# Patient Record
Sex: MALE | Race: WHITE | Employment: OTHER | URBAN - METROPOLITAN AREA
[De-identification: names, ages, dates, MRNs, and addresses within clinical notes are randomized per-mention and may not be internally consistent; named-entity substitution may affect disease eponyms.]

---

## 2017-01-04 ENCOUNTER — HOSPITAL ENCOUNTER (OUTPATIENT)
Dept: OTHER | Age: 82
Discharge: HOME | End: 2017-01-04
Attending: INTERNAL MEDICINE | Admitting: INTERNAL MEDICINE

## 2017-01-04 ENCOUNTER — TELEPHONE (OUTPATIENT)
Dept: CARDIOLOGY CLINIC | Age: 82
End: 2017-01-04

## 2017-01-04 DIAGNOSIS — I48.0 PAROXYSMAL ATRIAL FIBRILLATION (HCC): ICD-10-CM

## 2017-01-04 DIAGNOSIS — I25.10 CORONARY ARTERY DISEASE INVOLVING NATIVE CORONARY ARTERY OF NATIVE HEART WITHOUT ANGINA PECTORIS: ICD-10-CM

## 2017-01-04 DIAGNOSIS — I10 ESSENTIAL HYPERTENSION: ICD-10-CM

## 2017-01-04 DIAGNOSIS — E78.2 MIXED HYPERLIPIDEMIA: ICD-10-CM

## 2017-01-04 LAB
ALT SERPL-CCNC: 22 U/L (ref 10–40)
ANION GAP SERPL CALCULATED.3IONS-SCNC: 17 MMOL/L (ref 3–16)
AST SERPL-CCNC: 22 U/L (ref 15–37)
BASOPHILS ABSOLUTE: 0 K/UL (ref 0–0.2)
BASOPHILS RELATIVE PERCENT: 0.5 %
BUN BLDV-MCNC: 16 MG/DL (ref 7–20)
CALCIUM SERPL-MCNC: 8.8 MG/DL (ref 8.3–10.6)
CHLORIDE BLD-SCNC: 99 MMOL/L (ref 99–110)
CHOLESTEROL, TOTAL: 124 MG/DL (ref 0–199)
CO2: 22 MMOL/L (ref 21–32)
CREAT SERPL-MCNC: 1.2 MG/DL (ref 0.8–1.3)
EOSINOPHILS ABSOLUTE: 0.2 K/UL (ref 0–0.6)
EOSINOPHILS RELATIVE PERCENT: 2.4 %
GFR AFRICAN AMERICAN: >60
GFR NON-AFRICAN AMERICAN: 58
GLUCOSE BLD-MCNC: 96 MG/DL (ref 70–99)
HCT VFR BLD CALC: 40.7 % (ref 40.5–52.5)
HDLC SERPL-MCNC: 45 MG/DL (ref 40–60)
HEMOGLOBIN: 13.7 G/DL (ref 13.5–17.5)
LDL CHOLESTEROL CALCULATED: 54 MG/DL
LYMPHOCYTES ABSOLUTE: 0.9 K/UL (ref 1–5.1)
LYMPHOCYTES RELATIVE PERCENT: 9.8 %
MCH RBC QN AUTO: 30.5 PG (ref 26–34)
MCHC RBC AUTO-ENTMCNC: 33.5 G/DL (ref 31–36)
MCV RBC AUTO: 91 FL (ref 80–100)
MONOCYTES ABSOLUTE: 0.7 K/UL (ref 0–1.3)
MONOCYTES RELATIVE PERCENT: 8.1 %
NEUTROPHILS ABSOLUTE: 7 K/UL (ref 1.7–7.7)
NEUTROPHILS RELATIVE PERCENT: 79.2 %
PDW BLD-RTO: 13.3 % (ref 12.4–15.4)
PLATELET # BLD: 151 K/UL (ref 135–450)
PMV BLD AUTO: 9 FL (ref 5–10.5)
POTASSIUM SERPL-SCNC: 4.1 MMOL/L (ref 3.5–5.1)
RBC # BLD: 4.47 M/UL (ref 4.2–5.9)
SODIUM BLD-SCNC: 138 MMOL/L (ref 136–145)
TRIGL SERPL-MCNC: 124 MG/DL (ref 0–150)
VLDLC SERPL CALC-MCNC: 25 MG/DL
WBC # BLD: 8.9 K/UL (ref 4–11)

## 2017-01-10 ENCOUNTER — TELEPHONE (OUTPATIENT)
Dept: CARDIOLOGY CLINIC | Age: 82
End: 2017-01-10

## 2017-01-17 ENCOUNTER — TELEPHONE (OUTPATIENT)
Dept: CARDIOLOGY CLINIC | Age: 82
End: 2017-01-17

## 2017-02-14 ENCOUNTER — TELEPHONE (OUTPATIENT)
Dept: CARDIOLOGY CLINIC | Age: 82
End: 2017-02-14

## 2017-03-06 ENCOUNTER — HOSPITAL ENCOUNTER (OUTPATIENT)
Dept: OTHER | Age: 82
Discharge: HOME | End: 2017-03-06
Attending: UROLOGY | Admitting: UROLOGY

## 2017-03-06 DIAGNOSIS — N20.0 KIDNEY STONE: ICD-10-CM

## 2017-04-12 PROBLEM — R19.7 DIARRHEA: Status: ACTIVE | Noted: 2017-04-12

## 2017-06-06 ENCOUNTER — TELEPHONE (OUTPATIENT)
Dept: CARDIOLOGY CLINIC | Age: 82
End: 2017-06-06

## 2017-09-25 ENCOUNTER — OFFICE VISIT (OUTPATIENT)
Dept: CARDIOLOGY CLINIC | Age: 82
End: 2017-09-25

## 2017-09-25 ENCOUNTER — HOSPITAL ENCOUNTER (OUTPATIENT)
Dept: OTHER | Age: 82
Discharge: HOME | End: 2017-09-25
Attending: INTERNAL MEDICINE | Admitting: INTERNAL MEDICINE

## 2017-09-25 ENCOUNTER — TELEPHONE (OUTPATIENT)
Dept: CARDIOLOGY CLINIC | Age: 82
End: 2017-09-25

## 2017-09-25 VITALS
HEIGHT: 71 IN | OXYGEN SATURATION: 97 % | WEIGHT: 246.9 LBS | DIASTOLIC BLOOD PRESSURE: 84 MMHG | SYSTOLIC BLOOD PRESSURE: 150 MMHG | HEART RATE: 60 BPM | BODY MASS INDEX: 34.56 KG/M2

## 2017-09-25 DIAGNOSIS — I25.10 CORONARY ARTERY DISEASE INVOLVING NATIVE CORONARY ARTERY OF NATIVE HEART WITHOUT ANGINA PECTORIS: ICD-10-CM

## 2017-09-25 DIAGNOSIS — I10 ESSENTIAL HYPERTENSION: ICD-10-CM

## 2017-09-25 DIAGNOSIS — I25.10 CORONARY ARTERY DISEASE INVOLVING NATIVE CORONARY ARTERY OF NATIVE HEART WITHOUT ANGINA PECTORIS: Primary | ICD-10-CM

## 2017-09-25 DIAGNOSIS — E78.2 MIXED HYPERLIPIDEMIA: ICD-10-CM

## 2017-09-25 DIAGNOSIS — E78.2 MIXED HYPERLIPIDEMIA: Primary | ICD-10-CM

## 2017-09-25 LAB
ALT SERPL-CCNC: 17 U/L (ref 10–40)
ANION GAP SERPL CALCULATED.3IONS-SCNC: 12 MMOL/L (ref 3–16)
AST SERPL-CCNC: 15 U/L (ref 15–37)
BUN BLDV-MCNC: 16 MG/DL (ref 7–20)
CALCIUM SERPL-MCNC: 9.3 MG/DL (ref 8.3–10.6)
CHLORIDE BLD-SCNC: 101 MMOL/L (ref 99–110)
CHOLESTEROL, TOTAL: 141 MG/DL (ref 0–199)
CO2: 26 MMOL/L (ref 21–32)
CREAT SERPL-MCNC: 0.8 MG/DL (ref 0.8–1.3)
GFR AFRICAN AMERICAN: >60
GFR NON-AFRICAN AMERICAN: >60
GLUCOSE BLD-MCNC: 108 MG/DL (ref 70–99)
HDLC SERPL-MCNC: 57 MG/DL (ref 40–60)
LDL CHOLESTEROL CALCULATED: 69 MG/DL
POTASSIUM SERPL-SCNC: 4.3 MMOL/L (ref 3.5–5.1)
SODIUM BLD-SCNC: 139 MMOL/L (ref 136–145)
TRIGL SERPL-MCNC: 76 MG/DL (ref 0–150)
VLDLC SERPL CALC-MCNC: 15 MG/DL

## 2017-09-25 PROCEDURE — 99214 OFFICE O/P EST MOD 30 MIN: CPT | Performed by: INTERNAL MEDICINE

## 2017-09-25 PROCEDURE — 1123F ACP DISCUSS/DSCN MKR DOCD: CPT | Performed by: INTERNAL MEDICINE

## 2017-09-25 PROCEDURE — 1036F TOBACCO NON-USER: CPT | Performed by: INTERNAL MEDICINE

## 2017-09-25 PROCEDURE — G8598 ASA/ANTIPLAT THER USED: HCPCS | Performed by: INTERNAL MEDICINE

## 2017-09-25 PROCEDURE — 4040F PNEUMOC VAC/ADMIN/RCVD: CPT | Performed by: INTERNAL MEDICINE

## 2017-09-25 PROCEDURE — G8417 CALC BMI ABV UP PARAM F/U: HCPCS | Performed by: INTERNAL MEDICINE

## 2017-09-25 PROCEDURE — G8427 DOCREV CUR MEDS BY ELIG CLIN: HCPCS | Performed by: INTERNAL MEDICINE

## 2017-09-25 RX ORDER — RIVAROXABAN 20 MG/1
20 TABLET, FILM COATED ORAL
Qty: 90 TABLET | Refills: 3 | Status: SHIPPED | OUTPATIENT
Start: 2017-09-25 | End: 2020-04-23 | Stop reason: SDUPTHER

## 2017-09-25 RX ORDER — LOSARTAN POTASSIUM 50 MG/1
50 TABLET ORAL DAILY
Qty: 90 TABLET | Refills: 3 | Status: SHIPPED | OUTPATIENT
Start: 2017-09-25 | End: 2018-07-03 | Stop reason: SDUPTHER

## 2017-09-25 RX ORDER — ATORVASTATIN CALCIUM 20 MG/1
20 TABLET, FILM COATED ORAL DAILY
Qty: 90 TABLET | Refills: 3 | Status: SHIPPED | OUTPATIENT
Start: 2017-09-25 | End: 2018-07-03 | Stop reason: SDUPTHER

## 2017-09-26 ENCOUNTER — TELEPHONE (OUTPATIENT)
Dept: CARDIOLOGY CLINIC | Age: 82
End: 2017-09-26

## 2017-10-11 ENCOUNTER — TELEPHONE (OUTPATIENT)
Dept: CARDIOLOGY CLINIC | Age: 82
End: 2017-10-11

## 2017-10-31 ENCOUNTER — TELEPHONE (OUTPATIENT)
Dept: CARDIOLOGY CLINIC | Age: 82
End: 2017-10-31

## 2017-10-31 NOTE — TELEPHONE ENCOUNTER
Spoke with pharmacy to verify. . Patient is in the donut hole. Currently out of samples. . Patient states he has one table left. Martita from [facility] will be bringing samples over today and I will place some at the  for patient. Patient notified.

## 2017-10-31 NOTE — TELEPHONE ENCOUNTER
Please look for samples. I assume this is donut hole issue. Is that correct?     1 [facility]

## 2017-11-03 ENCOUNTER — TELEPHONE (OUTPATIENT)
Dept: CARDIOLOGY CLINIC | Age: 82
End: 2017-11-03

## 2017-11-08 ENCOUNTER — TELEPHONE (OUTPATIENT)
Dept: CARDIOLOGY CLINIC | Age: 82
End: 2017-11-08

## 2017-11-08 NOTE — TELEPHONE ENCOUNTER
Pt's wife called to adv pt has been prescribed diclofenac gel for a hamstring injury. They are very concerned with all the the cardiac related side effects that are listed. Was told to take his bp twice a day when starting this due to this medications can raise Bp. Also states risks of causing heart attacks and a lot of other things that have the pt very concerned. Please call to adv.   Thank you [facility]

## 2017-11-08 NOTE — TELEPHONE ENCOUNTER
Called to speak with patient/patient wife; BP was 170/78 earlier today. patient has been using medication since yesterday. May be a continuous medication if works. . Patient says it seems to work so far. Feels okay. . Had patient take BP while on the phone and it was 164/79, pulse 61.  Please advise

## 2017-11-17 ENCOUNTER — TELEPHONE (OUTPATIENT)
Dept: CARDIOLOGY CLINIC | Age: 82
End: 2017-11-17

## 2017-11-17 DIAGNOSIS — I10 ESSENTIAL HYPERTENSION: Primary | ICD-10-CM

## 2017-11-17 RX ORDER — AMLODIPINE BESYLATE 5 MG/1
5 TABLET ORAL DAILY
Qty: 30 TABLET | Refills: 3 | Status: SHIPPED | OUTPATIENT
Start: 2017-11-17 | End: 2017-12-08 | Stop reason: SDUPTHER

## 2017-11-17 NOTE — TELEPHONE ENCOUNTER
Please add norvasc 5 mg po q day. Needs ov with NP 2 weeks to assess.     1 [facility] Angella

## 2017-11-17 NOTE — TELEPHONE ENCOUNTER
Pt wife calling to adv that pt bp has been going up yesterday at 6 pm when their daughter who is nurse took bp around 6 pm it was 200/100, around 2:30pm bp was 185/95 and at 4:30pm they went to the drugstore and it was 212/102. She adv that pt is saying he feels ok no symptoms.  Please call to adv thank you

## 2017-11-17 NOTE — TELEPHONE ENCOUNTER
I spoke  Pt wife and she states that pt is asymptomatic and that they are concerned about the high readings.

## 2017-12-04 ENCOUNTER — OFFICE VISIT (OUTPATIENT)
Dept: CARDIOLOGY CLINIC | Age: 82
End: 2017-12-04

## 2017-12-04 VITALS
SYSTOLIC BLOOD PRESSURE: 138 MMHG | WEIGHT: 256.9 LBS | DIASTOLIC BLOOD PRESSURE: 70 MMHG | HEIGHT: 71 IN | BODY MASS INDEX: 35.97 KG/M2 | HEART RATE: 72 BPM

## 2017-12-04 DIAGNOSIS — E78.2 MIXED HYPERLIPIDEMIA: ICD-10-CM

## 2017-12-04 DIAGNOSIS — L03.116 CELLULITIS OF LEFT LOWER EXTREMITY: ICD-10-CM

## 2017-12-04 DIAGNOSIS — I10 ESSENTIAL HYPERTENSION: ICD-10-CM

## 2017-12-04 DIAGNOSIS — I48.0 PAROXYSMAL ATRIAL FIBRILLATION (HCC): Primary | ICD-10-CM

## 2017-12-04 DIAGNOSIS — I25.10 CORONARY ARTERY DISEASE INVOLVING NATIVE CORONARY ARTERY OF NATIVE HEART WITHOUT ANGINA PECTORIS: ICD-10-CM

## 2017-12-04 PROCEDURE — G8484 FLU IMMUNIZE NO ADMIN: HCPCS | Performed by: NURSE PRACTITIONER

## 2017-12-04 PROCEDURE — G8598 ASA/ANTIPLAT THER USED: HCPCS | Performed by: NURSE PRACTITIONER

## 2017-12-04 PROCEDURE — G8417 CALC BMI ABV UP PARAM F/U: HCPCS | Performed by: NURSE PRACTITIONER

## 2017-12-04 PROCEDURE — 1036F TOBACCO NON-USER: CPT | Performed by: NURSE PRACTITIONER

## 2017-12-04 PROCEDURE — G8427 DOCREV CUR MEDS BY ELIG CLIN: HCPCS | Performed by: NURSE PRACTITIONER

## 2017-12-04 PROCEDURE — 4040F PNEUMOC VAC/ADMIN/RCVD: CPT | Performed by: NURSE PRACTITIONER

## 2017-12-04 PROCEDURE — 1123F ACP DISCUSS/DSCN MKR DOCD: CPT | Performed by: NURSE PRACTITIONER

## 2017-12-04 PROCEDURE — 99214 OFFICE O/P EST MOD 30 MIN: CPT | Performed by: NURSE PRACTITIONER

## 2017-12-04 RX ORDER — FUROSEMIDE 20 MG/1
20 TABLET ORAL DAILY
Qty: 60 TABLET | Refills: 3
Start: 2017-12-04 | End: 2018-07-03 | Stop reason: ALTCHOICE

## 2017-12-04 RX ORDER — POTASSIUM CHLORIDE 20 MEQ/1
20 TABLET, EXTENDED RELEASE ORAL DAILY
Qty: 60 TABLET | Refills: 3
Start: 2017-12-04 | End: 2018-07-03 | Stop reason: ALTCHOICE

## 2017-12-04 NOTE — PROGRESS NOTES
[address]     Outpatient Follow Up Note    CHIEF COMPLAINT / HPI:  Follow Up secondary to coronary artery disease/ HTN/ Hyperlipidemia/ AF/ edema        Brianne Denney is 80 y.o. male who presents today for a routine follow up  related to the above mentioned issues. Subjective:   Since the time of last office visit, the patient admits their symptoms have not changed. Mr. Maine Goyal is an 80year old gentleman here today in follow up for CAD, hypertension, hyperlipidemia, afib and PVD. In 2014, he was diagnosed with a DVT and completed 6 months of treatment with Xarelto. He was seen by a hematologist, initially worked up for lupus anticoagulant, but most recent testing did not show evidence of this. He was admitted  4/17 with sepsis and developed afib. He was started on Eliquis. Now on Xarelto. At today's visit patient is doing fine. He has had 2+ BLE edema for three weeks. Patient denies any problem with kidney issues and denies any hematuria  . He denies any chest pain, palpitations, SOB or dizziness. With regard to medication therapy the patient has been compliant with prescribed regimen. They have tolerated therapy to date.      Past Medical History:   Diagnosis Date    Atrial fibrillation ([address]     CAD (coronary artery disease)     Diarrhea 04/12/2017    Hyperlipidemia     Hypertension     Kidney stones 03/2017     Social History:    History   Smoking Status    Former Smoker    Packs/day: 2.00    Years: 10.00    Quit date: 1/1/1975   Smokeless Tobacco    Never Used     Current Medications:  Current Outpatient Prescriptions   Medication Sig Dispense Refill    furosemide (LASIX) 20 MG tablet Take 1 tablet by mouth daily 60 tablet 3    potassium chloride (KLOR-CON M) 20 MEQ extended release tablet Take 1 tablet by mouth daily 60 tablet 3    amLODIPine (NORVASC) 5 MG tablet Take 1 tablet by mouth daily 30 tablet 3    XARELTO 20 MG TABS tablet Take 1 tablet by mouth Daily ventricle size, wall thickness and systolic function with an EF of 55%. Mild aortic stenosis with mean gradient of 12mmHg. Trivial aortic regurgitation is present. Mild to moderate tricuspid regurgitation with RVSP estimated at 47 mmHg     Last Stress Test:  Lexiscan Myoview 2014> Normal perfusion/EF. Assessment:     .1 CAD (coronary artery disease)/CABG 1981 & 2001:   Denies any anginal symptoms. Currently on Lipitor and Losartan     2. HTN   Today's B/P- 138/70  Stable  Continue current medications   3. Hyperlipidemia- on Lipitor 20 mg daily  9/25/17: HDL: 57, LDL: 69   4. Edema: + 2 BLE edema  Add Lasix 20 mg daily and klor- Con 20 meq daily  Lab slip given for BMP   5. Atrial Fibrillation (paroxysmal): On Xarelto. PAF. HR controlled. Asymptomatic. Creat clear=91  6. DVT: Diagnosed Spring 2014>       Patient  is stable since last office visit. Plan:   Add Lasix 20 mg daily   Add Klor- Con 20 meq daily  Lab slip given for BMP  Instructed the patient on daily weights, 2 gram sodium diet, wearing compression stockings. Instructed the patient to call in one week on BLE edema status. Follow up in one month with PCP  Follow up in three months with [facility]    I have addressed the patient's cardiac risk factors and adjusted pharmacologic treatment as needed. In addition, I have reinforced the need for patient directed risk factor modification. Further evaluation will be based upon the patient's clinical course and testing results. All questions and concerns were addressed to the patient/family. Alternatives to  treatment were discussed. The patient  currently  is not smoking. The risks related to smoking were reviewed with the patient. Recommend maintaining a smoke-free lifestyle. Products available for smoking cessation were discussed.         Pt is not  on a BB  Pt is on an ace-i/ARB  Pt is on a statin    Saturated fat diet discussed  Exercise program discussed    Thank you for allowing to us to participate in the [facility]

## 2017-12-08 ENCOUNTER — TELEPHONE (OUTPATIENT)
Dept: CARDIOLOGY CLINIC | Age: 82
End: 2017-12-08

## 2017-12-08 DIAGNOSIS — I10 ESSENTIAL HYPERTENSION: ICD-10-CM

## 2017-12-08 RX ORDER — AMLODIPINE BESYLATE 5 MG/1
5 TABLET ORAL DAILY
Qty: 90 TABLET | Refills: 3 | Status: SHIPPED | OUTPATIENT
Start: 2017-12-08 | End: 2018-06-04

## 2017-12-19 ENCOUNTER — HOSPITAL ENCOUNTER (OUTPATIENT)
Dept: MRI IMAGING | Age: 82
Discharge: HOME | End: 2017-12-19
Attending: INTERNAL MEDICINE | Admitting: INTERNAL MEDICINE

## 2017-12-19 DIAGNOSIS — S76.319A HAMSTRING MUSCLE STRAIN, UNSPECIFIED LATERALITY, INITIAL ENCOUNTER: ICD-10-CM

## 2017-12-19 DIAGNOSIS — S76.311D STRAIN OF MUSCLE, FASCIA AND TENDON OF THE POSTERIOR MUSCLE GROUP AT THIGH LEVEL, RIGHT THIGH, SUBSEQUENT ENCOUNTER: ICD-10-CM

## 2017-12-20 ENCOUNTER — TELEPHONE (OUTPATIENT)
Dept: CARDIOLOGY CLINIC | Age: 82
End: 2017-12-20

## 2017-12-20 NOTE — TELEPHONE ENCOUNTER
Pt calling to let Mara Diazkishorekristina know that he had his labs done yesterday and wants to know if he should continue taking lasix and potassium. Pt would like to be called back Please

## 2018-01-08 ENCOUNTER — HOSPITAL ENCOUNTER (OUTPATIENT)
Dept: PHYSICAL THERAPY | Age: 83
Discharge: HOME | End: 2018-01-31
Attending: INTERNAL MEDICINE | Admitting: INTERNAL MEDICINE

## 2018-01-08 ASSESSMENT — PAIN DESCRIPTION - DESCRIPTORS: DESCRIPTORS: ACHING

## 2018-01-08 ASSESSMENT — PAIN DESCRIPTION - ORIENTATION: ORIENTATION: LEFT

## 2018-01-08 ASSESSMENT — PAIN DESCRIPTION - PAIN TYPE: TYPE: ACUTE PAIN

## 2018-01-08 ASSESSMENT — PAIN SCALES - GENERAL: PAINLEVEL_OUTOF10: 7

## 2018-01-08 ASSESSMENT — PAIN DESCRIPTION - FREQUENCY: FREQUENCY: CONTINUOUS

## 2018-01-08 ASSESSMENT — PAIN DESCRIPTION - LOCATION: LOCATION: LEG

## 2018-01-08 NOTE — PLAN OF CARE
Outpatient Physical Therapy     Phone: [phone] Fax: [phone]     To: Referring Practitioner: Dr. Catalino Jha      Patient: Parminder Prabhakar   : 1935   MRN: [id number]  Evaluation Date: 2018      Diagnosis Information:  · Diagnosis: L hamstring strain   · Treatment Diagnosis: increased tissue tension in medial proximal hamstring, decreased knee AROM, decreased hip strength, impaired gait, decreased balance, and decreased endurance of B [facility]     Physical Therapy Certification/Re-Certification Form  Dear Dr. Isauro Zavala,  The following patient has been evaluated for physical therapy services. Please review the attached evaluation and/or summary of the patient's plan of care, and verify that you agree therapy should continue by signing the attached document and sending it back to our office. Plan of Care/Treatment to date:  [x] Therapeutic Exercise      [x] Modalities:  [x] Therapeutic Activity        [] Ultrasound    [x] Gait Training        [] Cervical Traction   [x] Neuromuscular Re-education      [x] Cold/hotpack    [x] Instruction in HEP        [] Lumbar Traction  [x] Manual Therapy        [x] Electrical Stimulation            [] Aquatic Therapy        [] Iontophoresis        ? [] Lymphedema management  [] Women's Health     Other:  [] Vestibular Rehab        []    []  Needed     Frequency/Duration:  # Days per week: [] 1 day # Weeks: [] 1 week [] 5 weeks     [x] 2 days? [] 2 weeks [x] 6 weeks     [] 3 days   [] 3 weeks [] 7 weeks     [] 4 days   [] 4 weeks [] 8 weeks    Rehab Potential: [] Excellent [x] Good [] Fair  [] Poor     Electronically signed by:  Fly Reno PT, DPT    If you have any questions or concerns, please don't hesitate to call.   Thank you for your referral.      Physician Signature:________________________________Date:__________________  By signing above, therapists plan is approved by physician

## 2018-01-08 NOTE — PROGRESS NOTES
Physical Therapy  Initial Assessment  Date: 2018  Patient Name: Dori Morales  MRN: [id number]  : 1935     Treatment Diagnosis: increased tissue tension in medial proximal hamstring, decreased knee AROM, decreased hip strength, impaired gait, decreased balance, and decreased endurance of B LEs    Restrictions       Subjective   General  Chart Reviewed: Yes  Patient assessed for rehabilitation services?: Yes  Additional Pertinent Hx: R TKA, A fib, HTN, HLD, CAD, B shoulder sx, back sx x 4 (discectomies and possibly a fusion)  Response To Previous Treatment: Not applicable  Family / Caregiver Present: Yes  Referring Practitioner: Dr. Olga Keane  Referral Date : 17  Diagnosis: L hamstring strain  PT Visit Information  Onset Date: 17  PT Insurance Information: [address] (medicare guidelines)  Subjective  Subjective: In 2017 had very weak quad folowing TKA on R side. Went to PT to increase quad strength. Therapy was really intense and he thinks he overdid it during the last few sessions. He beleives he strained the L hamstring while strengthening the R leg. CLOF: When he goes to bed at night he has no pain. Standing, walking, and climbing stairs are all painful. Has been using ice on his hamstring. Is able to dress and bathe without issues. He uses a walking when he is out of the house. Uses a cane in the house and on the stairs. Is able to use the elevator if needed up to his condo.  PLOF: prior to R TKA, no issues with L LE Goals: return to ambu without AD, decrease pain with all ADLs and weight bearing  Pain Screening  Patient Currently in Pain: Yes  Pain Assessment  Pain Assessment: 0-10  Pain Level: 7  Pain Type: Acute pain  Pain Location: Leg  Pain Orientation: Left  Pain Descriptors: Aching  Pain Frequency: Continuous  Vital Signs  Patient Currently in Pain: Yes    Vision/Hearing  Vision  Vision: Within Functional Limits (had cataract surgery)  Hearing  Hearing: Exceptions to WFL  Hearing Exceptions: Hard of hearing/hearing concerns    Orientation  Orientation  Overall Orientation Status: Within Normal Limits    Social/Functional History  Social/Functional History  Lives With: Spouse  Type of Home: Apartment  Home Layout: One level  Home Access: Elevator;Stairs to enter with rails  Entrance Stairs - Number of Steps: 17 [address]  Entrance Stairs - Rails: Both  Bathroom Shower/Tub: Shower chair with back  Bathroom Equipment: Shower chair  Bathroom Accessibility: Accessible  Home Equipment: Rolling walker;4 wheeled walker  Receives Help From: Family  ADL Assistance: Independent  Homemaking Assistance: Independent  Homemaking Responsibilities: Yes  Ambulation Assistance: Independent  Transfer Assistance: Independent  Active : Yes  Mode of Transportation: Car  Occupation: Retired  Objective     Observation/Palpation  Posture: Fair  Palpation: no TTP distal HS    AROM LLE (degrees)  LLE General AROM: supine, goni  L Knee Extension 0: lacking 20 deg   Spine  Special Tests: HS 90/90 test on L lacking 50 deg, R lacking 30 deg    Strength RLE  R Hip Flexion: 5/5  R Hip Extension: 3+/5  R Hip Internal Rotation: 5/5  R Hip External Rotation: 5/5  R Knee Flexion: 5/5  R Knee Extension: 5/5  R Ankle Dorsiflexion: 5/5  Strength LLE  L Hip Flexion: 5/5  L Hip Extension: 3+/5  L Hip Internal Rotation: 5/5  L Hip External Rotation: 5/5  L Knee Flexion: 5/5  L Knee Extension: 5/5  L Ankle Dorsiflexion: 5/5           Bed mobility  Rolling to Left: Modified independent  Rolling to Right: Modified independent  Supine to Sit: Modified independent  Sit to Supine: Modified independent  Transfers  Sit to Stand: Modified independent  Stand to sit: Modified independent  Ambulation  Ambulation?: Yes  Ambulation 1  Surface: level tile  Device: Rolling Walker  Assistance: Modified Independent  Quality of Gait: forward flexed trunk (walker too far in front), short step length, slow candence, heavy reliance on R knee  Patient Education: PT role and plan  Barriers to Learning: none  REQUIRES PT FOLLOW UP: Yes         Plan   Plan  Times per week: 2x/weeks   Plan weeks: 6 weeks  Current Treatment Recommendations: ROM, Balance Training, Functional Mobility Training, ADL/Self-care Training, IADL Training, Endurance Training, Gait Training, Stair training, Neuromuscular Re-education, Manual Therapy - Soft Tissue Mobilization, Manual Therapy - Joint Manipulation, Home Exercise Program, Safety Education & Training, Modalities, Patient/Caregiver Education & Training, Positioning    G-Code  PT G-Codes  Functional Assessment Tool Used: PT assessment based on subjective and objective measures  Score: 40%  Functional Limitation: Mobility: Walking and moving around  Mobility: Walking and Moving Around Current Status (): At least 40 percent but less than 60 percent impaired, limited or restricted  Mobility: Walking and Moving Around Goal Status (): At least 1 percent but less than 20 percent impaired, limited or restricted      Goals  Long term goals  Time Frame for Long term goals : 6 weeks:  Long term goal 1: Pt will improve L HS length to lacking 30 deg to improve ability to ambulate and ascend stairs without limiations or pain. Long term goal 2: Pt will demo ability to ascend/descend 2 flights of stairs with reciporcal pattern and 1-2 HRs safely to increase independence in his home. Long term goal 3: Pt will safely ambulate 50 feet without AD with efficient gait mechanics to progress towards PLOF. Long term goal 4: Pt will report pain at 2/10 over 24 hour period to progress towards no pain with ADLs. Long term goal 5: Pt will be confident with HEP in [facility] to avoid future injury and maintain strength in LEs.         Therapy Time   Individual Concurrent Group Co-treatment   Time In 1340         Time Out 1430         Minutes 50         Timed Code Treatment Minutes: [address], DPT [id number]

## 2018-01-12 ENCOUNTER — TELEPHONE (OUTPATIENT)
Dept: CARDIOLOGY CLINIC | Age: 83
End: 2018-01-12

## 2018-01-12 RX ORDER — LOSARTAN POTASSIUM 50 MG/1
50 TABLET ORAL DAILY
Qty: 90 TABLET | Refills: 3 | OUTPATIENT
Start: 2018-01-12

## 2018-01-18 ENCOUNTER — TELEPHONE (OUTPATIENT)
Dept: CARDIOLOGY CLINIC | Age: 83
End: 2018-01-18

## 2018-02-01 ENCOUNTER — HOSPITAL ENCOUNTER (OUTPATIENT)
Dept: OTHER | Age: 83
Discharge: HOME | End: 2018-02-28
Attending: INTERNAL MEDICINE | Admitting: INTERNAL MEDICINE

## 2018-02-26 ENCOUNTER — HOSPITAL ENCOUNTER (OUTPATIENT)
Dept: VASCULAR LAB | Age: 83
Discharge: HOME | End: 2018-02-26
Attending: INTERNAL MEDICINE | Admitting: INTERNAL MEDICINE

## 2018-02-26 DIAGNOSIS — R60.0 LOCALIZED EDEMA: ICD-10-CM

## 2018-03-05 ENCOUNTER — TELEPHONE (OUTPATIENT)
Dept: CARDIOLOGY CLINIC | Age: 83
End: 2018-03-05

## 2018-03-05 NOTE — TELEPHONE ENCOUNTER
Last OV 12/4/17  4 bottles of samples. Placed at   Also let patient know he should have refills of amlodipine medication at pharmacy.

## 2018-03-15 ENCOUNTER — HOSPITAL ENCOUNTER (OUTPATIENT)
Dept: MRI IMAGING | Age: 83
Discharge: HOME | End: 2018-03-15
Attending: INTERNAL MEDICINE | Admitting: INTERNAL MEDICINE

## 2018-03-15 DIAGNOSIS — M54.16 RADICULOPATHY OF LUMBAR REGION: ICD-10-CM

## 2018-04-10 ENCOUNTER — TELEPHONE (OUTPATIENT)
Dept: CARDIOLOGY CLINIC | Age: 83
End: 2018-04-10

## 2018-04-17 ENCOUNTER — TELEPHONE (OUTPATIENT)
Dept: CARDIOLOGY CLINIC | Age: 83
End: 2018-04-17

## 2018-04-20 ENCOUNTER — TELEPHONE (OUTPATIENT)
Dept: CARDIOLOGY CLINIC | Age: 83
End: 2018-04-20

## 2018-05-07 ENCOUNTER — TELEPHONE (OUTPATIENT)
Dept: CARDIOLOGY CLINIC | Age: 83
End: 2018-05-07

## 2018-05-23 ENCOUNTER — TELEPHONE (OUTPATIENT)
Dept: CARDIOLOGY CLINIC | Age: 83
End: 2018-05-23

## 2018-06-01 ENCOUNTER — HOSPITAL ENCOUNTER (OUTPATIENT)
Dept: MRI IMAGING | Age: 83
Discharge: HOME | End: 2018-06-01
Attending: NURSE PRACTITIONER | Admitting: NURSE PRACTITIONER

## 2018-06-01 DIAGNOSIS — M48.062 LUMBAR STENOSIS WITH NEUROGENIC CLAUDICATION: ICD-10-CM

## 2018-06-01 DIAGNOSIS — M51.17 INTERVERTEBRAL DISC DISORDER WITH RADICULOPATHY OF LUMBOSACRAL REGION: ICD-10-CM

## 2018-06-01 RX ORDER — SODIUM CHLORIDE 0.9 % (FLUSH) 0.9 %
10 SYRINGE (ML) INJECTION PRN
Status: DISCONTINUED | OUTPATIENT
Start: 2018-06-01 | End: 2018-06-02 | Stop reason: HOSPADM

## 2018-06-01 RX ADMIN — Medication 10 ML: at 15:39

## 2018-06-03 DIAGNOSIS — I10 ESSENTIAL HYPERTENSION: ICD-10-CM

## 2018-06-04 ENCOUNTER — TELEPHONE (OUTPATIENT)
Dept: CARDIOLOGY CLINIC | Age: 83
End: 2018-06-04

## 2018-06-04 RX ORDER — AMLODIPINE BESYLATE 5 MG/1
5 TABLET ORAL DAILY
Qty: 90 TABLET | Refills: 1 | Status: SHIPPED | OUTPATIENT
Start: 2018-06-04 | End: 2018-11-26 | Stop reason: SDUPTHER

## 2018-06-28 ENCOUNTER — TELEPHONE (OUTPATIENT)
Dept: CARDIOLOGY CLINIC | Age: 83
End: 2018-06-28

## 2018-07-03 ENCOUNTER — OFFICE VISIT (OUTPATIENT)
Dept: CARDIOLOGY CLINIC | Age: 83
End: 2018-07-03

## 2018-07-03 VITALS
SYSTOLIC BLOOD PRESSURE: 138 MMHG | DIASTOLIC BLOOD PRESSURE: 70 MMHG | BODY MASS INDEX: 35.43 KG/M2 | OXYGEN SATURATION: 93 % | HEART RATE: 60 BPM | WEIGHT: 254 LBS

## 2018-07-03 DIAGNOSIS — E78.2 MIXED HYPERLIPIDEMIA: ICD-10-CM

## 2018-07-03 DIAGNOSIS — I10 ESSENTIAL HYPERTENSION: ICD-10-CM

## 2018-07-03 DIAGNOSIS — I25.10 CORONARY ARTERY DISEASE INVOLVING NATIVE CORONARY ARTERY OF NATIVE HEART WITHOUT ANGINA PECTORIS: Primary | ICD-10-CM

## 2018-07-03 PROCEDURE — 99214 OFFICE O/P EST MOD 30 MIN: CPT | Performed by: INTERNAL MEDICINE

## 2018-07-03 RX ORDER — ATORVASTATIN CALCIUM 20 MG/1
20 TABLET, FILM COATED ORAL DAILY
Qty: 90 TABLET | Refills: 3 | Status: SHIPPED | OUTPATIENT
Start: 2018-07-03 | End: 2019-05-30 | Stop reason: SDUPTHER

## 2018-07-03 RX ORDER — ALPRAZOLAM 0.5 MG/1
0.5 TABLET ORAL NIGHTLY PRN
Status: ON HOLD | COMMUNITY
End: 2020-04-16

## 2018-07-03 RX ORDER — LOSARTAN POTASSIUM 50 MG/1
50 TABLET ORAL DAILY
Qty: 90 TABLET | Refills: 3 | Status: SHIPPED | OUTPATIENT
Start: 2018-07-03 | End: 2019-05-30 | Stop reason: SDUPTHER

## 2018-07-03 RX ORDER — OXYCODONE HYDROCHLORIDE AND ACETAMINOPHEN 5; 325 MG/1; MG/1
1 TABLET ORAL EVERY 4 HOURS PRN
COMMUNITY
End: 2018-08-20

## 2018-07-03 NOTE — PROGRESS NOTES
(PERCOCET) 5-325 MG per tablet Take 1 tablet by mouth every 4 hours as needed for Pain. [place] ALPRAZolam (XANAX) 0.5 MG tablet Take 0.5 mg by mouth nightly as needed for Sleep. .      amLODIPine (NORVASC) 5 MG tablet TAKE 1 TABLET BY MOUTH DAILY 90 tablet 1    Compression Stockings MISC Knee high hose 20-30 mmHg for swelling 1 each 0    XARELTO 20 MG TABS tablet Take 1 tablet by mouth Daily with supper 90 tablet 3    losartan (COZAAR) 50 MG tablet Take 1 tablet by mouth daily 90 tablet 3    atorvastatin (LIPITOR) 20 MG tablet Take 1 tablet by mouth daily 90 tablet 3    amitriptyline (ELAVIL) 100 MG tablet TK 1 T PO QD HS  2    VIAGRA 100 MG tablet TK 1 T PO 30 MINUTES B INTERCOURSE  0    ranitidine (ZANTAC) 150 MG tablet Take 150 mg by mouth 2 times daily       nitroGLYCERIN (NITROSTAT) 0.4 MG SL tablet Place 1 tablet under the tongue every 5 minutes as needed for Chest pain 25 tablet 3    Ascorbic Acid (VITAMIN C) 500 MG tablet Take 1,000 mg by mouth daily       vitamin E 1000 UNITS capsule Take 800 Units by mouth daily       vitamin D (CHOLECALCIFEROL) 1000 UNIT TABS tablet Take 1,000 Units by mouth daily.  psyllium (KONSYL) 28.3 % PACK Take 1 packet by mouth daily       furosemide (LASIX) 20 MG tablet Take 1 tablet by mouth daily 60 tablet 3    potassium chloride (KLOR-CON M) 20 MEQ extended release tablet Take 1 tablet by mouth daily 60 tablet 3     No facility-administered encounter medications on file as of 7/3/2018. Allergies:  Codeine and Flomax [tamsulosin hcl]     [x] Medications and dosages reviewed.   ROS:  [x]Full ROS obtained and negative except as mentioned in HPI      Physical Examination:    Vitals:    07/03/18 1410   BP: 138/70   Pulse: 60   SpO2: 93%        · GENERAL: Elderly male in wheel chair in NAD  · NEUROLOGICAL: Alert and oriented  · PSYCH: Calm affect  · SKIN: Warm and dry, No obvious Rash  · HEENT: Normocephalic, Sclera non-icteric, mucus membranes moist  · NECK:

## 2018-07-12 ENCOUNTER — TELEPHONE (OUTPATIENT)
Dept: CARDIOLOGY CLINIC | Age: 83
End: 2018-07-12

## 2018-08-09 ENCOUNTER — TELEPHONE (OUTPATIENT)
Dept: CARDIOLOGY CLINIC | Age: 83
End: 2018-08-09

## 2018-08-13 ENCOUNTER — TELEPHONE (OUTPATIENT)
Dept: CARDIOLOGY CLINIC | Age: 83
End: 2018-08-13

## 2018-08-20 ENCOUNTER — HOSPITAL ENCOUNTER (OUTPATIENT)
Dept: OTHER | Age: 83
Discharge: HOME | End: 2018-08-20
Attending: INTERNAL MEDICINE | Admitting: INTERNAL MEDICINE

## 2018-08-20 ENCOUNTER — OFFICE VISIT (OUTPATIENT)
Dept: CARDIOLOGY CLINIC | Age: 83
End: 2018-08-20

## 2018-08-20 VITALS
SYSTOLIC BLOOD PRESSURE: 124 MMHG | HEART RATE: 61 BPM | WEIGHT: 259 LBS | RESPIRATION RATE: 14 BRPM | OXYGEN SATURATION: 96 % | DIASTOLIC BLOOD PRESSURE: 80 MMHG | BODY MASS INDEX: 36.26 KG/M2 | HEIGHT: 71 IN

## 2018-08-20 DIAGNOSIS — R06.02 SOB (SHORTNESS OF BREATH): ICD-10-CM

## 2018-08-20 DIAGNOSIS — R06.02 SOB (SHORTNESS OF BREATH): Primary | ICD-10-CM

## 2018-08-20 DIAGNOSIS — Z86.718 HISTORY OF DVT (DEEP VEIN THROMBOSIS): ICD-10-CM

## 2018-08-20 DIAGNOSIS — I48.0 PAROXYSMAL ATRIAL FIBRILLATION (HCC): ICD-10-CM

## 2018-08-20 DIAGNOSIS — I10 ESSENTIAL HYPERTENSION: ICD-10-CM

## 2018-08-20 DIAGNOSIS — I25.10 CORONARY ARTERY DISEASE INVOLVING NATIVE CORONARY ARTERY OF NATIVE HEART WITHOUT ANGINA PECTORIS: ICD-10-CM

## 2018-08-20 DIAGNOSIS — I65.23 BILATERAL CAROTID ARTERY STENOSIS: ICD-10-CM

## 2018-08-20 DIAGNOSIS — E78.2 MIXED HYPERLIPIDEMIA: ICD-10-CM

## 2018-08-20 LAB
ALT SERPL-CCNC: 17 U/L (ref 10–40)
ANION GAP SERPL CALCULATED.3IONS-SCNC: 12 MMOL/L (ref 3–16)
AST SERPL-CCNC: 13 U/L (ref 15–37)
BUN BLDV-MCNC: 25 MG/DL (ref 7–20)
CALCIUM SERPL-MCNC: 9.2 MG/DL (ref 8.3–10.6)
CHLORIDE BLD-SCNC: 103 MMOL/L (ref 99–110)
CHOLESTEROL, TOTAL: 143 MG/DL (ref 0–199)
CO2: 24 MMOL/L (ref 21–32)
CREAT SERPL-MCNC: 1.1 MG/DL (ref 0.8–1.3)
GFR AFRICAN AMERICAN: >60
GFR NON-AFRICAN AMERICAN: >60
GLUCOSE BLD-MCNC: 104 MG/DL (ref 70–99)
HCT VFR BLD CALC: 43.1 % (ref 40.5–52.5)
HDLC SERPL-MCNC: 53 MG/DL (ref 40–60)
HEMOGLOBIN: 14.6 G/DL (ref 13.5–17.5)
LDL CHOLESTEROL CALCULATED: 65 MG/DL
MCH RBC QN AUTO: 32.3 PG (ref 26–34)
MCHC RBC AUTO-ENTMCNC: 33.9 G/DL (ref 31–36)
MCV RBC AUTO: 95.2 FL (ref 80–100)
PDW BLD-RTO: 13.8 % (ref 12.4–15.4)
PLATELET # BLD: 129 K/UL (ref 135–450)
PMV BLD AUTO: 8.7 FL (ref 5–10.5)
POTASSIUM SERPL-SCNC: 4.6 MMOL/L (ref 3.5–5.1)
PRO-BNP: 267 PG/ML (ref 0–449)
RBC # BLD: 4.53 M/UL (ref 4.2–5.9)
SODIUM BLD-SCNC: 139 MMOL/L (ref 136–145)
TRIGL SERPL-MCNC: 123 MG/DL (ref 0–150)
VLDLC SERPL CALC-MCNC: 25 MG/DL
WBC # BLD: 7.1 K/UL (ref 4–11)

## 2018-08-20 PROCEDURE — 99214 OFFICE O/P EST MOD 30 MIN: CPT | Performed by: INTERNAL MEDICINE

## 2018-08-20 RX ORDER — AMLODIPINE BESYLATE 5 MG/1
5 TABLET ORAL DAILY
Qty: 90 TABLET | Refills: 3 | Status: CANCELLED | OUTPATIENT
Start: 2018-08-20

## 2018-08-20 RX ORDER — RIVAROXABAN 20 MG/1
20 TABLET, FILM COATED ORAL
Qty: 90 TABLET | Refills: 3 | Status: CANCELLED | OUTPATIENT
Start: 2018-08-20

## 2018-08-20 RX ORDER — RANITIDINE 150 MG/1
150 CAPSULE ORAL 2 TIMES DAILY
Status: ON HOLD | COMMUNITY
End: 2020-04-16

## 2018-08-20 NOTE — PATIENT INSTRUCTIONS
Continue current medications  Fasting labs and chest x ray today  Echocardiogram  Lexiscan myoview--no caffeine 24 hrs prior to test  Follow up in next Tuesday/Wednesday

## 2018-08-20 NOTE — LETTER
Social History:   reports that he quit smoking about 43 years ago. He has a 20.00 pack-year smoking history. He has never used smokeless tobacco. He reports that he drinks alcohol. He reports that he does not use drugs. Family History:  family history includes Cancer in his mother and sister; Diabetes in his mother; Heart Attack in his father and mother; Heart Disease in his father; High Blood Pressure in his father; High Cholesterol in his father. Home Medications:  Outpatient Encounter Prescriptions as of 8/20/2018   Medication Sig Dispense Refill    ALPRAZolam (XANAX) 0.5 MG tablet Take 0.5 mg by mouth nightly as needed for Sleep. Sylvia Glatter atorvastatin (LIPITOR) 20 MG tablet Take 1 tablet by mouth daily 90 tablet 3    losartan (COZAAR) 50 MG tablet Take 1 tablet by mouth daily 90 tablet 3    amLODIPine (NORVASC) 5 MG tablet TAKE 1 TABLET BY MOUTH DAILY 90 tablet 1    Compression Stockings MISC Knee high hose 20-30 mmHg for swelling 1 each 0    XARELTO 20 MG TABS tablet Take 1 tablet by mouth Daily with supper 90 tablet 3    amitriptyline (ELAVIL) 100 MG tablet TK 1 T PO QD HS  2    nitroGLYCERIN (NITROSTAT) 0.4 MG SL tablet Place 1 tablet under the tongue every 5 minutes as needed for Chest pain 25 tablet 3    Ascorbic Acid (VITAMIN C) 500 MG tablet Take 1,000 mg by mouth daily       vitamin E 1000 UNITS capsule Take 800 Units by mouth daily       vitamin D (CHOLECALCIFEROL) 1000 UNIT TABS tablet Take 1,000 Units by mouth daily.  [DISCONTINUED] oxyCODONE-acetaminophen (PERCOCET) 5-325 MG per tablet Take 1 tablet by mouth every 4 hours as needed for Pain. Sylvia Glatter [DISCONTINUED] VIAGRA 100 MG tablet TK 1 T PO 30 MINUTES B INTERCOURSE  0    [DISCONTINUED] ranitidine (ZANTAC) 150 MG tablet Take 150 mg by mouth 2 times daily       [DISCONTINUED] psyllium (KONSYL) 28.3 % PACK Take 1 packet by mouth daily        No facility-administered encounter medications on file as of 8/20/2018.

## 2018-08-20 NOTE — COMMUNICATION BODY
High Blood Pressure in his father; High Cholesterol in his father. Home Medications:  Outpatient Encounter Prescriptions as of 8/20/2018   Medication Sig Dispense Refill    ALPRAZolam (XANAX) 0.5 MG tablet Take 0.5 mg by mouth nightly as needed for Sleep. Venkatesh Anne atorvastatin (LIPITOR) 20 MG tablet Take 1 tablet by mouth daily 90 tablet 3    losartan (COZAAR) 50 MG tablet Take 1 tablet by mouth daily 90 tablet 3    amLODIPine (NORVASC) 5 MG tablet TAKE 1 TABLET BY MOUTH DAILY 90 tablet 1    Compression Stockings MISC Knee high hose 20-30 mmHg for swelling 1 each 0    XARELTO 20 MG TABS tablet Take 1 tablet by mouth Daily with supper 90 tablet 3    amitriptyline (ELAVIL) 100 MG tablet TK 1 T PO QD HS  2    nitroGLYCERIN (NITROSTAT) 0.4 MG SL tablet Place 1 tablet under the tongue every 5 minutes as needed for Chest pain 25 tablet 3    Ascorbic Acid (VITAMIN C) 500 MG tablet Take 1,000 mg by mouth daily       vitamin E 1000 UNITS capsule Take 800 Units by mouth daily       vitamin D (CHOLECALCIFEROL) 1000 UNIT TABS tablet Take 1,000 Units by mouth daily.  [DISCONTINUED] oxyCODONE-acetaminophen (PERCOCET) 5-325 MG per tablet Take 1 tablet by mouth every 4 hours as needed for Pain. Venkatesh Uniqueen [DISCONTINUED] VIAGRA 100 MG tablet TK 1 T PO 30 MINUTES B INTERCOURSE  0    [DISCONTINUED] ranitidine (ZANTAC) 150 MG tablet Take 150 mg by mouth 2 times daily       [DISCONTINUED] psyllium (KONSYL) 28.3 % PACK Take 1 packet by mouth daily        No facility-administered encounter medications on file as of 8/20/2018. Allergies:  Codeine and Flomax [tamsulosin hcl]     [x] Medications and dosages reviewed.   ROS:  [x]Full ROS obtained and negative except as mentioned in HPI      Physical Examination:    Vitals:    08/20/18 0850   BP: 124/80   Pulse: 61   Resp: 14   SpO2: 96%        · GENERAL: Elderly male in wheel chair in NAD  · NEUROLOGICAL: Alert and oriented  · PSYCH: Calm affect  · SKIN: Warm and dry, No obvious Rash  · HEENT: Normocephalic, Sclera non-icteric, mucus membranes moist  · NECK: supple, JVP normal  · CAROTID: Normal upstroke, no bruits  · CARDIAC: Normal PMI, regular rate and slightly irregular rhythm, normal S1S2, no murmur, rub, or gallop  · RESPIRATORY: Normal respiratory effort, Clear bilaterally  · EXTREMITIES: 1+ BLE edema, lower extremity venous stasis changes  · MUSCULOSKELETAL: no chest wall tenderness, no c/o joint pain. · GASTROINTESTINAL: normal bowel sounds, soft, non-tender, no bruit    Echo 7/14/16:  Normal left ventricle size, wall thickness and systolic function with an EF of 55%. Mild aortic stenosis with mean gradient of 12mmHg. Trivial aortic regurgitation is present. Mild to moderate tricuspid regurgitation with RVSP estimated at 47 mmHg    Assessment:     1. CAD (coronary artery disease)/CABG 1981 & 2001:   Lexiscan Myoview 2014> Normal perfusion/EF. Repeat myoview due to progressive POWELL   2. HTN (hypertension): /80 (Site: Left Arm, Position: Sitting, Cuff Size: Large Adult)   Pulse 61   Resp 14   Ht 5' 11\" (1.803 m)   Wt 259 lb (117.5 kg)   SpO2 96%   BMI 36.12 kg/m²    Controlled. Same meds   3. Hyperlipidemia: LDL=67 9/2017-On lipitor. Repeat labs   4. Carotid Stenosis:  2008 dopplers> less than 40 % bilaterally. Stable   5. Atrial Fibrillation (paroxysmal): On Xarelto. PAF. HR controlled. Asymptomatic. Creat clear=91. Unchanged. Labs today  6. DVT: Diagnosed Spring 2014>  Xarelto for 6 months. ? Hypercoagulable. Repeat testing negative. 7.  Dysnpea: I suspect this is due to deconditioning and weight gain. Need to check ECHO, Myoview, labs CXR.  F/u 1 week to review      Plan:  ECHO  Myoview  Labs  CXR  F/u 1 week    Thank you for allowing me to participate in the care of this individual.      Itzel Squires M.D., [facility]

## 2018-08-20 NOTE — PROGRESS NOTES
[facility]  Cardiac Follow up       Referring Provider:  Mily Clemens MD     Chief Complaint   Patient presents with    Follow-up    Shortness of Breath     when walking upstairs     Coronary Artery Disease    Atrial Fibrillation    Hypertension      History of Present Illness:  Mr. Yvan He is an 80year old gentleman here today in follow up for dyspnea, CAD, hypertension, hyperlipidemia, afib and PVD. In 2014, he was diagnosed with a DVT and completed 6 months of treatment with Xarelto. He was seen by a hematologist, initially worked up for lupus anticoagulant, but most recent testing did not show evidence of this. Prior to last visit, he was admitted with sepsis and developed afib. He was started on Eliquis. Now on Xarelto. He has been struggling with back pain limiting his activity. He has gained about 25#. He recently moved to a condo, but now has to walk up 18 steps. He is having difficulty with this. Severe dyspnea with exertion. No associated chest pain. Wife and daughter feel he is also now having dyspnea with minimal walking on flat surface. Past Medical History:   has a past medical history of Atrial fibrillation ([address] CAD (coronary artery disease); Diarrhea; Hyperlipidemia; Hypertension; and Kidney stones. Surgical History:   has a past surgical history that includes back surgery; knee surgery (2005); shoulder surgery; Appendectomy; Coronary artery bypass graft (1981); Coronary artery bypass graft (2001); Prostate surgery; Skin cancer excision; and joint replacement (Right). Social History:   reports that he quit smoking about 43 years ago. He has a 20.00 pack-year smoking history. He has never used smokeless tobacco. He reports that he drinks alcohol. He reports that he does not use drugs.      Family History:  family history includes Cancer in his mother and sister; Diabetes in his mother; Heart Attack in his father and mother; Heart Disease in his father; High Blood Pressure in his father; High Cholesterol in his father. Home Medications:  Outpatient Encounter Prescriptions as of 8/20/2018   Medication Sig Dispense Refill    ALPRAZolam (XANAX) 0.5 MG tablet Take 0.5 mg by mouth nightly as needed for Sleep. Lorren Lesches atorvastatin (LIPITOR) 20 MG tablet Take 1 tablet by mouth daily 90 tablet 3    losartan (COZAAR) 50 MG tablet Take 1 tablet by mouth daily 90 tablet 3    amLODIPine (NORVASC) 5 MG tablet TAKE 1 TABLET BY MOUTH DAILY 90 tablet 1    Compression Stockings MISC Knee high hose 20-30 mmHg for swelling 1 each 0    XARELTO 20 MG TABS tablet Take 1 tablet by mouth Daily with supper 90 tablet 3    amitriptyline (ELAVIL) 100 MG tablet TK 1 T PO QD HS  2    nitroGLYCERIN (NITROSTAT) 0.4 MG SL tablet Place 1 tablet under the tongue every 5 minutes as needed for Chest pain 25 tablet 3    Ascorbic Acid (VITAMIN C) 500 MG tablet Take 1,000 mg by mouth daily       vitamin E 1000 UNITS capsule Take 800 Units by mouth daily       vitamin D (CHOLECALCIFEROL) 1000 UNIT TABS tablet Take 1,000 Units by mouth daily.  [DISCONTINUED] oxyCODONE-acetaminophen (PERCOCET) 5-325 MG per tablet Take 1 tablet by mouth every 4 hours as needed for Pain. Lorren Lesches [DISCONTINUED] VIAGRA 100 MG tablet TK 1 T PO 30 MINUTES B INTERCOURSE  0    [DISCONTINUED] ranitidine (ZANTAC) 150 MG tablet Take 150 mg by mouth 2 times daily       [DISCONTINUED] psyllium (KONSYL) 28.3 % PACK Take 1 packet by mouth daily        No facility-administered encounter medications on file as of 8/20/2018. Allergies:  Codeine and Flomax [tamsulosin hcl]     [x] Medications and dosages reviewed.   ROS:  [x]Full ROS obtained and negative except as mentioned in HPI      Physical Examination:    Vitals:    08/20/18 0850   BP: 124/80   Pulse: 61   Resp: 14   SpO2: 96%        · GENERAL: Elderly male in wheel chair in NAD  · NEUROLOGICAL: Alert and oriented  · PSYCH: Calm affect  · SKIN: Warm and dry, No

## 2018-08-21 ENCOUNTER — TELEPHONE (OUTPATIENT)
Dept: CARDIOLOGY CLINIC | Age: 83
End: 2018-08-21

## 2018-08-21 NOTE — TELEPHONE ENCOUNTER
Pt calling to see if [facility] got the results to his labs and chest X-ray that was done yesterday.  Please call to adv thank you

## 2018-08-22 ENCOUNTER — HOSPITAL ENCOUNTER (OUTPATIENT)
Dept: OTHER | Age: 83
Discharge: HOME | End: 2018-08-22
Attending: NEUROLOGICAL SURGERY | Admitting: NEUROLOGICAL SURGERY

## 2018-08-22 DIAGNOSIS — M25.551 HIP PAIN, RIGHT: ICD-10-CM

## 2018-08-28 ENCOUNTER — OFFICE VISIT (OUTPATIENT)
Dept: CARDIOLOGY CLINIC | Age: 83
End: 2018-08-28

## 2018-08-28 ENCOUNTER — HOSPITAL ENCOUNTER (OUTPATIENT)
Dept: NON INVASIVE DIAGNOSTICS | Age: 83
Discharge: HOME | End: 2018-08-28
Admitting: INTERNAL MEDICINE

## 2018-08-28 VITALS
OXYGEN SATURATION: 98 % | DIASTOLIC BLOOD PRESSURE: 76 MMHG | HEIGHT: 71 IN | WEIGHT: 259 LBS | BODY MASS INDEX: 36.26 KG/M2 | RESPIRATION RATE: 16 BRPM | SYSTOLIC BLOOD PRESSURE: 120 MMHG | HEART RATE: 50 BPM

## 2018-08-28 DIAGNOSIS — I25.10 CORONARY ARTERY DISEASE INVOLVING NATIVE CORONARY ARTERY OF NATIVE HEART WITHOUT ANGINA PECTORIS: Primary | ICD-10-CM

## 2018-08-28 DIAGNOSIS — E78.2 MIXED HYPERLIPIDEMIA: ICD-10-CM

## 2018-08-28 DIAGNOSIS — R06.02 SOB (SHORTNESS OF BREATH): ICD-10-CM

## 2018-08-28 DIAGNOSIS — I25.10 CORONARY ARTERY DISEASE INVOLVING NATIVE CORONARY ARTERY OF NATIVE HEART WITHOUT ANGINA PECTORIS: ICD-10-CM

## 2018-08-28 DIAGNOSIS — I25.10 ATHEROSCLEROTIC HEART DISEASE OF NATIVE CORONARY ARTERY WITHOUT ANGINA PECTORIS: ICD-10-CM

## 2018-08-28 DIAGNOSIS — I65.23 BILATERAL CAROTID ARTERY STENOSIS: ICD-10-CM

## 2018-08-28 DIAGNOSIS — I10 ESSENTIAL HYPERTENSION: ICD-10-CM

## 2018-08-28 DIAGNOSIS — Z86.718 HISTORY OF DVT (DEEP VEIN THROMBOSIS): ICD-10-CM

## 2018-08-28 DIAGNOSIS — I48.0 PAROXYSMAL ATRIAL FIBRILLATION (HCC): ICD-10-CM

## 2018-08-28 LAB
LEFT VENTRICULAR EJECTION FRACTION HIGH VALUE: 55 %
LEFT VENTRICULAR EJECTION FRACTION MODE: NORMAL
LV EF: 50 %
LV EF: 51 %
LVEF MODALITY: NORMAL

## 2018-09-13 ENCOUNTER — TELEPHONE (OUTPATIENT)
Dept: CARDIOLOGY CLINIC | Age: 83
End: 2018-09-13

## 2018-10-02 ENCOUNTER — TELEPHONE (OUTPATIENT)
Dept: CARDIOLOGY CLINIC | Age: 83
End: 2018-10-02

## 2018-10-02 NOTE — TELEPHONE ENCOUNTER
Pt has been experiencing broken blood vessels in his left eye that covers the white part of the eye. Pt has had 3 this past month. Could this be caused by Yu Perkins? Please pt to advise.  Thank you

## 2018-10-17 ENCOUNTER — TELEPHONE (OUTPATIENT)
Dept: CARDIOLOGY CLINIC | Age: 83
End: 2018-10-17

## 2018-10-17 NOTE — TELEPHONE ENCOUNTER
Discussed with [facility]- He may proceed with tooth extraction and hold Xarelto the day prior to procedure. Please call patient and send letter to Dr Manuel Holland office.

## 2018-10-17 NOTE — TELEPHONE ENCOUNTER
3 bottles of Xarelto 20 mg placed at [facility] cardiology .  Lot # [id number] 3/2021  Notified patient

## 2018-11-19 ENCOUNTER — TELEPHONE (OUTPATIENT)
Dept: CARDIOLOGY CLINIC | Age: 83
End: 2018-11-19

## 2018-11-26 ENCOUNTER — OFFICE VISIT (OUTPATIENT)
Dept: CARDIOLOGY CLINIC | Age: 83
End: 2018-11-26
Payer: COMMERCIAL

## 2018-11-26 VITALS
WEIGHT: 255 LBS | OXYGEN SATURATION: 97 % | DIASTOLIC BLOOD PRESSURE: 60 MMHG | HEART RATE: 59 BPM | HEIGHT: 72 IN | BODY MASS INDEX: 34.54 KG/M2 | SYSTOLIC BLOOD PRESSURE: 144 MMHG

## 2018-11-26 DIAGNOSIS — Z86.718 HISTORY OF DVT (DEEP VEIN THROMBOSIS): ICD-10-CM

## 2018-11-26 DIAGNOSIS — I10 ESSENTIAL HYPERTENSION: ICD-10-CM

## 2018-11-26 DIAGNOSIS — E78.2 MIXED HYPERLIPIDEMIA: ICD-10-CM

## 2018-11-26 DIAGNOSIS — I48.0 PAROXYSMAL ATRIAL FIBRILLATION (HCC): ICD-10-CM

## 2018-11-26 DIAGNOSIS — I25.10 CORONARY ARTERY DISEASE INVOLVING NATIVE CORONARY ARTERY OF NATIVE HEART WITHOUT ANGINA PECTORIS: Primary | ICD-10-CM

## 2018-11-26 DIAGNOSIS — I65.23 BILATERAL CAROTID ARTERY STENOSIS: ICD-10-CM

## 2018-11-26 PROCEDURE — 99214 OFFICE O/P EST MOD 30 MIN: CPT | Performed by: INTERNAL MEDICINE

## 2018-11-26 RX ORDER — AMLODIPINE BESYLATE 5 MG/1
5 TABLET ORAL DAILY
Qty: 90 TABLET | Refills: 3 | Status: SHIPPED | OUTPATIENT
Start: 2018-11-26 | End: 2019-05-30 | Stop reason: SDUPTHER

## 2018-11-26 NOTE — PROGRESS NOTES
[address]  Cardiac Follow up       Referring Provider:  Lidia Park MD     Chief Complaint   Patient presents with    Atrial Fibrillation     3 month follow up    Coronary Artery Disease    Hypertension    Hyperlipidemia      History of Present Illness:  Mr. Lakesha Martinez is an 80year old gentleman here today in follow up for dyspnea, CAD, hypertension, hyperlipidemia, afib and PVD. In 2014, he was diagnosed with a DVT and completed 6 months of treatment with Xarelto. He was seen by a hematologist, initially worked up for lupus anticoagulant, but most recent testing did not show evidence of this. Prior to last visit, he was admitted with sepsis and developed afib. He was started on Eliquis. Now on Xarelto. He continues to have issues with back pain. Now in wheel chair and walks at times with walker. He is to start therapy ordered by Dr. Mariam Boswell. No chest pain. Breathing unchanged. Denies snoring. He would like to try cardiac rehab after therapy. Past Medical History:   has a past medical history of Atrial fibrillation ([address] CAD (coronary artery disease); Diarrhea; Hyperlipidemia; Hypertension; and Kidney stones. Surgical History:   has a past surgical history that includes back surgery; knee surgery (2005); shoulder surgery; Appendectomy; Coronary artery bypass graft (1981); Coronary artery bypass graft (2001); Prostate surgery; Skin cancer excision; and joint replacement (Right). Social History:   reports that he quit smoking about 43 years ago. He has a 20.00 pack-year smoking history. He has never used smokeless tobacco. He reports that he drinks alcohol. He reports that he does not use drugs. Family History:  family history includes Cancer in his mother and sister; Diabetes in his mother; Heart Attack in his father and mother; Heart Disease in his father; High Blood Pressure in his father; High Cholesterol in his father.      Home Medications:  Outpatient Encounter Prescriptions as of 11/26/2018   Medication Sig Dispense Refill    psyllium (KONSYL) 28.3 % PACK Take 1 packet by mouth daily      ranitidine (ZANTAC) 150 MG capsule Take 150 mg by mouth 2 times daily      ALPRAZolam (XANAX) 0.5 MG tablet Take 0.5 mg by mouth nightly as needed for Sleep. Huan Bonilla atorvastatin (LIPITOR) 20 MG tablet Take 1 tablet by mouth daily 90 tablet 3    losartan (COZAAR) 50 MG tablet Take 1 tablet by mouth daily 90 tablet 3    amLODIPine (NORVASC) 5 MG tablet TAKE 1 TABLET BY MOUTH DAILY 90 tablet 1    Compression Stockings MISC Knee high hose 20-30 mmHg for swelling 1 each 0    XARELTO 20 MG TABS tablet Take 1 tablet by mouth Daily with supper 90 tablet 3    amitriptyline (ELAVIL) 100 MG tablet TK 1 T PO QD HS  2    nitroGLYCERIN (NITROSTAT) 0.4 MG SL tablet Place 1 tablet under the tongue every 5 minutes as needed for Chest pain 25 tablet 3    Ascorbic Acid (VITAMIN C) 500 MG tablet Take 1,000 mg by mouth daily       vitamin E 1000 UNITS capsule Take 800 Units by mouth daily       vitamin D (CHOLECALCIFEROL) 1000 UNIT TABS tablet Take 1,000 Units by mouth daily. No facility-administered encounter medications on file as of 11/26/2018. Allergies:  Codeine and Flomax [tamsulosin hcl]     [x] Medications and dosages reviewed.   ROS:  [x]Full ROS obtained and negative except as mentioned in HPI      Physical Examination:    Vitals:    11/26/18 1344   BP: (!) 150/76   Pulse: 59   SpO2: 97%        · GENERAL: Elderly obese male in wheel chair in NAD  · NEUROLOGICAL: Alert and oriented  · PSYCH: Calm affect  · SKIN: Warm and dry, No obvious Rash  · HEENT: Normocephalic, Sclera non-icteric, mucus membranes moist  · NECK: supple, JVP normal  · CAROTID: Normal upstroke, no bruits  · CARDIAC: Normal PMI, regular rate and slightly irregular rhythm, normal S1S2, no murmur, rub, or gallop  · RESPIRATORY: Normal respiratory effort, Clear bilaterally  · EXTREMITIES: trace BLE edema, lower

## 2018-11-26 NOTE — LETTER
· NEUROLOGICAL: Alert and oriented  · PSYCH: Calm affect  · SKIN: Warm and dry, No obvious Rash  · HEENT: Normocephalic, Sclera non-icteric, mucus membranes moist  · NECK: supple, JVP normal  · CAROTID: Normal upstroke, no bruits  · CARDIAC: Normal PMI, regular rate and slightly irregular rhythm, normal S1S2, no murmur, rub, or gallop  · RESPIRATORY: Normal respiratory effort, Clear bilaterally  · EXTREMITIES: trace BLE edema, lower extremity venous stasis changes  · MUSCULOSKELETAL: no chest wall tenderness, no c/o joint pain. · GASTROINTESTINAL: normal bowel sounds, soft, non-tender, no bruit    Echo 7/14/16:  Normal left ventricle size, wall thickness and systolic function with an EF of 55%. Mild aortic stenosis with mean gradient of 12mmHg. Trivial aortic regurgitation is present. Mild to moderate tricuspid regurgitation with RVSP estimated at 47 mmHg    Assessment:     1. CAD (coronary artery disease)/CABG 1981 & 2001:   Lexiscan Myoview 2014> Normal perfusion/EF. Repeat myoview 8/2018-fixed inferior, scar vs diaphragm   2. HTN (hypertension): BP (!) 150/76 (Site: Right Upper Arm, Position: Sitting, Cuff Size: Medium Adult)   Pulse 59   Ht 6' (1.829 m)   Wt 255 lb (115.7 kg)   SpO2 97%   BMI 34.58 kg/m²    Slightly high today. Follow. Has been good   3. Hyperlipidemia: LDL=65 Aug 2018 Continue lipitor. 4. Carotid Stenosis:  2008 dopplers> less than 40 % bilaterally. Stable   5. Atrial Fibrillation (paroxysmal): On Xarelto. PAF. HR controlled. Asymptomatic. Creat clear=91. Unchanged. 6.  DVT: Diagnosed Spring 2014>  Xarelto for 6 months. ? Hypercoagulable. Repeat testing negative. On Xarelto  7. Dysnpea: I suspect this is due to deconditioning and weight gain. ECHO normal LV, some right heart strain, On xarelto so PE unlikely, doesn't want sleep study. , Myoview no ischemic.        Plan:  Same meds  Agree with PT  Cardiac rehab after he finishes PT

## 2018-12-10 ENCOUNTER — HOSPITAL ENCOUNTER (OUTPATIENT)
Dept: WOUND CARE | Age: 83
Discharge: HOME | End: 2018-12-10
Payer: COMMERCIAL

## 2018-12-10 VITALS
RESPIRATION RATE: 16 BRPM | WEIGHT: 271 LBS | DIASTOLIC BLOOD PRESSURE: 81 MMHG | TEMPERATURE: 98 F | BODY MASS INDEX: 36.75 KG/M2 | SYSTOLIC BLOOD PRESSURE: 139 MMHG

## 2018-12-10 DIAGNOSIS — M79.89 PAIN AND SWELLING OF LOWER EXTREMITY, RIGHT: ICD-10-CM

## 2018-12-10 DIAGNOSIS — L97.929 CHRONIC VENOUS HYPERTENSION (IDIOPATHIC) WITH ULCER AND INFLAMMATION OF LEFT LOWER EXTREMITY (HCC): Primary | ICD-10-CM

## 2018-12-10 DIAGNOSIS — I87.332 CHRONIC VENOUS HYPERTENSION (IDIOPATHIC) WITH ULCER AND INFLAMMATION OF LEFT LOWER EXTREMITY (HCC): Primary | ICD-10-CM

## 2018-12-10 DIAGNOSIS — M79.604 PAIN AND SWELLING OF LOWER EXTREMITY, RIGHT: ICD-10-CM

## 2018-12-10 DIAGNOSIS — M79.89 PAIN AND SWELLING OF LEFT LOWER LEG: ICD-10-CM

## 2018-12-10 DIAGNOSIS — M79.662 PAIN AND SWELLING OF LEFT LOWER LEG: ICD-10-CM

## 2018-12-10 PROCEDURE — 99213 OFFICE O/P EST LOW 20 MIN: CPT

## 2018-12-10 PROCEDURE — 11042 DBRDMT SUBQ TIS 1ST 20SQCM/<: CPT | Performed by: SURGERY

## 2018-12-10 PROCEDURE — 11042 DBRDMT SUBQ TIS 1ST 20SQCM/<: CPT

## 2018-12-10 PROCEDURE — 29580 STRAPPING UNNA BOOT: CPT

## 2018-12-10 PROCEDURE — 99203 OFFICE O/P NEW LOW 30 MIN: CPT | Performed by: SURGERY

## 2018-12-10 RX ORDER — LIDOCAINE HYDROCHLORIDE 40 MG/ML
SOLUTION TOPICAL ONCE
Status: DISCONTINUED | OUTPATIENT
Start: 2018-12-10 | End: 2018-12-11 | Stop reason: HOSPADM

## 2018-12-10 NOTE — PROGRESS NOTES
Compression Application   Below Knee    NAME:  Arlet Trevino  YOB: 1935  MEDICAL RECORD NUMBER:  [id number]  DATE:  12/10/2018       Applied moisturizing agent to dry skin as needed. Appied primary and secondary dressing as ordered     Applied  Unna Boot wrap from toes to knee overlapping each time. Applied cast padding and coban from toes to below the knee. Instructed patient/caregiver to keep dressing dry and intact. DO NOT REMOVE DRESSING. Instructed pt/family/caregiver to report excessive draining, loose bandage, wet dressing, severe pain or tingling in toes. Applied Compression dressing below the knee to Bilateral lower leg(s)      Compression wrap(s) were applied per  Guidelines.      Electronically signed by Braxton Carlos LPN on 46/26/2614 at 10:49 AM
normal rate, regular rhythm,  distal pulses intact  Abdomen: soft, non-tender, non-distended  Extremities: no cyanosis, 2+ bilateral lower extremity swelling  Musculoskeletal: normal range of motion, no joint swelling, deformity or tenderness  Neurologic: reflexes normal and symmetric, no cranial nerve deficit, gait, coordination and speech normal      Assessment:     Patient Active Problem List   Diagnosis    Mixed hyperlipidemia    HTN (hypertension)    Coronary artery disease involving native heart without angina pectoris    Bilateral carotid artery stenosis    Knee pain, bilateral    Right knee DJD    Left knee DJD    Cellulitis of leg without foot, left    Cholelithiasis    Septic shock (HCC)    Hematuria    Acute respiratory failure with hypoxemia ([address]    Urinary tract infection with hematuria    Cellulitis of left lower extremity    Hypotension    Sepsis ([address]    ODILIA (acute kidney injury) ([address]    Paroxysmal atrial fibrillation (HCC)    Lactic acidosis    Dizziness    Diarrhea    History of DVT (deep vein thrombosis)    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 Devitalized Tissue Debrided:  fibrin, biofilm and slough    Pre Debridement Measurements:  Are located in the Wound Documentation Flow Sheet    Wound #: 1 and 2     Post  Debridement Measurements:  Wound 12/10/18 Leg Posterior; Left #1 (Active)   Wound Image   12/10/2018  9:21 AM   Wound Venous 12/10/2018  9:21 AM   Wound Cleansed Rinsed/Irrigated with saline 12/10/2018  9:21 AM   Wound Length (cm) 1.5 cm 12/10/2018  9:21

## 2018-12-12 ENCOUNTER — TELEPHONE (OUTPATIENT)
Dept: CARDIOLOGY CLINIC | Age: 83
End: 2018-12-12

## 2018-12-12 NOTE — TELEPHONE ENCOUNTER
Spoke to the pt-advised to  three sample bottles of Xarelto 20 mg, Lot # [id number], Exp. 3/'21, at the [place] office.

## 2018-12-17 ENCOUNTER — HOSPITAL ENCOUNTER (OUTPATIENT)
Dept: WOUND CARE | Age: 83
Discharge: HOME | End: 2018-12-17

## 2018-12-18 ENCOUNTER — HOSPITAL ENCOUNTER (OUTPATIENT)
Dept: WOUND CARE | Age: 83
Discharge: HOME | End: 2018-12-18
Attending: SURGERY
Payer: COMMERCIAL

## 2018-12-18 VITALS
SYSTOLIC BLOOD PRESSURE: 149 MMHG | RESPIRATION RATE: 18 BRPM | TEMPERATURE: 97.8 F | DIASTOLIC BLOOD PRESSURE: 72 MMHG | HEART RATE: 66 BPM

## 2018-12-18 PROCEDURE — 29580 STRAPPING UNNA BOOT: CPT

## 2018-12-18 PROCEDURE — 99213 OFFICE O/P EST LOW 20 MIN: CPT | Performed by: SURGERY

## 2018-12-18 RX ORDER — LIDOCAINE HYDROCHLORIDE 40 MG/ML
SOLUTION TOPICAL ONCE
Status: DISCONTINUED | OUTPATIENT
Start: 2018-12-18 | End: 2018-12-19 | Stop reason: HOSPADM

## 2018-12-18 NOTE — PROGRESS NOTES
psyllium (KONSYL) 28.3 % PACK Take 1 packet by mouth daily      ranitidine (ZANTAC) 150 MG capsule Take 150 mg by mouth 2 times daily      ALPRAZolam (XANAX) 0.5 MG tablet Take 0.5 mg by mouth nightly as needed for Sleep. Cheryle Gnosticist atorvastatin (LIPITOR) 20 MG tablet Take 1 tablet by mouth daily 90 tablet 3    losartan (COZAAR) 50 MG tablet Take 1 tablet by mouth daily 90 tablet 3    XARELTO 20 MG TABS tablet Take 1 tablet by mouth Daily with supper 90 tablet 3    amitriptyline (ELAVIL) 100 MG tablet TK 1 T PO QD HS  2    Ascorbic Acid (VITAMIN C) 500 MG tablet Take 1,000 mg by mouth daily       vitamin E 1000 UNITS capsule Take 800 Units by mouth daily       vitamin D (CHOLECALCIFEROL) 1000 UNIT TABS tablet Take 1,000 Units by mouth daily.  Compression Stockings MISC Knee high hose 20-30 mmHg for swelling 1 each 0    nitroGLYCERIN (NITROSTAT) 0.4 MG SL tablet Place 1 tablet under the tongue every 5 minutes as needed for Chest pain 25 tablet 3     No current facility-administered medications on file prior to encounter. REVIEW OF SYSTEMS    Pertinent items are noted in HPI.       Objective:      BP (!) 149/72   Pulse 66   Temp 97.8 °F (36.6 °C) (Oral)   Resp 18     PHYSICAL EXAM    General Appearance: alert and oriented to person, place and time, well-developed and well-nourished, in no acute distress  Skin: warm and dry, no rash or erythema  Head: normocephalic and atraumatic  Neck: neck supple and non tender without mass, no thyromegaly or thyroid nodules, no cervical lymphadenopathy   Abdomen: soft, non-tender, non-distended, normal bowel sounds, no masses or organomegaly  Clean superficial ulcerations L leg  No open ulcerations of the R leg     Assessment:     Patient Active Problem List   Diagnosis    Mixed hyperlipidemia    HTN (hypertension)    Coronary artery disease involving native heart without angina pectoris    Bilateral carotid artery stenosis    Knee pain, bilateral    Right

## 2018-12-26 ENCOUNTER — HOSPITAL ENCOUNTER (OUTPATIENT)
Dept: WOUND CARE | Age: 83
Discharge: HOME | End: 2018-12-26
Attending: SURGERY
Payer: COMMERCIAL

## 2018-12-26 ENCOUNTER — HOSPITAL ENCOUNTER (OUTPATIENT)
Dept: VASCULAR LAB | Age: 83
Discharge: HOME | End: 2018-12-26
Payer: COMMERCIAL

## 2018-12-26 VITALS
DIASTOLIC BLOOD PRESSURE: 65 MMHG | RESPIRATION RATE: 18 BRPM | HEART RATE: 59 BPM | TEMPERATURE: 97.8 F | SYSTOLIC BLOOD PRESSURE: 135 MMHG

## 2018-12-26 DIAGNOSIS — M79.662 PAIN AND SWELLING OF LEFT LOWER LEG: ICD-10-CM

## 2018-12-26 DIAGNOSIS — M79.604 PAIN AND SWELLING OF LOWER EXTREMITY, RIGHT: ICD-10-CM

## 2018-12-26 DIAGNOSIS — M79.89 PAIN AND SWELLING OF LOWER EXTREMITY, RIGHT: ICD-10-CM

## 2018-12-26 DIAGNOSIS — L97.929 CHRONIC VENOUS HYPERTENSION (IDIOPATHIC) WITH ULCER AND INFLAMMATION OF LEFT LOWER EXTREMITY (HCC): ICD-10-CM

## 2018-12-26 DIAGNOSIS — M79.89 PAIN AND SWELLING OF LEFT LOWER LEG: ICD-10-CM

## 2018-12-26 DIAGNOSIS — I87.332 CHRONIC VENOUS HYPERTENSION (IDIOPATHIC) WITH ULCER AND INFLAMMATION OF LEFT LOWER EXTREMITY (HCC): ICD-10-CM

## 2018-12-26 PROCEDURE — 11042 DBRDMT SUBQ TIS 1ST 20SQCM/<: CPT

## 2018-12-26 PROCEDURE — 93970 EXTREMITY STUDY: CPT

## 2018-12-26 PROCEDURE — 11042 DBRDMT SUBQ TIS 1ST 20SQCM/<: CPT | Performed by: SURGERY

## 2018-12-26 RX ORDER — LIDOCAINE HYDROCHLORIDE 40 MG/ML
SOLUTION TOPICAL ONCE
Status: DISCONTINUED | OUTPATIENT
Start: 2018-12-26 | End: 2018-12-27 | Stop reason: HOSPADM

## 2018-12-26 NOTE — PROGRESS NOTES
Compression Application   Below Knee    NAME:  Devin Meza  YOB: 1935  MEDICAL RECORD NUMBER:  [id number]  DATE:  12/26/2018       Applied moisturizing agent to dry skin as needed. Appied primary and secondary dressing as ordered     Applied  Unna Boot wrap from toes to knee overlapping each time. Applied cast padding and coban from toes to below the knee. Instructed patient/caregiver to keep dressing dry and intact. DO NOT REMOVE DRESSING. Instructed pt/family/caregiver to report excessive draining, loose bandage, wet dressing, severe pain or tingling in toes. Applied Compression dressing below the knee to Left lower leg(s)      Compression wrap(s) were applied per  Guidelines.      Electronically signed by Tanesha Storey RN on 12/26/2018 at 3:05 PM

## 2018-12-31 ENCOUNTER — HOSPITAL ENCOUNTER (OUTPATIENT)
Dept: WOUND CARE | Age: 83
Discharge: HOME | End: 2018-12-31
Payer: COMMERCIAL

## 2018-12-31 VITALS — SYSTOLIC BLOOD PRESSURE: 127 MMHG | HEART RATE: 65 BPM | TEMPERATURE: 97.6 F | DIASTOLIC BLOOD PRESSURE: 64 MMHG

## 2018-12-31 DIAGNOSIS — I87.332 CHRONIC VENOUS HYPERTENSION (IDIOPATHIC) WITH ULCER AND INFLAMMATION OF LEFT LOWER EXTREMITY (HCC): Primary | ICD-10-CM

## 2018-12-31 DIAGNOSIS — L97.929 CHRONIC VENOUS HYPERTENSION (IDIOPATHIC) WITH ULCER AND INFLAMMATION OF LEFT LOWER EXTREMITY (HCC): Primary | ICD-10-CM

## 2018-12-31 PROCEDURE — 11042 DBRDMT SUBQ TIS 1ST 20SQCM/<: CPT | Performed by: SURGERY

## 2018-12-31 PROCEDURE — 11042 DBRDMT SUBQ TIS 1ST 20SQCM/<: CPT

## 2018-12-31 RX ORDER — LIDOCAINE HYDROCHLORIDE 40 MG/ML
SOLUTION TOPICAL ONCE
Status: DISCONTINUED | OUTPATIENT
Start: 2018-12-31 | End: 2019-01-01 | Stop reason: HOSPADM

## 2019-01-07 ENCOUNTER — HOSPITAL ENCOUNTER (OUTPATIENT)
Dept: WOUND CARE | Age: 84
Discharge: HOME | End: 2019-01-07
Payer: MEDICARE

## 2019-01-07 ENCOUNTER — TELEPHONE (OUTPATIENT)
Dept: CARDIOLOGY CLINIC | Age: 84
End: 2019-01-07

## 2019-01-07 VITALS
SYSTOLIC BLOOD PRESSURE: 149 MMHG | RESPIRATION RATE: 18 BRPM | HEART RATE: 66 BPM | DIASTOLIC BLOOD PRESSURE: 81 MMHG | TEMPERATURE: 98 F

## 2019-01-07 DIAGNOSIS — L97.929 CHRONIC VENOUS HYPERTENSION (IDIOPATHIC) WITH ULCER AND INFLAMMATION OF LEFT LOWER EXTREMITY (HCC): Primary | ICD-10-CM

## 2019-01-07 DIAGNOSIS — I87.332 CHRONIC VENOUS HYPERTENSION (IDIOPATHIC) WITH ULCER AND INFLAMMATION OF LEFT LOWER EXTREMITY (HCC): Primary | ICD-10-CM

## 2019-01-07 PROCEDURE — 11045 DBRDMT SUBQ TISS EACH ADDL: CPT | Performed by: SURGERY

## 2019-01-07 PROCEDURE — 11042 DBRDMT SUBQ TIS 1ST 20SQCM/<: CPT

## 2019-01-07 PROCEDURE — 11042 DBRDMT SUBQ TIS 1ST 20SQCM/<: CPT | Performed by: SURGERY

## 2019-01-07 RX ORDER — LIDOCAINE HYDROCHLORIDE 40 MG/ML
SOLUTION TOPICAL ONCE
Status: DISCONTINUED | OUTPATIENT
Start: 2019-01-07 | End: 2019-01-08 | Stop reason: HOSPADM

## 2019-01-07 RX ORDER — AMOXICILLIN AND CLAVULANATE POTASSIUM 500; 125 MG/1; MG/1
1 TABLET, FILM COATED ORAL 2 TIMES DAILY WITH MEALS
COMMUNITY
End: 2019-05-30

## 2019-01-14 ENCOUNTER — HOSPITAL ENCOUNTER (OUTPATIENT)
Dept: WOUND CARE | Age: 84
Discharge: HOME | End: 2019-01-14
Payer: MEDICARE

## 2019-01-14 ENCOUNTER — TELEPHONE (OUTPATIENT)
Dept: SURGERY | Age: 84
End: 2019-01-14

## 2019-01-14 VITALS
TEMPERATURE: 97.2 F | SYSTOLIC BLOOD PRESSURE: 140 MMHG | RESPIRATION RATE: 18 BRPM | DIASTOLIC BLOOD PRESSURE: 72 MMHG | HEART RATE: 59 BPM

## 2019-01-14 DIAGNOSIS — I87.332 CHRONIC VENOUS HYPERTENSION (IDIOPATHIC) WITH ULCER AND INFLAMMATION OF LEFT LOWER EXTREMITY (HCC): Primary | ICD-10-CM

## 2019-01-14 DIAGNOSIS — L97.929 CHRONIC VENOUS HYPERTENSION (IDIOPATHIC) WITH ULCER AND INFLAMMATION OF LEFT LOWER EXTREMITY (HCC): Primary | ICD-10-CM

## 2019-01-14 PROBLEM — I87.323 CHRONIC VENOUS HTN W INFLAMMATION OF BILATERAL LOW EXTRM: Status: ACTIVE | Noted: 2018-12-10

## 2019-01-14 PROBLEM — I87.2 VENOUS INSUFFICIENCY: Status: ACTIVE | Noted: 2019-01-14

## 2019-01-14 PROCEDURE — 99213 OFFICE O/P EST LOW 20 MIN: CPT

## 2019-01-14 PROCEDURE — 99212 OFFICE O/P EST SF 10 MIN: CPT | Performed by: SURGERY

## 2019-01-14 ASSESSMENT — PAIN SCALES - GENERAL: PAINLEVEL_OUTOF10: 0

## 2019-01-21 ENCOUNTER — TELEPHONE (OUTPATIENT)
Dept: SURGERY | Age: 84
End: 2019-01-21

## 2019-01-21 ENCOUNTER — TELEPHONE (OUTPATIENT)
Dept: CARDIOLOGY CLINIC | Age: 84
End: 2019-01-21

## 2019-02-06 ENCOUNTER — TELEPHONE (OUTPATIENT)
Dept: CARDIOLOGY CLINIC | Age: 84
End: 2019-02-06

## 2019-02-26 ENCOUNTER — TELEPHONE (OUTPATIENT)
Dept: CARDIOLOGY CLINIC | Age: 84
End: 2019-02-26

## 2019-03-08 ENCOUNTER — OFFICE VISIT (OUTPATIENT)
Dept: VASCULAR SURGERY | Age: 84
End: 2019-03-08
Payer: MEDICARE

## 2019-03-08 VITALS
BODY MASS INDEX: 34.13 KG/M2 | DIASTOLIC BLOOD PRESSURE: 74 MMHG | SYSTOLIC BLOOD PRESSURE: 134 MMHG | WEIGHT: 252 LBS | HEIGHT: 72 IN

## 2019-03-08 DIAGNOSIS — I87.323 CHRONIC VENOUS HTN W INFLAMMATION OF BILATERAL LOW EXTRM: Primary | ICD-10-CM

## 2019-03-08 PROCEDURE — 99203 OFFICE O/P NEW LOW 30 MIN: CPT | Performed by: SURGERY

## 2019-03-21 ENCOUNTER — TELEPHONE (OUTPATIENT)
Dept: CARDIOLOGY CLINIC | Age: 84
End: 2019-03-21

## 2019-03-25 ENCOUNTER — TELEPHONE (OUTPATIENT)
Dept: CARDIOLOGY CLINIC | Age: 84
End: 2019-03-25

## 2019-04-12 ENCOUNTER — TELEPHONE (OUTPATIENT)
Dept: CARDIOLOGY CLINIC | Age: 84
End: 2019-04-12

## 2019-04-12 NOTE — TELEPHONE ENCOUNTER
Sample requested: xarelto    Strength: 20 mg    Dosage: daily    Patient's call back number: [phone]

## 2019-04-17 ENCOUNTER — HOSPITAL ENCOUNTER (OUTPATIENT)
Age: 84
Discharge: HOME | End: 2019-04-17
Payer: MEDICARE

## 2019-04-17 LAB
ALBUMIN SERPL-MCNC: 4.1 G/DL (ref 3.4–5)
ALP BLD-CCNC: 72 U/L (ref 40–129)
ALT SERPL-CCNC: 11 U/L (ref 10–40)
ANION GAP SERPL CALCULATED.3IONS-SCNC: 15 MMOL/L (ref 3–16)
AST SERPL-CCNC: 13 U/L (ref 15–37)
BILIRUB SERPL-MCNC: 0.6 MG/DL (ref 0–1)
BILIRUBIN DIRECT: <0.2 MG/DL (ref 0–0.3)
BILIRUBIN, INDIRECT: ABNORMAL MG/DL (ref 0–1)
BUN BLDV-MCNC: 14 MG/DL (ref 7–20)
CALCIUM SERPL-MCNC: 9 MG/DL (ref 8.3–10.6)
CHLORIDE BLD-SCNC: 101 MMOL/L (ref 99–110)
CHOLESTEROL, TOTAL: 151 MG/DL (ref 0–199)
CO2: 24 MMOL/L (ref 21–32)
CREAT SERPL-MCNC: 0.8 MG/DL (ref 0.8–1.3)
GFR AFRICAN AMERICAN: >60
GFR NON-AFRICAN AMERICAN: >60
GLUCOSE BLD-MCNC: 118 MG/DL (ref 70–99)
HCT VFR BLD CALC: 41.3 % (ref 40.5–52.5)
HDLC SERPL-MCNC: 48 MG/DL (ref 40–60)
HEMOGLOBIN: 13.9 G/DL (ref 13.5–17.5)
LDL CHOLESTEROL CALCULATED: 80 MG/DL
MCH RBC QN AUTO: 31.4 PG (ref 26–34)
MCHC RBC AUTO-ENTMCNC: 33.7 G/DL (ref 31–36)
MCV RBC AUTO: 93.2 FL (ref 80–100)
PDW BLD-RTO: 13.4 % (ref 12.4–15.4)
PLATELET # BLD: 142 K/UL (ref 135–450)
PMV BLD AUTO: 8.9 FL (ref 5–10.5)
POTASSIUM SERPL-SCNC: 4.6 MMOL/L (ref 3.5–5.1)
RBC # BLD: 4.43 M/UL (ref 4.2–5.9)
SODIUM BLD-SCNC: 140 MMOL/L (ref 136–145)
TOTAL PROTEIN: 7.3 G/DL (ref 6.4–8.2)
TRIGL SERPL-MCNC: 117 MG/DL (ref 0–150)
VLDLC SERPL CALC-MCNC: 23 MG/DL
WBC # BLD: 5.4 K/UL (ref 4–11)

## 2019-04-17 PROCEDURE — 85027 COMPLETE CBC AUTOMATED: CPT

## 2019-04-17 PROCEDURE — 83036 HEMOGLOBIN GLYCOSYLATED A1C: CPT

## 2019-04-17 PROCEDURE — 80076 HEPATIC FUNCTION PANEL: CPT

## 2019-04-17 PROCEDURE — 80048 BASIC METABOLIC PNL TOTAL CA: CPT

## 2019-04-17 PROCEDURE — 36415 COLL VENOUS BLD VENIPUNCTURE: CPT

## 2019-04-17 PROCEDURE — 80061 LIPID PANEL: CPT

## 2019-04-18 LAB
ESTIMATED AVERAGE GLUCOSE: 111.2 MG/DL
HBA1C MFR BLD: 5.5 %

## 2019-04-30 ENCOUNTER — TELEPHONE (OUTPATIENT)
Dept: CARDIOLOGY CLINIC | Age: 84
End: 2019-04-30

## 2019-04-30 NOTE — TELEPHONE ENCOUNTER
SPOKE TO PT AND PT UNDERSTOOD- WE WERE ONLY ABLE TO GIVE 2 BOTTLES OF SAMPLES AND THEY ARE AT THE  WAITING ON     2 BOTTLES EXP 04/21

## 2019-04-30 NOTE — TELEPHONE ENCOUNTER
Sample requested:  xarelto     Strength:  20 mg    Dosage: QD    Patient's call back number:  [phone]    Requesting 3-4 boxes

## 2019-05-15 ENCOUNTER — TELEPHONE (OUTPATIENT)
Dept: CARDIOLOGY CLINIC | Age: 84
End: 2019-05-15

## 2019-05-15 NOTE — TELEPHONE ENCOUNTER
Xarelto 20 mg samples (4 bottles) placed at [facility] cardio office. Lot # [id number] exp 4/2021. Patient notified.

## 2019-05-30 ENCOUNTER — OFFICE VISIT (OUTPATIENT)
Dept: CARDIOLOGY CLINIC | Age: 84
End: 2019-05-30
Payer: MEDICARE

## 2019-05-30 VITALS
HEART RATE: 52 BPM | HEIGHT: 71 IN | SYSTOLIC BLOOD PRESSURE: 110 MMHG | DIASTOLIC BLOOD PRESSURE: 64 MMHG | WEIGHT: 252 LBS | BODY MASS INDEX: 35.28 KG/M2

## 2019-05-30 DIAGNOSIS — I10 ESSENTIAL HYPERTENSION: ICD-10-CM

## 2019-05-30 DIAGNOSIS — R06.00 DYSPNEA, UNSPECIFIED TYPE: ICD-10-CM

## 2019-05-30 DIAGNOSIS — I25.10 CORONARY ARTERY DISEASE INVOLVING NATIVE CORONARY ARTERY OF NATIVE HEART WITHOUT ANGINA PECTORIS: Primary | ICD-10-CM

## 2019-05-30 DIAGNOSIS — I48.0 PAROXYSMAL ATRIAL FIBRILLATION (HCC): ICD-10-CM

## 2019-05-30 DIAGNOSIS — E78.2 MIXED HYPERLIPIDEMIA: ICD-10-CM

## 2019-05-30 DIAGNOSIS — Z86.718 HISTORY OF DVT (DEEP VEIN THROMBOSIS): ICD-10-CM

## 2019-05-30 PROCEDURE — 99214 OFFICE O/P EST MOD 30 MIN: CPT | Performed by: INTERNAL MEDICINE

## 2019-05-30 RX ORDER — ATORVASTATIN CALCIUM 20 MG/1
20 TABLET, FILM COATED ORAL DAILY
Qty: 90 TABLET | Refills: 3 | Status: SHIPPED | OUTPATIENT
Start: 2019-05-30 | End: 2020-05-27 | Stop reason: SDUPTHER

## 2019-05-30 RX ORDER — ATORVASTATIN CALCIUM 20 MG/1
20 TABLET, FILM COATED ORAL DAILY
Qty: 90 TABLET | Refills: 3 | Status: SHIPPED | OUTPATIENT
Start: 2019-05-30 | End: 2019-05-30 | Stop reason: SDUPTHER

## 2019-05-30 RX ORDER — AMLODIPINE BESYLATE 5 MG/1
5 TABLET ORAL DAILY
Qty: 90 TABLET | Refills: 3 | Status: SHIPPED | OUTPATIENT
Start: 2019-05-30 | End: 2019-05-30 | Stop reason: SDUPTHER

## 2019-05-30 RX ORDER — AMLODIPINE BESYLATE 5 MG/1
5 TABLET ORAL DAILY
Qty: 90 TABLET | Refills: 3 | Status: ON HOLD | OUTPATIENT
Start: 2019-05-30 | End: 2020-04-20 | Stop reason: HOSPADM

## 2019-05-30 RX ORDER — LOSARTAN POTASSIUM 50 MG/1
50 TABLET ORAL DAILY
Qty: 90 TABLET | Refills: 3 | Status: SHIPPED | OUTPATIENT
Start: 2019-05-30 | End: 2019-05-30 | Stop reason: SDUPTHER

## 2019-05-30 RX ORDER — LOSARTAN POTASSIUM 50 MG/1
50 TABLET ORAL DAILY
Qty: 90 TABLET | Refills: 3 | Status: SHIPPED | OUTPATIENT
Start: 2019-05-30 | End: 2020-05-27 | Stop reason: SDUPTHER

## 2019-05-30 NOTE — PROGRESS NOTES
[address]  Cardiac Follow up       Referring Provider:  Joyce Hendrix MD     Chief Complaint   Patient presents with    Check-Up     No cardiac complaints      History of Present Illness:  Mr. Harshal Manrique is an 80year old gentleman here today in follow up for dyspnea, CAD, hypertension, hyperlipidemia, afib and PVD. In 2014, he was diagnosed with a DVT and completed 6 months of treatment with Xarelto. He was seen by a hematologist, initially worked up for lupus anticoagulant, but most recent testing did not show evidence of this. Prior to last visit, he was admitted with sepsis and developed afib. He was started on Eliquis. Now on Xarelto. He continues to have issues with back pain. Activity very limited. Did not go to rehab. He does go to wound care. Wears LE compression stocking due to venous insufficiency. No chest pain. Denies significant dyspnea as well. No palpitations or heart racing. Past Medical History:   has a past medical history of Atrial fibrillation ([address] CAD (coronary artery disease), Diarrhea, Hyperlipidemia, Hypertension, and Kidney stones. Surgical History:   has a past surgical history that includes back surgery; knee surgery (2005); shoulder surgery; Appendectomy; Coronary artery bypass graft (1981); Coronary artery bypass graft (2001); Prostate surgery; Skin cancer excision; and joint replacement (Right). Social History:   reports that he quit smoking about 44 years ago. He has a 20.00 pack-year smoking history. He has never used smokeless tobacco. He reports that he drinks alcohol. He reports that he does not use drugs. Family History:  family history includes Cancer in his mother and sister; Diabetes in his mother; Heart Attack in his father and mother; Heart Disease in his father; High Blood Pressure in his father; High Cholesterol in his father.      Home Medications:  Outpatient Encounter Medications as of 5/30/2019   Medication Sig Dispense Refill    Compression Stockings MISC by Does not apply route 20-30 mm hg compression stockings, bilateral, knee high 1 each 0    amLODIPine (NORVASC) 5 MG tablet Take 1 tablet by mouth daily 90 tablet 3    psyllium (KONSYL) 28.3 % PACK Take 1 packet by mouth daily      ranitidine (ZANTAC) 150 MG capsule Take 150 mg by mouth 2 times daily      ALPRAZolam (XANAX) 0.5 MG tablet Take 0.5 mg by mouth nightly as needed for Sleep. Raejewilliam Blank atorvastatin (LIPITOR) 20 MG tablet Take 1 tablet by mouth daily 90 tablet 3    losartan (COZAAR) 50 MG tablet Take 1 tablet by mouth daily 90 tablet 3    XARELTO 20 MG TABS tablet Take 1 tablet by mouth Daily with supper 90 tablet 3    amitriptyline (ELAVIL) 100 MG tablet TK 1 T PO QD HS  2    nitroGLYCERIN (NITROSTAT) 0.4 MG SL tablet Place 1 tablet under the tongue every 5 minutes as needed for Chest pain 25 tablet 3    Ascorbic Acid (VITAMIN C) 500 MG tablet Take 1,000 mg by mouth daily       vitamin E 1000 UNITS capsule Take 800 Units by mouth daily       vitamin D (CHOLECALCIFEROL) 1000 UNIT TABS tablet Take 2,000 Units by mouth daily       [DISCONTINUED] amoxicillin-clavulanate (AUGMENTIN) 500-125 MG per tablet Take 1 tablet by mouth 2 times daily (with meals)      [DISCONTINUED] Compression Stockings MISC Knee high hose 20-30 mmHg for swelling 1 each 0     No facility-administered encounter medications on file as of 5/30/2019. Allergies:  Codeine and Flomax [tamsulosin hcl]     [x] Medications and dosages reviewed.   ROS:  [x]Full ROS obtained and negative except as mentioned in HPI      Physical Examination:    Vitals:    05/30/19 1402   BP: 110/64   Pulse: 52      /64   Pulse 52   Ht 5' 11\" (1.803 m)   Wt 252 lb (114.3 kg)   BMI 35.15 kg/m²     · GENERAL: Elderly obese male in wheel chair in NAD  · NEUROLOGICAL: Alert and oriented  · PSYCH: Calm affect  · SKIN: Warm and dry, No obvious Rash  · HEENT: Normocephalic, Sclera non-icteric, mucus membranes moist  · NECK: supple, JVP normal  · CAROTID: Normal upstroke, no bruits  · CARDIAC: Normal PMI, regular rate and slightly irregular rhythm, normal S1S2, no murmur, rub, or gallop  · RESPIRATORY: Normal respiratory effort, Clear bilaterally  · EXTREMITIES:No LE edema. Compression stockings on  · MUSCULOSKELETAL: no chest wall tenderness, no c/o joint pain. · GASTROINTESTINAL: normal bowel sounds, soft, non-tender, no bruit    Echo 7/14/16:  Normal left ventricle size, wall thickness and systolic function with an EF of 55%. Mild aortic stenosis with mean gradient of 12mmHg. Trivial aortic regurgitation is present. Mild to moderate tricuspid regurgitation with RVSP estimated at 47 mmHg    Assessment:     1. CAD (coronary artery disease)/CABG 1981 & 2001:   Remains stable without angina. Continue medical Rx  Lexiscan Myoview 2014> Normal perfusion/EF. Repeat myoview 8/2018-fixed inferior, scar vs diaphragm   2. HTN (hypertension): /64   Pulse 52   Ht 5' 11\" (1.803 m)   Wt 252 lb (114.3 kg)   BMI 35.15 kg/m²   Well controlled. Same meds   3. Hyperlipidemia: LDL=80 April 2019. Controlled Continue lipitor. 4. Carotid Stenosis:  2008 dopplers> less than 40 % bilaterally. Stable   5. Atrial Fibrillation (paroxysmal): On Xarelto. PAF. HR controlled. Asymptomatic. Creat clear=91. Unchanged. Same RX  6. DVT: Diagnosed Spring 2014>  Xarelto for 6 months. ? Hypercoagulable. Repeat testing negative. On Xarelto  7. Dysnpea:  ECHO normal LV, some right heart strain, On xarelto so PE unlikely,still doesn't want sleep study. , Myoview non ischemic.  Suspect mainly deconditioning      Plan:  Same meds  F/u 6 months    Thank you for allowing me to participate in the care of this individual.      Ry Munoz M.D., [facility]

## 2019-06-17 ENCOUNTER — TELEPHONE (OUTPATIENT)
Dept: CARDIOLOGY CLINIC | Age: 84
End: 2019-06-17

## 2019-06-17 NOTE — TELEPHONE ENCOUNTER
Sample requested: Xarelto    Strength: 20 mg    Dosage: 1 tab daily    Patient's call back number: [phone]

## 2019-06-25 NOTE — TELEPHONE ENCOUNTER
Pt calling. Pt was told by his pharmacy that Losartan was recalled. What is [facility] recommending? Also, pt would like free samples of Xarelto      Medication Samples    (if  it's been more than 1 year, please go ahead and schedule an appointment with the physician while you have the patient on the phone)    Medication:  xarelto    Doseage of the medication:  20 mg  How are you taking this medication (QD, BID, TID, QID, PRN):  1 daily    in the office or Mail to your home? Pt is going out of town and would like 4 bottles if available this week. Please call pt to advise.

## 2019-07-25 ENCOUNTER — TELEPHONE (OUTPATIENT)
Dept: CARDIOLOGY CLINIC | Age: 84
End: 2019-07-25

## 2019-08-12 ENCOUNTER — TELEPHONE (OUTPATIENT)
Dept: CARDIOLOGY CLINIC | Age: 84
End: 2019-08-12

## 2019-08-13 ENCOUNTER — TELEPHONE (OUTPATIENT)
Dept: CARDIOLOGY CLINIC | Age: 84
End: 2019-08-13

## 2019-08-14 ENCOUNTER — TELEPHONE (OUTPATIENT)
Dept: CARDIOLOGY CLINIC | Age: 84
End: 2019-08-14

## 2019-09-03 ENCOUNTER — TELEPHONE (OUTPATIENT)
Dept: CARDIOLOGY CLINIC | Age: 84
End: 2019-09-03

## 2019-09-03 NOTE — TELEPHONE ENCOUNTER
Sample requested: Xarelto    Strength:     Dosage: 20 mg    Patient's call back number: [phone]     Please call to let him know if/when ready to . He will have daughter pick them up.

## 2019-09-27 ENCOUNTER — TELEPHONE (OUTPATIENT)
Dept: CARDIOLOGY CLINIC | Age: 84
End: 2019-09-27

## 2019-10-18 ENCOUNTER — OFFICE VISIT (OUTPATIENT)
Dept: CARDIOLOGY CLINIC | Age: 84
End: 2019-10-18
Payer: MEDICARE

## 2019-10-18 VITALS
HEART RATE: 76 BPM | WEIGHT: 262 LBS | SYSTOLIC BLOOD PRESSURE: 136 MMHG | HEIGHT: 71 IN | BODY MASS INDEX: 36.68 KG/M2 | RESPIRATION RATE: 18 BRPM | DIASTOLIC BLOOD PRESSURE: 72 MMHG

## 2019-10-18 DIAGNOSIS — I25.10 CORONARY ARTERY DISEASE INVOLVING NATIVE CORONARY ARTERY OF NATIVE HEART WITHOUT ANGINA PECTORIS: Primary | ICD-10-CM

## 2019-10-18 DIAGNOSIS — I10 ESSENTIAL HYPERTENSION: ICD-10-CM

## 2019-10-18 DIAGNOSIS — I48.0 PAROXYSMAL ATRIAL FIBRILLATION (HCC): ICD-10-CM

## 2019-10-18 DIAGNOSIS — E78.2 MIXED HYPERLIPIDEMIA: ICD-10-CM

## 2019-10-18 DIAGNOSIS — Z86.718 HISTORY OF DVT (DEEP VEIN THROMBOSIS): ICD-10-CM

## 2019-10-18 DIAGNOSIS — I65.23 BILATERAL CAROTID ARTERY STENOSIS: ICD-10-CM

## 2019-10-18 PROCEDURE — 99214 OFFICE O/P EST MOD 30 MIN: CPT | Performed by: INTERNAL MEDICINE

## 2019-11-08 ENCOUNTER — TELEPHONE (OUTPATIENT)
Dept: CARDIOLOGY CLINIC | Age: 84
End: 2019-11-08

## 2019-12-11 ENCOUNTER — TELEPHONE (OUTPATIENT)
Dept: CARDIOLOGY CLINIC | Age: 84
End: 2019-12-11

## 2020-01-08 ENCOUNTER — TELEPHONE (OUTPATIENT)
Dept: CARDIOLOGY CLINIC | Age: 85
End: 2020-01-08

## 2020-01-08 NOTE — TELEPHONE ENCOUNTER
Sample requested: Xarelto    Strength: 20 mg    Dosage: 1 tab day    Patient's call back number: [phone]

## 2020-01-22 ENCOUNTER — TELEPHONE (OUTPATIENT)
Dept: CARDIOLOGY CLINIC | Age: 85
End: 2020-01-22

## 2020-01-22 NOTE — TELEPHONE ENCOUNTER
Call placed to patient he was informed that his samples are ready for . He states that he may his daughter Alec Pratt pick them up. Patient informed of office hrs.

## 2020-02-24 ENCOUNTER — TELEPHONE (OUTPATIENT)
Dept: CARDIOLOGY CLINIC | Age: 85
End: 2020-02-24

## 2020-02-24 NOTE — TELEPHONE ENCOUNTER
Spoke with patient informed him that we currently don't have an samples of xarelto 20 mg that we have notified a drug rep.

## 2020-02-26 ENCOUNTER — TELEPHONE (OUTPATIENT)
Dept: CARDIOLOGY CLINIC | Age: 85
End: 2020-02-26

## 2020-03-13 ENCOUNTER — TELEPHONE (OUTPATIENT)
Dept: CARDIOLOGY CLINIC | Age: 85
End: 2020-03-13

## 2020-04-16 ENCOUNTER — APPOINTMENT (OUTPATIENT)
Dept: GENERAL RADIOLOGY | Age: 85
DRG: 871 | End: 2020-04-16
Payer: MEDICARE

## 2020-04-16 ENCOUNTER — TELEPHONE (OUTPATIENT)
Dept: CARDIOLOGY CLINIC | Age: 85
End: 2020-04-16

## 2020-04-16 ENCOUNTER — HOSPITAL ENCOUNTER (INPATIENT)
Age: 85
LOS: 4 days | Discharge: HOME | DRG: 871 | End: 2020-04-20
Attending: EMERGENCY MEDICINE | Admitting: INTERNAL MEDICINE
Payer: MEDICARE

## 2020-04-16 PROBLEM — Z20.822 SUSPECTED COVID-19 VIRUS INFECTION: Status: ACTIVE | Noted: 2020-04-16

## 2020-04-16 PROBLEM — R79.89 ELEVATED TROPONIN: Status: ACTIVE | Noted: 2020-04-16

## 2020-04-16 PROBLEM — E44.0 MODERATE PROTEIN-CALORIE MALNUTRITION (HCC): Status: ACTIVE | Noted: 2020-04-16

## 2020-04-16 PROBLEM — D72.819 LEUKOPENIA: Status: ACTIVE | Noted: 2020-04-16

## 2020-04-16 PROBLEM — R00.0 TACHYCARDIA: Status: ACTIVE | Noted: 2020-04-16

## 2020-04-16 PROBLEM — R06.82 TACHYPNEA: Status: ACTIVE | Noted: 2020-04-16

## 2020-04-16 PROBLEM — R77.8 ELEVATED TROPONIN: Status: ACTIVE | Noted: 2020-04-16

## 2020-04-16 PROBLEM — J81.0 ACUTE PULMONARY EDEMA (HCC): Status: ACTIVE | Noted: 2020-04-16

## 2020-04-16 PROBLEM — R79.89 ELEVATED LACTIC ACID LEVEL: Status: ACTIVE | Noted: 2020-04-16

## 2020-04-16 PROBLEM — R50.9 FEVER: Status: ACTIVE | Noted: 2020-04-16

## 2020-04-16 PROBLEM — E66.01 MORBID OBESITY DUE TO EXCESS CALORIES (HCC): Status: ACTIVE | Noted: 2020-04-16

## 2020-04-16 PROBLEM — E83.42 HYPOMAGNESEMIA: Status: ACTIVE | Noted: 2020-04-16

## 2020-04-16 LAB
A/G RATIO: 0.8 (ref 1.1–2.2)
ALBUMIN SERPL-MCNC: 2.9 G/DL (ref 3.4–5)
ALP BLD-CCNC: 79 U/L (ref 40–129)
ALT SERPL-CCNC: 17 U/L (ref 10–40)
ANION GAP SERPL CALCULATED.3IONS-SCNC: 12 MMOL/L (ref 3–16)
ANION GAP SERPL CALCULATED.3IONS-SCNC: 16 MMOL/L (ref 3–16)
APTT: 37.8 SEC (ref 24.2–36.2)
AST SERPL-CCNC: 27 U/L (ref 15–37)
BACTERIA: ABNORMAL /HPF
BASOPHILS ABSOLUTE: 0 K/UL (ref 0–0.2)
BASOPHILS ABSOLUTE: 0 K/UL (ref 0–0.2)
BASOPHILS RELATIVE PERCENT: 0.1 %
BASOPHILS RELATIVE PERCENT: 0.2 %
BILIRUB SERPL-MCNC: 0.6 MG/DL (ref 0–1)
BILIRUBIN URINE: NEGATIVE
BLOOD, URINE: ABNORMAL
BUN BLDV-MCNC: 21 MG/DL (ref 7–20)
BUN BLDV-MCNC: 24 MG/DL (ref 7–20)
C-REACTIVE PROTEIN: 81.3 MG/L (ref 0–5.1)
CALCIUM SERPL-MCNC: 8.5 MG/DL (ref 8.3–10.6)
CALCIUM SERPL-MCNC: 8.7 MG/DL (ref 8.3–10.6)
CHLORIDE BLD-SCNC: 102 MMOL/L (ref 99–110)
CHLORIDE BLD-SCNC: 99 MMOL/L (ref 99–110)
CLARITY: ABNORMAL
CO2: 21 MMOL/L (ref 21–32)
CO2: 23 MMOL/L (ref 21–32)
COLOR: YELLOW
CREAT SERPL-MCNC: 1.3 MG/DL (ref 0.8–1.3)
CREAT SERPL-MCNC: 1.4 MG/DL (ref 0.8–1.3)
D DIMER: 707 NG/ML DDU (ref 0–229)
EKG ATRIAL RATE: 94 BPM
EKG DIAGNOSIS: NORMAL
EKG Q-T INTERVAL: 374 MS
EKG QRS DURATION: 110 MS
EKG QTC CALCULATION (BAZETT): 467 MS
EKG R AXIS: 32 DEGREES
EKG T AXIS: -9 DEGREES
EKG VENTRICULAR RATE: 94 BPM
EOSINOPHILS ABSOLUTE: 0 K/UL (ref 0–0.6)
EOSINOPHILS ABSOLUTE: 0 K/UL (ref 0–0.6)
EOSINOPHILS RELATIVE PERCENT: 0.3 %
EOSINOPHILS RELATIVE PERCENT: 0.3 %
EPITHELIAL CELLS, UA: 1 /HPF (ref 0–5)
FERRITIN: 373.8 NG/ML (ref 30–400)
GFR AFRICAN AMERICAN: 58
GFR AFRICAN AMERICAN: >60
GFR NON-AFRICAN AMERICAN: 48
GFR NON-AFRICAN AMERICAN: 53
GLOBULIN: 3.7 G/DL
GLUCOSE BLD-MCNC: 132 MG/DL (ref 70–99)
GLUCOSE BLD-MCNC: 136 MG/DL (ref 70–99)
GLUCOSE URINE: NEGATIVE MG/DL
HCT VFR BLD CALC: 31.3 % (ref 40.5–52.5)
HCT VFR BLD CALC: 34.1 % (ref 40.5–52.5)
HEMOGLOBIN: 10.5 G/DL (ref 13.5–17.5)
HEMOGLOBIN: 11.3 G/DL (ref 13.5–17.5)
HYALINE CASTS: 1 /LPF (ref 0–8)
INR BLD: 2.43 (ref 0.86–1.14)
KETONES, URINE: NEGATIVE MG/DL
LACTATE DEHYDROGENASE: 264 U/L (ref 100–190)
LACTIC ACID: 1.4 MMOL/L (ref 0.4–2)
LACTIC ACID: 1.7 MMOL/L (ref 0.4–2)
LACTIC ACID: 3.4 MMOL/L (ref 0.4–2)
LEUKOCYTE ESTERASE, URINE: NEGATIVE
LYMPHOCYTES ABSOLUTE: 0.2 K/UL (ref 1–5.1)
LYMPHOCYTES ABSOLUTE: 0.3 K/UL (ref 1–5.1)
LYMPHOCYTES RELATIVE PERCENT: 6.6 %
LYMPHOCYTES RELATIVE PERCENT: 7.9 %
MAGNESIUM: 1.6 MG/DL (ref 1.8–2.4)
MCH RBC QN AUTO: 30 PG (ref 26–34)
MCH RBC QN AUTO: 30.3 PG (ref 26–34)
MCHC RBC AUTO-ENTMCNC: 33.2 G/DL (ref 31–36)
MCHC RBC AUTO-ENTMCNC: 33.6 G/DL (ref 31–36)
MCV RBC AUTO: 90 FL (ref 80–100)
MCV RBC AUTO: 90.3 FL (ref 80–100)
MICROSCOPIC EXAMINATION: YES
MONOCYTES ABSOLUTE: 0.2 K/UL (ref 0–1.3)
MONOCYTES ABSOLUTE: 0.2 K/UL (ref 0–1.3)
MONOCYTES RELATIVE PERCENT: 5.4 %
MONOCYTES RELATIVE PERCENT: 5.8 %
NEUTROPHILS ABSOLUTE: 2.7 K/UL (ref 1.7–7.7)
NEUTROPHILS ABSOLUTE: 3.2 K/UL (ref 1.7–7.7)
NEUTROPHILS RELATIVE PERCENT: 86.2 %
NEUTROPHILS RELATIVE PERCENT: 87.2 %
NITRITE, URINE: NEGATIVE
PDW BLD-RTO: 14.3 % (ref 12.4–15.4)
PDW BLD-RTO: 14.5 % (ref 12.4–15.4)
PH UA: 5.5 (ref 5–8)
PLATELET # BLD: 81 K/UL (ref 135–450)
PLATELET # BLD: 87 K/UL (ref 135–450)
PLATELET SLIDE REVIEW: ABNORMAL
PMV BLD AUTO: 8 FL (ref 5–10.5)
PMV BLD AUTO: 8.2 FL (ref 5–10.5)
POTASSIUM REFLEX MAGNESIUM: 3.5 MMOL/L (ref 3.5–5.1)
POTASSIUM REFLEX MAGNESIUM: 3.7 MMOL/L (ref 3.5–5.1)
PRO-BNP: 2190 PG/ML (ref 0–449)
PROCALCITONIN: 0.43 NG/ML (ref 0–0.15)
PROTEIN UA: 30 MG/DL
PROTHROMBIN TIME: 28.4 SEC (ref 10–13.2)
RAPID INFLUENZA  B AGN: NEGATIVE
RAPID INFLUENZA A AGN: NEGATIVE
RBC # BLD: 3.48 M/UL (ref 4.2–5.9)
RBC # BLD: 3.78 M/UL (ref 4.2–5.9)
RBC UA: ABNORMAL /HPF (ref 0–4)
REASON FOR REJECTION: NORMAL
REJECTED TEST: NORMAL
SARS-COV-2, PCR: NOT DETECTED
SLIDE REVIEW: ABNORMAL
SODIUM BLD-SCNC: 136 MMOL/L (ref 136–145)
SODIUM BLD-SCNC: 137 MMOL/L (ref 136–145)
SPECIFIC GRAVITY UA: 1.02 (ref 1–1.03)
TOTAL PROTEIN: 6.6 G/DL (ref 6.4–8.2)
TROPONIN: 0.02 NG/ML
URINE REFLEX TO CULTURE: ABNORMAL
URINE TYPE: ABNORMAL
UROBILINOGEN, URINE: 1 E.U./DL
WBC # BLD: 3.1 K/UL (ref 4–11)
WBC # BLD: 3.8 K/UL (ref 4–11)
WBC UA: 1 /HPF (ref 0–5)

## 2020-04-16 PROCEDURE — 87804 INFLUENZA ASSAY W/OPTIC: CPT

## 2020-04-16 PROCEDURE — 84145 PROCALCITONIN (PCT): CPT

## 2020-04-16 PROCEDURE — 85610 PROTHROMBIN TIME: CPT

## 2020-04-16 PROCEDURE — U0002 COVID-19 LAB TEST NON-CDC: HCPCS

## 2020-04-16 PROCEDURE — 6360000002 HC RX W HCPCS: Performed by: NURSE PRACTITIONER

## 2020-04-16 PROCEDURE — 6360000002 HC RX W HCPCS: Performed by: INTERNAL MEDICINE

## 2020-04-16 PROCEDURE — 80053 COMPREHEN METABOLIC PANEL: CPT

## 2020-04-16 PROCEDURE — 2580000003 HC RX 258: Performed by: INTERNAL MEDICINE

## 2020-04-16 PROCEDURE — 99285 EMERGENCY DEPT VISIT HI MDM: CPT

## 2020-04-16 PROCEDURE — 83605 ASSAY OF LACTIC ACID: CPT

## 2020-04-16 PROCEDURE — 82728 ASSAY OF FERRITIN: CPT

## 2020-04-16 PROCEDURE — 81001 URINALYSIS AUTO W/SCOPE: CPT

## 2020-04-16 PROCEDURE — 85025 COMPLETE CBC W/AUTO DIFF WBC: CPT

## 2020-04-16 PROCEDURE — 83735 ASSAY OF MAGNESIUM: CPT

## 2020-04-16 PROCEDURE — 83615 LACTATE (LD) (LDH) ENZYME: CPT

## 2020-04-16 PROCEDURE — 93005 ELECTROCARDIOGRAM TRACING: CPT | Performed by: EMERGENCY MEDICINE

## 2020-04-16 PROCEDURE — 94640 AIRWAY INHALATION TREATMENT: CPT

## 2020-04-16 PROCEDURE — 71045 X-RAY EXAM CHEST 1 VIEW: CPT

## 2020-04-16 PROCEDURE — 86140 C-REACTIVE PROTEIN: CPT

## 2020-04-16 PROCEDURE — 87449 NOS EACH ORGANISM AG IA: CPT

## 2020-04-16 PROCEDURE — 36415 COLL VENOUS BLD VENIPUNCTURE: CPT

## 2020-04-16 PROCEDURE — 87205 SMEAR GRAM STAIN: CPT

## 2020-04-16 PROCEDURE — 93010 ELECTROCARDIOGRAM REPORT: CPT | Performed by: INTERNAL MEDICINE

## 2020-04-16 PROCEDURE — 85379 FIBRIN DEGRADATION QUANT: CPT

## 2020-04-16 PROCEDURE — 2580000003 HC RX 258: Performed by: EMERGENCY MEDICINE

## 2020-04-16 PROCEDURE — 1200000000 HC SEMI PRIVATE

## 2020-04-16 PROCEDURE — 84484 ASSAY OF TROPONIN QUANT: CPT

## 2020-04-16 PROCEDURE — 85730 THROMBOPLASTIN TIME PARTIAL: CPT

## 2020-04-16 PROCEDURE — 87040 BLOOD CULTURE FOR BACTERIA: CPT

## 2020-04-16 PROCEDURE — 83880 ASSAY OF NATRIURETIC PEPTIDE: CPT

## 2020-04-16 PROCEDURE — 6370000000 HC RX 637 (ALT 250 FOR IP): Performed by: NURSE PRACTITIONER

## 2020-04-16 PROCEDURE — 6370000000 HC RX 637 (ALT 250 FOR IP): Performed by: INTERNAL MEDICINE

## 2020-04-16 RX ORDER — SODIUM CHLORIDE 0.9 % (FLUSH) 0.9 %
10 SYRINGE (ML) INJECTION PRN
Status: DISCONTINUED | OUTPATIENT
Start: 2020-04-16 | End: 2020-04-20 | Stop reason: HOSPADM

## 2020-04-16 RX ORDER — IPRATROPIUM BROMIDE AND ALBUTEROL SULFATE 2.5; .5 MG/3ML; MG/3ML
1 SOLUTION RESPIRATORY (INHALATION) EVERY 4 HOURS PRN
Status: DISCONTINUED | OUTPATIENT
Start: 2020-04-16 | End: 2020-04-20 | Stop reason: HOSPADM

## 2020-04-16 RX ORDER — 0.9 % SODIUM CHLORIDE 0.9 %
500 INTRAVENOUS SOLUTION INTRAVENOUS ONCE
Status: COMPLETED | OUTPATIENT
Start: 2020-04-16 | End: 2020-04-16

## 2020-04-16 RX ORDER — AZITHROMYCIN 500 MG/1
500 TABLET, FILM COATED ORAL DAILY
Status: COMPLETED | OUTPATIENT
Start: 2020-04-16 | End: 2020-04-16

## 2020-04-16 RX ORDER — ALPRAZOLAM 2 MG/1
2 TABLET ORAL NIGHTLY
Status: ON HOLD | COMMUNITY
Start: 2020-02-23 | End: 2020-04-20 | Stop reason: HOSPADM

## 2020-04-16 RX ORDER — HYDROXYCHLOROQUINE SULFATE 200 MG/1
200 TABLET, FILM COATED ORAL EVERY 12 HOURS
Status: DISCONTINUED | OUTPATIENT
Start: 2020-04-17 | End: 2020-04-16

## 2020-04-16 RX ORDER — AZITHROMYCIN 250 MG/1
250 TABLET, FILM COATED ORAL DAILY
Status: COMPLETED | OUTPATIENT
Start: 2020-04-17 | End: 2020-04-20

## 2020-04-16 RX ORDER — ONDANSETRON 2 MG/ML
4 INJECTION INTRAMUSCULAR; INTRAVENOUS EVERY 4 HOURS PRN
Status: DISCONTINUED | OUTPATIENT
Start: 2020-04-16 | End: 2020-04-20 | Stop reason: HOSPADM

## 2020-04-16 RX ORDER — METHYLPREDNISOLONE SODIUM SUCCINATE 125 MG/2ML
125 INJECTION, POWDER, LYOPHILIZED, FOR SOLUTION INTRAMUSCULAR; INTRAVENOUS ONCE
Status: COMPLETED | OUTPATIENT
Start: 2020-04-16 | End: 2020-04-16

## 2020-04-16 RX ORDER — HYDROXYCHLOROQUINE SULFATE 200 MG/1
400 TABLET, FILM COATED ORAL EVERY 12 HOURS
Status: DISCONTINUED | OUTPATIENT
Start: 2020-04-16 | End: 2020-04-16

## 2020-04-16 RX ORDER — POLYETHYLENE GLYCOL 3350 17 G/17G
17 POWDER, FOR SOLUTION ORAL DAILY PRN
Status: DISCONTINUED | OUTPATIENT
Start: 2020-04-16 | End: 2020-04-20 | Stop reason: HOSPADM

## 2020-04-16 RX ORDER — SODIUM CHLORIDE 0.9 % (FLUSH) 0.9 %
10 SYRINGE (ML) INJECTION EVERY 12 HOURS SCHEDULED
Status: DISCONTINUED | OUTPATIENT
Start: 2020-04-16 | End: 2020-04-20 | Stop reason: HOSPADM

## 2020-04-16 RX ORDER — ALPRAZOLAM 0.5 MG/1
0.5 TABLET ORAL NIGHTLY PRN
Status: DISCONTINUED | OUTPATIENT
Start: 2020-04-16 | End: 2020-04-20 | Stop reason: HOSPADM

## 2020-04-16 RX ORDER — 0.9 % SODIUM CHLORIDE 0.9 %
1000 INTRAVENOUS SOLUTION INTRAVENOUS ONCE
Status: COMPLETED | OUTPATIENT
Start: 2020-04-16 | End: 2020-04-16

## 2020-04-16 RX ORDER — MAGNESIUM SULFATE IN WATER 40 MG/ML
2 INJECTION, SOLUTION INTRAVENOUS ONCE
Status: COMPLETED | OUTPATIENT
Start: 2020-04-16 | End: 2020-04-16

## 2020-04-16 RX ORDER — ATORVASTATIN CALCIUM 20 MG/1
20 TABLET, FILM COATED ORAL DAILY
Status: DISCONTINUED | OUTPATIENT
Start: 2020-04-16 | End: 2020-04-20 | Stop reason: HOSPADM

## 2020-04-16 RX ORDER — METHYLPREDNISOLONE SODIUM SUCCINATE 40 MG/ML
40 INJECTION, POWDER, LYOPHILIZED, FOR SOLUTION INTRAMUSCULAR; INTRAVENOUS EVERY 12 HOURS
Status: DISCONTINUED | OUTPATIENT
Start: 2020-04-17 | End: 2020-04-19

## 2020-04-16 RX ORDER — FAMOTIDINE 20 MG/1
20 TABLET, FILM COATED ORAL 2 TIMES DAILY
COMMUNITY
Start: 2020-03-08

## 2020-04-16 RX ADMIN — AZITHROMYCIN MONOHYDRATE 500 MG: 500 TABLET ORAL at 08:40

## 2020-04-16 RX ADMIN — IPRATROPIUM BROMIDE AND ALBUTEROL SULFATE 1 AMPULE: .5; 3 SOLUTION RESPIRATORY (INHALATION) at 21:40

## 2020-04-16 RX ADMIN — SODIUM CHLORIDE 1000 ML: 9 INJECTION, SOLUTION INTRAVENOUS at 02:22

## 2020-04-16 RX ADMIN — SODIUM CHLORIDE, PRESERVATIVE FREE 10 ML: 5 INJECTION INTRAVENOUS at 08:41

## 2020-04-16 RX ADMIN — METHYLPREDNISOLONE SODIUM SUCCINATE 125 MG: 125 INJECTION, POWDER, FOR SOLUTION INTRAMUSCULAR; INTRAVENOUS at 21:53

## 2020-04-16 RX ADMIN — ATORVASTATIN CALCIUM 20 MG: 20 TABLET, FILM COATED ORAL at 08:40

## 2020-04-16 RX ADMIN — MAGNESIUM SULFATE HEPTAHYDRATE 2 G: 40 INJECTION, SOLUTION INTRAVENOUS at 02:38

## 2020-04-16 RX ADMIN — WATER 1 G: 1 INJECTION INTRAMUSCULAR; INTRAVENOUS; SUBCUTANEOUS at 09:47

## 2020-04-16 RX ADMIN — SODIUM CHLORIDE 500 ML: 9 INJECTION, SOLUTION INTRAVENOUS at 13:58

## 2020-04-16 RX ADMIN — HYDROXYCHLOROQUINE SULFATE 400 MG: 200 TABLET ORAL at 04:28

## 2020-04-16 RX ADMIN — SODIUM CHLORIDE, PRESERVATIVE FREE 10 ML: 5 INJECTION INTRAVENOUS at 21:13

## 2020-04-16 RX ADMIN — RIVAROXABAN 20 MG: 20 TABLET, FILM COATED ORAL at 17:06

## 2020-04-16 ASSESSMENT — PAIN SCALES - GENERAL
PAINLEVEL_OUTOF10: 0
PAINLEVEL_OUTOF10: 0

## 2020-04-16 NOTE — ED PROVIDER NOTES
[facility]  eMERGENCY dEPARTMENT eNCOUnter      Pt Name: Trevon Nicole  MRN: [id number]  Cés[place]met 1935  Date of evaluation: 4/16/2020  Provider: Renuka Ling MD  PCP: Sofía Mtz MD      [address] [place] [address]       Chief Complaint   Patient presents with    Shortness of Breath    Fever       HISTORY OFPRESENT ILLNESS   (Location/Symptom, Timing/Onset, Context/Setting, Quality, Duration, Modifying Factors,Severity)  Note limiting factors. Trevon Nicole is a 80 y.o. male presents with complaints of shortness of breath and a fever he says it all started tonight denies any nausea vomiting no chest pain he did get a DuoNeb in route he does not have any history of COPD he does have hypertension he does have a history of atrial fibrillation. he denies any he denies any contact with anyone positive for coronavirus he denies any cough or sore throat    Nursing Notes were all reviewed and agreed with or any disagreements were addressed  in the HPI. REVIEW OF SYSTEMS    (2-9 systems for level 4, 10 or more for level 5)     Review of Systems    Positives and Pertinent negatives as per HPI. Except as noted above in the ROS, all other systems were reviewed andnegative.        PASTMEDICAL HISTORY     Past Medical History:   Diagnosis Date    Atrial fibrillation ([address]     CAD (coronary artery disease)     Diarrhea 04/12/2017    Hyperlipidemia     Hypertension     Kidney stones 03/2017         SURGICAL HISTORY       Past Surgical History:   Procedure Laterality Date    APPENDECTOMY      BACK SURGERY      CORONARY ARTERY BYPASS GRAFT  1981    CORONARY ARTERY BYPASS GRAFT  2001    JOINT REPLACEMENT Right     knee replacement    KNEE SURGERY  2005    right     PROSTATE SURGERY      SHOULDER SURGERY      both    SKIN CANCER EXCISION           CURRENT MEDICATIONS       Previous Medications    ALPRAZOLAM (XANAX) 0.5 MG TABLET    Take 0.5 mg by mouth [id number]   D-DIMER, QUANTITATIVE - Abnormal; Notable for the following components:    D-Dimer, Quant 707 (*)     All other components within normal limits    Narrative:     Performed at:  [facility]  [address] [place] [employer] [address]   Phone [phone]   LACTIC ACID, PLASMA - Abnormal; Notable for the following components:    Lactic Acid 3.4 (*)     All other components within normal limits    Narrative:     Performed at:  [facility]  [address]  [place] [employer] [address]   Phone [phone]   MAGNESIUM - Abnormal; Notable for the following components:    Magnesium 1.60 (*)     All other components within normal limits    Narrative:     Performed at:  [facility]  [address] [place] [employer] [address]   Phone [phone]   RAPID INFLUENZA A/B ANTIGENS   CULTURE, BLOOD 1   CULTURE, BLOOD 2   URINE RT REFLEX TO CULTURE   EMERGENT DISEASE PANEL   COVID-19   COVID-19       All other labs were within normal range or not returned as of this dictation. EKG: All EKG's are interpreted by the Emergency Department Physician who eithersigns or Co-signs this chart in the absence of a cardiologist.    The Ekg interpreted by me in the absence of a cardiologist shows. Atrial  fibrillation  Rate of   94  Axis is   Normal  QTc is  within an acceptable range  Intervals and Durations are unremarkable. Nonspecific ST-T wave changes appreciated. No evidence of acute ischemia. RADIOLOGY:   Non-plain film images such as CT, Ultrasound and MRI are read by the radiologist. Plain radiographic images are visualized by myself. *    Interpretation per the Radiologist below, if available at the time of this note:    XR CHEST PORTABLE   Final Result   Cardiomegaly. Increased interstitial opacities may represent pulmonary edema   or interstitial/atypical pneumonia, including viral pneumonia.

## 2020-04-16 NOTE — H&P
tablet by mouth daily 5/30/19   Argelia Thomas MD   Compression Stockings MISC by Does not apply route 20-30 mm hg compression stockings, bilateral, knee high 1/14/19   Sari Barcenas MD   psyllium (KONSYL) 28.3 % PACK Take 1 packet by mouth daily    Historical Provider, MD   ranitidine (ZANTAC) 150 MG capsule Take 150 mg by mouth 2 times daily    Historical Provider, MD   ALPRAZolam Hezzie Arnt) 0.5 MG tablet Take 0.5 mg by mouth nightly as needed for Sleep. Donald Ace Historical Provider, MD   XARELTO 20 MG TABS tablet Take 1 tablet by mouth Daily with supper 9/25/17   Argelia Thomas MD   amitriptyline (ELAVIL) 100 MG tablet TK 1 T PO QD HS 3/27/17   Historical Provider, MD   nitroGLYCERIN (NITROSTAT) 0.4 MG SL tablet Place 1 tablet under the tongue every 5 minutes as needed for Chest pain 9/24/15   Argelia Thomas MD   Ascorbic Acid (VITAMIN C) 500 MG tablet Take 1,000 mg by mouth daily     Historical Provider, MD   vitamin E 1000 UNITS capsule Take 800 Units by mouth daily     Historical Provider, MD   vitamin D (CHOLECALCIFEROL) 1000 UNIT TABS tablet Take 2,000 Units by mouth daily     Historical Provider, MD       Family History:       Problem Relation Age of Onset    Heart Attack Mother     Cancer Mother     Diabetes Mother     Heart Disease Father     Heart Attack Father     High Blood Pressure Father     High Cholesterol Father     Cancer Sister         brain, lung, spine     Social History:   TOBACCO:   reports that he quit smoking about 45 years ago. He has a 20.00 pack-year smoking history. He has never used smokeless tobacco.  ETOH:   reports current alcohol use.   OCCUPATION:      REVIEW OF SYSTEMS:  A full review of systems was performed and is negative except for that which appears in the HPI    Physical Exam:    Vitals: BP (!) 110/58   Pulse 84   Temp 98.8 °F (37.1 °C) (Oral)   Resp 20   Ht 5' 11\" (1.803 m)   Wt 275 lb 12.7 oz (125.1 kg)   SpO2 94%   BMI 38.47 kg/m²   General appearance: WD/WN 80y.o. year-old  male who is alert, appears stated age and is cooperative  HEENT: Head: Normocephalic, no lesions, without obvious abnormality. Eye: Normal external eye, conjunctiva, lids cornea, PASCUAL. Ears: Normal external ears. Non-tender. Nose: Normal external nose, mucus membranes and septum. Pharynx: Dental Hygiene adequate. Normal buccal mucosa. Normal pharynx. Neck: no adenopathy, no carotid bruit, no JVD, supple, symmetrical, trachea midline and thyroid not enlarged, symmetric, no tenderness/mass/nodules  Lungs: scattered crackles on auscultation bilaterally and no use of accessory muscles. Heart: Irregularly irregular, S1, S2 normal, no murmur, click, rub or gallop and normal apical impulse  Abdomen: soft, non-tender; bowel sounds normal; no masses,  no organomegaly  Extremities: extremities atraumatic, no cyanosis or edema and Homans sign is negative, no sign of DVT. Capillary Refill: Acceptable < 3 seconds  Peripheral Pulses: +3 easily felt, not easily obliterated with pressures  Skin: Skin color, texture, turgor normal. No rashes or lesions on exposed skin  Neurologic: Neurovascularly intact without any focal sensory/motor deficits. Cranial nerves: II-XII intact, grossly non-focal. Gait was not tested.   Psychiatric: Alert and oriented, thought content appropriate, normal insight    CBC:   Recent Labs     04/16/20  0125   WBC 3.8*   HGB 11.3*   PLT 87*     BMP:    Recent Labs     04/16/20  0125      K 3.5   CL 99   CO2 21   BUN 21*   CREATININE 1.3   GLUCOSE 136*     Troponin:   Recent Labs     04/16/20  0125   TROPONINI 0.02*     PT/INR:  No results found for: PTINR  U/A:    Lab Results   Component Value Date    LEUKOCYTESUR LARGE 04/13/2017    RBCUA 3 04/13/2017    SPECGRAV 1.005 04/13/2017    UROBILINOGEN 0.2 04/13/2017    BILIRUBINUR Negative 04/13/2017    BILIRUBINUR SMALL 05/16/2012    BLOODU SMALL 04/13/2017    GLUCOSEU Negative 04/13/2017    GLUCOSEU NEGATIVE 05/16/2012 Ferritin and Procalcitonin)    Sepsis ([facility] due to COVID19? With Fever of 101°F at home, Tachycardia, Tachypnea, Leukopenia. Initial Lactic Acid was elevated. - Pt will not be resuscitated with 30 cc/Kg IV Fluids per sepsis protocol as his BP has improved with a smaller volume of IV fluids, and he has acute pulmonary edema on his CXR resulting in a high risk of fluid overload and catastrophic decompensation if he were to receive such a bolus. His blood pressure will be monitored closely  - We will cycle serial Lactic Acid levels until Lactic Acid has normalized  - Blood cultures x 2 have been ordered    Hypotension - BP improved with IV fluids. Monitor closely    Coronary artery disease involving native heart without angina pectoris - Pt denies chest pain. Continue Statin. Monitor on Telemetry. Elevated troponin - Pt denies chest pain. Possibly secondary to viral pneumonia. Will monitor on Telemetry and follow serial cardiac enzymes    Paroxysmal atrial fibrillation (HCC) rate controlled without a rate controlling medication; continue Xarelto    Moderate protein-calorie malnutrition ([facility] - Patient encouraged to eat a balanced diet, including protein-rich foods. Dietary has been consulted, and patient's diet has been liberalized. Dietary supplements will be provided. Hypomagnesemia - replace Magnesium      Essential (primary) hypertension - hold antihypertensive medications while hypotensive and monitor blood pressure. Hyperlipidemia - No current evidence of Rhabdomyolysis or other adverse effects. Continue statin therapy while in the hospital    Morbid obesity due to excess calories (Body mass index is 38.47 kg/m². ) - Complicating assessment and treatment. Placing patient at risk for multiple co-morbidities as well as early death and contributing to the patient's presentation.  on weight loss when appropriate.         Total critical care time caring for this patient with life threatening illness,

## 2020-04-16 NOTE — TELEPHONE ENCOUNTER
Spoke with patient's daughter Eduarda Crawford. Notified her that [facility] is OOT until Monday. She did speak with ER nurse who gave her info and also spoke with her father today. She is trying to get a hold of the floor nurse to find out if a cardiology consult can be placed. She knows they are trying to rule out COVID but does not want them to overlook his heart.

## 2020-04-16 NOTE — TELEPHONE ENCOUNTER
Pt daughter calling pt was having SOB and Wheezing, he \"slipped\" out of bed, they called Ambulance and they took his to [address] was admitted, tested for COVID-19 (Daughter doesn't think it's that, pt hasn't been out anywhere). Drs there think it's CHF ruling out pneumonia. Amanda Going not sure if heart Drs will be called in. Amanda Going would like to know if [facility] can check on him to see what's going on?  Pls call to advise Thank you

## 2020-04-16 NOTE — PLAN OF CARE
Problem: Falls - Risk of:  Goal: Will remain free from falls  Description: Will remain free from falls  Outcome: Ongoing  Goal: Absence of physical injury  Description: Absence of physical injury  Outcome: Ongoing  Note: No falls this shift. PT in bed with non-skid footwear on and alarm applied. Belongings and call light are placed within reach. PT calls appropriately for needs and is rounded on frequently. Bed placed in lowest position with siderails up and wheels locked. Will continue to monitor.       Problem: Discharge Planning:  Goal: Discharged to appropriate level of care  Description: Discharged to appropriate level of care  Outcome: Ongoing  Goal: Participates in care planning  Description: Participates in care planning  Outcome: Ongoing     Problem: Airway Clearance - Ineffective:  Goal: Clear lung sounds  Description: Clear lung sounds  Outcome: Ongoing  Goal: Ability to maintain a clear airway will improve  Description: Ability to maintain a clear airway will improve  Outcome: Ongoing     Problem: Fluid Volume - Deficit:  Goal: Achieves intake and output within specified parameters  Description: Achieves intake and output within specified parameters  Outcome: Ongoing     Problem: Gas Exchange - Impaired:  Goal: Levels of oxygenation will improve  Description: Levels of oxygenation will improve  Outcome: Ongoing     Problem: Hyperthermia:  Goal: Ability to maintain a body temperature in the normal range will improve  Description: Ability to maintain a body temperature in the normal range will improve  Outcome: Ongoing     Problem: Tobacco Use:  Goal: Will participate in inpatient tobacco-use cessation counseling  Description: Will participate in inpatient tobacco-use cessation counseling  Outcome: Ongoing     Problem: OXYGENATION/RESPIRATORY FUNCTION  Goal: Patient will maintain patent airway  Outcome: Ongoing  Goal: Patient will achieve/maintain normal respiratory rate/effort  Description: Respiratory

## 2020-04-16 NOTE — PROGRESS NOTES
Pt to unit via stretcher to COVID-19 rule out unit. PT in droplet isolation. Heart monitor applied and confirmed with [facility]. Oxygen placed on 2L nasal cannula for dyspnea with exertion. Lung sounds are diminished with fine crackles. All VSS at this time, PT afebrile upon arrival.  PT in bed with non-skid footwear on and alarm applied to bed. Belongings and call light are placed within reach. PT educated and oriented to call light and unit. Bed placed in lowest position with siderails up and wheels locked. Will continue to monitor.

## 2020-04-16 NOTE — PROGRESS NOTES
pneumonia. Assessment/Plan:    Active Hospital Problems    Diagnosis    Essential hypertension [I10]     Priority: High    Coronary artery disease involving native heart without angina pectoris [I25.10]     Priority: Medium    Mixed hyperlipidemia [E78.2]     Priority: Low    Suspected COVID-19 virus infection [R68.89]    Tachycardia [R00.0]    Tachypnea [R06.82]    Leukopenia [D72.819]    Elevated lactic acid level [R79.89]    Moderate protein-calorie malnutrition (HCC) [E44.0]    Fever [R50.9]    Hypomagnesemia [E83.42]    Elevated troponin [R79.89]    Acute pulmonary edema (HCC) [J81.0]    Morbid obesity due to excess calories ([facility] [E66.01]    Paroxysmal atrial fibrillation (HCC) [I48.0]    Sepsis ([facility] [A41.9]    Hypotension [I95.9]     Acute Hypoxic Respiratory Failure  Requiring 2L NC. Last TTE with normal systolic and diastolic function though does take lasix at home  - wean O2 as able  - IS  - IV lasix   - recheck TTE      Sepsis ([facility] due to pneumonia   Presented with fever, SOB. T max 101, tachycardic, tachypnic, elevated lactate. CXR concerning for fluid vs viral PNA. Elevated procal, negative COVID test, prominent wheezing. Does have elevated acute phase reactants, non specific. Elevated D dimer but on Xarelto, less likely PE. Started on plaquenil, discontinued. - Pt not resuscitated with 30 cc/Kg IV Fluids per sepsis protocol as his BP improved with a smaller volume of IV fluids, and he has acute pulmonary edema on his CXR resulting in a high risk of fluid overload and catastrophic decompensation if he were to receive such a bolus. His blood pressure will be monitored closely  - lactate normalized  - Blood cultures x 2 have been ordered  - continue ceftriaxone, azithromycin  - started solumedrol and nebs for prominent wheezing    Diastolic heart failure  Acute on chronic. Patient says he is chronic lower extremity edema.   Chest x-ray consistent with heart failure with cardiomegaly and pleural effusions. Not on Lasix at home.  -Lasix 20 mg twice daily  -Patient requested cardiology consult  -TTE ordered and pending     Hypotension   BP improved with IV fluids. Monitor closely    Acute Kidney Injury, improved  Cr up to 1.4 from baseline 0.8. Likely pre-renal in setting of sepsis. - daily renal  - avoid nephrotoxic agents    Thrombocytopenia, pancytopenia  Platelets stable ~55. Timing not consistent with HIT. White count 2.4. Hemoglobin 11, stable. Likely secondary to acute illness  -Daily CBC     Coronary artery disease involving native heart without angina pectoris   Pt denies chest pain. - Continue Statin  - Monitor on Telemetry.      Elevated troponin -  0.02 x2. Pt denies chest pain. Possibly secondary to viral pneumonia. - tele     Paroxysmal atrial fibrillation   Rate controlled without a rate controlling medication  - continue Xarelto     Moderate protein-calorie malnutrition   Patient encouraged to eat a balanced diet, including protein-rich foods. Dietary has been consulted, and patient's diet has been liberalized. Dietary supplements will be provided.     Hypomagnesemia   Replace Magnesium      Essential (primary) hypertension   Hold antihypertensive medications while hypotensive and monitor blood pressure.     Hyperlipidemia   No current evidence of Rhabdomyolysis or other adverse effects. Continue statin therapy while in the hospital     Morbid obesity due to excess calories (Body mass index is 48.68 kg/m².)   Complicating assessment and treatment. Placing patient at risk for multiple co-morbidities as well as early death and contributing to the patient's presentation.  on weight loss when appropriate.     DVT Prophylaxis: Xarelto  Diet: DIET GENERAL;  Dietary Nutrition Supplements: Standard Oral Supplement  Code Status: Full Code    PT/OT Eval Status: deferred for COVID rule out    Dispo - pending    Nereyda Paula MD    This note was transcribed using Dragon Dictation software. Please disregard any translational errors.

## 2020-04-17 ENCOUNTER — TELEPHONE (OUTPATIENT)
Dept: CARDIOLOGY CLINIC | Age: 85
End: 2020-04-17

## 2020-04-17 ENCOUNTER — APPOINTMENT (OUTPATIENT)
Dept: GENERAL RADIOLOGY | Age: 85
DRG: 871 | End: 2020-04-17
Payer: MEDICARE

## 2020-04-17 LAB
ANION GAP SERPL CALCULATED.3IONS-SCNC: 10 MMOL/L (ref 3–16)
BASOPHILS ABSOLUTE: 0 K/UL (ref 0–0.2)
BASOPHILS RELATIVE PERCENT: 0.3 %
BUN BLDV-MCNC: 18 MG/DL (ref 7–20)
CALCIUM SERPL-MCNC: 8.6 MG/DL (ref 8.3–10.6)
CHLORIDE BLD-SCNC: 102 MMOL/L (ref 99–110)
CO2: 25 MMOL/L (ref 21–32)
CREAT SERPL-MCNC: 0.9 MG/DL (ref 0.8–1.3)
EKG ATRIAL RATE: 83 BPM
EKG DIAGNOSIS: NORMAL
EKG P AXIS: 27 DEGREES
EKG P-R INTERVAL: 220 MS
EKG Q-T INTERVAL: 392 MS
EKG QRS DURATION: 100 MS
EKG QTC CALCULATION (BAZETT): 460 MS
EKG R AXIS: 37 DEGREES
EKG T AXIS: -3 DEGREES
EKG VENTRICULAR RATE: 83 BPM
EOSINOPHILS ABSOLUTE: 0 K/UL (ref 0–0.6)
EOSINOPHILS RELATIVE PERCENT: 0.1 %
GFR AFRICAN AMERICAN: >60
GFR NON-AFRICAN AMERICAN: >60
GLUCOSE BLD-MCNC: 194 MG/DL (ref 70–99)
HCT VFR BLD CALC: 33.7 % (ref 40.5–52.5)
HEMOGLOBIN: 11.5 G/DL (ref 13.5–17.5)
L. PNEUMOPHILA SEROGP 1 UR AG: NORMAL
LV EF: 43 %
LVEF MODALITY: NORMAL
LYMPHOCYTES ABSOLUTE: 0.2 K/UL (ref 1–5.1)
LYMPHOCYTES RELATIVE PERCENT: 9.6 %
MCH RBC QN AUTO: 30.3 PG (ref 26–34)
MCHC RBC AUTO-ENTMCNC: 34 G/DL (ref 31–36)
MCV RBC AUTO: 89 FL (ref 80–100)
MONOCYTES ABSOLUTE: 0.1 K/UL (ref 0–1.3)
MONOCYTES RELATIVE PERCENT: 3.8 %
NEUTROPHILS ABSOLUTE: 2.1 K/UL (ref 1.7–7.7)
NEUTROPHILS RELATIVE PERCENT: 86.2 %
PDW BLD-RTO: 14.3 % (ref 12.4–15.4)
PLATELET # BLD: 81 K/UL (ref 135–450)
PMV BLD AUTO: 8.1 FL (ref 5–10.5)
POTASSIUM REFLEX MAGNESIUM: 4.1 MMOL/L (ref 3.5–5.1)
RBC # BLD: 3.78 M/UL (ref 4.2–5.9)
SODIUM BLD-SCNC: 137 MMOL/L (ref 136–145)
STREP PNEUMONIAE ANTIGEN, URINE: NORMAL
WBC # BLD: 2.4 K/UL (ref 4–11)

## 2020-04-17 PROCEDURE — 71045 X-RAY EXAM CHEST 1 VIEW: CPT

## 2020-04-17 PROCEDURE — 6370000000 HC RX 637 (ALT 250 FOR IP): Performed by: INTERNAL MEDICINE

## 2020-04-17 PROCEDURE — 36415 COLL VENOUS BLD VENIPUNCTURE: CPT

## 2020-04-17 PROCEDURE — 80048 BASIC METABOLIC PNL TOTAL CA: CPT

## 2020-04-17 PROCEDURE — 2580000003 HC RX 258: Performed by: INTERNAL MEDICINE

## 2020-04-17 PROCEDURE — 85025 COMPLETE CBC W/AUTO DIFF WBC: CPT

## 2020-04-17 PROCEDURE — 93010 ELECTROCARDIOGRAM REPORT: CPT | Performed by: INTERNAL MEDICINE

## 2020-04-17 PROCEDURE — 6360000002 HC RX W HCPCS: Performed by: INTERNAL MEDICINE

## 2020-04-17 PROCEDURE — 1200000000 HC SEMI PRIVATE

## 2020-04-17 PROCEDURE — 93005 ELECTROCARDIOGRAM TRACING: CPT | Performed by: INTERNAL MEDICINE

## 2020-04-17 PROCEDURE — 6360000002 HC RX W HCPCS: Performed by: NURSE PRACTITIONER

## 2020-04-17 PROCEDURE — 99222 1ST HOSP IP/OBS MODERATE 55: CPT | Performed by: INTERNAL MEDICINE

## 2020-04-17 PROCEDURE — C8929 TTE W OR WO FOL WCON,DOPPLER: HCPCS

## 2020-04-17 PROCEDURE — 6360000004 HC RX CONTRAST MEDICATION: Performed by: NURSE PRACTITIONER

## 2020-04-17 RX ORDER — AMLODIPINE BESYLATE 5 MG/1
5 TABLET ORAL DAILY
Status: DISCONTINUED | OUTPATIENT
Start: 2020-04-17 | End: 2020-04-17

## 2020-04-17 RX ORDER — FUROSEMIDE 10 MG/ML
40 INJECTION INTRAMUSCULAR; INTRAVENOUS 2 TIMES DAILY
Status: DISCONTINUED | OUTPATIENT
Start: 2020-04-18 | End: 2020-04-20

## 2020-04-17 RX ORDER — CARVEDILOL 12.5 MG/1
12.5 TABLET ORAL 2 TIMES DAILY WITH MEALS
Status: DISCONTINUED | OUTPATIENT
Start: 2020-04-18 | End: 2020-04-18

## 2020-04-17 RX ORDER — AMITRIPTYLINE HYDROCHLORIDE 50 MG/1
100 TABLET, FILM COATED ORAL NIGHTLY
Status: DISCONTINUED | OUTPATIENT
Start: 2020-04-17 | End: 2020-04-20 | Stop reason: HOSPADM

## 2020-04-17 RX ORDER — FUROSEMIDE 10 MG/ML
20 INJECTION INTRAMUSCULAR; INTRAVENOUS 2 TIMES DAILY
Status: DISCONTINUED | OUTPATIENT
Start: 2020-04-17 | End: 2020-04-17

## 2020-04-17 RX ORDER — ACETAMINOPHEN 500 MG
1000 TABLET ORAL EVERY 8 HOURS PRN
Status: DISCONTINUED | OUTPATIENT
Start: 2020-04-17 | End: 2020-04-20 | Stop reason: HOSPADM

## 2020-04-17 RX ORDER — FUROSEMIDE 10 MG/ML
20 INJECTION INTRAMUSCULAR; INTRAVENOUS ONCE
Status: COMPLETED | OUTPATIENT
Start: 2020-04-17 | End: 2020-04-17

## 2020-04-17 RX ADMIN — PERFLUTREN 1.5 ML: 6.52 INJECTION, SUSPENSION INTRAVENOUS at 10:49

## 2020-04-17 RX ADMIN — METHYLPREDNISOLONE SODIUM SUCCINATE 40 MG: 40 INJECTION, POWDER, FOR SOLUTION INTRAMUSCULAR; INTRAVENOUS at 19:57

## 2020-04-17 RX ADMIN — AMLODIPINE BESYLATE 5 MG: 5 TABLET ORAL at 15:27

## 2020-04-17 RX ADMIN — ALPRAZOLAM 0.5 MG: 0.5 TABLET ORAL at 21:32

## 2020-04-17 RX ADMIN — RIVAROXABAN 20 MG: 20 TABLET, FILM COATED ORAL at 17:15

## 2020-04-17 RX ADMIN — FUROSEMIDE 20 MG: 10 INJECTION, SOLUTION INTRAMUSCULAR; INTRAVENOUS at 05:56

## 2020-04-17 RX ADMIN — SODIUM CHLORIDE, PRESERVATIVE FREE 10 ML: 5 INJECTION INTRAVENOUS at 19:59

## 2020-04-17 RX ADMIN — AZITHROMYCIN MONOHYDRATE 250 MG: 250 TABLET ORAL at 08:44

## 2020-04-17 RX ADMIN — WATER 1 G: 1 INJECTION INTRAMUSCULAR; INTRAVENOUS; SUBCUTANEOUS at 08:44

## 2020-04-17 RX ADMIN — ATORVASTATIN CALCIUM 20 MG: 20 TABLET, FILM COATED ORAL at 08:44

## 2020-04-17 RX ADMIN — FUROSEMIDE 20 MG: 10 INJECTION, SOLUTION INTRAMUSCULAR; INTRAVENOUS at 13:15

## 2020-04-17 RX ADMIN — FUROSEMIDE 20 MG: 10 INJECTION, SOLUTION INTRAMUSCULAR; INTRAVENOUS at 17:15

## 2020-04-17 RX ADMIN — METHYLPREDNISOLONE SODIUM SUCCINATE 40 MG: 40 INJECTION, POWDER, FOR SOLUTION INTRAMUSCULAR; INTRAVENOUS at 08:45

## 2020-04-17 RX ADMIN — AMITRIPTYLINE HYDROCHLORIDE 100 MG: 50 TABLET, FILM COATED ORAL at 21:31

## 2020-04-17 RX ADMIN — SODIUM CHLORIDE, PRESERVATIVE FREE 10 ML: 5 INJECTION INTRAVENOUS at 08:45

## 2020-04-17 ASSESSMENT — PAIN SCALES - GENERAL: PAINLEVEL_OUTOF10: 0

## 2020-04-17 NOTE — PLAN OF CARE
Problem: Falls - Risk of:  Goal: Will remain free from falls  Description: Will remain free from falls  4/17/2020 1108 by Sarah Cavanaugh RN  Outcome: Ongoing  Note: Fall risk assessment completed. Fall precautions in place. Call light within reach. Pt educated on calling for assistance before getting up. Walkway free of clutter. Will continue to monitor. Problem: OXYGENATION/RESPIRATORY FUNCTION  Goal: Patient will maintain patent airway  4/17/2020 1108 by Sarah Cavanaugh RN  Outcome: Ongoing  Note: Pt able to maintain patent airway. Lungs assessed. Medications administered per orders. Vitals stable and monitored per floor orders. Problem: HEMODYNAMIC STATUS  Goal: Patient has stable vital signs and fluid balance  4/17/2020 1108 by Sarah Cavanaugh RN  Outcome: Ongoing  Note: Vitals stable and monitored per floor orders. Strict I&O monitored. Daily weights obtained. Pt educated on fluids, pt denies hx of CHF. Labs monitored. Medications administered as directed. Problem: FLUID AND ELECTROLYTE IMBALANCE  Goal: Fluid and electrolyte balance are achieved/maintained  4/17/2020 1108 by Sarah Cavanaugh RN  Outcome: Ongoing  Note: Fluids and electrolytes monitored. Pt denies fluid restriction. Swelling noted. I&O monitored. Daily weights obtained. Medications administered as directed. Problem: ACTIVITY INTOLERANCE/IMPAIRED MOBILITY  Goal: Mobility/activity is maintained at optimum level for patient  4/17/2020 1108 by Sarah Cavanaugh RN  Outcome: Ongoing  Note: Pt with SOB with exertion. Oxygen as needed. Vitals stable and monitored per floor orders. Fall precautions in place. Lungs assessed.

## 2020-04-17 NOTE — PROGRESS NOTES
Pt has an appointment with his Cardiologist at [facility] on Monday that he is worried he won't be able to make. He is requesting cardiology to see him here. He may have active CHF (per chest x-ray), SOB, and he is swollen 2+ pitting edema. Messaged Dr Kishore Gonzalez to see if we could get cardiology consulted. Await response.   Electronically signed by Kenroy Ott RN on 4/17/2020 at 12:12 PM

## 2020-04-17 NOTE — TELEPHONE ENCOUNTER
Wife states pt is admitted to [facility] for wheezing because squad would not bring him to [facility], they tested for covid which was negitive. Pt want to keep his appt on 4/23/20.

## 2020-04-17 NOTE — PROGRESS NOTES
Pt A&O x4. VSS. Pt on 2L O2 d/t dyspnea with exertion. Lungs assessed, exp wheezing noted. 2+ pitting edema and discoloration to BLE. Lasix given earlier. AM meds completed. Will monitor.   Electronically signed by Kalin Solis RN on 4/17/2020 at 8:59 AM

## 2020-04-18 PROBLEM — I50.21 ACUTE SYSTOLIC HEART FAILURE (HCC): Status: ACTIVE | Noted: 2020-04-18

## 2020-04-18 LAB
ANION GAP SERPL CALCULATED.3IONS-SCNC: 12 MMOL/L (ref 3–16)
ATYPICAL LYMPHOCYTE RELATIVE PERCENT: 4 % (ref 0–6)
BANDED NEUTROPHILS RELATIVE PERCENT: 1 % (ref 0–7)
BASOPHILS ABSOLUTE: 0 K/UL (ref 0–0.2)
BASOPHILS RELATIVE PERCENT: 0 %
BUN BLDV-MCNC: 22 MG/DL (ref 7–20)
CALCIUM SERPL-MCNC: 8.7 MG/DL (ref 8.3–10.6)
CHLORIDE BLD-SCNC: 99 MMOL/L (ref 99–110)
CO2: 28 MMOL/L (ref 21–32)
CREAT SERPL-MCNC: 0.9 MG/DL (ref 0.8–1.3)
EOSINOPHILS ABSOLUTE: 0 K/UL (ref 0–0.6)
EOSINOPHILS RELATIVE PERCENT: 0 %
GFR AFRICAN AMERICAN: >60
GFR NON-AFRICAN AMERICAN: >60
GLUCOSE BLD-MCNC: 165 MG/DL (ref 70–99)
HCT VFR BLD CALC: 36.3 % (ref 40.5–52.5)
HEMOGLOBIN: 12.1 G/DL (ref 13.5–17.5)
LYMPHOCYTES ABSOLUTE: 0.6 K/UL (ref 1–5.1)
LYMPHOCYTES RELATIVE PERCENT: 7 %
MCH RBC QN AUTO: 29.6 PG (ref 26–34)
MCHC RBC AUTO-ENTMCNC: 33.2 G/DL (ref 31–36)
MCV RBC AUTO: 89.3 FL (ref 80–100)
MONOCYTES ABSOLUTE: 0.2 K/UL (ref 0–1.3)
MONOCYTES RELATIVE PERCENT: 4 %
NEUTROPHILS ABSOLUTE: 4.9 K/UL (ref 1.7–7.7)
NEUTROPHILS RELATIVE PERCENT: 84 %
PDW BLD-RTO: 14.4 % (ref 12.4–15.4)
PLATELET # BLD: 107 K/UL (ref 135–450)
PLATELET SLIDE REVIEW: ABNORMAL
PMV BLD AUTO: 8.4 FL (ref 5–10.5)
POTASSIUM REFLEX MAGNESIUM: 3.8 MMOL/L (ref 3.5–5.1)
PRO-BNP: 2893 PG/ML (ref 0–449)
RBC # BLD: 4.07 M/UL (ref 4.2–5.9)
RBC # BLD: NORMAL 10*6/UL
SLIDE REVIEW: ABNORMAL
SODIUM BLD-SCNC: 139 MMOL/L (ref 136–145)
WBC # BLD: 5.8 K/UL (ref 4–11)

## 2020-04-18 PROCEDURE — 80048 BASIC METABOLIC PNL TOTAL CA: CPT

## 2020-04-18 PROCEDURE — 6370000000 HC RX 637 (ALT 250 FOR IP): Performed by: NURSE PRACTITIONER

## 2020-04-18 PROCEDURE — 85025 COMPLETE CBC W/AUTO DIFF WBC: CPT

## 2020-04-18 PROCEDURE — 6360000002 HC RX W HCPCS: Performed by: NURSE PRACTITIONER

## 2020-04-18 PROCEDURE — 2580000003 HC RX 258: Performed by: INTERNAL MEDICINE

## 2020-04-18 PROCEDURE — 6370000000 HC RX 637 (ALT 250 FOR IP): Performed by: INTERNAL MEDICINE

## 2020-04-18 PROCEDURE — 83880 ASSAY OF NATRIURETIC PEPTIDE: CPT

## 2020-04-18 PROCEDURE — 2700000000 HC OXYGEN THERAPY PER DAY

## 2020-04-18 PROCEDURE — 99232 SBSQ HOSP IP/OBS MODERATE 35: CPT | Performed by: NURSE PRACTITIONER

## 2020-04-18 PROCEDURE — 36415 COLL VENOUS BLD VENIPUNCTURE: CPT

## 2020-04-18 PROCEDURE — 6360000002 HC RX W HCPCS: Performed by: INTERNAL MEDICINE

## 2020-04-18 PROCEDURE — 1200000000 HC SEMI PRIVATE

## 2020-04-18 PROCEDURE — 94760 N-INVAS EAR/PLS OXIMETRY 1: CPT

## 2020-04-18 RX ORDER — FUROSEMIDE 10 MG/ML
40 INJECTION INTRAMUSCULAR; INTRAVENOUS ONCE
Status: COMPLETED | OUTPATIENT
Start: 2020-04-18 | End: 2020-04-18

## 2020-04-18 RX ORDER — POTASSIUM CHLORIDE 20 MEQ/1
20 TABLET, EXTENDED RELEASE ORAL ONCE
Status: COMPLETED | OUTPATIENT
Start: 2020-04-18 | End: 2020-04-18

## 2020-04-18 RX ORDER — CARVEDILOL 6.25 MG/1
6.25 TABLET ORAL 2 TIMES DAILY WITH MEALS
Status: DISCONTINUED | OUTPATIENT
Start: 2020-04-18 | End: 2020-04-20 | Stop reason: HOSPADM

## 2020-04-18 RX ADMIN — METHYLPREDNISOLONE SODIUM SUCCINATE 40 MG: 40 INJECTION, POWDER, FOR SOLUTION INTRAMUSCULAR; INTRAVENOUS at 21:03

## 2020-04-18 RX ADMIN — SODIUM CHLORIDE, PRESERVATIVE FREE 10 ML: 5 INJECTION INTRAVENOUS at 21:04

## 2020-04-18 RX ADMIN — FUROSEMIDE 40 MG: 10 INJECTION, SOLUTION INTRAMUSCULAR; INTRAVENOUS at 11:26

## 2020-04-18 RX ADMIN — METHYLPREDNISOLONE SODIUM SUCCINATE 40 MG: 40 INJECTION, POWDER, FOR SOLUTION INTRAMUSCULAR; INTRAVENOUS at 08:30

## 2020-04-18 RX ADMIN — CARVEDILOL 6.25 MG: 6.25 TABLET, FILM COATED ORAL at 17:29

## 2020-04-18 RX ADMIN — AMITRIPTYLINE HYDROCHLORIDE 100 MG: 50 TABLET, FILM COATED ORAL at 21:03

## 2020-04-18 RX ADMIN — ATORVASTATIN CALCIUM 20 MG: 20 TABLET, FILM COATED ORAL at 08:29

## 2020-04-18 RX ADMIN — CARVEDILOL 12.5 MG: 12.5 TABLET, FILM COATED ORAL at 08:29

## 2020-04-18 RX ADMIN — WATER 1 G: 1 INJECTION INTRAMUSCULAR; INTRAVENOUS; SUBCUTANEOUS at 10:42

## 2020-04-18 RX ADMIN — RIVAROXABAN 20 MG: 20 TABLET, FILM COATED ORAL at 17:29

## 2020-04-18 RX ADMIN — FUROSEMIDE 40 MG: 10 INJECTION, SOLUTION INTRAMUSCULAR; INTRAVENOUS at 08:30

## 2020-04-18 RX ADMIN — POTASSIUM CHLORIDE 20 MEQ: 1500 TABLET, EXTENDED RELEASE ORAL at 14:29

## 2020-04-18 RX ADMIN — ACETAMINOPHEN 1000 MG: 500 TABLET, FILM COATED ORAL at 21:18

## 2020-04-18 RX ADMIN — FUROSEMIDE 40 MG: 10 INJECTION, SOLUTION INTRAMUSCULAR; INTRAVENOUS at 17:29

## 2020-04-18 RX ADMIN — SODIUM CHLORIDE, PRESERVATIVE FREE 10 ML: 5 INJECTION INTRAVENOUS at 08:31

## 2020-04-18 RX ADMIN — AZITHROMYCIN MONOHYDRATE 250 MG: 250 TABLET ORAL at 08:29

## 2020-04-18 RX ADMIN — ALPRAZOLAM 0.5 MG: 0.5 TABLET ORAL at 21:15

## 2020-04-18 ASSESSMENT — PAIN SCALES - GENERAL
PAINLEVEL_OUTOF10: 3
PAINLEVEL_OUTOF10: 0

## 2020-04-18 ASSESSMENT — PAIN SCALES - WONG BAKER: WONGBAKER_NUMERICALRESPONSE: 0

## 2020-04-18 NOTE — CONSULTS
Cardiology Consultation   Referring Physician: Dr. Carrol Alba   Reason for Consultation: Low EF   Chief Complaint:   Chief Complaint   Patient presents with    Shortness of Breath    Fever         Subjective:   History of Present Illness:     Patti Melgoza is a 80 y.o. male who presents with the above chief complaint. PMhx of CABG X 2, pafib, HTN, HL who admits to about one week of worsening dyspnea with minimal exertion. He admits to difficulty sleeping and having to sit up at night as well as increased daytime fatigue. He denies chest pain, palpitations or syncope. Has regular follow up with Dr. Dianna Randall at [address] [facility]. Prior cardiac testing:    EKG:     Echo:   2018   Summary   -Left ventricular cavity size is normal.   -There is increased left ventricular wall thickness.   -Overall left ventricular systolic function estimated at 50-55%. -Abnormal septal motion.   -Normal diastolic function.   -Aortic valve appears sclerotic but opens adequately. -Mild aortic regurgitation.   -Mild mitral regurgitation.   -Mild tricuspid regurgitation with a estimated RVSP of 48 mmHg. -The right atrium is dilated. -The right ventricle is enlarged with reduced right ventricular function. Stress Test: 2018  Summary    -There is a small, moderate intensity, inferior fixed defect suggestive of    scar vs diaphragm attenuation.    -There is no ischemia.    -There is mild global hypokinesis with EF=51%.  -Low risk study. Cath:     Past Medical History:   has a past medical history of Atrial fibrillation ([address] CAD (coronary artery disease), Diarrhea, Hyperlipidemia, Hypertension, and Kidney stones. Surgical History:   has a past surgical history that includes back surgery; knee surgery (2005); shoulder surgery; Appendectomy; Coronary artery bypass graft (1981); Coronary artery bypass graft (2001); Prostate surgery; Skin cancer excision; and joint replacement (Right).      Social History:   reports that he infection    Tachycardia    Tachypnea    Leukopenia    Elevated lactic acid level    Moderate protein-calorie malnutrition (HCC)    Fever    Hypomagnesemia    Elevated troponin    Acute pulmonary edema (HCC)    Morbid obesity due to excess calories (HCC)         Assessment and Plan   1) Acute LV systolic heart failure  - EF newly reduced   - NYHA class III, Stage C   - IV lasix 40mg IV bid for 48 hrs and then transition to PO BID   - will start coreg, d/c CCB  - discussed with his daughter at length ( [address])     2) pafib   - c/w NOAC     3) CAD   - troponin negative   - recommend asa/statin/beta blockade    Thank you for allowing us to participate in the care of Samuel Correia.     Sunny Agee MD [address] [facility]   [facility], [place]   (C): [phone]  Ned Butts): [phone]     MDM  Number of Diagnoses or Management Options  Acute on chronic systolic congestive heart failure ([address]): new, no workup  Pneumonia due to organism:      Amount and/or Complexity of Data Reviewed  Clinical lab tests: reviewed  Tests in the radiology section of CPT®: reviewed  Tests in the medicine section of CPT®: reviewed  Review and summarize past medical records: yes  Independent visualization of images, tracings, or specimens: yes    Risk of Complications, Morbidity, and/or Mortality  Presenting problems: high  Diagnostic procedures: high  Management options: high

## 2020-04-18 NOTE — PROGRESS NOTES
is increased left ventricular wall thickness.   -Overall left ventricular systolic function estimated at 50-55%.  -Abnormal septal motion.   -Normal diastolic function.   -Aortic valve appears sclerotic but opens adequately.   -Mild aortic regurgitation.   -Mild mitral regurgitation.   -Mild tricuspid regurgitation with a estimated RVSP of 48 mmHg.   -The right atrium is dilated.   -The right ventricle is enlarged with reduced right ventricular function. Stress Test: 2018  Summary    -There is a small, moderate intensity, inferior fixed defect suggestive of    scar vs diaphragm attenuation.    -There is no ischemia.    -There is mild global hypokinesis with EF=51%.  -Low risk study. 4/17/2020  FINDINGS:   Monitor wires project over the thorax.  Rotated chest.  Status post median   sternotomy.  Heart size is enlarged, stable.  Mild pulmonary edema.  Small   bilateral pleural effusions.  No focal airspace consolidation or pneumothorax.           Impression   Cardiomegaly with mild pulmonary edema and small pleural effusions compatible   with CHF.  No significant interval changes. Cardiac Angiography:     Assessment/Plan:  1. ) Acute systolic heart failure: EF newly reduced 50-55% to 40-45%: Minimal improvement, continues w/ SOB, edema, abd distention. Only neg 840 but probably not accurate with incontinence. Dry weight prob 250lbs so at least 10lbs more than that today   NYHA Class III-IV  Diuretic: lasix 40mg IV BID, will give extra dose today  Beta Blocker: Decrease coreg D/T bradycardia  ACEi/ARB/ARNI: plan to restart losartan tomorrow   Aldosterone Antagonist: plan to start tomorrow  Cardiac Rehab: Not indicated for EF>35%  2gm Na diet, daily weight, 64 oz fluid restriction  Avoid NSAIDS  ICD: Not indicated for EF>35%  Recommend outpatient sleep medicine referral for possible SACHA based on symptoms.     2.) Paroxysmal AF: controlledm NSR  CHADSVASC-       Thromboembolic risk reduction:

## 2020-04-18 NOTE — PROGRESS NOTES
Patient is alert and oriented x4, call light within reach, bed/chair alarm on. AM meds complete, patient tolerated well. VSS and WDL. No s/s of distress and no c/o pain, no further needs noted at this time.   Electronically signed by Dax Mcknight RN on 4/18/2020 at 11:10 AM

## 2020-04-18 NOTE — PROGRESS NOTES
interstitial opacities may represent pulmonary edema   or interstitial/atypical pneumonia, including viral pneumonia. Assessment/Plan:    Active Hospital Problems    Diagnosis    Essential hypertension [I10]     Priority: High    Coronary artery disease involving native heart without angina pectoris [I25.10]     Priority: Medium    Mixed hyperlipidemia [E78.2]     Priority: Low    Suspected COVID-19 virus infection [R68.89]    Tachycardia [R00.0]    Tachypnea [R06.82]    Leukopenia [D72.819]    Elevated lactic acid level [R79.89]    Moderate protein-calorie malnutrition (HCC) [E44.0]    Fever [R50.9]    Hypomagnesemia [E83.42]    Elevated troponin [R79.89]    Acute pulmonary edema (HCC) [J81.0]    Morbid obesity due to excess calories ([facility] [E66.01]    Paroxysmal atrial fibrillation (HCC) [I48.0]    Sepsis ([facility] [A41.9]    Hypotension [I95.9]     Acute Hypoxic Respiratory Failure  Requiring 2L NC. Last TTE with normal systolic and diastolic function though does take lasix at home  - wean O2 as able  - IS  - diuresing      Sepsis ([facility] due to pneumonia   Presented with fever, SOB. T max 101, tachycardic, tachypnic, elevated lactate. CXR concerning for fluid vs viral PNA. Elevated procal, negative COVID test, prominent wheezing. Does have elevated acute phase reactants, non specific. Elevated D dimer but on Xarelto, less likely PE. Started on plaquenil, discontinued. - Pt not resuscitated with 30 cc/Kg IV Fluids per sepsis protocol as his BP improved with a smaller volume of IV fluids, and he has acute pulmonary edema on his CXR resulting in a high risk of fluid overload and catastrophic decompensation if he were to receive such a bolus.  His blood pressure will be monitored closely  - lactate normalized  - Blood cultures x 2 have been ordered  - continue ceftriaxone, azithromycin  - started solumedrol and nebs for prominent wheezing    Acute systolic and diastolic HF  Patient says he out    Dispo - pending    Steven Mead MD    This note was transcribed using [id number]. Please disregard any translational errors.

## 2020-04-18 NOTE — PLAN OF CARE
Problem: Falls - Risk of:  Goal: Will remain free from falls  Description: Will remain free from falls  4/18/2020 1108 by Zachery Slade RN  Outcome: Ongoing     Problem: Discharge Planning:  Goal: Discharged to appropriate level of care  Description: Discharged to appropriate level of care  4/18/2020 1108 by Zachery Slade RN  Outcome: Ongoing     Problem: Airway Clearance - Ineffective:  Goal: Clear lung sounds  Description: Clear lung sounds  4/18/2020 1108 by Zachery Slade RN  Outcome: Ongoing     Problem: Fluid Volume - Deficit:  Goal: Achieves intake and output within specified parameters  Description: Achieves intake and output within specified parameters  4/18/2020 1108 by Zachery Slade RN  Outcome: Ongoing     Problem: OXYGENATION/RESPIRATORY FUNCTION  Goal: Patient will maintain patent airway  4/18/2020 1108 by Zachery Slade RN  Outcome: Ongoing     Problem: FLUID AND ELECTROLYTE IMBALANCE  Goal: Fluid and electrolyte balance are achieved/maintained  4/18/2020 1108 by Zachery Slade RN  Outcome: Ongoing     Problem: ACTIVITY INTOLERANCE/IMPAIRED MOBILITY  Goal: Mobility/activity is maintained at optimum level for patient  4/18/2020 1108 by Zachery Slade RN  Outcome: Ongoing

## 2020-04-19 LAB
ANION GAP SERPL CALCULATED.3IONS-SCNC: 15 MMOL/L (ref 3–16)
ATYPICAL LYMPHOCYTE RELATIVE PERCENT: 10 % (ref 0–6)
BASOPHILS ABSOLUTE: 0 K/UL (ref 0–0.2)
BASOPHILS RELATIVE PERCENT: 0 %
BUN BLDV-MCNC: 32 MG/DL (ref 7–20)
CALCIUM SERPL-MCNC: 8.7 MG/DL (ref 8.3–10.6)
CHLORIDE BLD-SCNC: 93 MMOL/L (ref 99–110)
CO2: 29 MMOL/L (ref 21–32)
CREAT SERPL-MCNC: 1 MG/DL (ref 0.8–1.3)
EOSINOPHILS ABSOLUTE: 0 K/UL (ref 0–0.6)
EOSINOPHILS RELATIVE PERCENT: 0 %
GFR AFRICAN AMERICAN: >60
GFR NON-AFRICAN AMERICAN: >60
GLUCOSE BLD-MCNC: 174 MG/DL (ref 70–99)
HCT VFR BLD CALC: 36.4 % (ref 40.5–52.5)
HEMATOLOGY PATH CONSULT: YES
HEMOGLOBIN: 12.2 G/DL (ref 13.5–17.5)
LYMPHOCYTES ABSOLUTE: 2.5 K/UL (ref 1–5.1)
LYMPHOCYTES RELATIVE PERCENT: 14 %
MCH RBC QN AUTO: 29.8 PG (ref 26–34)
MCHC RBC AUTO-ENTMCNC: 33.5 G/DL (ref 31–36)
MCV RBC AUTO: 89 FL (ref 80–100)
MONOCYTES ABSOLUTE: 0 K/UL (ref 0–1.3)
MONOCYTES RELATIVE PERCENT: 0 %
NEUTROPHILS ABSOLUTE: 7.9 K/UL (ref 1.7–7.7)
NEUTROPHILS RELATIVE PERCENT: 76 %
PDW BLD-RTO: 14.5 % (ref 12.4–15.4)
PLATELET # BLD: 115 K/UL (ref 135–450)
PLATELET SLIDE REVIEW: ABNORMAL
PMV BLD AUTO: 8.5 FL (ref 5–10.5)
POTASSIUM REFLEX MAGNESIUM: 3.9 MMOL/L (ref 3.5–5.1)
RBC # BLD: 4.09 M/UL (ref 4.2–5.9)
SLIDE REVIEW: ABNORMAL
SODIUM BLD-SCNC: 137 MMOL/L (ref 136–145)
WBC # BLD: 10.4 K/UL (ref 4–11)

## 2020-04-19 PROCEDURE — 6360000002 HC RX W HCPCS: Performed by: INTERNAL MEDICINE

## 2020-04-19 PROCEDURE — 6370000000 HC RX 637 (ALT 250 FOR IP): Performed by: INTERNAL MEDICINE

## 2020-04-19 PROCEDURE — 2580000003 HC RX 258: Performed by: INTERNAL MEDICINE

## 2020-04-19 PROCEDURE — 94760 N-INVAS EAR/PLS OXIMETRY 1: CPT

## 2020-04-19 PROCEDURE — 99232 SBSQ HOSP IP/OBS MODERATE 35: CPT | Performed by: NURSE PRACTITIONER

## 2020-04-19 PROCEDURE — 85025 COMPLETE CBC W/AUTO DIFF WBC: CPT

## 2020-04-19 PROCEDURE — 2700000000 HC OXYGEN THERAPY PER DAY

## 2020-04-19 PROCEDURE — 80048 BASIC METABOLIC PNL TOTAL CA: CPT

## 2020-04-19 PROCEDURE — 36415 COLL VENOUS BLD VENIPUNCTURE: CPT

## 2020-04-19 PROCEDURE — 1200000000 HC SEMI PRIVATE

## 2020-04-19 PROCEDURE — 6370000000 HC RX 637 (ALT 250 FOR IP): Performed by: NURSE PRACTITIONER

## 2020-04-19 RX ORDER — PREDNISONE 20 MG/1
40 TABLET ORAL DAILY
Status: DISCONTINUED | OUTPATIENT
Start: 2020-04-19 | End: 2020-04-20 | Stop reason: HOSPADM

## 2020-04-19 RX ORDER — LOSARTAN POTASSIUM 50 MG/1
50 TABLET ORAL DAILY
Status: DISCONTINUED | OUTPATIENT
Start: 2020-04-19 | End: 2020-04-20 | Stop reason: HOSPADM

## 2020-04-19 RX ORDER — SPIRONOLACTONE 25 MG/1
25 TABLET ORAL DAILY
Status: DISCONTINUED | OUTPATIENT
Start: 2020-04-19 | End: 2020-04-20 | Stop reason: HOSPADM

## 2020-04-19 RX ORDER — ALBUTEROL SULFATE 90 UG/1
2 AEROSOL, METERED RESPIRATORY (INHALATION) 4 TIMES DAILY PRN
Qty: 3 INHALER | Refills: 1 | Status: SHIPPED | OUTPATIENT
Start: 2020-04-19

## 2020-04-19 RX ADMIN — RIVAROXABAN 20 MG: 20 TABLET, FILM COATED ORAL at 17:07

## 2020-04-19 RX ADMIN — ALPRAZOLAM 0.5 MG: 0.5 TABLET ORAL at 21:19

## 2020-04-19 RX ADMIN — AMITRIPTYLINE HYDROCHLORIDE 100 MG: 50 TABLET, FILM COATED ORAL at 21:17

## 2020-04-19 RX ADMIN — CARVEDILOL 6.25 MG: 6.25 TABLET, FILM COATED ORAL at 17:06

## 2020-04-19 RX ADMIN — SODIUM CHLORIDE, PRESERVATIVE FREE 10 ML: 5 INJECTION INTRAVENOUS at 10:43

## 2020-04-19 RX ADMIN — AZITHROMYCIN MONOHYDRATE 250 MG: 250 TABLET ORAL at 10:40

## 2020-04-19 RX ADMIN — CARVEDILOL 6.25 MG: 6.25 TABLET, FILM COATED ORAL at 10:39

## 2020-04-19 RX ADMIN — PREDNISONE 40 MG: 20 TABLET ORAL at 10:38

## 2020-04-19 RX ADMIN — SODIUM CHLORIDE, PRESERVATIVE FREE 10 ML: 5 INJECTION INTRAVENOUS at 22:05

## 2020-04-19 RX ADMIN — ATORVASTATIN CALCIUM 20 MG: 20 TABLET, FILM COATED ORAL at 10:39

## 2020-04-19 RX ADMIN — SPIRONOLACTONE 25 MG: 25 TABLET ORAL at 10:39

## 2020-04-19 RX ADMIN — LOSARTAN POTASSIUM 50 MG: 50 TABLET, FILM COATED ORAL at 10:39

## 2020-04-19 RX ADMIN — FUROSEMIDE 40 MG: 10 INJECTION, SOLUTION INTRAMUSCULAR; INTRAVENOUS at 17:06

## 2020-04-19 RX ADMIN — WATER 1 G: 1 INJECTION INTRAMUSCULAR; INTRAVENOUS; SUBCUTANEOUS at 10:39

## 2020-04-19 RX ADMIN — FUROSEMIDE 40 MG: 10 INJECTION, SOLUTION INTRAMUSCULAR; INTRAVENOUS at 10:39

## 2020-04-19 ASSESSMENT — PAIN SCALES - GENERAL: PAINLEVEL_OUTOF10: 0

## 2020-04-19 NOTE — PROGRESS NOTES
[address]   Daily Progress Note      Admit Date:  4/16/2020    CC: SOB    HPI:   Katja Wyatt is a 80 y.o. male with PMH CAD s/p CABGX2, PAF, DHF, HTN, HLP. Admitted to [facility] with  with SOB X1 week and orthopnea. Fatigue and decreased activity for about 1 month. ECHO indicated decreased EF 40-45%. Slightly better today. Continues to sleep in chair D/T orthopnea but overall SOB improving. Still on 1L NC. Ambulating in room. Edema and abd distention improved. BLE discoloring improving. He continues with wheezing. Discussed with Dr. Rica Chao, hospitalist. Possible underlying COPD, she has ordered outpatient PFT upon DC. Admits to snoring, daytime sleepiness and napping> agrees to outpatient sleep study.     Review of Systems:   General: Denies fever, chills, +fatigue  Skin: Denies rash, itching, lesions. + BLE petecheai  HEENT: Denies headache, dizziness, vision changes, nosebleeds, sore throat, nasal drainage  RESP: Denies cough, sputum  CARD: Denies palpitations,  Murmur, chest pain  GI:Denies nausea, vomiting, heartburn, loss of appetite, change in bowels  : Denies frequency, pain, incontinence, polyuria  VASC: Denies claudication, leg cramps, clots  MUSC/SKEL: Denies pain, stiffness, arthritis  PSYCH: Denies anxiety, depression, stress  NEURO: Denies numbness, tingling, weakness,change in mood or memory  HEME: Denies abn bruising, bleeding, anemia  ENDO: Denies intolerance to heat, cold, excessive thirst or hunger, hx thyroid disease    Objective:   BP (!) 179/61   Pulse 76   Temp 98.5 °F (36.9 °C) (Oral)   Resp 16   Ht 5' 11\" (1.803 m)   Wt 260 lb 9.3 oz (118.2 kg)   SpO2 95%   BMI 36.34 kg/m²           Intake/Output Summary (Last 24 hours) at 4/19/2020 0941  Last data filed at 4/19/2020 0235  Gross per 24 hour   Intake 480 ml   Output 1950 ml   Net -1470 ml     I/O since adm: -2250+ incontinence    WEIGHT:Admit Weight: 277 lb 9 oz (125.9 kg)         Today  Weight: 260 lb 9.3 oz (118.2 kg)   DRY WEIGHT: around 250#  Wt Readings from Last 3 Encounters:   04/19/20 260 lb 9.3 oz (118.2 kg)   10/18/19 262 lb (118.8 kg)   05/30/19 252 lb (114.3 kg)       Physical Exam:  GEN: Appears obese, no acute distress  SKIN: Pink, warm, dry. Nails without clubbing. HEENT: PERRLA. Normocephalic, atraumatic. Neck supple. No adenopathy. LUNG: AP diameter normal. Decreased bases, exp wheeze. Respiratory effort increaesed. HEART: S1S2 A/R. No JVD. No carotid bruit. No murmur, rub or gallop. ABD: Soft, nontender. +BS X 4 quads. No hepatomegaly. EXT: Radial and pedal pulses 2+ and symmetric. Without varicosities. 2+ BLE pedal to sacral edema. Discoloring mottling vs petechiae. MUSCSKEL: Good ROM X4 extremities. No deformity. Uses walker for ambulation. NEURO: A/O X3. Calm and cooperative. Telemetry: SR 1st degree with PVCs HR 50s    Medications:    predniSONE  40 mg Oral Daily    carvedilol  6.25 mg Oral BID WC    amitriptyline  100 mg Oral Nightly    furosemide  40 mg Intravenous BID    rivaroxaban  20 mg Oral Dinner    atorvastatin  20 mg Oral Daily    azithromycin  250 mg Oral Daily    sodium chloride flush  10 mL Intravenous 2 times per day    cefTRIAXone (ROCEPHIN) IV  1 g Intravenous Q24H       acetaminophen, ALPRAZolam, sodium chloride flush, polyethylene glycol, ondansetron, ipratropium-albuterol    Lab Data: I have reviewed all labs below today.    CBC:   Recent Labs     04/17/20  0426 04/18/20  0430 04/19/20  0353   WBC 2.4* 5.8 10.4   HGB 11.5* 12.1* 12.2*   HCT 33.7* 36.3* 36.4*   MCV 89.0 89.3 89.0   PLT 81* 107* 115*     BMP:   Recent Labs     04/17/20  0426 04/18/20  0430 04/19/20  0353    139 137   K 4.1 3.8 3.9    99 93*   CO2 25 28 29   BUN 18 22* 32*   CREATININE 0.9 0.9 1.0     GLUCOSE:   Recent Labs     04/17/20  0426 04/18/20  0430 04/19/20  0353   GLUCOSE 194* 165* 174*     LIVER PROFILE:   Lab Results   Component Value Date    AST 27 04/16/2020    ALT 17 motion.   -Normal diastolic function.   -Aortic valve appears sclerotic but opens adequately.   -Mild aortic regurgitation.   -Mild mitral regurgitation.   -Mild tricuspid regurgitation with a estimated RVSP of 48 mmHg.   -The right atrium is dilated.   -The right ventricle is enlarged with reduced right ventricular function. Stress Test: 2018  Summary    -There is a small, moderate intensity, inferior fixed defect suggestive of    scar vs diaphragm attenuation.    -There is no ischemia.    -There is mild global hypokinesis with EF=51%.  -Low risk study. 4/17/2020  FINDINGS:   Monitor wires project over the thorax.  Rotated chest.  Status post median   sternotomy.  Heart size is enlarged, stable.  Mild pulmonary edema.  Small   bilateral pleural effusions.  No focal airspace consolidation or pneumothorax.           Impression   Cardiomegaly with mild pulmonary edema and small pleural effusions compatible   with CHF.  No significant interval changes. Cardiac Angiography:     Assessment/Plan:  1. ) Acute systolic heart failure: EF newly reduced 50-55% to 40-45%: Slight improvement, continues w/ SOB, edema, abd distention. Neg > 2.2L but probably not accurate with incontinence. Dry weight prob 250lbs so at least 10lbs more than that today. Continue diurese, possible DC tomorrow. Joe Omalley NYHA Class III-IV  Diuretic: lasix 40mg IV BID  Beta Blocker: Decrease coreg D/T bradycardia  ACEi/ARB/ARNI: restart losartan today  Aldosterone Antagonist: plan to start tomorrow  Cardiac Rehab: Not indicated for EF>35%  2gm Na diet, daily weight, 64 oz fluid restriction  Avoid NSAIDS  ICD: Not indicated for EF>35%  Recommend outpatient sleep medicine referral for possible SACHA based on symptoms.     2.) Paroxysmal AF: controlled NSR  CHADSVASC-       Thromboembolic risk reduction: xarelto- had mild throombocytopenia with petechiae BLE, plt count now improving, no evidence of bleeding  Rate Control: Decrease coreg D/T HR 50s,

## 2020-04-20 VITALS
OXYGEN SATURATION: 95 % | SYSTOLIC BLOOD PRESSURE: 129 MMHG | WEIGHT: 254.85 LBS | DIASTOLIC BLOOD PRESSURE: 75 MMHG | BODY MASS INDEX: 35.68 KG/M2 | HEIGHT: 71 IN | RESPIRATION RATE: 16 BRPM | TEMPERATURE: 98.3 F | HEART RATE: 62 BPM

## 2020-04-20 PROBLEM — G47.00 INSOMNIA: Status: ACTIVE | Noted: 2020-04-20

## 2020-04-20 LAB
ANION GAP SERPL CALCULATED.3IONS-SCNC: 11 MMOL/L (ref 3–16)
BASOPHILS ABSOLUTE: 0 K/UL (ref 0–0.2)
BASOPHILS RELATIVE PERCENT: 0 %
BLOOD CULTURE, ROUTINE: NORMAL
BUN BLDV-MCNC: 36 MG/DL (ref 7–20)
CALCIUM SERPL-MCNC: 8.7 MG/DL (ref 8.3–10.6)
CHLORIDE BLD-SCNC: 93 MMOL/L (ref 99–110)
CO2: 34 MMOL/L (ref 21–32)
CREAT SERPL-MCNC: 1.1 MG/DL (ref 0.8–1.3)
CULTURE, BLOOD 2: NORMAL
EOSINOPHILS ABSOLUTE: 0 K/UL (ref 0–0.6)
EOSINOPHILS RELATIVE PERCENT: 0 %
GFR AFRICAN AMERICAN: >60
GFR NON-AFRICAN AMERICAN: >60
GLUCOSE BLD-MCNC: 128 MG/DL (ref 70–99)
HCT VFR BLD CALC: 37.4 % (ref 40.5–52.5)
HEMATOLOGY PATH CONSULT: NORMAL
HEMATOLOGY PATH CONSULT: YES
HEMOGLOBIN: 12.5 G/DL (ref 13.5–17.5)
LYMPHOCYTES ABSOLUTE: 5.4 K/UL (ref 1–5.1)
LYMPHOCYTES RELATIVE PERCENT: 44 %
MCH RBC QN AUTO: 29.5 PG (ref 26–34)
MCHC RBC AUTO-ENTMCNC: 33.4 G/DL (ref 31–36)
MCV RBC AUTO: 88.2 FL (ref 80–100)
MONOCYTES ABSOLUTE: 0.7 K/UL (ref 0–1.3)
MONOCYTES RELATIVE PERCENT: 6 %
NEUTROPHILS ABSOLUTE: 6.1 K/UL (ref 1.7–7.7)
NEUTROPHILS RELATIVE PERCENT: 50 %
PDW BLD-RTO: 14.1 % (ref 12.4–15.4)
PLATELET # BLD: 139 K/UL (ref 135–450)
PMV BLD AUTO: 8.8 FL (ref 5–10.5)
POTASSIUM REFLEX MAGNESIUM: 3.7 MMOL/L (ref 3.5–5.1)
RBC # BLD: 4.24 M/UL (ref 4.2–5.9)
RBC # BLD: NORMAL 10*6/UL
SMUDGE CELLS: PRESENT
SODIUM BLD-SCNC: 138 MMOL/L (ref 136–145)
WBC # BLD: 12.2 K/UL (ref 4–11)

## 2020-04-20 PROCEDURE — 99232 SBSQ HOSP IP/OBS MODERATE 35: CPT | Performed by: NURSE PRACTITIONER

## 2020-04-20 PROCEDURE — 36415 COLL VENOUS BLD VENIPUNCTURE: CPT

## 2020-04-20 PROCEDURE — 6360000002 HC RX W HCPCS: Performed by: INTERNAL MEDICINE

## 2020-04-20 PROCEDURE — 94680 O2 UPTK RST&XERS DIR SIMPLE: CPT

## 2020-04-20 PROCEDURE — 6370000000 HC RX 637 (ALT 250 FOR IP): Performed by: INTERNAL MEDICINE

## 2020-04-20 PROCEDURE — 2580000003 HC RX 258: Performed by: INTERNAL MEDICINE

## 2020-04-20 PROCEDURE — 85025 COMPLETE CBC W/AUTO DIFF WBC: CPT

## 2020-04-20 PROCEDURE — 94761 N-INVAS EAR/PLS OXIMETRY MLT: CPT

## 2020-04-20 PROCEDURE — 6370000000 HC RX 637 (ALT 250 FOR IP): Performed by: NURSE PRACTITIONER

## 2020-04-20 PROCEDURE — 80048 BASIC METABOLIC PNL TOTAL CA: CPT

## 2020-04-20 RX ORDER — CARVEDILOL 6.25 MG/1
6.25 TABLET ORAL 2 TIMES DAILY WITH MEALS
Qty: 60 TABLET | Refills: 3 | Status: SHIPPED | OUTPATIENT
Start: 2020-04-20 | End: 2021-09-16

## 2020-04-20 RX ORDER — FUROSEMIDE 40 MG/1
40 TABLET ORAL 2 TIMES DAILY
Status: DISCONTINUED | OUTPATIENT
Start: 2020-04-20 | End: 2020-04-20 | Stop reason: HOSPADM

## 2020-04-20 RX ORDER — SPIRONOLACTONE 25 MG/1
25 TABLET ORAL DAILY
Qty: 30 TABLET | Refills: 3 | Status: SHIPPED | OUTPATIENT
Start: 2020-04-21 | End: 2020-05-08 | Stop reason: SINTOL

## 2020-04-20 RX ORDER — POLYETHYLENE GLYCOL 3350 17 G/17G
17 POWDER, FOR SOLUTION ORAL DAILY PRN
Qty: 527 G | Refills: 1 | Status: SHIPPED | OUTPATIENT
Start: 2020-04-20 | End: 2020-05-20

## 2020-04-20 RX ORDER — METHYLPREDNISOLONE 4 MG/1
TABLET ORAL
Qty: 1 KIT | Refills: 0 | Status: SHIPPED | OUTPATIENT
Start: 2020-04-20 | End: 2020-04-26

## 2020-04-20 RX ORDER — ALPRAZOLAM 0.5 MG/1
0.5 TABLET ORAL NIGHTLY PRN
Qty: 3 TABLET | Refills: 0 | Status: SHIPPED | OUTPATIENT
Start: 2020-04-20 | End: 2020-04-23

## 2020-04-20 RX ORDER — FUROSEMIDE 40 MG/1
40 TABLET ORAL 2 TIMES DAILY
Qty: 60 TABLET | Refills: 3 | Status: SHIPPED | OUTPATIENT
Start: 2020-04-20 | End: 2020-10-27

## 2020-04-20 RX ADMIN — AZITHROMYCIN MONOHYDRATE 250 MG: 250 TABLET ORAL at 09:17

## 2020-04-20 RX ADMIN — SPIRONOLACTONE 25 MG: 25 TABLET ORAL at 09:18

## 2020-04-20 RX ADMIN — ATORVASTATIN CALCIUM 20 MG: 20 TABLET, FILM COATED ORAL at 09:17

## 2020-04-20 RX ADMIN — FUROSEMIDE 40 MG: 10 INJECTION, SOLUTION INTRAMUSCULAR; INTRAVENOUS at 09:20

## 2020-04-20 RX ADMIN — CARVEDILOL 6.25 MG: 6.25 TABLET, FILM COATED ORAL at 09:18

## 2020-04-20 RX ADMIN — PREDNISONE 40 MG: 20 TABLET ORAL at 09:18

## 2020-04-20 RX ADMIN — LOSARTAN POTASSIUM 50 MG: 50 TABLET, FILM COATED ORAL at 09:18

## 2020-04-20 RX ADMIN — WATER 1 G: 1 INJECTION INTRAMUSCULAR; INTRAVENOUS; SUBCUTANEOUS at 09:17

## 2020-04-20 RX ADMIN — SODIUM CHLORIDE, PRESERVATIVE FREE 10 ML: 5 INJECTION INTRAVENOUS at 09:20

## 2020-04-20 ASSESSMENT — PAIN SCALES - GENERAL: PAINLEVEL_OUTOF10: 0

## 2020-04-20 NOTE — PROGRESS NOTES
Data- discharge order received, pt verbalized agreement to discharge, disposition to previous residence, with home O2. Action- discharge instructions prepared and given to patient, pt verbalized understanding. Medication information packet given r/t NEW and/or CHANGED prescriptions emphasizing name/purpose/side effects, pt verbalized understanding. Discharge instruction summary: Diet- low sodium with 1500 ml fluid restriction, Activity- as tolerated, Primary Care Physician as follows: Roland Mcadams -[phone] f/u appointment scheduled by patient, immunizations reviewed and up to date, prescription medications filled at preferred pharmacy. Inpatient surgical procedure precautions reviewed: 61 CHF Education reviewed. Pt/ Family has had a total of 60 minutes CHF education this admission encounter. Response- Pt belongings gathered, IV removed. Disposition is home (with oyxgen), transported with belongings, taken to lobby via w/c w/ RN, no complications.

## 2020-04-20 NOTE — DISCHARGE SUMMARY
Portable    Result Date: 4/16/2020  EXAMINATION: ONE XRAY VIEW OF THE CHEST 4/16/2020 1:31 am COMPARISON: 08/20/2018 HISTORY: ORDERING SYSTEM PROVIDED HISTORY: cxr TECHNOLOGIST PROVIDED HISTORY: Reason for exam:->cxr Reason for Exam: sob with fever Acuity: Acute Relevant Medical/Surgical History: hx hypertension FINDINGS: Heart size is enlarged. Status post median sternotomy. Increased interstitial opacities. Small pleural effusion suspected. No focal airspace consolidation. No pneumothorax. Cardiomegaly. Increased interstitial opacities may represent pulmonary edema or interstitial/atypical pneumonia, including viral pneumonia. Results for orders placed or performed during the hospital encounter of 04/16/20   Culture, Blood 1   Result Value Ref Range    Blood Culture, Routine No Growth after 4 days of incubation. Culture, Blood 2   Result Value Ref Range    Culture, Blood 2 No Growth after 4 days of incubation. Strep Pneumoniae Antigen   Result Value Ref Range    STREP PNEUMONIAE ANTIGEN, URINE       Presumptive Negative  Presumptive negative suggests no current or recent  pneumococcal infection. Infection due to Strep pneumoniae  cannot be ruled out since the antigen present in the sample  may be below the detection limit of the test.  Normal Range:Presumptive Negative     Rapid influenza A/B antigens   Result Value Ref Range    Rapid Influenza A Ag Negative Negative    Rapid Influenza B Ag Negative Negative   Legionella antigen, urine   Result Value Ref Range    L. pneumophila Serogp 1 Ur Ag       Presumptive Negative  No Legionella pneumophila serogroup 1 antigens detected. A negative result does not exclude infection with  Legionella pneumophila serogroup 1 nor does it rule out  other microbial-caused respiratory infections or  disease caused by other serogroups of  Legionella pneumophila.   Normal Range: Presumptive Negative     CBC Auto Differential   Result Value Ref Range    WBC 3.8 Reflex to MG   Result Value Ref Range    Sodium 139 136 - 145 mmol/L    Potassium reflex Magnesium 3.8 3.5 - 5.1 mmol/L    Chloride 99 99 - 110 mmol/L    CO2 28 21 - 32 mmol/L    Anion Gap 12 3 - 16    Glucose 165 (H) 70 - 99 mg/dL    BUN 22 (H) 7 - 20 mg/dL    CREATININE 0.9 0.8 - 1.3 mg/dL    GFR Non-African American >60 >60    GFR African American >60 >60    Calcium 8.7 8.3 - 10.6 mg/dL   CBC auto differential   Result Value Ref Range    WBC 5.8 4.0 - 11.0 K/uL    RBC 4.07 (L) 4.20 - 5.90 M/uL    Hemoglobin 12.1 (L) 13.5 - 17.5 g/dL    Hematocrit 36.3 (L) 40.5 - 52.5 %    MCV 89.3 80.0 - 100.0 fL    MCH 29.6 26.0 - 34.0 pg    MCHC 33.2 31.0 - 36.0 g/dL    RDW 14.4 12.4 - 15.4 %    Platelets 163 (L) 569 - 450 K/uL    MPV 8.4 5.0 - 10.5 fL    PLATELET SLIDE REVIEW Decreased     SLIDE REVIEW see below     Neutrophils % 84.0 %    Lymphocytes % 7.0 %    Monocytes % 4.0 %    Eosinophils % 0.0 %    Basophils % 0.0 %    Neutrophils Absolute 4.9 1.7 - 7.7 K/uL    Lymphocytes Absolute 0.6 (L) 1.0 - 5.1 K/uL    Monocytes Absolute 0.2 0.0 - 1.3 K/uL    Eosinophils Absolute 0.0 0.0 - 0.6 K/uL    Basophils Absolute 0.0 0.0 - 0.2 K/uL    Bands Relative 1 0 - 7 %    Atypical Lymphocytes Relative 4 0 - 6 %    RBC Morphology Normal    Brain Natriuretic Peptide   Result Value Ref Range    Pro-BNP 2,893 (H) 0 - 449 pg/mL   Basic Metabolic Panel w/ Reflex to MG   Result Value Ref Range    Sodium 137 136 - 145 mmol/L    Potassium reflex Magnesium 3.9 3.5 - 5.1 mmol/L    Chloride 93 (L) 99 - 110 mmol/L    CO2 29 21 - 32 mmol/L    Anion Gap 15 3 - 16    Glucose 174 (H) 70 - 99 mg/dL    BUN 32 (H) 7 - 20 mg/dL    CREATININE 1.0 0.8 - 1.3 mg/dL    GFR Non-African American >60 >60    GFR African American >60 >60    Calcium 8.7 8.3 - 10.6 mg/dL   CBC auto differential   Result Value Ref Range    WBC 10.4 4.0 - 11.0 K/uL    RBC 4.09 (L) 4.20 - 5.90 M/uL    Hemoglobin 12.2 (L) 13.5 - 17.5 g/dL    Hematocrit 36.4 (L) 40.5 - 52.5 %    MCV 89.0 Calculation (Bazett) 467 ms    R Axis 32 degrees    T Axis -9 degrees    Diagnosis       sinus rhythm with pacs and pvc most likely but Poor data quality, interpretation may be adversely affectedNon-diagnostic Q wave inferiorlyAbnormal ECGWhen compared with ECG of 12-APR-2017 10:34,No significant change likelyConfirmed by [facility] Ed GREWAL [id number] ([id number]) on 4/16/2020 3:08:06 PM   EKG 12 Lead   Result Value Ref Range    Ventricular Rate 83 BPM    Atrial Rate 83 BPM    P-R Interval 220 ms    QRS Duration 100 ms    Q-T Interval 392 ms    QTc Calculation (Bazett) 460 ms    P Axis 27 degrees    R Axis 37 degrees    T Axis -3 degrees    Diagnosis       Sinus rhythm with 1st degree A-V block with frequent Premature ventricular complexes  Poor data quality, interpretation may be adversely affectedCannot rule out Inferior infarct (cited on or before 17-APR-2020)Cannot rule out Anterior infarct , age undeterminedAbnormal ECGWhen compared with ECG of 16-APR-2020 01:10,No significant changeConfirmed by [facility] DAMI GREWAL ([id number]) on 4/17/2020 4:16:40 PM       Diet:  Dietary Nutrition Supplements: Standard Oral Supplement  DIET GENERAL; Low Sodium (2 GM);  Daily Fluid Restriction: 1500 ml    Activity:  Activity as tolerated (Patient may move about with assist as indicated or with supervision.)    Follow-up:  in the next few days with Daisy Gilbert MD,      Disposition: home    Condition: Stable      Time Spent: 35 minutes    Electronically signed by Tessa Garcia MD on 4/20/2020 at 11:41 AM    Discharging Hospitalist

## 2020-04-20 NOTE — DISCHARGE INSTR - COC
angina pectoris I25.10    Bilateral carotid artery stenosis I65.23    Knee pain, bilateral M25.561, M25.562    Right knee DJD M17.11    Left knee DJD M17.12    Cellulitis of leg without foot, left L03. 116    Cholelithiasis K80.20    Septic shock (HCC) A41.9, R65.21    Hematuria R31.9    Acute respiratory failure with hypoxemia ([facility]) J96.01    Urinary tract infection with hematuria N39.0, R31.9    Cellulitis of left lower extremity L03. 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 ([facility]) I87.332, L97.929    Suspected COVID-19 virus infection R68.89    Tachycardia R00.0    Tachypnea R06.82    Leukopenia D72.819    Elevated lactic acid level R79.89    Moderate protein-calorie malnutrition ([facility]) E44.0    Fever R50.9    Hypomagnesemia E83.42    Elevated troponin R79.89    Acute pulmonary edema ([facility]) J81.0    Morbid obesity due to excess calories ([facility]) I70.25    Acute systolic heart failure ([facility]) I50.21    Insomnia G47.00       Isolation/Infection:   Isolation          No Isolation        Patient Infection Status     Infection Onset Added Last Indicated Last Indicated By Review Planned Expiration Resolved Resolved By    None active    Resolved    COVID-19 Rule Out 04/16/20 04/16/20 04/16/20 Emergent Disease Panel (Ordered)   04/16/20 Rule-Out Test Resulted          Nurse Assessment:  Last Vital Signs: /75   Pulse 62   Temp 98.3 °F (36.8 °C)   Resp 16   Ht 5' 11\" (1.803 m)   Wt 254 lb 13.6 oz (115.6 kg)   SpO2 95%   BMI 35.54 kg/m²     Last documented pain score (0-10 scale): Pain Level: 0  Last Weight:   Wt Readings from Last 1 Encounters:   04/20/20 254 lb 13.6 oz (115.6 kg)     Mental Status:  {IP PT MENTAL STATUS:20030}    IV Access:  { CARLEEN IV ACCESS:277422381}    Nursing Mobility/ADLs:  Walking   {CHP DME JFVL:415704458}  Transfer  {CHP DME HXPB:911429117}  Bathing  {CHP DME BMJA:289042015}  Dressing  {CHP DME EJQD:739447623}  Toileting  {CHP DME YKCR:630884025}  Feeding  {CHP DME MKKW:586785353}  Med Admin  {P DME UWGR:488152320}  Med Delivery   { CARLEEN MED Delivery:285553305}    Wound Care Documentation and Therapy:  Wound 12/26/18 Leg Left;Medial #3 (Active)   Number of days: 481        Elimination:  Continence:   · Bowel: {YES / NC:05417}  · Bladder: {YES / GW:04663}  Urinary Catheter: {Urinary Catheter:980881544}   Colostomy/Ileostomy/Ileal Conduit: {YES / NP:91282}       Date of Last BM: ***    Intake/Output Summary (Last 24 hours) at 4/20/2020 1230  Last data filed at 4/20/2020 1146  Gross per 24 hour   Intake 360 ml   Output 1200 ml   Net -840 ml     I/O last 3 completed shifts: In: 120 [P.O.:120]  Out: 600 [Urine:600]    Safety Concerns:     [employer] Safety Concerns:702379585}    Impairments/Disabilities:      [employer] Impairments/Disabilities:517277011}    Nutrition Therapy:  Current Nutrition Therapy:   [employer] Diet List:488694984}    Routes of Feeding: {P DME Other Feedings:103389945}  Liquids: {Slp liquid thickness:51192}  Daily Fluid Restriction: {CHP DME Yes amt example:654955673}  Last Modified Barium Swallow with Video (Video Swallowing Test): {Done Not Done DZGK:759656717}    Treatments at the Time of Hospital Discharge:   Respiratory Treatments: ***  Oxygen Therapy:  {Therapy; copd oxygen:59746}  Ventilator:    { CC Vent UWCJ:776073152}     Heart Failure Instructions for Daily Management  Patient was treated for acute combined systolic and diastolic heart failure. he  will require the following:     Please weigh daily on the same scale and approximately the same time of day. Report weight gain of 3 pounds/day or 5 pounds/week to : Tamia [phone].  Please use hospital discharge weight as baseline reference.  Please monitor for signs and symptoms of and report to MD:  o Worsening Heart Failure: sudden weight gain, shortness of breath, lower extremity or general edema/swelling, abdominal bloating/swelling, inability to lie flat, intolerance to usual activity, or cough (especially at night). Report these finding even if no increase in weight.  o Dehydration:  having difficulty or a decrease in urination, dizziness, worsening fatigue, or new onset/worsening of generalized weakness.  Please continue a LOW SODIUM diet and LIMIT fluid intake to 48 - 64 ounces ( 1.5 - 2 liters) per day. Cao Call Tamia [phone] and/or Mary Cantu @ [phone] with any questions or concerns.  Please continue heart failure education to patient and family/support system.  See After Visit Summary for hospital follow up appointment details.  Consider spiritual care referral for support and/or completion of advance directives [phone].  Consider: [place] [address] telehealth program if patient agreeable and able to participate, palliative care consult for ongoing goals of care, end of life, and/or chronic disease management discussions and referral to [facility] [phone]) once SNF/HHC complete.     Rehab Therapies: {THERAPEUTIC INTERVENTION:0325933049}  Weight Bearing Status/Restrictions: [facility] Weight Bearin}  Other Medical Equipment (for information only, NOT a DME order):  {EQUIPMENT:556820711}  Other Treatments: ***    Patient's personal belongings (please select all that are sent with patient):  {CHP DME Belongings:153137671}    RN SIGNATURE:  {Esignature:898039833}    CASE MANAGEMENT/SOCIAL WORK SECTION    Inpatient Status Date: ***    Readmission Risk Assessment Score:  Readmission Risk

## 2020-04-20 NOTE — PROGRESS NOTES
filed at 4/20/2020 0531  Gross per 24 hour   Intake 120 ml   Output 600 ml   Net -480 ml     I/O since adm: -2850+ incontinence    WEIGHT:Admit Weight: 277 lb 9 oz (125.9 kg)         Today  Weight: 254 lb 13.6 oz (115.6 kg)   DRY WEIGHT: around 250#  Wt Readings from Last 3 Encounters:   04/20/20 254 lb 13.6 oz (115.6 kg)   10/18/19 262 lb (118.8 kg)   05/30/19 252 lb (114.3 kg)       Physical Exam:  GEN: Appears obese, no acute distress  SKIN: Pink, warm, dry. Nails without clubbing. HEENT: PERRLA. Normocephalic, atraumatic. Neck supple. No adenopathy. LUNG: AP diameter normal. Decreased bases, exp wheeze. Respiratory effort increaesed. HEART: S1S2 A/R. No JVD. No carotid bruit. No murmur, rub or gallop. ABD: Soft, nontender. +BS X 4 quads. No hepatomegaly. EXT: Radial and pedal pulses 2+ and symmetric. Without varicosities. 2+ BLE pedal to sacral edema. Discoloring mottling vs petechiae. MUSCSKEL: Good ROM X4 extremities. No deformity. Uses walker for ambulation. NEURO: A/O X3. Calm and cooperative. Telemetry: SR 1st degree with PVCs HR 50s    Medications:    predniSONE  40 mg Oral Daily    losartan  50 mg Oral Daily    spironolactone  25 mg Oral Daily    carvedilol  6.25 mg Oral BID WC    amitriptyline  100 mg Oral Nightly    furosemide  40 mg Intravenous BID    rivaroxaban  20 mg Oral Dinner    atorvastatin  20 mg Oral Daily    sodium chloride flush  10 mL Intravenous 2 times per day    cefTRIAXone (ROCEPHIN) IV  1 g Intravenous Q24H       acetaminophen, ALPRAZolam, sodium chloride flush, polyethylene glycol, ondansetron, ipratropium-albuterol    Lab Data: I have reviewed all labs below today.    CBC:   Recent Labs     04/18/20  0430 04/19/20  0353 04/20/20  0434   WBC 5.8 10.4 12.2*   HGB 12.1* 12.2* 12.5*   HCT 36.3* 36.4* 37.4*   MCV 89.3 89.0 88.2   * 115* 139     BMP:   Recent Labs     04/18/20  0430 04/19/20  0353 04/20/20  0434    137 138   K 3.8 3.9 3.7   CL 99 93*

## 2020-04-20 NOTE — PROGRESS NOTES
Elevated lactic acid level [R79.89] 04/16/2020    Moderate protein-calorie malnutrition ([facility] [address] [E44.0] 04/16/2020    Fever [R50.9] 04/16/2020    Hypomagnesemia [E83.42] 04/16/2020    Elevated troponin [R79.89] 04/16/2020    Acute pulmonary edema ([facility] [address] [J81.0] 04/16/2020    Morbid obesity due to excess calories (HCC) [E66.01] 04/16/2020    Paroxysmal atrial fibrillation ([facility] [address] [I48.0] 07/17/2016    Sepsis ([facility] [address] [A41.9]     Hypotension [I95.9]      1) CHF  - Newly reduced EF 97-30%, grade I diastolic dysfunction    2) pAfib    3) CAD  - trop neg    4) Hypoxemic resp failure  - desat to 84% with ambulation    DVT Prophylaxis: ***  Diet: Dietary Nutrition Supplements: Standard Oral Supplement  DIET GENERAL; Low Sodium (2 GM);  Daily Fluid Restriction: 1500 ml  Code Status: Full Code    PT/OT Eval Status: ***    Dispo - ***    Chucky Dominguez MD

## 2020-04-20 NOTE — CONSULTS
lb (122.9 kg)   11/26/18 255 lb (115.7 kg)   08/28/18 259 lb (117.5 kg)   08/20/18 259 lb (117.5 kg)   07/03/18 254 lb (115.2 kg)   12/04/17 256 lb 14.4 oz (116.5 kg)   09/25/17 246 lb 14.4 oz (112 kg)   04/14/17 236 lb 4.8 oz (107.2 kg)   01/11/17 240 lb (108.9 kg)   12/28/16 239 lb (108.4 kg)   08/22/16 235 lb (106.6 kg)   08/12/16 233 lb (105.7 kg)   08/03/16 244 lb 6.4 oz (110.9 kg)   07/25/16 241 lb (109.3 kg)   07/20/16 251 lb (113.9 kg)   07/18/16 252 lb 3.3 oz (114.4 kg)   06/06/16 231 lb 8 oz (105 kg)   04/04/16 252 lb (114.3 kg)   12/01/15 238 lb 12.8 oz (108.3 kg)   09/24/15 250 lb 6.4 oz (113.6 kg)   09/18/14 245 lb (111.1 kg)          Intake/Output Summary (Last 24 hours) at 4/20/2020 0843  Last data filed at 4/20/2020 0531  Gross per 24 hour   Intake 120 ml   Output 600 ml   Net -480 ml       LABS  BMP:   Lab Results   Component Value Date     04/20/2020    K 3.7 04/20/2020    CL 93 04/20/2020    CO2 34 04/20/2020    BUN 36 04/20/2020    LABALBU 2.9 04/16/2020    CREATININE 1.1 04/20/2020    CALCIUM 8.7 04/20/2020    GFRAA >60 04/20/2020    GFRAA >60 10/23/2012    LABGLOM >60 04/20/2020    GLUCOSE 128 04/20/2020     CBC:   Recent Labs     04/18/20  0430 04/19/20  0353 04/20/20  0434   WBC 5.8 10.4 12.2*   HGB 12.1* 12.2* 12.5*   HCT 36.3* 36.4* 37.4*   MCV 89.3 89.0 88.2   * 115* 139     BNP: No results found for: BNP    ECHOCARDIOGRAM: 04/17/2020      Summary   Suboptimal image quality. Definity contrast administered. Left ventricular cavity size is normal.   There is asymmetric hypertrophy of the basal septum. Ejection fraction is visually estimated to be 40-45%. There is mild to moderate diffuse hypokinesis. Diastolic filling parameters suggest grade I diastolic dysfunction. Mitral valve leaflets appear mildly thickened. Trivial mitral regurgitation is present. The left atrium is mildly dilated. Aortic valve leaflets appear thickened.    Trivial aortic regurgitation is present. The right ventricle is enlarged. Right ventricular systolic function is moderately reduced. TAPSE 1.1 cm   Tricuspid valve is structurally normal.   Mild tricuspid regurgitation. The right atrium is severely dilated. Estimated pulmonary artery systolic pressure is moderately elevated at 29-41   mmHg assuming a right atrial pressure of 15 mmHg. Assessment:     CONSULTS:   IP CONSULT TO DIETITIAN  IP CONSULT TO PALLIATIVE CARE  IP CONSULT TO CARDIOLOGY  IP CONSULT TO HEART FAILURE NURSE/COORDINATOR    Patient has a CARDIOLOGY CONSULT: Yes      Patient taking an ACEI/ARB:  Yes       Patient taking a BETA BLOCKER:  Yes    SCALE AVAILABLE:  Yes - \"spring\" type but will get a new digital    EDUCATION STATUS: Patient and pt's wife, ANNA [facility], by phone  [x]  Provided both written and verbal education on Heart Failure signs/symptoms. [x]  Provided instructions on daily medications. [x]  Provided instructions to monitor and record weight daily. [x]  Provided instructions to call if weight increases 3 lbs in one day or 5 lbs in one week. [x]  Received verbal acknowledgment/understanding of Heart Failure related causes. [x]  Provided instructions on how to maintain a low sodium diet. []  Provided recommendations for smoking cessation programs  [x]  Provided recommendations on activity and exercise    [x]  Other: HF RN consult received from Dr Cale Ruiz. Chart reviewed. Pt presented to ED via EMS with c/o fever 101F at home and SOB. He was admitted as COVID r/o vs HF vs PNA. Rapid flu was negative x 2 as were strep and legionella. Covid testing reported as negative although I can not see this in lab results myself. Pt has a history of CABG 1981 and 2001. He follows with Dr Edwin Hua at [facility]. He was last seen in office on 1018/2019 by Dr Edwin Hua and felt to be stable. Weight at that time was 262#. Pt does not have prior history of HF per office notes and was on no home diuretic.      ProBNP on on his scale as a baseline reference but to also anticipate further weight loss and cardiology feels patient still has ~10# of \"water weight\" to lose. They both voice understanding. I stressed the importance of EARLY report of concerns to most easily address issues. I provided [facility] contact number as well as [place] [facility] [employer]. We discussed the education opportunities available at the [place] location and how HF RN education will also be available through the cardiology office. Pt / wife voices understanding. Pt reports he routinely uses salt shaker at home and he / wife eat out 1-2 times per week (under normal circumstances). Wife reports Jesika President is a big water drinker -- always refilling his glass\". They use filtered tap water through frig dispenser. We discussed the rationale of sodium /fluid restriction for treatment / management of HF. We reviewed the recommendations of each. I shared tips on how to start weaning away from added salt and replacing with alternate seasoning. Wife requested I make notations on handouts which she will review later. I provided the [employer] and we reviewed both how to read a food label and how to wean off added salt in 3 week time frame [facility] recommendations). Wife questions appropriateness of NoSalt item (potassium chloride for seasoning). I informed her I personally advised against it as pt will be taking a diuretic and aldactone - both which impact K+ level. I advised just weaning away to salt free seasonings such as Mrs Sylvia Samano and any other spices alexsandra garlic, onion, lemon, pepper, etc.  I also cautioned against \"rubs\" which often have a salt base. She voices understanding. We reviewed changes made in his medications - specifically switch to Coreg from Norvasc and plan for scheduled diuretic as well as addition of Aldactone. We discussed timing of diuretic and importance of reporting s/sx of DEhydration as well.   Pt / wife voice

## 2020-04-20 NOTE — PROGRESS NOTES
minimal alcohol consumption    Drug use: No       Patient Room Air saturation at rest 89  %  Patient Room Air saturation upon ambulation 84 %    Oxygen saturations of 88% or less on RA qualifies patient for Home Oxygen    Patient resting on 0  lmp  with an oxygen saturation of  89 %     Patient ambulated on 2 lpm with an oxygen saturation of 91%      Qualifying patient for home oxygen with ambulation and continuous flow  @ 2 lpm.      In your clinical assessment does the Patient Require Portable Oxygen Tanks?     Yes              [facility] [email or web address] contacted to follow care of patients home oxygen needs on 4/20/2020 at 10:01 AM    Patient/caregiver was educated on Home Oxygen process:  Yes      Level of patient/caregiver understanding able to:   [] Verbalize understanding   [] Demonstrate understanding       [] Teach back        [] Needs reinforcement        []  No available caregiver               []  Other:     Response to education:  Excellent     Time Spent with Home O2 Set Up:  20  minutes     Ariella Honeycutt RCP on 4/20/2020 at 10:01 AM

## 2020-04-21 ENCOUNTER — CARE COORDINATION (OUTPATIENT)
Dept: CASE MANAGEMENT | Age: 85
End: 2020-04-21

## 2020-04-21 ENCOUNTER — TELEPHONE (OUTPATIENT)
Dept: CARDIOLOGY CLINIC | Age: 85
End: 2020-04-21

## 2020-04-21 NOTE — CARE COORDINATION
feels a \"little better\". He states he often weighs himself but has not done so yet today. His daughter is going to pick him up a digital scale. Michelle Jou denies any SOB,chest pain, or edema. Plan for follow-up call in 5-7 days based on severity of symptoms and risk factors.

## 2020-04-21 NOTE — TELEPHONE ENCOUNTER
Medication Refill    Medication needing refilled:    Doseage of the medication:    How are you taking this medication (QD, BID, TID, QID, PRN):    30 or 90 day supply called in:    Which Pharmacy are we sending the medication to?:      Medication Question/Concern    What is the name of the medication you need to speak with someone about? Doseage of the medication:    How are you taking this medication:    What issues/concerns are you having with this medication:      Medication Samples    Medication:  xarelto  Doseage of the medication:  20 mg  How are you taking this medication (QD, BID, TID, QID, PRN):  1 tab daily   in the office or Mail to your home? Will  in office on thursday at his appt if available.

## 2020-04-22 ENCOUNTER — TELEPHONE (OUTPATIENT)
Dept: CARDIOLOGY CLINIC | Age: 85
End: 2020-04-22

## 2020-04-22 NOTE — TELEPHONE ENCOUNTER
Pt wife calling to see if an order can be sent for the pt to have a portable oxygen tank? If so, please fax to [facility] [phone]. Can this be done today so they can have it for tomorrow's appt?

## 2020-04-23 ENCOUNTER — OFFICE VISIT (OUTPATIENT)
Dept: CARDIOLOGY CLINIC | Age: 85
End: 2020-04-23
Payer: MEDICARE

## 2020-04-23 ENCOUNTER — HOSPITAL ENCOUNTER (OUTPATIENT)
Age: 85
Discharge: HOME | End: 2020-04-23
Payer: MEDICARE

## 2020-04-23 VITALS
DIASTOLIC BLOOD PRESSURE: 70 MMHG | HEIGHT: 71 IN | HEART RATE: 56 BPM | SYSTOLIC BLOOD PRESSURE: 120 MMHG | BODY MASS INDEX: 35 KG/M2 | WEIGHT: 250 LBS | OXYGEN SATURATION: 99 %

## 2020-04-23 PROBLEM — I50.21 ACUTE SYSTOLIC HEART FAILURE (HCC): Status: RESOLVED | Noted: 2020-04-18 | Resolved: 2020-04-23

## 2020-04-23 LAB
ANION GAP SERPL CALCULATED.3IONS-SCNC: 11 MMOL/L (ref 3–16)
BUN BLDV-MCNC: 33 MG/DL (ref 7–20)
CALCIUM SERPL-MCNC: 8.6 MG/DL (ref 8.3–10.6)
CHLORIDE BLD-SCNC: 92 MMOL/L (ref 99–110)
CO2: 32 MMOL/L (ref 21–32)
CREAT SERPL-MCNC: 1.1 MG/DL (ref 0.8–1.3)
GFR AFRICAN AMERICAN: >60
GFR NON-AFRICAN AMERICAN: >60
GLUCOSE BLD-MCNC: 127 MG/DL (ref 70–99)
POTASSIUM SERPL-SCNC: 3.8 MMOL/L (ref 3.5–5.1)
SODIUM BLD-SCNC: 135 MMOL/L (ref 136–145)

## 2020-04-23 PROCEDURE — 80048 BASIC METABOLIC PNL TOTAL CA: CPT

## 2020-04-23 PROCEDURE — 99214 OFFICE O/P EST MOD 30 MIN: CPT | Performed by: INTERNAL MEDICINE

## 2020-04-23 PROCEDURE — 36415 COLL VENOUS BLD VENIPUNCTURE: CPT

## 2020-04-23 RX ORDER — RIVAROXABAN 20 MG/1
20 TABLET, FILM COATED ORAL
Qty: 90 TABLET | Refills: 3 | Status: SHIPPED | OUTPATIENT
Start: 2020-04-23 | End: 2021-03-02 | Stop reason: SDUPTHER

## 2020-04-23 NOTE — PROGRESS NOTES
Disease in his father; High Blood Pressure in his father; High Cholesterol in his father. Home Medications:  Outpatient Encounter Medications as of 4/23/2020   Medication Sig Dispense Refill    carvedilol (COREG) 6.25 MG tablet Take 1 tablet by mouth 2 times daily (with meals) 60 tablet 3    methylPREDNISolone (MEDROL DOSEPACK) 4 MG tablet Take by mouth. 1 kit 0    furosemide (LASIX) 40 MG tablet Take 1 tablet by mouth 2 times daily 60 tablet 3    spironolactone (ALDACTONE) 25 MG tablet Take 1 tablet by mouth daily 30 tablet 3    polyethylene glycol (GLYCOLAX) packet Take 17 g by mouth daily as needed for Constipation 527 g 1    albuterol sulfate HFA (VENTOLIN HFA) 108 (90 Base) MCG/ACT inhaler Inhale 2 puffs into the lungs 4 times daily as needed for Wheezing 3 Inhaler 1    famotidine (PEPCID) 20 MG tablet Take 20 mg by mouth 2 times daily      atorvastatin (LIPITOR) 20 MG tablet Take 1 tablet by mouth daily 90 tablet 3    losartan (COZAAR) 50 MG tablet Take 1 tablet by mouth daily 90 tablet 3    psyllium (KONSYL) 28.3 % PACK Take 1 packet by mouth daily      XARELTO 20 MG TABS tablet Take 1 tablet by mouth Daily with supper 90 tablet 3    amitriptyline (ELAVIL) 100 MG tablet Take 100 mg by mouth nightly   2    Ascorbic Acid (VITAMIN C) 500 MG tablet Take 1,000 mg by mouth daily       vitamin E 400 UNIT capsule Take 800 Units by mouth daily       vitamin D (CHOLECALCIFEROL) 1000 UNIT TABS tablet Take 1,000 Units by mouth 2 times daily       ALPRAZolam (XANAX) 0.5 MG tablet Take 1 tablet by mouth nightly as needed for Sleep for up to 3 days.  Indications: nightly for sleep 3 tablet 0    Compression Stockings MISC by Does not apply route 20-30 mm hg compression stockings, bilateral, knee high 1 each 0    nitroGLYCERIN (NITROSTAT) 0.4 MG SL tablet Place 1 tablet under the tongue every 5 minutes as needed for Chest pain 25 tablet 3     No facility-administered encounter medications on file as of

## 2020-04-24 ENCOUNTER — TELEPHONE (OUTPATIENT)
Dept: CARDIOLOGY CLINIC | Age: 85
End: 2020-04-24

## 2020-04-27 ENCOUNTER — TELEPHONE (OUTPATIENT)
Dept: CARDIOLOGY CLINIC | Age: 85
End: 2020-04-27

## 2020-04-27 NOTE — TELEPHONE ENCOUNTER
Spoke to patient. He asked to reduce his furosemide dose as taking the second dose at 4-5 pm was more of a nuisance. His weight is 238 lb. Pulse of 96-97%. He denies lower extremity edema and states his breathing is good/stable. Dr. Deirdre Andersen said he could try to reduce the dose to Furosemide 40 mg daily, but he needs to closely monitor weight, symptoms (more shortness of breath, or edema etc). Also suggested that patient could take second dose of furosemide at 2-3 pm and see if urinary frequency slows down before bedtime. Patient states he will continue taking furosemide 40 mg BID and will move up his second dose as suggested. He will call the office in three days (4/30/20) with an update on weight/symtoms.

## 2020-05-05 ENCOUNTER — CARE COORDINATION (OUTPATIENT)
Dept: CASE MANAGEMENT | Age: 85
End: 2020-05-05

## 2020-05-05 NOTE — CARE COORDINATION
Patient resolved from the Care Transitions episode on 05/05/2020  Patient/family has been provided the following resources and education related to COVID-19:                         Signs, symptoms and red flags related to COVID-19            CDC exposure and quarantine guidelines            [facility] exposure contact - [phone]            Contact for their local Department of Health                 Patient currently reports that the following symptoms have improved:  no new or worsening symptoms. No further outreach scheduled with this CTN/ACM. Episode of Care resolved. Patient has this CTN/ACM contact information if future needs arise.

## 2020-05-06 ENCOUNTER — TELEPHONE (OUTPATIENT)
Dept: CARDIOLOGY CLINIC | Age: 85
End: 2020-05-06

## 2020-05-06 NOTE — TELEPHONE ENCOUNTER
Pt wife calling pt was coming to have labs done and wants to make sure there are orders in his chart. There are no orders, can they be placed in chart and call wife when done so pt can get them done.  Thank you

## 2020-05-07 ENCOUNTER — HOSPITAL ENCOUNTER (OUTPATIENT)
Age: 85
Discharge: HOME | End: 2020-05-07
Payer: MEDICARE

## 2020-05-07 LAB
ANION GAP SERPL CALCULATED.3IONS-SCNC: 13 MMOL/L (ref 3–16)
BUN BLDV-MCNC: 22 MG/DL (ref 7–20)
CALCIUM SERPL-MCNC: 9 MG/DL (ref 8.3–10.6)
CHLORIDE BLD-SCNC: 99 MMOL/L (ref 99–110)
CO2: 30 MMOL/L (ref 21–32)
CREAT SERPL-MCNC: 1.3 MG/DL (ref 0.8–1.3)
GFR AFRICAN AMERICAN: >60
GFR NON-AFRICAN AMERICAN: 53
GLUCOSE BLD-MCNC: 98 MG/DL (ref 70–99)
POTASSIUM SERPL-SCNC: 3.4 MMOL/L (ref 3.5–5.1)
SODIUM BLD-SCNC: 142 MMOL/L (ref 136–145)

## 2020-05-07 PROCEDURE — 80048 BASIC METABOLIC PNL TOTAL CA: CPT

## 2020-05-07 PROCEDURE — 36415 COLL VENOUS BLD VENIPUNCTURE: CPT

## 2020-05-08 ENCOUNTER — TELEPHONE (OUTPATIENT)
Dept: CARDIOLOGY CLINIC | Age: 85
End: 2020-05-08

## 2020-05-08 RX ORDER — POTASSIUM CHLORIDE 20 MEQ/1
20 TABLET, EXTENDED RELEASE ORAL 2 TIMES DAILY
Qty: 90 TABLET | Refills: 1
Start: 2020-05-08 | End: 2020-05-27 | Stop reason: ALTCHOICE

## 2020-05-08 NOTE — TELEPHONE ENCOUNTER
----- Message from Boogie Bautista MD sent at 5/8/2020 10:19 AM EDT -----  Labs OK. K a little low. Increase K in diet. Recheck 2 weeks.     1 [facility]

## 2020-05-16 PROBLEM — R79.89 ELEVATED TROPONIN: Status: RESOLVED | Noted: 2020-04-16 | Resolved: 2020-05-16

## 2020-05-16 PROBLEM — R77.8 ELEVATED TROPONIN: Status: RESOLVED | Noted: 2020-04-16 | Resolved: 2020-05-16

## 2020-05-21 ENCOUNTER — HOSPITAL ENCOUNTER (OUTPATIENT)
Age: 85
Discharge: HOME | End: 2020-05-21
Payer: MEDICARE

## 2020-05-21 LAB
ANION GAP SERPL CALCULATED.3IONS-SCNC: 12 MMOL/L (ref 3–16)
BUN BLDV-MCNC: 15 MG/DL (ref 7–20)
CALCIUM SERPL-MCNC: 9.3 MG/DL (ref 8.3–10.6)
CHLORIDE BLD-SCNC: 99 MMOL/L (ref 99–110)
CO2: 30 MMOL/L (ref 21–32)
CREAT SERPL-MCNC: 1 MG/DL (ref 0.8–1.3)
GFR AFRICAN AMERICAN: >60
GFR NON-AFRICAN AMERICAN: >60
GLUCOSE BLD-MCNC: 98 MG/DL (ref 70–99)
POTASSIUM SERPL-SCNC: 3.6 MMOL/L (ref 3.5–5.1)
SODIUM BLD-SCNC: 141 MMOL/L (ref 136–145)

## 2020-05-21 PROCEDURE — 36415 COLL VENOUS BLD VENIPUNCTURE: CPT

## 2020-05-21 PROCEDURE — 80048 BASIC METABOLIC PNL TOTAL CA: CPT

## 2020-05-27 ENCOUNTER — OFFICE VISIT (OUTPATIENT)
Dept: CARDIOLOGY CLINIC | Age: 85
End: 2020-05-27
Payer: MEDICARE

## 2020-05-27 VITALS
HEART RATE: 58 BPM | SYSTOLIC BLOOD PRESSURE: 106 MMHG | WEIGHT: 244.9 LBS | HEIGHT: 71 IN | BODY MASS INDEX: 34.28 KG/M2 | DIASTOLIC BLOOD PRESSURE: 58 MMHG | OXYGEN SATURATION: 97 %

## 2020-05-27 PROBLEM — R06.00 DYSPNEA: Status: ACTIVE | Noted: 2020-05-27

## 2020-05-27 PROCEDURE — 99214 OFFICE O/P EST MOD 30 MIN: CPT | Performed by: INTERNAL MEDICINE

## 2020-05-27 RX ORDER — ATORVASTATIN CALCIUM 20 MG/1
20 TABLET, FILM COATED ORAL DAILY
Qty: 90 TABLET | Refills: 3 | Status: SHIPPED | OUTPATIENT
Start: 2020-05-27 | End: 2020-09-24 | Stop reason: SDUPTHER

## 2020-05-27 RX ORDER — LOSARTAN POTASSIUM 50 MG/1
50 TABLET ORAL DAILY
Qty: 90 TABLET | Refills: 3 | Status: SHIPPED | OUTPATIENT
Start: 2020-05-27 | End: 2021-03-02 | Stop reason: SDUPTHER

## 2020-05-27 NOTE — LETTER
[address] [place] [address] Cardiology [address] [id number]  Phone: [phone]  Fax: [phone]    Valeria Benson MD        June 8, 2020     [address] [place] [address]    Patient: Ghanshyam Mukherjee  MR Number: [id number]  YOB: 1935  Date of Visit: 5/27/2020    Dear Dr. Pamela Alcala:    [address]  Cardiac Follow up       Referring Provider:  Pamela Alcala MD     Chief Complaint   Patient presents with    Coronary Artery Disease     no cardiac complaints at this time    Atrial Fibrillation    1 Month Follow-Up      History of Present Illness:  Mr. Dusty Zurita is an 80year old gentleman here today in follow up for CHF, CAD, hypertension, hyperlipidemia, afib and PVD. In 2014, he was diagnosed with a DVT and completed 6 months of treatment with Xarelto. He was seen by a hematologist, initially worked up for lupus anticoagulant, but most recent testing did not show evidence of this. Prior to last visit, he was admitted with sepsis and developed afib. He was started on Eliquis. Now on Xarelto. He is doing better. Off O2. Weight down 10 lbs. Decrease in appetite. Edema resolved. Breathing improved. Stopped aldactone due to diarrhea that improved. He is taking lasix 20 mg BID and extra 20 prn edema. Past Medical History:   has a past medical history of Atrial fibrillation ([address] CAD (coronary artery disease), Diarrhea, Hyperlipidemia, Hypertension, Insomnia, Insomnia, and Kidney stones. Surgical History:   has a past surgical history that includes back surgery; knee surgery (2005); shoulder surgery; Appendectomy; Coronary artery bypass graft (1981); Coronary artery bypass graft (2001); Prostate surgery; Skin cancer excision; and joint replacement (Right). Social History:   reports that he quit smoking about 45 years ago.  He has a 20.00 pack-year  [DISCONTINUED] psyllium (KONSYL) 28.3 % PACK Take 1 packet by mouth daily       No facility-administered encounter medications on file as of 5/27/2020. Allergies:  Codeine and Flomax [tamsulosin hcl]     [x] Medications and dosages reviewed. ROS:  [x]Full ROS obtained and negative except as mentioned in HPI      Physical Examination:    Vitals:    05/27/20 0906   BP: (!) 106/58   Pulse: 58   SpO2: 97%      BP (!) 106/58 (Site: Right Upper Arm, Position: Sitting, Cuff Size: Large Adult)   Pulse 58   Ht 5' 11\" (1.803 m)   Wt 244 lb 14.4 oz (111.1 kg)   SpO2 97%   BMI 34.16 kg/m²      · GENERAL: Elderly obese male in wheel chair in NAD  · NEUROLOGICAL: Alert and oriented  · PSYCH: Calm affect  · SKIN: Warm and dry, No obvious Rash  · HEENT: Normocephalic, Sclera non-icteric, mucus membranes moist  · NECK: supple, JVP normal  · CAROTID: Normal upstroke, no bruits  · CARDIAC: Normal PMI, regular rate and slightly irregular rhythm, normal S1S2, No murmur, rub, or gallop  · RESPIRATORY: Normal respiratory effort, Clear bilaterally  · EXTREMITIES: Trace  RLE edema. · MUSCULOSKELETAL: no chest wall tenderness, no c/o joint pain. · GASTROINTESTINAL: normal bowel sounds, soft, non-tender, no bruit    Echo 7/14/16:  Normal left ventricle size, wall thickness and systolic function with an EF of 55%. Mild aortic stenosis with mean gradient of 12mmHg. Trivial aortic regurgitation is present. Mild to moderate tricuspid regurgitation with RVSP estimated at 47 mmHg        Assessment:     1. CAD (coronary artery disease)/CABG 1981 & 2001:   Remains stable without angina. Continue current treatment  Lexiscan Myoview 2014> Normal perfusion/EF. Repeat myoview 8/2018-fixed inferior, scar vs diaphragm   2.  HTN (hypertension): BP (!) 106/58 (Site: Right Upper Arm, Position: Sitting, Cuff Size: Large Adult)   Pulse 58   Ht 5' 11\" (1.803 m)   Wt 244 lb 14.4 oz (111.1 kg)   SpO2 97%   BMI 34.16 kg/m²

## 2020-05-27 NOTE — PROGRESS NOTES
[address]  Cardiac Follow up       Referring Provider:  Erick Boswell MD     Chief Complaint   Patient presents with    Coronary Artery Disease     no cardiac complaints at this time    Atrial Fibrillation    1 Month Follow-Up      History of Present Illness:  Mr. Tommy Sol is an 80year old gentleman here today in follow up for CHF, CAD, hypertension, hyperlipidemia, afib and PVD. In 2014, he was diagnosed with a DVT and completed 6 months of treatment with Xarelto. He was seen by a hematologist, initially worked up for lupus anticoagulant, but most recent testing did not show evidence of this. Prior to last visit, he was admitted with sepsis and developed afib. He was started on Eliquis. Now on Xarelto. He is doing better. Off O2. Weight down 10 lbs. Decrease in appetite. Edema resolved. Breathing improved. Stopped aldactone due to diarrhea that improved. He is taking lasix 20 mg BID and extra 20 prn edema. Past Medical History:   has a past medical history of Atrial fibrillation ([address] CAD (coronary artery disease), Diarrhea, Hyperlipidemia, Hypertension, Insomnia, Insomnia, and Kidney stones. Surgical History:   has a past surgical history that includes back surgery; knee surgery (2005); shoulder surgery; Appendectomy; Coronary artery bypass graft (1981); Coronary artery bypass graft (2001); Prostate surgery; Skin cancer excision; and joint replacement (Right). Social History:   reports that he quit smoking about 45 years ago. He has a 20.00 pack-year smoking history. He has never used smokeless tobacco. He reports current alcohol use. He reports that he does not use drugs. Family History:  family history includes Cancer in his mother and sister; Diabetes in his mother; Heart Attack in his father and mother; Heart Disease in his father; High Blood Pressure in his father; High Cholesterol in his father.      Home Medications:  Outpatient Encounter Medications as of 5/27/2020   Medication Sig Dispense Refill    XARELTO 20 MG TABS tablet Take 1 tablet by mouth Daily with supper 90 tablet 3    carvedilol (COREG) 6.25 MG tablet Take 1 tablet by mouth 2 times daily (with meals) 60 tablet 3    furosemide (LASIX) 40 MG tablet Take 1 tablet by mouth 2 times daily (Patient taking differently: Take 20 mg by mouth 2 times daily Half tab BID) 60 tablet 3    albuterol sulfate HFA (VENTOLIN HFA) 108 (90 Base) MCG/ACT inhaler Inhale 2 puffs into the lungs 4 times daily as needed for Wheezing 3 Inhaler 1    famotidine (PEPCID) 20 MG tablet Take 20 mg by mouth 2 times daily      atorvastatin (LIPITOR) 20 MG tablet Take 1 tablet by mouth daily 90 tablet 3    losartan (COZAAR) 50 MG tablet Take 1 tablet by mouth daily 90 tablet 3    Compression Stockings MISC by Does not apply route 20-30 mm hg compression stockings, bilateral, knee high 1 each 0    amitriptyline (ELAVIL) 100 MG tablet Take 100 mg by mouth nightly   2    nitroGLYCERIN (NITROSTAT) 0.4 MG SL tablet Place 1 tablet under the tongue every 5 minutes as needed for Chest pain 25 tablet 3    Ascorbic Acid (VITAMIN C) 500 MG tablet Take 1,000 mg by mouth daily       vitamin E 400 UNIT capsule Take 800 Units by mouth daily       vitamin D (CHOLECALCIFEROL) 1000 UNIT TABS tablet Take 1,000 Units by mouth 2 times daily       [DISCONTINUED] potassium chloride (KLOR-CON M) 20 MEQ extended release tablet Take 1 tablet by mouth 2 times daily (Patient not taking: Reported on 5/27/2020) 90 tablet 1    [DISCONTINUED] psyllium (KONSYL) 28.3 % PACK Take 1 packet by mouth daily       No facility-administered encounter medications on file as of 5/27/2020. Allergies:  Codeine and Flomax [tamsulosin hcl]     [x] Medications and dosages reviewed.   ROS:  [x]Full ROS obtained and negative except as mentioned in HPI      Physical Examination:    Vitals:    05/27/20 0906   BP: (!) 106/58   Pulse: 58   SpO2: 97%      BP (!) 106/58 (Site:

## 2020-05-29 ENCOUNTER — TELEPHONE (OUTPATIENT)
Dept: CARDIOLOGY CLINIC | Age: 85
End: 2020-05-29

## 2020-06-29 ENCOUNTER — TELEPHONE (OUTPATIENT)
Dept: CARDIOLOGY CLINIC | Age: 85
End: 2020-06-29

## 2020-06-29 NOTE — TELEPHONE ENCOUNTER
Medication was sent in to [facility] on 5/27/20 for 90 days with 3 refills. Called pt, asked that he call [facility] to refill med and if any issues call us back.

## 2020-06-29 NOTE — TELEPHONE ENCOUNTER
Medication Refill    Medication needing refilled:losartan (COZAAR) 50 MG tablet    Doseage of the medication: 50 mg    How are you taking this medication (QD, BID, TID, QID, PRN): 1 tablet daily    30 or 90 day supply called in: 80    Which Pharmacy are we sending the medication to?:  [id number] - [place], [address] [phone] - F [phone]

## 2020-09-14 ENCOUNTER — OFFICE VISIT (OUTPATIENT)
Dept: CARDIOLOGY CLINIC | Age: 85
End: 2020-09-14
Payer: MEDICARE

## 2020-09-14 VITALS
BODY MASS INDEX: 34.16 KG/M2 | SYSTOLIC BLOOD PRESSURE: 122 MMHG | HEART RATE: 51 BPM | HEIGHT: 71 IN | OXYGEN SATURATION: 96 % | WEIGHT: 244 LBS | DIASTOLIC BLOOD PRESSURE: 60 MMHG

## 2020-09-14 PROCEDURE — 99214 OFFICE O/P EST MOD 30 MIN: CPT | Performed by: INTERNAL MEDICINE

## 2020-09-14 NOTE — PROGRESS NOTES
mouth daily 90 tablet 3    atorvastatin (LIPITOR) 20 MG tablet Take 1 tablet by mouth daily 90 tablet 3    XARELTO 20 MG TABS tablet Take 1 tablet by mouth Daily with supper 90 tablet 3    carvedilol (COREG) 6.25 MG tablet Take 1 tablet by mouth 2 times daily (with meals) 60 tablet 3    furosemide (LASIX) 40 MG tablet Take 1 tablet by mouth 2 times daily (Patient taking differently: Take 20 mg by mouth 2 times daily Half tab BID) 60 tablet 3    famotidine (PEPCID) 20 MG tablet Take 20 mg by mouth 2 times daily      Compression Stockings MISC by Does not apply route 20-30 mm hg compression stockings, bilateral, knee high 1 each 0    amitriptyline (ELAVIL) 100 MG tablet Take 100 mg by mouth nightly   2    Ascorbic Acid (VITAMIN C) 500 MG tablet Take 1,000 mg by mouth daily       vitamin E 400 UNIT capsule Take 800 Units by mouth daily       vitamin D (CHOLECALCIFEROL) 1000 UNIT TABS tablet Take 1,000 Units by mouth 2 times daily       albuterol sulfate HFA (VENTOLIN HFA) 108 (90 Base) MCG/ACT inhaler Inhale 2 puffs into the lungs 4 times daily as needed for Wheezing (Patient not taking: Reported on 9/14/2020) 3 Inhaler 1    nitroGLYCERIN (NITROSTAT) 0.4 MG SL tablet Place 1 tablet under the tongue every 5 minutes as needed for Chest pain (Patient not taking: Reported on 9/14/2020) 25 tablet 3     No facility-administered encounter medications on file as of 9/14/2020. Allergies:  Codeine and Flomax [tamsulosin hcl]     [x] Medications and dosages reviewed.   ROS:  [x]Full ROS obtained and negative except as mentioned in HPI      Physical Examination:    Vitals:    09/14/20 1119   BP: 122/60   Pulse: 51   SpO2: 96%      /60 (Site: Right Upper Arm, Position: Sitting, Cuff Size: Medium Adult)   Pulse 51   Ht 5' 11\" (1.803 m)   Wt 244 lb (110.7 kg)   SpO2 96%   BMI 34.03 kg/m²     · GENERAL: Elderly obese male in wheel chair in NAD  · NEUROLOGICAL: Alert and oriented  · PSYCH: Calm affect  · SKIN: Warm and dry, No obvious Rash  · HEENT: Normocephalic, Sclera non-icteric, mucus membranes moist  · NECK: supple, JVP normal  · CAROTID: Normal upstroke, no bruits  · CARDIAC: Normal PMI, regular rate and slightly irregular rhythm, normal S1S2, no murmur, rub, or gallop  · RESPIRATORY: Normal respiratory effort, Clear bilaterally  · EXTREMITIES: No LE edema. · MUSCULOSKELETAL: no chest wall tenderness, no c/o joint pain. · GASTROINTESTINAL: normal bowel sounds, soft, non-tender, no bruit    Echo 7/14/16:  Normal left ventricle size, wall thickness and systolic function with an EF of 55%. Mild aortic stenosis with mean gradient of 12mmHg. Trivial aortic regurgitation is present. Mild to moderate tricuspid regurgitation with RVSP estimated at 47 mmHg        Assessment:     1. CAD (coronary artery disease)/CABG 1981 & 2001:   Stable without angina. Continue medical therapy. Lexiscan Myoview 2014> Normal perfusion/EF. Repeat myoview 8/2018-fixed inferior, scar vs diaphragm   2. HTN (hypertension): Well controlled. Continue medical therapy. /60 (Site: Right Upper Arm, Position: Sitting, Cuff Size: Medium Adult)   Pulse 51   Ht 5' 11\" (1.803 m)   Wt 244 lb (110.7 kg)   SpO2 96%   BMI 34.03 kg/m²      3. Hyperlipidemia: LDL=80 April 2019. Controlled. Continue lipitor. Repeat labs 2 months   4. Carotid Stenosis:  2008 dopplers> less than 40 % bilaterally. Stable   5. Atrial Fibrillation (paroxysmal): On Xarelto. PAF. HR controlled. Asymptomatic. Creat clear=90. Unchanged. Same RX. Follow creat q 3 months. Repeat labs 2 months  6. DVT: Diagnosed Spring 2014>  Xarelto for 6 months. ? Hypercoagulable. Repeat testing negative. On Xarelto. Continue  7. Dysnpea/CHF:  Improved. Low sodium. Weights daily. Continue lasix 20 BID. Follow labs. Aldactone stopped due to diarrhea. Plan:  Stable.   Labs 2 months  F/u 6 months    Thank you for allowing me to participate in the care of this individual.      Sampson Lesch, M.D., [facility] [place] [facility]

## 2020-09-23 ENCOUNTER — HOSPITAL ENCOUNTER (OUTPATIENT)
Age: 85
Discharge: HOME | End: 2020-09-23
Payer: MEDICARE

## 2020-09-23 LAB
ALT SERPL-CCNC: 9 U/L (ref 10–40)
ANION GAP SERPL CALCULATED.3IONS-SCNC: 13 MMOL/L (ref 3–16)
AST SERPL-CCNC: 11 U/L (ref 15–37)
BUN BLDV-MCNC: 14 MG/DL (ref 7–20)
CALCIUM SERPL-MCNC: 9.1 MG/DL (ref 8.3–10.6)
CHLORIDE BLD-SCNC: 95 MMOL/L (ref 99–110)
CHOLESTEROL, TOTAL: 171 MG/DL (ref 0–199)
CO2: 33 MMOL/L (ref 21–32)
CREAT SERPL-MCNC: 1 MG/DL (ref 0.8–1.3)
GFR AFRICAN AMERICAN: >60
GFR NON-AFRICAN AMERICAN: >60
GLUCOSE BLD-MCNC: 129 MG/DL (ref 70–99)
HCT VFR BLD CALC: 38.2 % (ref 40.5–52.5)
HDLC SERPL-MCNC: 39 MG/DL (ref 40–60)
HEMOGLOBIN: 13.3 G/DL (ref 13.5–17.5)
LDL CHOLESTEROL CALCULATED: 103 MG/DL
MCH RBC QN AUTO: 30.8 PG (ref 26–34)
MCHC RBC AUTO-ENTMCNC: 34.7 G/DL (ref 31–36)
MCV RBC AUTO: 88.6 FL (ref 80–100)
PDW BLD-RTO: 14.1 % (ref 12.4–15.4)
PLATELET # BLD: 139 K/UL (ref 135–450)
PMV BLD AUTO: 8.4 FL (ref 5–10.5)
POTASSIUM SERPL-SCNC: 2.6 MMOL/L (ref 3.5–5.1)
RBC # BLD: 4.32 M/UL (ref 4.2–5.9)
SODIUM BLD-SCNC: 141 MMOL/L (ref 136–145)
TRIGL SERPL-MCNC: 145 MG/DL (ref 0–150)
VLDLC SERPL CALC-MCNC: 29 MG/DL
WBC # BLD: 5.1 K/UL (ref 4–11)

## 2020-09-23 PROCEDURE — 84450 TRANSFERASE (AST) (SGOT): CPT

## 2020-09-23 PROCEDURE — 80061 LIPID PANEL: CPT

## 2020-09-23 PROCEDURE — 84460 ALANINE AMINO (ALT) (SGPT): CPT

## 2020-09-23 PROCEDURE — 36415 COLL VENOUS BLD VENIPUNCTURE: CPT

## 2020-09-23 PROCEDURE — 80048 BASIC METABOLIC PNL TOTAL CA: CPT

## 2020-09-23 PROCEDURE — 85027 COMPLETE CBC AUTOMATED: CPT

## 2020-09-24 ENCOUNTER — TELEPHONE (OUTPATIENT)
Dept: CARDIOLOGY CLINIC | Age: 85
End: 2020-09-24

## 2020-09-24 RX ORDER — POTASSIUM CHLORIDE 20 MEQ/1
20 TABLET, EXTENDED RELEASE ORAL 2 TIMES DAILY
Qty: 180 TABLET | Refills: 4 | Status: SHIPPED | OUTPATIENT
Start: 2020-09-24 | End: 2021-09-16 | Stop reason: SDUPTHER

## 2020-09-24 RX ORDER — ATORVASTATIN CALCIUM 80 MG/1
80 TABLET, FILM COATED ORAL DAILY
Qty: 90 TABLET | Refills: 4 | Status: SHIPPED | OUTPATIENT
Start: 2020-09-24 | End: 2020-10-27

## 2020-09-24 NOTE — TELEPHONE ENCOUNTER
Pt calling asking that Alicia Citron call him back. His wife is home and he wants to discuss his medications.

## 2020-09-24 NOTE — TELEPHONE ENCOUNTER
Patient's potassium 2.6 on lab results. Per MMK- start Potassium 20 meq BID and recheck BMP on Tuesday. - Increase Lipitor to 80 mg daily. Discussed with patient. He verbalized understanding. Meds sent to [place] and lab order placed.

## 2020-09-24 NOTE — TELEPHONE ENCOUNTER
Called patient's wife back. Reviewed instructions for taking potassium supplement and increasing Lipitor dose due to low potassium and elevated LDL. She verbalized understanding.

## 2020-09-30 ENCOUNTER — TELEPHONE (OUTPATIENT)
Dept: CARDIOLOGY CLINIC | Age: 85
End: 2020-09-30

## 2020-09-30 NOTE — TELEPHONE ENCOUNTER
His pharmacist called him to double check to make sure that his (he thinks atorvastatin) was increased. Pharmacist said he usually takes 20mg and it was increased to 80mg which is a big jump in strength so he wanted to double check. Please call patient to clarify what med was increased at last appt and how he is to take it .

## 2020-09-30 NOTE — TELEPHONE ENCOUNTER
Spoke with patient clarified how he took his atorvastatin that indeed it went from 20 mg to 80 mg per [facility]K.

## 2020-10-14 ENCOUNTER — OFFICE VISIT (OUTPATIENT)
Dept: ORTHOPEDIC SURGERY | Age: 85
End: 2020-10-14
Payer: MEDICARE

## 2020-10-14 VITALS — HEIGHT: 71 IN | TEMPERATURE: 98.1 F | WEIGHT: 240 LBS | BODY MASS INDEX: 33.6 KG/M2

## 2020-10-14 PROCEDURE — 99203 OFFICE O/P NEW LOW 30 MIN: CPT | Performed by: ORTHOPAEDIC SURGERY

## 2020-10-14 NOTE — PROGRESS NOTES
Alan Cannon MD  [address], [address] [place] [address]  [address] [place] [address]    History of Present Illness:  Chief Complaint   Patient presents with    Knee Pain     Chronic Right knee pain. Increased pain x6 months. S/P TKA; DOS 2015, at [facility]. Renee Oropeza is a 80 y.o. male here for evaluation of right knee pain. Pain assessment has been completed and is documented below. Patient states he is having increased pain for 6 months. He had a total knee arthroplasty at [facility] in 2015 with Dr. Martinez Perdomo. Patient states the pain started about 2 years ago. He went back to [place] at that time and began rehab which did not help at that time. He has a history of lumbar spine issues including a total of 4 back surgeries as well and did undergo an epidural injection with Dr. Kristie Parker which helped a little bit. Still, for the last 2 years he has been fairly sedentary. He uses a lift chair at home. He has had no recent falls. He is on Xarelto for atrial fibrillation.     Pain Assessment  Location of Pain: Knee  Location Modifiers: Right  Severity of Pain: 8  Quality of Pain: Aching, Throbbing  Duration of Pain: Persistent  Frequency of Pain: Intermittent  Date Pain First Started: (Chronic)  Aggravating Factors: Walking  Limiting Behavior: Yes  Relieving Factors: Rest  Result of Injury: No  Work-Related Injury: No  Are there other pain locations you wish to document?: No    Medication Review:  Current Outpatient Medications   Medication Sig Dispense Refill    atorvastatin (LIPITOR) 80 MG tablet Take 1 tablet by mouth daily 90 tablet 4    potassium chloride (KLOR-CON M) 20 MEQ extended release tablet Take 1 tablet by mouth 2 times daily 180 tablet 4    losartan (COZAAR) 50 MG tablet Take 1 tablet by mouth daily 90 tablet 3    XARELTO 20 MG TABS tablet Take 1 tablet by mouth Daily with supper 90 tablet Allergies, social and family histories, and medications were reviewed and updated as appropriate. General Exam:  Vital Signs:  Temp 98.1 °F (36.7 °C) (Infrared)   Ht 5' 11\" (1.803 m)   Wt 240 lb (108.9 kg)   BMI 33.47 kg/m²    Constitutional: Patient is adequately groomed with no evidence of malnutrition. Mental Status: The patient is oriented to time, place and person. The patient's mood and affect are appropriate. Lymphatic: The lymphatic examination bilaterally reveals all areas to be without enlargement or induration. Vascular: Examination reveals no swelling or calf tenderness. 2+ palpable pulses distally. Neurological: The patient has good coordination. There is no focal weakness or sensory deficit. Focal examination of right knee is as follows: Inspection & Skin:  Knee shows neutral alignment. Normal muscle bulk and tone for patient's age and activity level. No significant effusion. There are no rashes, ulcerations or lesions. No erythema. Palpation:  no tenderness on palpation along medial joint line. mild tenderness on palpation along lateral joint line. Extensor mechanism intact on palpation. Range of Motion:     Extension: 0°   Flexion: 115°    Strength:     Quad 3/5.  Hamstrings 3/5.  Hip and ankle motor function are grossly intact. Special Tests:    Does not open to valgus or varus stress at 0 or 30°   Suzan's sign negative   Posterior tibial pulse are +2/4, capillary refill is brisk, sensation in intact    Right hip: Inspection & Skin: muscle deconditioning compatible with poor walking for several years. bilateral legs with venous stasis changes that are chronic and no open wounds. Palpation:  mild tenderness to the greater trochanter/trochanteric bursa. Range of Motion:  Hip flexion beyond 90 degrees with increased groin and knee pain. Internal and external rotation exhibit very limited motion and increase groin and knee pain.        Strength: At least 3/5 on hip flexion and abduction. Gait: not tested due to being in wheelchair     Radiology:     X-rays obtained today and reviewed in office:  Views 3V: right knee with comparison AP, and skyline views. Impression: No evidence for acute fracture, subluxation, or dislocation. No lytic or blastic lesions in the metaphyseal regions. Stable appearing of RT TKA, with no evidence of hardware failure. X-rays: 3V, Right hip. Pelvis, AP and lateral views. 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 Office Orders/Procedures:  Orders Placed This Encounter   Procedures    XR KNEE BILATERAL STANDING     Standing Status:   Future     Number of Occurrences:   1     Standing Expiration Date:   11/14/2020    XR KNEE RIGHT (1-2 VIEWS)     2V Rt Knee     Standing Status:   Future     Number of Occurrences:   1     Standing Expiration Date:   10/14/2021     Order Specific Question:   Reason for exam:     Answer:   Knee Pain    XR HIP 2-3 VW W PELVIS RIGHT     Standing Status:   Future     Number of Occurrences:   1     Standing Expiration Date:   11/14/2020       Impression:   Diagnosis Orders   1. Right knee pain, unspecified chronicity  XR KNEE BILATERAL STANDING    XR KNEE RIGHT (1-2 VIEWS)   2. Status post total right knee replacement  XR KNEE BILATERAL STANDING    XR KNEE RIGHT (1-2 VIEWS)   3. Right hip pain  XR HIP 2-3 VW W PELVIS RIGHT        Treatment Plan:  We discussed treatment options today. Based on his x-rays, the knee looks good and he shows a stable total knee prosthesis. Upon his phyical exam, he was very tender in the hip area and has significant pain with any isolated range of motion of the hip. I found x-rays from 2018 and based on those and his exam today, the issue is in the right hip, not the knee. We did update his right hip x-rays today and his severe degeneration is stable.   I believe he would benefit from having a right total hip arthroplasty procedure. He was instructed to contact his other physicians, including his cardiac physician Dr. Michele Garza, to get medical clearance. We did discuss the procedure, its risks, benefits and rehab expectations. He understood. Patient was asking if this could be done after COVID-19 settled, and I informed him we can not predict when this will settle, but the hospital has done a great job at containing cases and keeping patients safe to proceed with their procedures. I mentioned based on his films today, this procedure could wait another 6 months or so before he had it done if he chose to go that route. Sooner the better since the longer he is chair bound, the recovery to get his strength back will take even longer. Also, the longer he delays surgery, the more likely it is that he would develop further medical comorbidities that could no longer make him a surgical candidate. Then, his only option would be use of a wheelchair. I have reviewed patient's pertinent medical history, relevant laboratory and imaging studies, and past surgical history. Patient's medications have been reviewed and were discussed during the visit. Patient was advised to keep future appointments with their respective specialty care team(s). Patient had the opportunity to ask questions, all of which were answered to the best of my ability and with patient satisfaction. Patient understands and is agreeable with the care plan following today's visit. Patient is to schedule an appointment for any new or worsening symptoms. By signing my name below, Terrell Gomez, attest that this documentation has been prepared under the direction and in the presence of Mabel Davis MD.   Electronically Signed: Perla Tafoya, 10/14/20, 1:09 PM EDT. Kaur Kumar MD, personally performed the services described in this documentation.  All medical record entries made by the perla were at my direction and in my presence. I have reviewed the chart and discharge instructions (if applicable) and agree that the record reflects my personal performance and is accurate and complete. Buddie Lombard, MD, 10/16/20, 8:01 AM EDT. Some documentation was done using voice recognition dragon software. Every effort was made to ensure accuracy; however, inadvertent unintentional computerized transcription errors may be present.

## 2020-10-15 ENCOUNTER — TELEPHONE (OUTPATIENT)
Dept: CARDIOLOGY CLINIC | Age: 85
End: 2020-10-15

## 2020-10-27 ENCOUNTER — TELEPHONE (OUTPATIENT)
Dept: CARDIOLOGY CLINIC | Age: 85
End: 2020-10-27

## 2020-10-27 RX ORDER — FUROSEMIDE 20 MG/1
20 TABLET ORAL 2 TIMES DAILY
Qty: 180 TABLET | Refills: 3 | Status: SHIPPED | OUTPATIENT
Start: 2020-10-27 | End: 2021-09-16 | Stop reason: SDUPTHER

## 2020-10-27 RX ORDER — FUROSEMIDE 40 MG/1
40 TABLET ORAL 2 TIMES DAILY
Qty: 60 TABLET | Refills: 3 | Status: CANCELLED | OUTPATIENT
Start: 2020-10-27

## 2020-10-27 RX ORDER — ATORVASTATIN CALCIUM 40 MG/1
40 TABLET, FILM COATED ORAL DAILY
Qty: 90 TABLET | Refills: 3 | Status: SHIPPED | OUTPATIENT
Start: 2020-10-27 | End: 2021-03-02 | Stop reason: SINTOL

## 2020-10-27 RX ORDER — ATORVASTATIN CALCIUM 40 MG/1
40 TABLET, FILM COATED ORAL DAILY
Qty: 30 TABLET | Refills: 3 | Status: CANCELLED | OUTPATIENT
Start: 2020-10-27

## 2020-10-27 NOTE — TELEPHONE ENCOUNTER
Recently sent in rx for lipitor 80mg and the patient was breaking the pill in 1/2.  The pills are hard to break and the patient is asking that rx for the 40mg lipitor be sent to [facility]

## 2020-10-27 NOTE — TELEPHONE ENCOUNTER
Refilled lasix as prescribed by MMK 20 bid  Refilled lipitor as 40 mg daily to reflect how he is taking it

## 2021-01-29 ENCOUNTER — TELEPHONE (OUTPATIENT)
Dept: CARDIOLOGY CLINIC | Age: 86
End: 2021-01-29

## 2021-01-29 NOTE — TELEPHONE ENCOUNTER
Pt calling he has been having nose bleeds in the morning for the past week and wants to know if he should continue Xarelto as prescribed? pls call to advise thank you

## 2021-01-29 NOTE — TELEPHONE ENCOUNTER
Patient has had a little nose bleeding this week every morning while blowing his nose. It only lasts as he is blowing and then stops. It is not gross bleeding. Discussed with Dr Theo Rodriguez. Per [facility]- continue Xarelto for now as he is high chadsvasc score. Try not to blow nose hard. If nose bleeds continue or worsen call ENT Dr Albina Storey. Phone number given to patient. Patient and wife verbalized understanding.

## 2021-03-02 ENCOUNTER — OFFICE VISIT (OUTPATIENT)
Dept: CARDIOLOGY CLINIC | Age: 86
End: 2021-03-02
Payer: MEDICARE

## 2021-03-02 ENCOUNTER — HOSPITAL ENCOUNTER (OUTPATIENT)
Age: 86
Discharge: HOME | End: 2021-03-02
Payer: MEDICARE

## 2021-03-02 VITALS
SYSTOLIC BLOOD PRESSURE: 122 MMHG | WEIGHT: 243 LBS | RESPIRATION RATE: 21 BRPM | DIASTOLIC BLOOD PRESSURE: 72 MMHG | BODY MASS INDEX: 34.02 KG/M2 | HEIGHT: 71 IN | OXYGEN SATURATION: 97 % | HEART RATE: 72 BPM

## 2021-03-02 DIAGNOSIS — I50.42 CHRONIC COMBINED SYSTOLIC AND DIASTOLIC CONGESTIVE HEART FAILURE (HCC): ICD-10-CM

## 2021-03-02 DIAGNOSIS — I10 ESSENTIAL HYPERTENSION: ICD-10-CM

## 2021-03-02 DIAGNOSIS — I48.0 PAROXYSMAL ATRIAL FIBRILLATION (HCC): ICD-10-CM

## 2021-03-02 DIAGNOSIS — Z86.718 HISTORY OF DVT (DEEP VEIN THROMBOSIS): ICD-10-CM

## 2021-03-02 DIAGNOSIS — R06.00 DYSPNEA, UNSPECIFIED TYPE: ICD-10-CM

## 2021-03-02 DIAGNOSIS — I25.10 CORONARY ARTERY DISEASE INVOLVING NATIVE CORONARY ARTERY OF NATIVE HEART WITHOUT ANGINA PECTORIS: Primary | ICD-10-CM

## 2021-03-02 DIAGNOSIS — E78.2 MIXED HYPERLIPIDEMIA: ICD-10-CM

## 2021-03-02 LAB
ANION GAP SERPL CALCULATED.3IONS-SCNC: 10 MMOL/L (ref 3–16)
BUN BLDV-MCNC: 19 MG/DL (ref 7–20)
CALCIUM SERPL-MCNC: 9.2 MG/DL (ref 8.3–10.6)
CHLORIDE BLD-SCNC: 106 MMOL/L (ref 99–110)
CO2: 27 MMOL/L (ref 21–32)
CREAT SERPL-MCNC: 1.1 MG/DL (ref 0.8–1.3)
GFR AFRICAN AMERICAN: >60
GFR NON-AFRICAN AMERICAN: >60
GLUCOSE BLD-MCNC: 117 MG/DL (ref 70–99)
POTASSIUM SERPL-SCNC: 4.9 MMOL/L (ref 3.5–5.1)
SODIUM BLD-SCNC: 143 MMOL/L (ref 136–145)

## 2021-03-02 PROCEDURE — 36415 COLL VENOUS BLD VENIPUNCTURE: CPT

## 2021-03-02 PROCEDURE — 99214 OFFICE O/P EST MOD 30 MIN: CPT | Performed by: INTERNAL MEDICINE

## 2021-03-02 PROCEDURE — 80048 BASIC METABOLIC PNL TOTAL CA: CPT

## 2021-03-02 RX ORDER — LOSARTAN POTASSIUM 50 MG/1
50 TABLET ORAL DAILY
Qty: 90 TABLET | Refills: 4 | Status: SHIPPED | OUTPATIENT
Start: 2021-03-02 | End: 2021-09-16 | Stop reason: SDUPTHER

## 2021-03-02 RX ORDER — RIVAROXABAN 20 MG/1
20 TABLET, FILM COATED ORAL
Qty: 90 TABLET | Refills: 4 | Status: SHIPPED | OUTPATIENT
Start: 2021-03-02 | End: 2021-09-16 | Stop reason: SDUPTHER

## 2021-03-02 SDOH — HEALTH STABILITY: MENTAL HEALTH: HOW MANY STANDARD DRINKS CONTAINING ALCOHOL DO YOU HAVE ON A TYPICAL DAY?: 1 OR 2

## 2021-03-02 NOTE — PROGRESS NOTES
[address]  Cardiac Follow up       Referring Provider:  Walt Keenan MD     Chief Complaint   Patient presents with    6 Month Follow-Up     Patient return to office for his 6 month follow up with spouse     Hypertension    Coronary Artery Disease    Hyperlipidemia      History of Present Illness:  Mr. Nixon Ugarte is an 80year old gentleman here today in follow up for CHF, CAD, hypertension, hyperlipidemia, afib and PVD. In 2014, he was diagnosed with a DVT and completed 6 months of treatment with Xarelto. He was seen by a hematologist, initially worked up for lupus anticoagulant, but most recent testing did not show evidence of this. Prior to last visit, he was admitted with sepsis and developed afib. He was started on Eliquis. Now on Xarelto. He is doing well from a cardiac standpoint. No dyspnea or chest pain. No edema. Major complaint is chronic knee pain    Past Medical History:   has a past medical history of Atrial fibrillation ([facility] [address] CAD (coronary artery disease), Diarrhea, History of skin cancer, Hyperlipidemia, Hypertension, Insomnia, and Kidney stones. Surgical History:   has a past surgical history that includes back surgery; knee surgery (2005); shoulder surgery; Appendectomy; Coronary artery bypass graft (1981); Coronary artery bypass graft (2001); Prostate surgery; Skin cancer excision; joint replacement (Right); and Total knee arthroplasty (Right, 2015). Social History:   reports that he quit smoking about 46 years ago. His smoking use included cigarettes. He has a 20.00 pack-year smoking history. He has never used smokeless tobacco. He reports current alcohol use of about 1.0 standard drinks of alcohol per week. He reports that he does not use drugs.      Family History:  family history includes Cancer in his mother and sister; Diabetes in his mother; Heart Attack in his father and mother; Heart Disease in his father; High Blood Pressure in his father; High Cholesterol in his father. Home Medications:  Outpatient Encounter Medications as of 3/2/2021   Medication Sig Dispense Refill    furosemide (LASIX) 20 MG tablet Take 1 tablet by mouth 2 times daily 180 tablet 3    potassium chloride (KLOR-CON M) 20 MEQ extended release tablet Take 1 tablet by mouth 2 times daily 180 tablet 4    losartan (COZAAR) 50 MG tablet Take 1 tablet by mouth daily 90 tablet 3    XARELTO 20 MG TABS tablet Take 1 tablet by mouth Daily with supper 90 tablet 3    carvedilol (COREG) 6.25 MG tablet Take 1 tablet by mouth 2 times daily (with meals) 60 tablet 3    albuterol sulfate HFA (VENTOLIN HFA) 108 (90 Base) MCG/ACT inhaler Inhale 2 puffs into the lungs 4 times daily as needed for Wheezing 3 Inhaler 1    famotidine (PEPCID) 20 MG tablet Take 20 mg by mouth 2 times daily      Compression Stockings MISC by Does not apply route 20-30 mm hg compression stockings, bilateral, knee high 1 each 0    amitriptyline (ELAVIL) 100 MG tablet Take 100 mg by mouth nightly   2    nitroGLYCERIN (NITROSTAT) 0.4 MG SL tablet Place 1 tablet under the tongue every 5 minutes as needed for Chest pain 25 tablet 3    Ascorbic Acid (VITAMIN C) 500 MG tablet Take 1,000 mg by mouth daily       vitamin E 400 UNIT capsule Take 800 Units by mouth daily       vitamin D (CHOLECALCIFEROL) 1000 UNIT TABS tablet Take 1,000 Units by mouth 2 times daily       atorvastatin (LIPITOR) 40 MG tablet Take 1 tablet by mouth daily (Patient not taking: Reported on 3/2/2021) 90 tablet 3     No facility-administered encounter medications on file as of 3/2/2021. Allergies:  Codeine and Flomax [tamsulosin hcl]     [x] Medications and dosages reviewed.   ROS:  [x]Full ROS obtained and negative except as mentioned in HPI      Physical Examination:    Vitals:    03/02/21 1355   BP: 122/72   Pulse: 72   Resp: 21   SpO2: 97%      /72 (Site: Right Upper Arm, Position: Sitting, Cuff Size: Large Adult)   Pulse 72   Resp 21 Ht 5' 11\" (1.803 m)   Wt 243 lb (110.2 kg)   SpO2 97%   BMI 33.89 kg/m²     · GENERAL: Elderly obese male in wheel chair in NAD  · NEUROLOGICAL: Alert and oriented  · PSYCH: Calm affect  · SKIN: Warm and dry, No obvious Rash  · HEENT: Normocephalic, Sclera non-icteric, mucus membranes moist  · NECK: supple, JVP normal  · CAROTID: Normal upstroke, no bruits  · CARDIAC: Normal PMI, regular rate and slightly irregular rhythm, normal S1S2, no murmur, rub, or gallop  · RESPIRATORY: Normal respiratory effort, Clear bilaterally  · EXTREMITIES: no LE edema. · MUSCULOSKELETAL: no chest wall tenderness, no c/o joint pain. · GASTROINTESTINAL: normal bowel sounds, soft, non-tender, no bruit    Echo 7/14/16:  Normal left ventricle size, wall thickness and systolic function with an EF of 55%. Mild aortic stenosis with mean gradient of 12mmHg. Trivial aortic regurgitation is present. Mild to moderate tricuspid regurgitation with RVSP estimated at 47 mmHg    ECHO 4/2020  Summary   Suboptimal image quality. Definity contrast administered. Left ventricular cavity size is normal.   There is asymmetric hypertrophy of the basal septum. Ejection fraction is visually estimated to be 40-45%. There is mild to moderate diffuse hypokinesis. Diastolic filling parameters suggest grade I diastolic dysfunction. Mitral valve leaflets appear mildly thickened. Trivial mitral regurgitation is present. The left atrium is mildly dilated. Aortic valve leaflets appear thickened. Trivial aortic regurgitation is present. The right ventricle is enlarged. Right ventricular systolic function is moderately reduced. TAPSE 1.1 cm   Tricuspid valve is structurally normal.   Mild tricuspid regurgitation. The right atrium is severely dilated. Estimated pulmonary artery systolic pressure is moderately elevated at 29-41   mmHg assuming a right atrial pressure of 15 mmHg.     LABS:  2/10/2020 H/H=13/40,   9/28/2020

## 2021-03-03 ENCOUNTER — TELEPHONE (OUTPATIENT)
Dept: CARDIOLOGY CLINIC | Age: 86
End: 2021-03-03

## 2021-03-03 NOTE — TELEPHONE ENCOUNTER
Pt calling he saw Bonnie Burleson yesterday and was told to call back with the supplements he takes. He takes CoQ10 200 mg daily, Turmerix Curcumin 500 mg daily, and Probiotics 1 daily. Also, pt did the labs [facility] wanted yesterday and pt would like to know the results, especially the kidney function one?  Pls call to advise Thank you

## 2021-03-03 NOTE — TELEPHONE ENCOUNTER
Spoke with patient and gave him the results per [facility]. Patient expressed understanding and had no other questions.

## 2021-07-08 ENCOUNTER — HOSPITAL ENCOUNTER (OUTPATIENT)
Age: 86
Discharge: HOME | End: 2021-07-08
Payer: MEDICARE

## 2021-07-08 ENCOUNTER — HOSPITAL ENCOUNTER (OUTPATIENT)
Dept: GENERAL RADIOLOGY | Age: 86
Discharge: HOME | End: 2021-07-08
Payer: MEDICARE

## 2021-07-08 DIAGNOSIS — M25.561 RIGHT KNEE PAIN, UNSPECIFIED CHRONICITY: ICD-10-CM

## 2021-07-08 PROCEDURE — 73560 X-RAY EXAM OF KNEE 1 OR 2: CPT

## 2021-07-08 PROCEDURE — 73590 X-RAY EXAM OF LOWER LEG: CPT

## 2021-07-20 ENCOUNTER — HOSPITAL ENCOUNTER (OUTPATIENT)
Dept: VASCULAR LAB | Age: 86
Discharge: HOME | End: 2021-07-20
Payer: MEDICARE

## 2021-07-20 DIAGNOSIS — R60.0 EDEMA OF LOWER EXTREMITY: ICD-10-CM

## 2021-07-20 DIAGNOSIS — M25.561 RIGHT KNEE PAIN, UNSPECIFIED CHRONICITY: Primary | ICD-10-CM

## 2021-07-20 PROCEDURE — 93970 EXTREMITY STUDY: CPT

## 2021-08-23 NOTE — TELEPHONE ENCOUNTER
Sample requested: xarelto    Strength:  20 mg    Dosage: daily    Patient's call back number:  [phone]    Pt is in the donut hole

## 2021-08-23 NOTE — TELEPHONE ENCOUNTER
Prescription refill    Last OV:3-2-21    Last Refill:3-2-21    Labs: 3-2-21    Future Appt:9-16-21     Called Josue Goyal (wife) and informed her that there was 4 bottles of Xarelto 20 mg lot #  [id number] expiration date 11-21 waiting up front for p/u.wife verbalized understanding and stated she would be out today to p/u.

## 2021-09-16 ENCOUNTER — OFFICE VISIT (OUTPATIENT)
Dept: CARDIOLOGY CLINIC | Age: 86
End: 2021-09-16
Payer: MEDICARE

## 2021-09-16 ENCOUNTER — HOSPITAL ENCOUNTER (OUTPATIENT)
Age: 86
Discharge: HOME | End: 2021-09-16
Payer: MEDICARE

## 2021-09-16 VITALS
OXYGEN SATURATION: 97 % | WEIGHT: 246.6 LBS | HEIGHT: 71 IN | BODY MASS INDEX: 34.52 KG/M2 | HEART RATE: 46 BPM | SYSTOLIC BLOOD PRESSURE: 102 MMHG | DIASTOLIC BLOOD PRESSURE: 60 MMHG

## 2021-09-16 DIAGNOSIS — I10 ESSENTIAL HYPERTENSION: ICD-10-CM

## 2021-09-16 DIAGNOSIS — I25.10 CORONARY ARTERY DISEASE INVOLVING NATIVE CORONARY ARTERY OF NATIVE HEART WITHOUT ANGINA PECTORIS: ICD-10-CM

## 2021-09-16 DIAGNOSIS — I65.23 BILATERAL CAROTID ARTERY STENOSIS: ICD-10-CM

## 2021-09-16 DIAGNOSIS — Z86.718 HISTORY OF DVT (DEEP VEIN THROMBOSIS): ICD-10-CM

## 2021-09-16 DIAGNOSIS — I25.10 CORONARY ARTERY DISEASE INVOLVING NATIVE CORONARY ARTERY OF NATIVE HEART WITHOUT ANGINA PECTORIS: Primary | ICD-10-CM

## 2021-09-16 DIAGNOSIS — I50.42 CHRONIC COMBINED SYSTOLIC AND DIASTOLIC CONGESTIVE HEART FAILURE (HCC): ICD-10-CM

## 2021-09-16 DIAGNOSIS — E78.2 MIXED HYPERLIPIDEMIA: ICD-10-CM

## 2021-09-16 DIAGNOSIS — I48.0 PAROXYSMAL ATRIAL FIBRILLATION (HCC): ICD-10-CM

## 2021-09-16 LAB
ANION GAP SERPL CALCULATED.3IONS-SCNC: 12 MMOL/L (ref 3–16)
BUN BLDV-MCNC: 23 MG/DL (ref 7–20)
CALCIUM SERPL-MCNC: 9.4 MG/DL (ref 8.3–10.6)
CHLORIDE BLD-SCNC: 103 MMOL/L (ref 99–110)
CO2: 26 MMOL/L (ref 21–32)
CREAT SERPL-MCNC: 1.1 MG/DL (ref 0.8–1.3)
GFR AFRICAN AMERICAN: >60
GFR NON-AFRICAN AMERICAN: >60
GLUCOSE BLD-MCNC: 111 MG/DL (ref 70–99)
HCT VFR BLD CALC: 40.8 % (ref 40.5–52.5)
HEMOGLOBIN: 13.7 G/DL (ref 13.5–17.5)
MCH RBC QN AUTO: 31.7 PG (ref 26–34)
MCHC RBC AUTO-ENTMCNC: 33.6 G/DL (ref 31–36)
MCV RBC AUTO: 94.4 FL (ref 80–100)
PDW BLD-RTO: 13.7 % (ref 12.4–15.4)
PLATELET # BLD: 141 K/UL (ref 135–450)
PMV BLD AUTO: 8.3 FL (ref 5–10.5)
POTASSIUM SERPL-SCNC: 5.1 MMOL/L (ref 3.5–5.1)
RBC # BLD: 4.32 M/UL (ref 4.2–5.9)
SODIUM BLD-SCNC: 141 MMOL/L (ref 136–145)
WBC # BLD: 7.5 K/UL (ref 4–11)

## 2021-09-16 PROCEDURE — 99214 OFFICE O/P EST MOD 30 MIN: CPT | Performed by: INTERNAL MEDICINE

## 2021-09-16 PROCEDURE — 85027 COMPLETE CBC AUTOMATED: CPT

## 2021-09-16 PROCEDURE — 80048 BASIC METABOLIC PNL TOTAL CA: CPT

## 2021-09-16 PROCEDURE — 36415 COLL VENOUS BLD VENIPUNCTURE: CPT

## 2021-09-16 RX ORDER — CARVEDILOL 3.12 MG/1
3.12 TABLET ORAL 2 TIMES DAILY WITH MEALS
Qty: 180 TABLET | Refills: 4 | Status: SHIPPED | OUTPATIENT
Start: 2021-09-16 | End: 2022-03-21 | Stop reason: SDUPTHER

## 2021-09-16 RX ORDER — RIVAROXABAN 20 MG/1
20 TABLET, FILM COATED ORAL
Qty: 90 TABLET | Refills: 4 | Status: SHIPPED | OUTPATIENT
Start: 2021-09-16 | End: 2022-03-10 | Stop reason: SDUPTHER

## 2021-09-16 RX ORDER — POTASSIUM CHLORIDE 20 MEQ/1
20 TABLET, EXTENDED RELEASE ORAL 2 TIMES DAILY
Qty: 180 TABLET | Refills: 4 | Status: SHIPPED | OUTPATIENT
Start: 2021-09-16 | End: 2022-03-21 | Stop reason: SDUPTHER

## 2021-09-16 RX ORDER — ALPRAZOLAM 2 MG/1
TABLET ORAL
COMMUNITY
Start: 2021-09-12

## 2021-09-16 RX ORDER — GABAPENTIN 300 MG/1
CAPSULE ORAL
COMMUNITY
Start: 2021-09-01 | End: 2022-03-21

## 2021-09-16 RX ORDER — FUROSEMIDE 20 MG/1
20 TABLET ORAL 2 TIMES DAILY
Qty: 180 TABLET | Refills: 3 | Status: SHIPPED | OUTPATIENT
Start: 2021-09-16 | End: 2022-03-21 | Stop reason: SDUPTHER

## 2021-09-16 RX ORDER — LOSARTAN POTASSIUM 50 MG/1
50 TABLET ORAL DAILY
Qty: 90 TABLET | Refills: 4 | Status: SHIPPED | OUTPATIENT
Start: 2021-09-16 | End: 2022-03-21 | Stop reason: SDUPTHER

## 2021-09-16 NOTE — PROGRESS NOTES
[facility]  Cardiac Follow up       Referring Provider:  Feroz Solitario MD     Chief Complaint   Patient presents with    6 Month Follow-Up    Hypertension    Coronary Artery Disease    Hyperlipidemia      History of Present Illness:  Mr. Cheri Sher is an 80year old gentleman here today in follow up for CHF, CAD, hypertension, hyperlipidemia, afib and PVD. In 2014, he was diagnosed with a DVT and completed 6 months of treatment with Xarelto. He was seen by a hematologist, initially worked up for lupus anticoagulant, but most recent testing did not show evidence of this. Prior to last visit, he was admitted with sepsis and developed afib. He was started on Eliquis. Now on Xarelto. He is doing well except for continued knee pain. No chest pain or edema. Past Medical History:   has a past medical history of Atrial fibrillation ([address] CAD (coronary artery disease), Diarrhea, History of skin cancer, Hyperlipidemia, Hypertension, Insomnia, and Kidney stones. Surgical History:   has a past surgical history that includes back surgery; knee surgery (2005); shoulder surgery; Appendectomy; Coronary artery bypass graft (1981); Coronary artery bypass graft (2001); Prostate surgery; Skin cancer excision; joint replacement (Right); and Total knee arthroplasty (Right, 2015). Social History:   reports that he quit smoking about 46 years ago. His smoking use included cigarettes. He has a 20.00 pack-year smoking history. He has never used smokeless tobacco. He reports current alcohol use of about 1.0 standard drinks of alcohol per week. He reports that he does not use drugs. Family History:  family history includes Cancer in his mother and sister; Diabetes in his mother; Heart Attack in his father and mother; Heart Disease in his father; High Blood Pressure in his father; High Cholesterol in his father.      Home Medications:  Outpatient Encounter Medications as of 9/16/2021   Medication Sig Dispense Refill    gabapentin (NEURONTIN) 300 MG capsule TAKE 1 CAPSULE BY MOUTH THREE TIMES A DAY FOR 90 DAYS      ALPRAZolam (XANAX) 2 MG tablet       losartan (COZAAR) 50 MG tablet Take 1 tablet by mouth daily 90 tablet 4    XARELTO 20 MG TABS tablet Take 1 tablet by mouth Daily with supper 90 tablet 4    furosemide (LASIX) 20 MG tablet Take 1 tablet by mouth 2 times daily 180 tablet 3    potassium chloride (KLOR-CON M) 20 MEQ extended release tablet Take 1 tablet by mouth 2 times daily 180 tablet 4    carvedilol (COREG) 6.25 MG tablet Take 1 tablet by mouth 2 times daily (with meals) 60 tablet 3    albuterol sulfate HFA (VENTOLIN HFA) 108 (90 Base) MCG/ACT inhaler Inhale 2 puffs into the lungs 4 times daily as needed for Wheezing 3 Inhaler 1    famotidine (PEPCID) 20 MG tablet Take 20 mg by mouth 2 times daily      Compression Stockings MISC by Does not apply route 20-30 mm hg compression stockings, bilateral, knee high 1 each 0    amitriptyline (ELAVIL) 100 MG tablet Take 100 mg by mouth nightly   2    nitroGLYCERIN (NITROSTAT) 0.4 MG SL tablet Place 1 tablet under the tongue every 5 minutes as needed for Chest pain 25 tablet 3    Ascorbic Acid (VITAMIN C) 500 MG tablet Take 1,000 mg by mouth daily       vitamin E 400 UNIT capsule Take 800 Units by mouth daily       vitamin D (CHOLECALCIFEROL) 1000 UNIT TABS tablet Take 1,000 Units by mouth 2 times daily        No facility-administered encounter medications on file as of 9/16/2021. Allergies:  Codeine and Flomax [tamsulosin hcl]     [x] Medications and dosages reviewed.   ROS:  [x]Full ROS obtained and negative except as mentioned in HPI      Physical Examination:    Vitals:    09/16/21 1411   BP: 102/60   Pulse: (!) 46   SpO2: 97%      /60 (Site: Right Upper Arm, Position: Sitting, Cuff Size: Large Adult)   Pulse (!) 46   Ht 5' 11\" (1.803 m)   Wt 246 lb 9.6 oz (111.9 kg)   SpO2 97%   BMI 34.39 kg/m²     · GENERAL: Elderly obese male in wheel chair in NAD  · NEUROLOGICAL: Alert and oriented  · PSYCH: Calm affect  · SKIN: Warm and dry, No obvious Rash  · HEENT: Normocephalic, Sclera non-icteric, mucus membranes moist  · NECK: supple, JVP normal  · CAROTID: Normal upstroke, no bruits  · CARDIAC: Normal PMI, regular rate and slightly irregular rhythm, normal S1S2, no murmur, rub, or gallop  · RESPIRATORY: Normal respiratory effort, Clear bilaterally  · EXTREMITIES: no LE edema. · MUSCULOSKELETAL: no chest wall tenderness, no c/o joint pain. · GASTROINTESTINAL: normal bowel sounds, soft, non-tender, no bruit    Echo 7/14/16:  Normal left ventricle size, wall thickness and systolic function with an EF of 55%. Mild aortic stenosis with mean gradient of 12mmHg. Trivial aortic regurgitation is present. Mild to moderate tricuspid regurgitation with RVSP estimated at 47 mmHg    ECHO 4/2020  Summary   Suboptimal image quality. Definity contrast administered. Left ventricular cavity size is normal.   There is asymmetric hypertrophy of the basal septum. Ejection fraction is visually estimated to be 40-45%. There is mild to moderate diffuse hypokinesis. Diastolic filling parameters suggest grade I diastolic dysfunction. Mitral valve leaflets appear mildly thickened. Trivial mitral regurgitation is present. The left atrium is mildly dilated. Aortic valve leaflets appear thickened. Trivial aortic regurgitation is present. The right ventricle is enlarged. Right ventricular systolic function is moderately reduced. TAPSE 1.1 cm   Tricuspid valve is structurally normal.   Mild tricuspid regurgitation. The right atrium is severely dilated. Estimated pulmonary artery systolic pressure is moderately elevated at 29-41   mmHg assuming a right atrial pressure of 15 mmHg. LABS:  2/10/2020 H/H=13/40,   9/28/2020 creat=0.9        Assessment:     1. CAD (coronary artery disease)/CABG 1981 & 2001:   Remains stable without angina. Continue medical therapy. Lexiscan Myoview 2014> Normal perfusion/EF. Repeat myoview 8/2018-fixed inferior, scar vs diaphragm   2. HTN (hypertension): Well controlled. Decrease coreg to 3.125 mg BID due to bradycardia   /60 (Site: Right Upper Arm, Position: Sitting, Cuff Size: Large Adult)   Pulse (!) 46   Ht 5' 11\" (1.803 m)   Wt 246 lb 9.6 oz (111.9 kg)   SpO2 97%   BMI 34.39 kg/m²      3. Hyperlipidemia: MOB=016 Stopped lipitor due to abdominal complaints/diarrhea    4. Carotid Stenosis:  2008 dopplers> less than 40 % bilaterally. Stable   5. Atrial Fibrillation (paroxysmal): On Xarelto. PAF. HR controlled. Asymptomatic. Creat clear=90. Unchanged. Continue Xarelto. Repeat labs today  6. DVT: Diagnosed Spring 2014>  Xarelto for 6 months. ? Hypercoagulable. Repeat testing negative. On Xarelto. Continue  7. Dysnpea/CHF: Improved. Follow. Aldactone stopped due to diarrhea.        Plan:  Same meds  Check chem and CBC for Xarelto  F/u 6 months    Thank you for allowing me to participate in the care of this individual.      Emily Contreras M.D., [facility] [place]

## 2021-09-17 ENCOUNTER — TELEPHONE (OUTPATIENT)
Dept: CARDIOLOGY CLINIC | Age: 86
End: 2021-09-17

## 2021-09-17 NOTE — TELEPHONE ENCOUNTER
Call placed to patient who was not available for message below. Also at his request he ask that we send out a copy of his labs. Printed and mailed out for Monday.

## 2021-09-17 NOTE — TELEPHONE ENCOUNTER
----- Message from Joshua Hathaway MD sent at 9/17/2021  1:10 PM EDT -----  Tell pt. their labs are good. F/u in clinic as planned.       [facility]

## 2021-09-20 ENCOUNTER — TELEPHONE (OUTPATIENT)
Dept: CARDIOLOGY CLINIC | Age: 86
End: 2021-09-20

## 2021-09-20 ENCOUNTER — OFFICE VISIT (OUTPATIENT)
Dept: ORTHOPEDIC SURGERY | Age: 86
End: 2021-09-20
Payer: MEDICARE

## 2021-09-20 VITALS — HEIGHT: 71 IN | BODY MASS INDEX: 34.44 KG/M2 | WEIGHT: 246 LBS

## 2021-09-20 DIAGNOSIS — G89.29 CHRONIC PAIN OF LEFT KNEE: ICD-10-CM

## 2021-09-20 DIAGNOSIS — M17.12 PRIMARY OSTEOARTHRITIS OF LEFT KNEE: Primary | ICD-10-CM

## 2021-09-20 DIAGNOSIS — M25.562 CHRONIC PAIN OF LEFT KNEE: ICD-10-CM

## 2021-09-20 PROCEDURE — 99213 OFFICE O/P EST LOW 20 MIN: CPT | Performed by: PHYSICIAN ASSISTANT

## 2021-09-20 PROCEDURE — 20610 DRAIN/INJ JOINT/BURSA W/O US: CPT | Performed by: PHYSICIAN ASSISTANT

## 2021-09-20 RX ORDER — LIDOCAINE HYDROCHLORIDE 10 MG/ML
5 INJECTION, SOLUTION INFILTRATION; PERINEURAL ONCE
Status: COMPLETED | OUTPATIENT
Start: 2021-09-20 | End: 2021-09-20

## 2021-09-20 RX ORDER — BETAMETHASONE SODIUM PHOSPHATE AND BETAMETHASONE ACETATE 3; 3 MG/ML; MG/ML
12 INJECTION, SUSPENSION INTRA-ARTICULAR; INTRALESIONAL; INTRAMUSCULAR; SOFT TISSUE ONCE
Status: COMPLETED | OUTPATIENT
Start: 2021-09-20 | End: 2021-09-20

## 2021-09-20 RX ADMIN — LIDOCAINE HYDROCHLORIDE 5 ML: 10 INJECTION, SOLUTION INFILTRATION; PERINEURAL at 15:22

## 2021-09-20 RX ADMIN — BETAMETHASONE SODIUM PHOSPHATE AND BETAMETHASONE ACETATE 12 MG: 3; 3 INJECTION, SUSPENSION INTRA-ARTICULAR; INTRALESIONAL; INTRAMUSCULAR; SOFT TISSUE at 15:23

## 2021-09-20 NOTE — TELEPHONE ENCOUNTER
Sample requested: xarelto    Strength:  20 mg    Dosage: 1 tablet by mouth Daily with supper     Patient's call back number: [phone]    Pt states he is going to be out today and would like to  today if possible.

## 2021-09-20 NOTE — PROGRESS NOTES
Patient Name: Lonny Valladares  Medical Record Number: [id number]  YOB: 1935  Date of Encounter: 9/20/2021     Chief Complaint   Patient presents with    New Patient     NP, LT Knee, No injury, 6 months        History of Present Illness:   Mr. Lonny Valladares is here in follow up regarding his left knee. Patient has been seen in the past by Dr. Myra Barrera and also Dr. Emily Lucas for chronic left knee pain secondary to severe osteoarthritis. Patient is not a surgical candidate. He would like to know what his options are. He denies any recent injury. He ambulates very little. The patient's past medical history, medications, allergies, family history, social history, and review of systems have been reviewed, and dated and are recorded in the chart under the 'MEDIA\" tab. Physical Exam:    Mr. Lonny Valladares appears well, he is in no apparent distress, he demonstrates appropriate mood & affect. He is alert and oriented to person, place and time. Ht 5' 11\" (1.803 m)   Wt 246 lb (111.6 kg)   BMI 34.31 kg/m²     On examination of patient's left knee there is a mild joint effusion. Patient has tenderness on palpation over the medial and lateral joint lines. He lacks about 15 degrees of extension and has flexion to 90 degrees. There is severe patellofemoral crepitus. Radiology:  X-rays obtained and reviewed in office:   Views: 4 view left knee including AP, tunnel, lateral and sunrise views  Impression: There are severe tricompartmental left knee osteoarthritic changes. He has bone-on-bone with subchondral sclerosis and severe osteophyte formation. There are no obvious fractures. He is status post right total knee arthroplasty. Projection is off secondary to patient positioning. There are no signs of obvious loosening or hardware failure. Impression:   Diagnosis Orders   1.  Primary osteoarthritis of left knee  betamethasone acetate-betamethasone sodium phosphate (CELESTONE) injection 12 mg    lidocaine 1 % injection 5 mL    GA ARTHROCENTESIS ASPIR&/INJ MAJOR JT/BURSA W/O US    GEL ONE INJECTION (For Auth/Precert)   2. Chronic pain of left knee  XR KNEE BILATERAL STANDING    XR KNEE LEFT (3 VIEWS)       Injection:  After discussing the risks and benefits of cortisone injection including increased pain, drug reaction, infection, bleeding, blood glucose elevation, lack of improvement and neurovascular injury he agreed to receive one today. Questions were encouraged and answered. The correct patient, procedure, site and side were identified. The left knee was prepped with Betadine and 2 mL of betamethasone (12mg) mixed with 5 mL 1% lidocaine plain (50mg) were instilled with careful aspiration and injection under aseptic technique. The skin was then cleaned again with alcohol and a sterile adhesive dressing was applied. He tolerated this well and was instructed regarding post-injection care of icing and decreased activity as necessary. Treatment Plan:    Patient presented today in follow-up regarding his chronic left knee pain secondary to severe osteoarthritis. He has had no recent injury. X-rays show no signs of acute fracture. Patient is not a surgical candidate. We discussed other conservative treatment options and he elected to proceed with cortisone injection. Left knee cortisone injection was completed as recorded above in the procedure note. He is given postinjection precautions. He is status post right total knee arthroplasty and has chronic right knee pain also as well as chronic bilateral hip pain. Previous bilateral hip x-rays also show severe osteoarthritis. We discussed the option of bilateral knee [place] procedures. He is given a handout and referred to Dr. Molly Wallace. We will also submit for left knee joint fluid injection. Maxi Cardozo was informed of the results of any imaging. We discussed treatment options and a time was given to answer questions.  A plan was

## 2021-09-20 NOTE — TELEPHONE ENCOUNTER
2 bottles of samples provided Lot [id number] Exp 10/22    Last OV: 9/16/21  Last labs: n/a  Appt scheduled : none

## 2021-09-21 ENCOUNTER — TELEPHONE (OUTPATIENT)
Dept: ORTHOPEDIC SURGERY | Age: 86
End: 2021-09-21

## 2021-09-21 NOTE — TELEPHONE ENCOUNTER
Spoke to patient and let him know that everyone responds differently to the cortisone and he could have relief for several days or several weeks. He verbalized understanding.

## 2021-09-21 NOTE — TELEPHONE ENCOUNTER
General Question     Subject: CORTISONE INJECTION  Patient and /or Facility Request: PATIENT WANTS TO KNOW HOW LONG WILL THE CORTISONE INJECTION LAST.  [address] Number: [phone]

## 2021-09-27 ENCOUNTER — TELEPHONE (OUTPATIENT)
Dept: ORTHOPEDIC SURGERY | Age: 86
End: 2021-09-27

## 2021-09-27 DIAGNOSIS — M17.12 PRIMARY OSTEOARTHRITIS OF LEFT KNEE: Primary | ICD-10-CM

## 2021-09-27 NOTE — TELEPHONE ENCOUNTER
This gentleman is 80years old with severe knee arthritis. I do not believe physical therapy or a home exercise program is going to help with his pain from severe arthritis. I believe a joint fluid injection could provide him the most benefit.

## 2021-10-05 ENCOUNTER — TELEPHONE (OUTPATIENT)
Dept: ORTHOPEDIC SURGERY | Age: 86
End: 2021-10-05

## 2021-10-07 NOTE — TELEPHONE ENCOUNTER
Per pt chart, auth scanned in yesterday for Gel One approval.     Called and informed pt of auth and scheduled appt w/Ady on 10/11/21 for injection.

## 2021-10-13 ENCOUNTER — HOSPITAL ENCOUNTER (OUTPATIENT)
Dept: WOUND CARE | Age: 86
Discharge: HOME | End: 2021-10-13
Payer: MEDICARE

## 2021-10-13 VITALS
DIASTOLIC BLOOD PRESSURE: 60 MMHG | TEMPERATURE: 96.8 F | RESPIRATION RATE: 18 BRPM | SYSTOLIC BLOOD PRESSURE: 114 MMHG | HEART RATE: 55 BPM

## 2021-10-13 DIAGNOSIS — S81.801A OPEN WOUND OF RIGHT LOWER LEG, INITIAL ENCOUNTER: Primary | ICD-10-CM

## 2021-10-13 PROCEDURE — 99213 OFFICE O/P EST LOW 20 MIN: CPT

## 2021-10-13 PROCEDURE — 11042 DBRDMT SUBQ TIS 1ST 20SQCM/<: CPT

## 2021-10-13 PROCEDURE — 11042 DBRDMT SUBQ TIS 1ST 20SQCM/<: CPT | Performed by: SURGERY

## 2021-10-13 RX ORDER — BETAMETHASONE DIPROPIONATE 0.05 %
OINTMENT (GRAM) TOPICAL ONCE
Status: CANCELLED | OUTPATIENT
Start: 2021-10-13 | End: 2021-10-13

## 2021-10-13 RX ORDER — CLOBETASOL PROPIONATE 0.5 MG/G
OINTMENT TOPICAL ONCE
Status: CANCELLED | OUTPATIENT
Start: 2021-10-13 | End: 2021-10-13

## 2021-10-13 RX ORDER — GINSENG 100 MG
CAPSULE ORAL ONCE
Status: CANCELLED | OUTPATIENT
Start: 2021-10-13 | End: 2021-10-13

## 2021-10-13 RX ORDER — BACITRACIN, NEOMYCIN, POLYMYXIN B 400; 3.5; 5 [USP'U]/G; MG/G; [USP'U]/G
OINTMENT TOPICAL ONCE
Status: CANCELLED | OUTPATIENT
Start: 2021-10-13 | End: 2021-10-13

## 2021-10-13 RX ORDER — GENTAMICIN SULFATE 1 MG/G
OINTMENT TOPICAL ONCE
Status: CANCELLED | OUTPATIENT
Start: 2021-10-13 | End: 2021-10-13

## 2021-10-13 RX ORDER — LIDOCAINE HYDROCHLORIDE 20 MG/ML
JELLY TOPICAL ONCE
Status: CANCELLED | OUTPATIENT
Start: 2021-10-13 | End: 2021-10-13

## 2021-10-13 RX ORDER — LIDOCAINE 40 MG/G
CREAM TOPICAL ONCE
Status: DISCONTINUED | OUTPATIENT
Start: 2021-10-13 | End: 2021-10-14 | Stop reason: HOSPADM

## 2021-10-13 RX ORDER — LIDOCAINE HYDROCHLORIDE 40 MG/ML
SOLUTION TOPICAL ONCE
Status: CANCELLED | OUTPATIENT
Start: 2021-10-13 | End: 2021-10-13

## 2021-10-13 RX ORDER — BACITRACIN ZINC AND POLYMYXIN B SULFATE 500; 1000 [USP'U]/G; [USP'U]/G
OINTMENT TOPICAL ONCE
Status: CANCELLED | OUTPATIENT
Start: 2021-10-13 | End: 2021-10-13

## 2021-10-13 RX ORDER — LIDOCAINE 50 MG/G
OINTMENT TOPICAL ONCE
Status: CANCELLED | OUTPATIENT
Start: 2021-10-13 | End: 2021-10-13

## 2021-10-13 RX ORDER — LIDOCAINE 40 MG/G
CREAM TOPICAL ONCE
Status: CANCELLED | OUTPATIENT
Start: 2021-10-13 | End: 2021-10-13

## 2021-10-13 RX ORDER — AMOXICILLIN AND CLAVULANATE POTASSIUM 875; 125 MG/1; MG/1
1 TABLET, FILM COATED ORAL 2 TIMES DAILY
COMMUNITY
Start: 2021-10-07 | End: 2021-10-14

## 2021-10-13 NOTE — PLAN OF CARE
Multilayer Compression Wrap   Below the Knee    NAME:  Bernadette Mujica OF BIRTH:  1935  MEDICAL RECORD NUMBER:  [id number]  DATE:  10/13/2021    Multilayer compression wrap: Applied primary and secondary dressing as ordered. Applied multilayered dressing below the knee to right lower leg. Instructed patient/caregiver not to remove dressing and to keep it clean and dry. Instructed patient/caregiver on complications to report to provider, such as pain, numbness in toes, heavy drainage, and slippage of dressing. Instructed patient on purpose of compression dressing and on activity and exercise recommendations.      Applied  [employer] wrap from toes to knee overlapping each time    Electronically signed by Bety Carter RN on 10/13/2021 at 9:05 AM

## 2021-10-13 NOTE — PROGRESS NOTES
[address] Progress and Procedure Note    Albaro Gallardo  AGE: 80 y.o. GENDER: male    : 1935  TODAY'S DATE: 10/13/2021    Chief Complaint   Patient presents with    Wound Check     Right leg weeping 1 weel, on Abx from PCP        History of Present Illness     Albaro Gallardo is a 80 y.o. male who presents today for wound evaluation. History of Wound: venous wound located on the right leg.    Wound Pain:  mild  Severity:  2 / 10   Wound Type:  venous  Modifying Factors:  edema, venous stasis and anticoagulation therapy  Associated Signs/Symptoms:  edema    Past Medical History:   Diagnosis Date    Atrial fibrillation (HCC)     CAD (coronary artery disease)     Diarrhea 2017    History of skin cancer     Hyperlipidemia     Hypertension     Insomnia 2020    Kidney stones 2017     Past Surgical History:   Procedure Laterality Date    APPENDECTOMY      BACK SURGERY      x4    CORONARY ARTERY BYPASS GRAFT  1981    CORONARY ARTERY BYPASS GRAFT  2001    JOINT REPLACEMENT Right     knee replacement    KNEE SURGERY  2005    right     PROSTATE SURGERY      SHOULDER SURGERY      both    SKIN CANCER EXCISION      TOTAL KNEE ARTHROPLASTY Right 2015    [place]     Current Outpatient Medications   Medication Sig Dispense Refill    amoxicillin-clavulanate (AUGMENTIN) 875-125 MG per tablet Take 1 tablet by mouth 2 times daily      gabapentin (NEURONTIN) 300 MG capsule TAKE 1 CAPSULE BY MOUTH THREE TIMES A DAY FOR 90 DAYS      ALPRAZolam (XANAX) 2 MG tablet       losartan (COZAAR) 50 MG tablet Take 1 tablet by mouth daily 90 tablet 4    XARELTO 20 MG TABS tablet Take 1 tablet by mouth Daily with supper 90 tablet 4    furosemide (LASIX) 20 MG tablet Take 1 tablet by mouth 2 times daily 180 tablet 3    potassium chloride (KLOR-CON M) 20 MEQ extended release tablet Take 1 tablet by mouth 2 times daily 180 tablet 4    carvedilol (COREG) 3.125 MG tablet Take 1 tablet by mouth 2 times daily (with meals) 180 tablet 4    albuterol sulfate HFA (VENTOLIN HFA) 108 (90 Base) MCG/ACT inhaler Inhale 2 puffs into the lungs 4 times daily as needed for Wheezing 3 Inhaler 1    famotidine (PEPCID) 20 MG tablet Take 20 mg by mouth 2 times daily      Compression Stockings MISC by Does not apply route 20-30 mm hg compression stockings, bilateral, knee high 1 each 0    amitriptyline (ELAVIL) 100 MG tablet Take 100 mg by mouth nightly   2    nitroGLYCERIN (NITROSTAT) 0.4 MG SL tablet Place 1 tablet under the tongue every 5 minutes as needed for Chest pain 25 tablet 3    Ascorbic Acid (VITAMIN C) 500 MG tablet Take 1,000 mg by mouth daily       vitamin E 400 UNIT capsule Take 800 Units by mouth daily       vitamin D (CHOLECALCIFEROL) 1000 UNIT TABS tablet Take 1,000 Units by mouth 2 times daily        No current facility-administered medications for this encounter. Social History:   Social History     Tobacco Use    Smoking status: Former Smoker     Packs/day: 2.00     Years: 10.00     Pack years: 20.00     Types: Cigarettes     Quit date: 1975     Years since quittin.8    Smokeless tobacco: Never Used   Vaping Use    Vaping Use: Never used   Substance Use Topics    Alcohol use: Yes     Alcohol/week: 1.0 standard drinks     Types: 1 Shots of liquor per week     Comment: minimal alcohol consumption    Drug use: No     Allergies:  Codeine and Flomax [tamsulosin hcl]    Procedure: Indications:  Based on my examination of this patient's wound(s)/ulcer(s) today, debridement is required to promote healing and evaluate the wound base. Performed by: Nicho Santos MD    Consent obtained: Yes    Time out taken: Yes    Pain Control:   topical lidocaine    Debridement: Excisional Debridement    Using curette the wound was sharply debrided    down through and including the removal of epidermis, dermis and subcutaneous tissue.         Devitalized Tissue Debrided: staining (brown yellow) 10/13/21 0829   Margins Attached edges; Undefined edges 10/13/21 0829   Number of days: 0       Percent of Wound(s)/Ulcer(s) Debrided: 100%    Total Surface Area Debrided:  2 sq cm     Bleeding:  Minimal    Hemostasis Achieved:  by pressure    Procedural Pain:  2  / 10     Post Procedural Pain:  2 / 10     Response to treatment:  Well tolerated by patient. Assessment:     Right leg venous wound    Patient tolerated procedure well and was given proper instruction. The nature of the patient's condition was explained in depth. The patient was informed that their compliance to the treatment plan is paramount to successful healing and prevention of further ulceration and/or infection     Plan:     Treatment Plan:       Treatment Note please see attached Discharge Instructions    Written patient dismissal instructions given to patient and signed by patient or POA. Discharge Instructions         [address], [address] [place] [address]  Telephone: [phone]    Discharge Instructions    Important reminders:    1. Increase Protein intake for optimal wound healing  2. No added salt to reduce any swelling  3. If diabetic, maintain good glucose control  4. If you smoke, smoking prohibits wound healing, we ask that you refrain from smoking. 5. When taking antibiotics take the entire prescription as ordered. Do not stop taking until medication is all gone unless otherwise instructed. 6. Exercise as tolerated. 7. Keep weight off wounds and reposition every 2 hours if applicable. 8. If wound(s) is on your lower extremity, elevate legs to the level of the heart or above for 30 minutes 4-5 times a day and/or when sitting. Avoid standing for long periods of time. 9. Do not get wounds wet in bath or shower unless otherwise instructed by your physician. If your wound is on your foot or leg, you may purchase a cast bag.  Please ask at the pharmacy. If Vascular testing is ordered, please call [address] [place] [address] [phone]) to schedule. Vascular tests ordered by Wound Care Physicians may take up to 2 hours to complete. Please keep that in mind when scheduling. If Vascular testing is scheduled, please bring supplies to replace your dressing after testing is done. The vascular department does not stock supplies. Wound: Right lower leg     With each dressing change, rinse wounds with 0.9% Saline. (May use wound wash or soft contact solution. Both can be purchased at a local drug store). If unable to obtain saline, may use a gentle soap and water. Dressing care: Mepitel, 4 x 4, Unna boot- these will be changed at your next visit unless they slide down or cause pain. If they slide, gets wet, or cause pain please call the wound care center, you may need to come in to be re-wrapped. If you are unable to get to your follow up appointment you will need to remove your wraps at home & place some kind of compression. Compression Wraps  Location: Right lower leg below knee  Type: Unna boot    Your doctor has ordered compression therapy for your wound. Compression bandages reduce the swelling, or edema, in your legs and prevent it from returning. The wound care staff will apply your compression wrap. It must be removed and re-applied at least weekly. As the swelling decreases, the boot no longer provides adequate compression and you need a new one. Once applied, you need to know how to take care of your compression wrap. The boot must stay dry. Do not get it wet in the shower or tub. You may do a partial bath, or you can cover the boot with a large plastic bag, secured at the top, so that no water can get in. Avoid standing in one place for long periods of time. If you must  one place, shift your weight and change positions often. If you have CHF, consult your doctor before following the next two recommendations for leg elevation. When sitting, elevate your legs on pillows, or put blocks under the foot of your bed. Your legs should be higher than your heart. If your boot becomes painful, or you notice an increase in swelling in your toes, numbness or tingling, or purple color to your toes, remove the wrap and call the [address]. If it is after hours, call your doctor for instructions or go to the nearest emergency room. Home Care Agency:     Your wound-care supplies will be provided by:   Please note, depending on your insurance coverage, you may have out-of-pocket expenses for these supplies. Someone from the company should call you to confirm your order and discuss those potential costs before they ship your products -- please anticipate that call. If your out-of-pocket cost could be substantial, Many companies have financial hardship programs for patients who qualify, so please ask about that if you might need a hand. If you have any questions about your supplies or your potential out-of-pocket costs, or if you need to place an order for a refill of supplies (typically monthly), please call the company directly. Your  is [facility]    Follow up with Dr Reyes Wyatt In 1 week(s) in the wound care center. Wound Care Center Information: Should you experience any significant changes in your wound(s) or have questions about your wound care, please contact the [place] [address] at [phone] Monday  - Thursday 8:00 am - 4:00 pm and Friday 8:00 am - 1:00pm. If you need help with your wound outside these hours and cannot wait until we are again available, contact your PCP or go to the hospital emergency room. PLEASE NOTE: IF YOU ARE UNABLE TO OBTAIN WOUND SUPPLIES, CONTINUE TO USE THE SUPPLIES YOU HAVE AVAILABLE UNTIL YOU ARE ABLE TO REACH US. IT IS MOST IMPORTANT TO KEEP THE WOUND COVERED AT ALL TIMES. Onel Young 6  Reyes Wyatt MD, FACS  10/13/2021  9:02 AM

## 2021-10-18 ENCOUNTER — TELEPHONE (OUTPATIENT)
Dept: CARDIOLOGY CLINIC | Age: 86
End: 2021-10-18

## 2021-10-18 NOTE — TELEPHONE ENCOUNTER
Prescription refill    Last OV:9-16-21 Sunil Lombardo    Last CDVBPS:2-01-97    Labs:9-16-21    Future Appt:3-15-22 on recall list      Called patient and let him know I put 3 bottles up front for  Lot # [id number] and Exp 10-22 with verbal understanding from patient.

## 2021-10-18 NOTE — TELEPHONE ENCOUNTER
Sample requested: xarelto    Strength: 20    Dosage:     Patient's call back number:   He will be at [facility] on wed

## 2021-10-20 ENCOUNTER — HOSPITAL ENCOUNTER (OUTPATIENT)
Dept: WOUND CARE | Age: 86
Discharge: HOME | End: 2021-10-20
Payer: MEDICARE

## 2021-10-20 ENCOUNTER — OFFICE VISIT (OUTPATIENT)
Dept: ORTHOPEDIC SURGERY | Age: 86
End: 2021-10-20
Payer: MEDICARE

## 2021-10-20 VITALS
RESPIRATION RATE: 18 BRPM | HEART RATE: 52 BPM | DIASTOLIC BLOOD PRESSURE: 76 MMHG | SYSTOLIC BLOOD PRESSURE: 130 MMHG | TEMPERATURE: 97.3 F

## 2021-10-20 VITALS — HEIGHT: 71 IN | BODY MASS INDEX: 34.58 KG/M2 | WEIGHT: 247 LBS

## 2021-10-20 DIAGNOSIS — S81.801A OPEN WOUND OF RIGHT LOWER LEG, INITIAL ENCOUNTER: Primary | ICD-10-CM

## 2021-10-20 DIAGNOSIS — M17.12 PRIMARY OSTEOARTHRITIS OF LEFT KNEE: Primary | ICD-10-CM

## 2021-10-20 PROCEDURE — 11042 DBRDMT SUBQ TIS 1ST 20SQCM/<: CPT | Performed by: SURGERY

## 2021-10-20 PROCEDURE — 11042 DBRDMT SUBQ TIS 1ST 20SQCM/<: CPT

## 2021-10-20 PROCEDURE — 20610 DRAIN/INJ JOINT/BURSA W/O US: CPT | Performed by: PHYSICIAN ASSISTANT

## 2021-10-20 RX ORDER — LIDOCAINE 40 MG/G
CREAM TOPICAL ONCE
Status: DISCONTINUED | OUTPATIENT
Start: 2021-10-20 | End: 2021-10-21 | Stop reason: HOSPADM

## 2021-10-20 RX ORDER — LIDOCAINE 50 MG/G
OINTMENT TOPICAL ONCE
Status: CANCELLED | OUTPATIENT
Start: 2021-10-20 | End: 2021-10-20

## 2021-10-20 RX ORDER — LIDOCAINE 40 MG/G
CREAM TOPICAL ONCE
Status: CANCELLED | OUTPATIENT
Start: 2021-10-20 | End: 2021-10-20

## 2021-10-20 RX ORDER — LIDOCAINE HYDROCHLORIDE 40 MG/ML
SOLUTION TOPICAL ONCE
Status: CANCELLED | OUTPATIENT
Start: 2021-10-20 | End: 2021-10-20

## 2021-10-20 RX ORDER — BACITRACIN ZINC AND POLYMYXIN B SULFATE 500; 1000 [USP'U]/G; [USP'U]/G
OINTMENT TOPICAL ONCE
Status: CANCELLED | OUTPATIENT
Start: 2021-10-20 | End: 2021-10-20

## 2021-10-20 RX ORDER — GENTAMICIN SULFATE 1 MG/G
OINTMENT TOPICAL ONCE
Status: CANCELLED | OUTPATIENT
Start: 2021-10-20 | End: 2021-10-20

## 2021-10-20 RX ORDER — CLOBETASOL PROPIONATE 0.5 MG/G
OINTMENT TOPICAL ONCE
Status: CANCELLED | OUTPATIENT
Start: 2021-10-20 | End: 2021-10-20

## 2021-10-20 RX ORDER — GINSENG 100 MG
CAPSULE ORAL ONCE
Status: CANCELLED | OUTPATIENT
Start: 2021-10-20 | End: 2021-10-20

## 2021-10-20 RX ORDER — BACITRACIN, NEOMYCIN, POLYMYXIN B 400; 3.5; 5 [USP'U]/G; MG/G; [USP'U]/G
OINTMENT TOPICAL ONCE
Status: CANCELLED | OUTPATIENT
Start: 2021-10-20 | End: 2021-10-20

## 2021-10-20 RX ORDER — LIDOCAINE HYDROCHLORIDE 20 MG/ML
JELLY TOPICAL ONCE
Status: CANCELLED | OUTPATIENT
Start: 2021-10-20 | End: 2021-10-20

## 2021-10-20 RX ORDER — BETAMETHASONE DIPROPIONATE 0.05 %
OINTMENT (GRAM) TOPICAL ONCE
Status: CANCELLED | OUTPATIENT
Start: 2021-10-20 | End: 2021-10-20

## 2021-10-20 NOTE — PROGRESS NOTES
Patient Name:Jarred Feliz [facility]  Medical Record Number: [id number]  YOB: 1935  Date of Encounter: 10/20/2021     Chief Complaint   Patient presents with    Follow-up     Gel One left knee       History of Present Illness:  Roseann Mcdermott is a 80 y.o. male here for Gel-One injection in the left knee. Patient has had no adverse events from prior joint fluid injections. Vital Signs:  Ht 5' 11\" (1.803 m)   Wt 247 lb (112 kg)   BMI 34.45 kg/m²     Left Knee Examination:  There is a milld joint effusion noted of the left knee. There is mild pain with range of motion testing. There is no  significant instability noted. Neuro exam of both lower extremities is grossly intact . Intact sensation to light touch. Motor exam 4+ to 5/5 in all major motor groups. Negative Frank's sign. Skin is warm, dry and intact with out erythema, induration or warmth around the left knee joint. 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 Office Procedures:  Orders Placed This Encounter   Procedures    GA ARTHROCENTESIS ASPIR&/INJ MAJOR JT/BURSA W/O US     Injections:   Discussed Gel-One injections including all risks and benefits including increased pain, drug reaction, infection, bleeding, lack of improvement and neurovascular injury. Questions were encouraged and answered. The correct patient, procedure, site and side were identified. With the patient's permission, his left knee was prepped in standard sterile fashion with betadine and alcohol. The prefilled syringe of Gel One 30mg/3ml was injected into the left medial joint space compartment under aseptic technique. The skin was then cleaned again with alcohol and a sterile adhesive dressing was applied. He tolerated this well. Treatment Plan:    The patient was advised to ice the left knee for 15-20 minutes to relieve any injection site related pain. Decrease activity for the next 24 to 48 hours.  May use prescription or OTC pain relievers as needed. Follow-up as directed or call or return to clinic if these symptoms worsen or fail to improve as anticipated. If at any time you are concerned you may contact the office for further instructions or care. Electronically signed by Mady Patiño PA-C on 31/69/5778  Board Certified [facility]    Please note that portions of this note were completed with a voice recognition program.  Efforts were made to edit the dictations but occasionally words are mis-transcribed. 26-Jun-2017

## 2021-10-20 NOTE — PROGRESS NOTES
MCG/ACT inhaler Inhale 2 puffs into the lungs 4 times daily as needed for Wheezing 3 Inhaler 1    famotidine (PEPCID) 20 MG tablet Take 20 mg by mouth 2 times daily      Compression Stockings MISC by Does not apply route 20-30 mm hg compression stockings, bilateral, knee high 1 each 0    amitriptyline (ELAVIL) 100 MG tablet Take 100 mg by mouth nightly   2    nitroGLYCERIN (NITROSTAT) 0.4 MG SL tablet Place 1 tablet under the tongue every 5 minutes as needed for Chest pain 25 tablet 3    Ascorbic Acid (VITAMIN C) 500 MG tablet Take 1,000 mg by mouth daily       vitamin E 400 UNIT capsule Take 800 Units by mouth daily       vitamin D (CHOLECALCIFEROL) 1000 UNIT TABS tablet Take 1,000 Units by mouth 2 times daily        Current Facility-Administered Medications   Medication Dose Route Frequency Provider Last Rate Last Admin    lidocaine (LMX) 4 % cream   Topical Once Fay Fischer MD          Social History:   Social History     Tobacco Use    Smoking status: Former Smoker     Packs/day: 2.00     Years: 10.00     Pack years: 20.00     Types: Cigarettes     Quit date: 1975     Years since quittin.8    Smokeless tobacco: Never Used   Vaping Use    Vaping Use: Never used   Substance Use Topics    Alcohol use: Yes     Alcohol/week: 1.0 standard drinks     Types: 1 Shots of liquor per week     Comment: minimal alcohol consumption    Drug use: No     Allergies:  Codeine and Flomax [tamsulosin hcl]    Procedure: Indications:  Based on my examination of this patient's wound(s)/ulcer(s) today, debridement is required to promote healing and evaluate the wound base. Performed by: Lj Quinn MD    Consent obtained: Yes    Time out taken: Yes    Pain Control: Anesthetic  Anesthetic: 4% Lidocaine Cream     Debridement: Excisional Debridement    Using curette the wound was sharply debrided    down through and including the removal of epidermis, dermis and subcutaneous tissue.         Devitalized Tissue Debrided: fibrin, biofilm and slough    Pre Debridement Measurements:  Are located in the Wound Documentation Flow Sheet     Wound #: 1     Post  Debridement Measurements:  Wound 10/13/21 Leg Right; Anterior #1 (Active)   Wound Image   10/13/21 0829   Wound Etiology Venous 10/20/21 1023   Wound Cleansed Cleansed with saline 10/20/21 1038   Wound Length (cm) 0.9 cm 10/20/21 1023   Wound Width (cm) 1 cm 10/20/21 1023   Wound Depth (cm) 0.1 cm 10/20/21 1023   Wound Surface Area (cm^2) 0.9 cm^2 10/20/21 1023   Change in Wound Size % (l*w) 10 10/20/21 1023   Wound Volume (cm^3) 0.09 cm^3 10/20/21 1023   Wound Healing % 10 10/20/21 1023   Post-Procedure Length (cm) 0.9 cm 10/20/21 1038   Post-Procedure Width (cm) 1 cm 10/20/21 1038   Post-Procedure Depth (cm) 0.1 cm 10/20/21 1038   Post-Procedure Surface Area (cm^2) 0.9 cm^2 10/20/21 1038   Post-Procedure Volume (cm^3) 0.09 cm^3 10/20/21 1038   Wound Assessment Bleeding 10/20/21 1038   Drainage Amount Moderate 10/20/21 1038   Drainage Description Serosanguinous 10/20/21 1023   Odor None 10/20/21 1023   Sameera-wound Assessment Hemosiderin staining (brown yellow) 10/20/21 1023   Margins Defined edges 10/20/21 1023   Number of days: 7       Wound 10/13/21 Leg Right; Lower;Medial #2 (Active)   Wound Image   10/13/21 0829   Wound Etiology Venous 10/20/21 1023   Wound Cleansed Wound cleanser 10/20/21 1023   Wound Length (cm) 0 cm 10/20/21 1023   Wound Width (cm) 0 cm 10/20/21 1023   Wound Depth (cm) 0 cm 10/20/21 1023   Wound Surface Area (cm^2) 0 cm^2 10/20/21 1023   Change in Wound Size % (l*w) 100 10/20/21 1023   Wound Volume (cm^3) 0 cm^3 10/20/21 1023   Wound Healing % 100 10/20/21 1023   Post-Procedure Length (cm) 1 cm 10/13/21 0846   Post-Procedure Width (cm) 1 cm 10/13/21 0846   Post-Procedure Depth (cm) 0.1 cm 10/13/21 0846   Post-Procedure Surface Area (cm^2) 1 cm^2 10/13/21 0846   Post-Procedure Volume (cm^3) 0.1 cm^3 10/13/21 0846   Wound Assessment Granulation periods of time. 9. Do not get wounds wet in bath or shower unless otherwise instructed by your physician. If your wound is on your foot or leg, you may purchase a cast bag. Please ask at the pharmacy. If Vascular testing is ordered, please call [address] [place] [address] [phone]) to schedule. Vascular tests ordered by Wound Care Physicians may take up to 2 hours to complete. Please keep that in mind when scheduling. If Vascular testing is scheduled, please bring supplies to replace your dressing after testing is done. The vascular department does not stock supplies. Wound: Right lower leg     With each dressing change, rinse wounds with 0.9% Saline. (May use wound wash or soft contact solution. Both can be purchased at a local drug store). If unable to obtain saline, may use a gentle soap and water. Dressing care: Mepitel, 4 x 4, Unna boot- these will be changed at your next visit unless they slide down or cause pain. If they slide, gets wet, or cause pain please call the wound care center, you may need to come in to be re-wrapped. If you are unable to get to your follow up appointment you will need to remove your wraps at home & place some kind of compression. Compression Wraps  Location: Right lower leg below knee  Type: Unna boot    Your doctor has ordered compression therapy for your wound. Compression bandages reduce the swelling, or edema, in your legs and prevent it from returning. The wound care staff will apply your compression wrap. It must be removed and re-applied at least weekly. As the swelling decreases, the boot no longer provides adequate compression and you need a new one. Once applied, you need to know how to take care of your compression wrap. The boot must stay dry. Do not get it wet in the shower or tub. You may do a partial bath, or you can cover the boot with a large plastic bag, secured at the top, so that no water can get in.     Avoid standing in one place for long periods of AVAILABLE UNTIL YOU ARE ABLE TO REACH US. IT IS MOST IMPORTANT TO KEEP THE WOUND COVERED AT ALL TIMES. Onel Young 6  Juliann Hardy MD, FACS  10/20/2021  11:00 AM

## 2021-10-20 NOTE — PLAN OF CARE
Discharge instructions given. Patient verbalized understanding. Return to [facility] in 1 week(s).

## 2021-10-27 ENCOUNTER — HOSPITAL ENCOUNTER (OUTPATIENT)
Dept: WOUND CARE | Age: 86
Discharge: HOME | End: 2021-10-27
Payer: MEDICARE

## 2021-10-27 VITALS — DIASTOLIC BLOOD PRESSURE: 71 MMHG | SYSTOLIC BLOOD PRESSURE: 119 MMHG | RESPIRATION RATE: 16 BRPM | HEART RATE: 68 BPM

## 2021-10-27 DIAGNOSIS — S81.801A OPEN WOUND OF RIGHT LOWER LEG, INITIAL ENCOUNTER: Primary | ICD-10-CM

## 2021-10-27 PROCEDURE — 99213 OFFICE O/P EST LOW 20 MIN: CPT

## 2021-10-27 PROCEDURE — 99212 OFFICE O/P EST SF 10 MIN: CPT | Performed by: SURGERY

## 2021-10-27 RX ORDER — LIDOCAINE HYDROCHLORIDE 20 MG/ML
JELLY TOPICAL ONCE
Status: CANCELLED | OUTPATIENT
Start: 2021-10-27 | End: 2021-10-27

## 2021-10-27 RX ORDER — GENTAMICIN SULFATE 1 MG/G
OINTMENT TOPICAL ONCE
Status: CANCELLED | OUTPATIENT
Start: 2021-10-27 | End: 2021-10-27

## 2021-10-27 RX ORDER — LIDOCAINE 40 MG/G
CREAM TOPICAL ONCE
Status: CANCELLED | OUTPATIENT
Start: 2021-10-27 | End: 2021-10-27

## 2021-10-27 RX ORDER — BACITRACIN ZINC AND POLYMYXIN B SULFATE 500; 1000 [USP'U]/G; [USP'U]/G
OINTMENT TOPICAL ONCE
Status: CANCELLED | OUTPATIENT
Start: 2021-10-27 | End: 2021-10-27

## 2021-10-27 RX ORDER — LIDOCAINE HYDROCHLORIDE 40 MG/ML
SOLUTION TOPICAL ONCE
Status: CANCELLED | OUTPATIENT
Start: 2021-10-27 | End: 2021-10-27

## 2021-10-27 RX ORDER — LIDOCAINE 40 MG/G
CREAM TOPICAL ONCE
Status: DISCONTINUED | OUTPATIENT
Start: 2021-10-27 | End: 2021-10-28 | Stop reason: HOSPADM

## 2021-10-27 RX ORDER — BACITRACIN, NEOMYCIN, POLYMYXIN B 400; 3.5; 5 [USP'U]/G; MG/G; [USP'U]/G
OINTMENT TOPICAL ONCE
Status: CANCELLED | OUTPATIENT
Start: 2021-10-27 | End: 2021-10-27

## 2021-10-27 RX ORDER — CLOBETASOL PROPIONATE 0.5 MG/G
OINTMENT TOPICAL ONCE
Status: CANCELLED | OUTPATIENT
Start: 2021-10-27 | End: 2021-10-27

## 2021-10-27 RX ORDER — LIDOCAINE 50 MG/G
OINTMENT TOPICAL ONCE
Status: CANCELLED | OUTPATIENT
Start: 2021-10-27 | End: 2021-10-27

## 2021-10-27 RX ORDER — GINSENG 100 MG
CAPSULE ORAL ONCE
Status: CANCELLED | OUTPATIENT
Start: 2021-10-27 | End: 2021-10-27

## 2021-10-27 RX ORDER — BETAMETHASONE DIPROPIONATE 0.05 %
OINTMENT (GRAM) TOPICAL ONCE
Status: CANCELLED | OUTPATIENT
Start: 2021-10-27 | End: 2021-10-27

## 2021-10-27 NOTE — PLAN OF CARE
Discharge instructions given. Patient verbalized understanding.   Return to [address] [place] [address],[address] as needed  Wound healed

## 2021-10-28 NOTE — PROGRESS NOTES
[address] Progress Note    Phyllistine Bloch  AGE: 80 y.o. GENDER: male    : 1935  TODAY'S DATE: 10/27/2021    Subjective:     Chief Complaint   Patient presents with    Wound Check     follow up Rught leg wound         History of Present Illness     Phyllistine Bloch presents today for wound evaluation. History of Wound: 80-year-old male with right leg venous wound that has healed, no new wounds    Wound Type:  venous  Wound Location: right side  leg  Wound Pain:  mild  Severity:  1 / 10   Timing: resolved  Modifying Factors:  edema, venous stasis and anticoagulation therapy  Associated Signs/Symptoms:  edema  Other significant symptoms or pertinent wound history: as above.      Past Medical History:   Diagnosis Date    Atrial fibrillation ([address]     CAD (coronary artery disease)     Diarrhea 2017    History of skin cancer     Hyperlipidemia     Hypertension     Insomnia 2020    Kidney stones 2017       Past Surgical History:   Procedure Laterality Date    APPENDECTOMY      BACK SURGERY      x4    CORONARY ARTERY BYPASS GRAFT  1981    CORONARY ARTERY BYPASS GRAFT  2001    JOINT REPLACEMENT Right     knee replacement    KNEE SURGERY  2005    right     PROSTATE SURGERY      SHOULDER SURGERY      both    SKIN CANCER EXCISION      TOTAL KNEE ARTHROPLASTY Right     [place]       Current Outpatient Medications   Medication Sig Dispense Refill    gabapentin (NEURONTIN) 300 MG capsule TAKE 1 CAPSULE BY MOUTH THREE TIMES A DAY FOR 90 DAYS      ALPRAZolam (XANAX) 2 MG tablet       losartan (COZAAR) 50 MG tablet Take 1 tablet by mouth daily 90 tablet 4    XARELTO 20 MG TABS tablet Take 1 tablet by mouth Daily with supper 90 tablet 4    furosemide (LASIX) 20 MG tablet Take 1 tablet by mouth 2 times daily 180 tablet 3    potassium chloride (KLOR-CON M) 20 MEQ extended release tablet Take 1 tablet by mouth 2 times daily 180 tablet 4    carvedilol (COREG) 3.125 MG tablet Take 1 tablet by mouth 2 times daily (with meals) 180 tablet 4    albuterol sulfate HFA (VENTOLIN HFA) 108 (90 Base) MCG/ACT inhaler Inhale 2 puffs into the lungs 4 times daily as needed for Wheezing 3 Inhaler 1    famotidine (PEPCID) 20 MG tablet Take 20 mg by mouth 2 times daily      Compression Stockings MISC by Does not apply route 20-30 mm hg compression stockings, bilateral, knee high 1 each 0    amitriptyline (ELAVIL) 100 MG tablet Take 100 mg by mouth nightly   2    nitroGLYCERIN (NITROSTAT) 0.4 MG SL tablet Place 1 tablet under the tongue every 5 minutes as needed for Chest pain 25 tablet 3    Ascorbic Acid (VITAMIN C) 500 MG tablet Take 1,000 mg by mouth daily       vitamin E 400 UNIT capsule Take 800 Units by mouth daily       vitamin D (CHOLECALCIFEROL) 1000 UNIT TABS tablet Take 1,000 Units by mouth 2 times daily        Current Facility-Administered Medications   Medication Dose Route Frequency Provider Last Rate Last Admin    lidocaine (LMX) 4 % cream   Topical Once Fay Fischer MD            Allergies   Allergen Reactions    Codeine Itching    Flomax [Tamsulosin Hcl] Other (See Comments)     dizziness             REVIEW OF SYSTEMS    Pertinent items from the review of systems are discussed in the HPI; the remainder of the ROS was reviewed and is negative.     Objective:      /71   Pulse 68   Resp 16     PHYSICAL EXAM    General Appearance: alert and oriented to person, place and time, well developed and well- nourished, in no acute distress  Skin: warm and dry, no rash or erythema  Head: normocephalic and atraumatic  Right leg wound has healed      Assessment:     Patient Active Problem List   Diagnosis    Mixed hyperlipidemia    Essential hypertension    Coronary artery disease involving native coronary artery of native heart without angina pectoris    Bilateral carotid artery stenosis    Knee pain, bilateral    Right knee DJD    Left knee DJD    Cellulitis of leg without foot, left    Cholelithiasis    Septic shock (HCC)    Hematuria    Acute respiratory failure with hypoxemia (HCC)    Urinary tract infection with hematuria    Cellulitis of left lower extremity    Hypotension    Sepsis (HCC)    ODILIA (acute kidney injury) (HCC)    Paroxysmal atrial fibrillation (HCC)    Lactic acidosis    Dizziness    Diarrhea    History of DVT (deep vein thrombosis)    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    Venous insufficiency    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 The patient was informed that their compliance to the treatment plan is paramount to successful healing and prevention of further ulceration and/or infection     Treatment Plan:       Treatment Note please see attached Discharge Instructions    Written patient dismissal instructions given to patient and signed by patient or POA. Discharge Instructions       Congratulations! You have completed your treatment program!    To prevent Venous Wounds from recurring again:  · Elevate your legs at least parallel to the ground while sitting. · Wear your prescription support stockings everyday and remove at night while you sleep. · Replace your stockings every 4-6 months so that your legs continue to get the support they need.   · Inspect your legs and feet daily and report any increased swelling, unusual redness or new wounds to your physician. · Wash your legs and feet regularly with mild soap and water and moisturize daily. · Continue to use compression pumps if prescribed by your physician. · Avoid overeating. Extra weight adds pressure on the veins in your legs. · Limit salty foods as they promote swelling or fluid retention in your legs. [facility] if your wound reopens, if new wounds occur, or if you have any other questions about your follow up care [phone]. Wear your compression stocking daily. Onel Young 6  Geraldine Mantilla MD, FACS  10/27/2021  8:14 PM

## 2021-11-22 ENCOUNTER — TELEPHONE (OUTPATIENT)
Dept: CARDIOLOGY CLINIC | Age: 86
End: 2021-11-22

## 2021-11-22 NOTE — TELEPHONE ENCOUNTER
LOV :  09/16/2021 [facility]  NOV :  03/15/2022 [facility]    Provided patient with 2 bottles/boxes of Xarelto 20 mg  LOT :  [id number]  EXP :  11/2023    Called and spoke with the patient ; advised them that samples have been placed up front and ready for pickup. Pt voiced understanding .  Call complete

## 2021-12-08 NOTE — TELEPHONE ENCOUNTER
Last OV: 9/16/21  Last labs: n/a  Appt scheduled : 3/21/22    2 bottles of samples provided Xarelto 20mg    [id number]  Exp: 4/24     I called pt and let him know to .

## 2021-12-08 NOTE — TELEPHONE ENCOUNTER
Sample requested: xarelto    Strength:  20 mg    Dosage: 1 tablet by mouth Daily with supper    Patient's call back number: [phone]    Friday is his last dose

## 2021-12-21 NOTE — TELEPHONE ENCOUNTER
Sample requested: xarelto    Strength: 20 mg     Dosage:   Once daily    Patient's call back number: [phone]

## 2022-01-03 ENCOUNTER — HOSPITAL ENCOUNTER (OUTPATIENT)
Dept: WOUND CARE | Age: 87
Discharge: HOME | End: 2022-01-03
Payer: MEDICARE

## 2022-01-03 VITALS — DIASTOLIC BLOOD PRESSURE: 73 MMHG | RESPIRATION RATE: 16 BRPM | SYSTOLIC BLOOD PRESSURE: 153 MMHG

## 2022-01-03 DIAGNOSIS — R09.89 DECREASED PULSES IN FEET: Primary | ICD-10-CM

## 2022-01-03 DIAGNOSIS — I73.9 PVD (PERIPHERAL VASCULAR DISEASE) (HCC): ICD-10-CM

## 2022-01-03 DIAGNOSIS — S81.801A OPEN WOUND OF RIGHT LOWER LEG, INITIAL ENCOUNTER: ICD-10-CM

## 2022-01-03 DIAGNOSIS — L97.522 CHRONIC FOOT ULCER WITH FAT LAYER EXPOSED, LEFT (HCC): ICD-10-CM

## 2022-01-03 PROCEDURE — 99213 OFFICE O/P EST LOW 20 MIN: CPT

## 2022-01-03 PROCEDURE — 11042 DBRDMT SUBQ TIS 1ST 20SQCM/<: CPT

## 2022-01-03 RX ORDER — GINSENG 100 MG
CAPSULE ORAL ONCE
Status: CANCELLED | OUTPATIENT
Start: 2022-01-03 | End: 2022-01-03

## 2022-01-03 RX ORDER — BACITRACIN ZINC AND POLYMYXIN B SULFATE 500; 1000 [USP'U]/G; [USP'U]/G
OINTMENT TOPICAL ONCE
Status: CANCELLED | OUTPATIENT
Start: 2022-01-03 | End: 2022-01-03

## 2022-01-03 RX ORDER — LIDOCAINE 50 MG/G
OINTMENT TOPICAL ONCE
Status: CANCELLED | OUTPATIENT
Start: 2022-01-03 | End: 2022-01-03

## 2022-01-03 RX ORDER — BETAMETHASONE DIPROPIONATE 0.05 %
OINTMENT (GRAM) TOPICAL ONCE
Status: CANCELLED | OUTPATIENT
Start: 2022-01-03 | End: 2022-01-03

## 2022-01-03 RX ORDER — CLOBETASOL PROPIONATE 0.5 MG/G
OINTMENT TOPICAL ONCE
Status: CANCELLED | OUTPATIENT
Start: 2022-01-03 | End: 2022-01-03

## 2022-01-03 RX ORDER — LIDOCAINE HYDROCHLORIDE 20 MG/ML
JELLY TOPICAL ONCE
Status: CANCELLED | OUTPATIENT
Start: 2022-01-03 | End: 2022-01-03

## 2022-01-03 RX ORDER — GENTAMICIN SULFATE 1 MG/G
OINTMENT TOPICAL ONCE
Status: CANCELLED | OUTPATIENT
Start: 2022-01-03 | End: 2022-01-03

## 2022-01-03 RX ORDER — BACITRACIN, NEOMYCIN, POLYMYXIN B 400; 3.5; 5 [USP'U]/G; MG/G; [USP'U]/G
OINTMENT TOPICAL ONCE
Status: CANCELLED | OUTPATIENT
Start: 2022-01-03 | End: 2022-01-03

## 2022-01-03 RX ORDER — LIDOCAINE 40 MG/G
CREAM TOPICAL ONCE
Status: DISCONTINUED | OUTPATIENT
Start: 2022-01-03 | End: 2022-01-04 | Stop reason: HOSPADM

## 2022-01-03 RX ORDER — LIDOCAINE 40 MG/G
CREAM TOPICAL ONCE
Status: CANCELLED | OUTPATIENT
Start: 2022-01-03 | End: 2022-01-03

## 2022-01-03 RX ORDER — LIDOCAINE HYDROCHLORIDE 40 MG/ML
SOLUTION TOPICAL ONCE
Status: CANCELLED | OUTPATIENT
Start: 2022-01-03 | End: 2022-01-03

## 2022-01-03 NOTE — PLAN OF CARE
Discharge instructions given. Patient verbalized understanding. Return to [facility] in 2 week(s).   Arterial studies ordered

## 2022-01-03 NOTE — PROGRESS NOTES
[facility]   Progress Note and Procedure Note      Le Fields  AGE: 80 y.o. GENDER: male  : 1935  TODAY'S DATE:  1/3/2022    Subjective:     Chief Complaint   Patient presents with    Wound Check     left lower extremity         HISTORY of PRESENT ILLNESS HPI     Le Fields is a 80 y.o. male who presents today for wound evaluation. History of Wound: Lateral L foot ulcer that startes due to a trauma. Admits that he has been following with a podiatrist and has been using Santyl. Denies being a diabetic. Denies n/v/f/c/sob.    Wound Pain:  intermittent  Severity:  2 / 10   Wound Type:  traumatic  Modifying Factors:  edema, arterial insufficiency and decreased tissue oxygenation  Associated Signs/Symptoms:  edema and drainage        PAST MEDICAL HISTORY        Diagnosis Date    Atrial fibrillation (HCC)     CAD (coronary artery disease)     Diarrhea 2017    History of skin cancer     Hyperlipidemia     Hypertension     Insomnia 2020    Kidney stones 2017       PAST SURGICAL HISTORY    Past Surgical History:   Procedure Laterality Date    APPENDECTOMY      BACK SURGERY      x4    CORONARY ARTERY BYPASS GRAFT  1981    CORONARY ARTERY BYPASS GRAFT  2001    JOINT REPLACEMENT Right     knee replacement    KNEE SURGERY  2005    right     PROSTATE SURGERY      SHOULDER SURGERY      both    SKIN CANCER EXCISION      TOTAL KNEE ARTHROPLASTY Right     [place]       FAMILY HISTORY    Family History   Problem Relation Age of Onset    Heart Attack Mother     Cancer Mother     Diabetes Mother     Heart Disease Father     Heart Attack Father     High Blood Pressure Father     High Cholesterol Father     Cancer Sister         brain, lung, spine       SOCIAL HISTORY    Social History     Tobacco Use    Smoking status: Former Smoker     Packs/day: 2.00     Years: 10.00     Pack years: 20.00     Types: Cigarettes     Quit date: 1975     Years since quittin.0    Smokeless tobacco: Never Used   Vaping Use    Vaping Use: Never used   Substance Use Topics    Alcohol use:  Yes     Alcohol/week: 1.0 standard drink     Types: 1 Shots of liquor per week     Comment: minimal alcohol consumption    Drug use: No       ALLERGIES    Allergies   Allergen Reactions    Codeine Itching    Flomax [Tamsulosin Hcl] Other (See Comments)     dizziness       MEDICATIONS    Current Outpatient Medications on File Prior to Encounter   Medication Sig Dispense Refill    gabapentin (NEURONTIN) 300 MG capsule TAKE 1 CAPSULE BY MOUTH THREE TIMES A DAY FOR 90 DAYS      ALPRAZolam (XANAX) 2 MG tablet       losartan (COZAAR) 50 MG tablet Take 1 tablet by mouth daily 90 tablet 4    XARELTO 20 MG TABS tablet Take 1 tablet by mouth Daily with supper 90 tablet 4    furosemide (LASIX) 20 MG tablet Take 1 tablet by mouth 2 times daily 180 tablet 3    potassium chloride (KLOR-CON M) 20 MEQ extended release tablet Take 1 tablet by mouth 2 times daily 180 tablet 4    carvedilol (COREG) 3.125 MG tablet Take 1 tablet by mouth 2 times daily (with meals) 180 tablet 4    albuterol sulfate HFA (VENTOLIN HFA) 108 (90 Base) MCG/ACT inhaler Inhale 2 puffs into the lungs 4 times daily as needed for Wheezing 3 Inhaler 1    famotidine (PEPCID) 20 MG tablet Take 20 mg by mouth 2 times daily      Compression Stockings MISC by Does not apply route 20-30 mm hg compression stockings, bilateral, knee high 1 each 0    amitriptyline (ELAVIL) 100 MG tablet Take 100 mg by mouth nightly   2    nitroGLYCERIN (NITROSTAT) 0.4 MG SL tablet Place 1 tablet under the tongue every 5 minutes as needed for Chest pain 25 tablet 3    Ascorbic Acid (VITAMIN C) 500 MG tablet Take 1,000 mg by mouth daily       vitamin E 400 UNIT capsule Take 800 Units by mouth daily       vitamin D (CHOLECALCIFEROL) 1000 UNIT TABS tablet Take 1,000 Units by mouth 2 times daily        No current facility-administered medications on file prior to encounter. REVIEW OF SYSTEMS    Pertinent items are noted in HPI. Objective:      BP (!) 153/73   Resp 16     PHYSICAL EXAM    General Appearance: alert and oriented to person, place and time, well-developed and well-nourished, in no acute distress  Skin: warm and dry, no rash or erythema and ulcer lateral L forefoot. Fibrogranular base.  No SOI  Cardiovascular: normal rate and distal pulses diminished  Extremities: no cyanosis, no clubbing and venous stasis dermatosis noted  Musculoskeletal: no swollen joints, no tender joints, no crepitus, no joint instability and no trigger point or muscular tenderness  Neurologic: gait and coordination normal, speech normal and sensation to light touch intact      Assessment:     Patient Active Problem List   Diagnosis    Mixed hyperlipidemia    Essential hypertension    Coronary artery disease involving native coronary artery of native heart without angina pectoris    Bilateral carotid artery stenosis    Knee pain, bilateral    Right knee DJD    Left knee DJD    Cellulitis of leg without foot, left    Cholelithiasis    Septic shock (HCC)    Hematuria    Acute respiratory failure with hypoxemia (HCC)    Urinary tract infection with hematuria    Cellulitis of left lower extremity    Hypotension    Sepsis ([address]    ODILIA (acute kidney injury) ([address]    Paroxysmal atrial fibrillation (HCC)    Lactic acidosis    Dizziness    Diarrhea    History of DVT (deep vein thrombosis)    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    Venous insufficiency    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)    Suspected COVID-19 virus infection    Tachycardia    Tachypnea    Leukopenia    Elevated lactic acid level    Moderate protein-calorie malnutrition (HCC)    Fever    Hypomagnesemia    Acute pulmonary edema (HCC)    Morbid obesity due to excess calories ([address]  Insomnia    Dyspnea    Chronic combined systolic and diastolic congestive heart failure (HCC)    Open wound of right lower leg       Procedure Note    Performed by: Aravind Baxter DPM    Consent obtained: Yes    Time out taken:  Yes    Pain Control: Anesthetic  Anesthetic: 4% Lidocaine Cream     Debridement:Excisional Debridement    Using curette the wound was sharply debrided    down through and including the removal of epidermis, dermis and subcutaneous tissue. Devitalized Tissue Debrided:  fibrin, biofilm and slough    Pre Debridement Measurements:  Are located in the Wound Documentation Flow Sheet    Wound #: 1     Post  Debridement Measurements:  Wound 01/03/22 Foot Lateral;Left #1 (Active)   Wound Image   01/03/22 1410   Wound Etiology Traumatic 01/03/22 1410   Wound Cleansed Cleansed with saline 01/03/22 1417   Wound Length (cm) 1.4 cm 01/03/22 1410   Wound Width (cm) 1.1 cm 01/03/22 1410   Wound Depth (cm) 0.1 cm 01/03/22 1410   Wound Surface Area (cm^2) 1.54 cm^2 01/03/22 1410   Wound Volume (cm^3) 0.154 cm^3 01/03/22 1410   Wound Assessment Bleeding 01/03/22 1417   Drainage Amount Moderate 01/03/22 1417   Drainage Description Serosanguinous 01/03/22 1410   Odor None 01/03/22 1410   Sameera-wound Assessment Edematous 01/03/22 1410   Margins Defined edges 01/03/22 1410   Number of days: 0           Total Surface Area Debrided:  1.54 sq cm     Percentage of wound debrided 100%    Bleeding:  Minimal    Hemostasis Achieved:  by pressure    Procedural Pain:  3  / 10     Post Procedural Pain:  2 / 10     Response to treatment:  Well tolerated by patient. Plan:     The nature of the patient's condition was explained in depth. The patient was informed that their compliance to the treatment plan is paramount to successful healing and prevention of further ulceration and/or infection     Discharge Treatment       Pt examined  Wound debrided  Dressed with Santyl and DSD.  Pt fitted for surgical shoe to decrease pressure to wound. Order for arterial studies placed in chart.      Written Patient Discharge Instructions Given            Electronically signed by Nicole Brice DPM on 1/3/2022 at 2:23 PM

## 2022-02-26 DIAGNOSIS — I10 ESSENTIAL HYPERTENSION: ICD-10-CM

## 2022-02-26 DIAGNOSIS — I25.10 CORONARY ARTERY DISEASE INVOLVING NATIVE CORONARY ARTERY OF NATIVE HEART WITHOUT ANGINA PECTORIS: ICD-10-CM

## 2022-02-28 RX ORDER — LOSARTAN POTASSIUM 50 MG/1
50 TABLET ORAL DAILY
Qty: 90 TABLET | Refills: 0 | OUTPATIENT
Start: 2022-02-28

## 2022-03-01 DIAGNOSIS — I25.10 CORONARY ARTERY DISEASE INVOLVING NATIVE CORONARY ARTERY OF NATIVE HEART WITHOUT ANGINA PECTORIS: ICD-10-CM

## 2022-03-01 DIAGNOSIS — I10 ESSENTIAL HYPERTENSION: ICD-10-CM

## 2022-03-01 RX ORDER — LOSARTAN POTASSIUM 50 MG/1
50 TABLET ORAL DAILY
Qty: 90 TABLET | Refills: 0 | OUTPATIENT
Start: 2022-03-01

## 2022-03-01 NOTE — TELEPHONE ENCOUNTER
Request for refill is to soon  Spoke to patient and verified patient still has refills available at [facility]. Future refill for Losartan pt request to have it sent to:    [facility]   [id number] - [place], [address] [facility] [phone] [facility] [phone]   [address], [address] [place] [address]   Phone:  [phone]  Fax:  [phone]

## 2022-03-09 DIAGNOSIS — I48.0 PAROXYSMAL ATRIAL FIBRILLATION (HCC): ICD-10-CM

## 2022-03-09 NOTE — TELEPHONE ENCOUNTER
Medication Refill    Medication needing refilled:  XARELTO 20 MG      Dosage of the medication:    How are you taking this medication (QD, BID, TID, QID, PRN):1 tablet by mouth Daily with supper    30 or 90 day supply called in: 90 day supply    When will you run out of your medication:    Which Pharmacy are we sending the medication to?: [address] [place], [address] [facility] [phone] Thanh Monterroso [phone]   [address]   Phone:  [phone]  Fax:  [phone]

## 2022-03-09 NOTE — TELEPHONE ENCOUNTER
Received refill request for Navjot from [facility].     Last ov:09/16/2021 [facility]    Last labs:09/16/2021 BMP    Last Refill:09/16/2021 #90 w/ 4 refills    Next appointment:03/21/2022 [facility]

## 2022-03-10 RX ORDER — RIVAROXABAN 20 MG/1
20 TABLET, FILM COATED ORAL
Qty: 90 TABLET | Refills: 4 | Status: SHIPPED | OUTPATIENT
Start: 2022-03-10 | End: 2022-10-24 | Stop reason: SDUPTHER

## 2022-03-21 ENCOUNTER — HOSPITAL ENCOUNTER (OUTPATIENT)
Age: 87
Discharge: HOME | End: 2022-03-21
Payer: MEDICARE

## 2022-03-21 ENCOUNTER — OFFICE VISIT (OUTPATIENT)
Dept: CARDIOLOGY CLINIC | Age: 87
End: 2022-03-21
Payer: MEDICARE

## 2022-03-21 VITALS
SYSTOLIC BLOOD PRESSURE: 108 MMHG | OXYGEN SATURATION: 96 % | DIASTOLIC BLOOD PRESSURE: 60 MMHG | HEIGHT: 70 IN | BODY MASS INDEX: 35.3 KG/M2 | WEIGHT: 246.6 LBS | HEART RATE: 58 BPM

## 2022-03-21 DIAGNOSIS — I48.0 PAROXYSMAL ATRIAL FIBRILLATION (HCC): ICD-10-CM

## 2022-03-21 DIAGNOSIS — I65.23 BILATERAL CAROTID ARTERY STENOSIS: ICD-10-CM

## 2022-03-21 DIAGNOSIS — E78.2 MIXED HYPERLIPIDEMIA: ICD-10-CM

## 2022-03-21 DIAGNOSIS — Z86.718 HISTORY OF DVT (DEEP VEIN THROMBOSIS): ICD-10-CM

## 2022-03-21 DIAGNOSIS — R73.09 ELEVATED GLUCOSE: ICD-10-CM

## 2022-03-21 DIAGNOSIS — I25.10 CORONARY ARTERY DISEASE INVOLVING NATIVE CORONARY ARTERY OF NATIVE HEART WITHOUT ANGINA PECTORIS: Primary | ICD-10-CM

## 2022-03-21 DIAGNOSIS — I50.42 CHRONIC COMBINED SYSTOLIC AND DIASTOLIC CONGESTIVE HEART FAILURE (HCC): ICD-10-CM

## 2022-03-21 DIAGNOSIS — I10 ESSENTIAL HYPERTENSION: ICD-10-CM

## 2022-03-21 LAB
ANION GAP SERPL CALCULATED.3IONS-SCNC: 14 MMOL/L (ref 3–16)
BUN BLDV-MCNC: 20 MG/DL (ref 7–20)
CALCIUM SERPL-MCNC: 9.3 MG/DL (ref 8.3–10.6)
CHLORIDE BLD-SCNC: 106 MMOL/L (ref 99–110)
CO2: 24 MMOL/L (ref 21–32)
CREAT SERPL-MCNC: 1.2 MG/DL (ref 0.8–1.3)
GFR AFRICAN AMERICAN: >60
GFR NON-AFRICAN AMERICAN: 57
GLUCOSE BLD-MCNC: 116 MG/DL (ref 70–99)
HCT VFR BLD CALC: 38.4 % (ref 40.5–52.5)
HEMOGLOBIN: 12.9 G/DL (ref 13.5–17.5)
MCH RBC QN AUTO: 31.4 PG (ref 26–34)
MCHC RBC AUTO-ENTMCNC: 33.6 G/DL (ref 31–36)
MCV RBC AUTO: 93.6 FL (ref 80–100)
PDW BLD-RTO: 14.1 % (ref 12.4–15.4)
PLATELET # BLD: 161 K/UL (ref 135–450)
PMV BLD AUTO: 8.2 FL (ref 5–10.5)
POTASSIUM SERPL-SCNC: 4.7 MMOL/L (ref 3.5–5.1)
RBC # BLD: 4.11 M/UL (ref 4.2–5.9)
SODIUM BLD-SCNC: 144 MMOL/L (ref 136–145)
WBC # BLD: 5.4 K/UL (ref 4–11)

## 2022-03-21 PROCEDURE — 83036 HEMOGLOBIN GLYCOSYLATED A1C: CPT

## 2022-03-21 PROCEDURE — 99214 OFFICE O/P EST MOD 30 MIN: CPT | Performed by: INTERNAL MEDICINE

## 2022-03-21 PROCEDURE — 36415 COLL VENOUS BLD VENIPUNCTURE: CPT

## 2022-03-21 PROCEDURE — 85027 COMPLETE CBC AUTOMATED: CPT

## 2022-03-21 PROCEDURE — 80048 BASIC METABOLIC PNL TOTAL CA: CPT

## 2022-03-21 PROCEDURE — 93000 ELECTROCARDIOGRAM COMPLETE: CPT | Performed by: INTERNAL MEDICINE

## 2022-03-21 RX ORDER — CARVEDILOL 3.12 MG/1
3.12 TABLET ORAL 2 TIMES DAILY WITH MEALS
Qty: 180 TABLET | Refills: 4 | Status: SHIPPED | OUTPATIENT
Start: 2022-03-21 | End: 2022-09-19

## 2022-03-21 RX ORDER — FUROSEMIDE 20 MG/1
20 TABLET ORAL DAILY
Qty: 90 TABLET | Refills: 4 | Status: SHIPPED | OUTPATIENT
Start: 2022-03-21 | End: 2022-09-19 | Stop reason: SDUPTHER

## 2022-03-21 RX ORDER — POTASSIUM CHLORIDE 20 MEQ/1
20 TABLET, EXTENDED RELEASE ORAL DAILY
Qty: 90 TABLET | Refills: 4 | Status: SHIPPED | OUTPATIENT
Start: 2022-03-21 | End: 2022-09-19 | Stop reason: SDUPTHER

## 2022-03-21 RX ORDER — LOSARTAN POTASSIUM 50 MG/1
50 TABLET ORAL DAILY
Qty: 90 TABLET | Refills: 4 | Status: SHIPPED | OUTPATIENT
Start: 2022-03-21 | End: 2022-09-19 | Stop reason: SDUPTHER

## 2022-03-21 NOTE — PROGRESS NOTES
[address]  Cardiac Follow up       Referring Provider:  Kasandra Lozano MD     Chief Complaint   Patient presents with    Coronary Artery Disease    6 Month Follow-Up    Hypertension      History of Present Illness:  Mr. Master Parkinson is an 80year old gentleman here today in follow up for CHF, CAD, hypertension, hyperlipidemia, afib and PVD. In 2014, he was diagnosed with a DVT and completed 6 months of treatment with Xarelto. He was seen by a hematologist, initially worked up for lupus anticoagulant, but most recent testing did not show evidence of this. Prior to last visit, he was admitted with sepsis and developed afib. He was started on Eliquis. Now on Xarelto. He is doing well from a cardiac standpoint. Remains on Xarelto. No bleeding. No edema. Only complaint is knee pain. Past Medical History:   has a past medical history of Atrial fibrillation ([facility] CAD (coronary artery disease), Diarrhea, History of skin cancer, Hyperlipidemia, Hypertension, Insomnia, and Kidney stones. Surgical History:   has a past surgical history that includes back surgery; knee surgery (2005); shoulder surgery; Appendectomy; Coronary artery bypass graft (1981); Coronary artery bypass graft (2001); Prostate surgery; Skin cancer excision; joint replacement (Right); and Total knee arthroplasty (Right, 2015). Social History:   reports that he quit smoking about 47 years ago. His smoking use included cigarettes. He has a 20.00 pack-year smoking history. He has never used smokeless tobacco. He reports current alcohol use of about 1.0 standard drink of alcohol per week. He reports that he does not use drugs. Family History:  family history includes Cancer in his mother and sister; Diabetes in his mother; Heart Attack in his father and mother; Heart Disease in his father; High Blood Pressure in his father; High Cholesterol in his father.      Home Medications:  Outpatient Encounter Medications as of 3/21/2022   Medication Sig Dispense Refill    XARELTO 20 MG TABS tablet Take 1 tablet by mouth Daily with supper 90 tablet 4    ALPRAZolam (XANAX) 2 MG tablet       losartan (COZAAR) 50 MG tablet Take 1 tablet by mouth daily 90 tablet 4    furosemide (LASIX) 20 MG tablet Take 1 tablet by mouth 2 times daily (Patient taking differently: Take 20 mg by mouth daily ) 180 tablet 3    potassium chloride (KLOR-CON M) 20 MEQ extended release tablet Take 1 tablet by mouth 2 times daily 180 tablet 4    carvedilol (COREG) 3.125 MG tablet Take 1 tablet by mouth 2 times daily (with meals) 180 tablet 4    famotidine (PEPCID) 20 MG tablet Take 20 mg by mouth 2 times daily      Compression Stockings MISC by Does not apply route 20-30 mm hg compression stockings, bilateral, knee high 1 each 0    amitriptyline (ELAVIL) 100 MG tablet Take 100 mg by mouth nightly   2    nitroGLYCERIN (NITROSTAT) 0.4 MG SL tablet Place 1 tablet under the tongue every 5 minutes as needed for Chest pain 25 tablet 3    Ascorbic Acid (VITAMIN C) 500 MG tablet Take 1,000 mg by mouth daily       vitamin E 400 UNIT capsule Take 800 Units by mouth daily       vitamin D (CHOLECALCIFEROL) 1000 UNIT TABS tablet Take 1,000 Units by mouth 2 times daily       [DISCONTINUED] gabapentin (NEURONTIN) 300 MG capsule TAKE 1 CAPSULE BY MOUTH THREE TIMES A DAY FOR 90 DAYS      albuterol sulfate HFA (VENTOLIN HFA) 108 (90 Base) MCG/ACT inhaler Inhale 2 puffs into the lungs 4 times daily as needed for Wheezing (Patient not taking: Reported on 3/21/2022) 3 Inhaler 1     No facility-administered encounter medications on file as of 3/21/2022. Allergies:  Codeine and Flomax [tamsulosin hcl]     [x] Medications and dosages reviewed.   ROS:  [x]Full ROS obtained and negative except as mentioned in HPI      Physical Examination:    Vitals:    03/21/22 1406   BP: 108/60   Pulse: 58   SpO2: 96%      /60   Pulse 58   Ht 5' 10\" (1.778 m)   Wt 246 lb 9.6 oz (111.9 kg)   SpO2 96%   BMI 35.38 kg/m²     · GENERAL: Elderly obese male in wheel chair in NAD  · NEUROLOGICAL: Alert and oriented  · PSYCH: Calm affect  · SKIN: Warm and dry, No obvious Rash  · HEENT: Normocephalic, Sclera non-icteric, mucus membranes moist  · NECK: supple, JVP normal  · CAROTID: Normal upstroke, no bruits  · CARDIAC: Normal PMI, regular rate and slightly irregular rhythm, normal S1S2, no murmur, rub, or gallop  · RESPIRATORY: Normal respiratory effort, Clear bilaterally  · EXTREMITIES: no LE edema. · MUSCULOSKELETAL: no chest wall tenderness, no c/o joint pain. · GASTROINTESTINAL: normal bowel sounds, soft, non-tender, no bruit    Echo 7/14/16:  Normal left ventricle size, wall thickness and systolic function with an EF of 55%. Mild aortic stenosis with mean gradient of 12mmHg. Trivial aortic regurgitation is present. Mild to moderate tricuspid regurgitation with RVSP estimated at 47 mmHg    ECHO 4/2020  Summary   Suboptimal image quality. Definity contrast administered. Left ventricular cavity size is normal.   There is asymmetric hypertrophy of the basal septum. Ejection fraction is visually estimated to be 40-45%. There is mild to moderate diffuse hypokinesis. Diastolic filling parameters suggest grade I diastolic dysfunction. Mitral valve leaflets appear mildly thickened. Trivial mitral regurgitation is present. The left atrium is mildly dilated. Aortic valve leaflets appear thickened. Trivial aortic regurgitation is present. The right ventricle is enlarged. Right ventricular systolic function is moderately reduced. TAPSE 1.1 cm   Tricuspid valve is structurally normal.   Mild tricuspid regurgitation. The right atrium is severely dilated. Estimated pulmonary artery systolic pressure is moderately elevated at 29-41   mmHg assuming a right atrial pressure of 15 mmHg.     LABS:  2/10/2020 H/H=13/40,   9/28/2020 creat=0.9        Assessment:     1. CAD (coronary artery disease)/CABG 1981 & 2001:   Stable without angina. Continue medical therapy. Lexiscan Myoview 2014> Normal perfusion/EF. Repeat myoview 8/2018-fixed inferior, scar vs diaphragm   2. HTN (hypertension): Well controlled. Continue coreg and losartan    /60   Pulse 58   Ht 5' 10\" (1.778 m)   Wt 246 lb 9.6 oz (111.9 kg)   SpO2 96%   BMI 35.38 kg/m²      3. Hyperlipidemia: PYY=892 Stopped lipitor due to abdominal complaints/diarrhea    4. Carotid Stenosis:  2008 dopplers> less than 40 % bilaterally. Stable   5. Atrial Fibrillation (paroxysmal):  PAF. HR controlled. Asymptomatic. EKG today. Need labs. Continue Xarelto  6. DVT: Diagnosed Spring 2014>  Xarelto for 6 months. ? Hypercoagulable. Repeat testing negative. On Xarelto. Continue  7. Dysnpea/CHF: EF=40-45%. Improved. Follow. Aldactone stopped due to diarrhea.  Continue coreg and losartan       Plan:  Same meds  Check EKG=> sinus with PACs  Labs    Thank you for allowing me to participate in the care of this individual.      Chanda Noriega M.D., [facility] [place]

## 2022-03-22 ENCOUNTER — TELEPHONE (OUTPATIENT)
Dept: CARDIOLOGY CLINIC | Age: 87
End: 2022-03-22

## 2022-03-22 LAB
ESTIMATED AVERAGE GLUCOSE: 96.8 MG/DL
HBA1C MFR BLD: 5 %

## 2022-03-22 NOTE — TELEPHONE ENCOUNTER
Pt called to find out lab results from yesterday. He would like lab results mailed to him as well. He would like call back at home number, [phone].

## 2022-04-15 ENCOUNTER — HOSPITAL ENCOUNTER (OUTPATIENT)
Dept: VASCULAR LAB | Age: 87
Discharge: HOME | End: 2022-04-15
Payer: MEDICARE

## 2022-04-15 DIAGNOSIS — I73.89 OTHER SPECIFIED PERIPHERAL VASCULAR DISEASES (HCC): ICD-10-CM

## 2022-04-15 PROCEDURE — 93922 UPR/L XTREMITY ART 2 LEVELS: CPT

## 2022-04-28 ENCOUNTER — HOSPITAL ENCOUNTER (EMERGENCY)
Age: 87
Discharge: HOME | End: 2022-04-28
Payer: COMMERCIAL

## 2022-04-28 ENCOUNTER — APPOINTMENT (OUTPATIENT)
Dept: CT IMAGING | Age: 87
End: 2022-04-28
Payer: COMMERCIAL

## 2022-04-28 VITALS
DIASTOLIC BLOOD PRESSURE: 81 MMHG | HEART RATE: 73 BPM | OXYGEN SATURATION: 96 % | HEIGHT: 71 IN | RESPIRATION RATE: 16 BRPM | SYSTOLIC BLOOD PRESSURE: 155 MMHG | TEMPERATURE: 98.3 F | WEIGHT: 216 LBS | BODY MASS INDEX: 30.24 KG/M2
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
## 2022-04-28 DIAGNOSIS — S61.419A SKIN TEAR OF HAND WITHOUT COMPLICATION, INITIAL ENCOUNTER: ICD-10-CM

## 2022-04-28 DIAGNOSIS — Z23 NEED FOR TETANUS BOOSTER: ICD-10-CM

## 2022-04-28 PROCEDURE — 99284 EMERGENCY DEPT VISIT MOD MDM: CPT

## 2022-04-28 PROCEDURE — 70450 CT HEAD/BRAIN W/O DYE: CPT

## 2022-04-28 PROCEDURE — 6370000000 HC RX 637 (ALT 250 FOR IP): Performed by: PHYSICIAN ASSISTANT

## 2022-04-28 PROCEDURE — 90471 IMMUNIZATION ADMIN: CPT | Performed by: PHYSICIAN ASSISTANT

## 2022-04-28 PROCEDURE — 90715 TDAP VACCINE 7 YRS/> IM: CPT | Performed by: PHYSICIAN ASSISTANT

## 2022-04-28 PROCEDURE — 6360000002 HC RX W HCPCS: Performed by: PHYSICIAN ASSISTANT

## 2022-04-28 RX ORDER — ACETAMINOPHEN 500 MG
1000 TABLET ORAL ONCE
Status: COMPLETED | OUTPATIENT
Start: 2022-04-28 | End: 2022-04-28

## 2022-04-28 RX ADMIN — ACETAMINOPHEN 1000 MG: 500 TABLET ORAL at 19:43

## 2022-04-28 RX ADMIN — TETANUS TOXOID, REDUCED DIPHTHERIA TOXOID AND ACELLULAR PERTUSSIS VACCINE, ADSORBED 0.5 ML: 5; 2.5; 8; 8; 2.5 SUSPENSION INTRAMUSCULAR at 19:44

## 2022-04-28 ASSESSMENT — ENCOUNTER SYMPTOMS
COUGH: 0
DIARRHEA: 0
VOMITING: 0
RHINORRHEA: 0
SHORTNESS OF BREATH: 0
ABDOMINAL PAIN: 0
NAUSEA: 0
WHEEZING: 0

## 2022-04-28 ASSESSMENT — PAIN SCALES - GENERAL
PAINLEVEL_OUTOF10: 9
PAINLEVEL_OUTOF10: 8

## 2022-04-28 ASSESSMENT — PAIN DESCRIPTION - LOCATION: LOCATION: HEAD

## 2022-04-28 ASSESSMENT — PAIN DESCRIPTION - PAIN TYPE: TYPE: ACUTE PAIN

## 2022-04-28 ASSESSMENT — PAIN - FUNCTIONAL ASSESSMENT: PAIN_FUNCTIONAL_ASSESSMENT: 0-10

## 2022-04-28 ASSESSMENT — PAIN DESCRIPTION - FREQUENCY: FREQUENCY: CONTINUOUS
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
## 2022-05-03 ENCOUNTER — HOSPITAL ENCOUNTER (OUTPATIENT)
Dept: GENERAL RADIOLOGY | Age: 87
Discharge: HOME | End: 2022-05-03
Payer: MEDICARE

## 2022-05-03 ENCOUNTER — HOSPITAL ENCOUNTER (OUTPATIENT)
Dept: WOUND CARE | Age: 87
Discharge: HOME | End: 2022-05-03
Payer: MEDICARE

## 2022-05-03 ENCOUNTER — HOSPITAL ENCOUNTER (OUTPATIENT)
Age: 87
Discharge: HOME | End: 2022-05-03
Payer: MEDICARE

## 2022-05-03 VITALS
TEMPERATURE: 96.9 F | SYSTOLIC BLOOD PRESSURE: 144 MMHG | DIASTOLIC BLOOD PRESSURE: 126 MMHG | RESPIRATION RATE: 15 BRPM | HEART RATE: 55 BPM

## 2022-05-03 DIAGNOSIS — S61.402A OPEN WOUND OF LEFT HAND, INITIAL ENCOUNTER: ICD-10-CM

## 2022-05-03 DIAGNOSIS — L97.522 CHRONIC FOOT ULCER WITH FAT LAYER EXPOSED, LEFT (HCC): ICD-10-CM

## 2022-05-03 DIAGNOSIS — R52 PAIN: ICD-10-CM

## 2022-05-03 DIAGNOSIS — R52 PAIN: Primary | ICD-10-CM

## 2022-05-03 DIAGNOSIS — S81.801A OPEN WOUND OF RIGHT LOWER LEG, INITIAL ENCOUNTER: ICD-10-CM

## 2022-05-03 DIAGNOSIS — S81.801D OPEN WOUND OF RIGHT LOWER LEG, SUBSEQUENT ENCOUNTER: ICD-10-CM

## 2022-05-03 PROCEDURE — 73100 X-RAY EXAM OF WRIST: CPT

## 2022-05-03 PROCEDURE — 73130 X-RAY EXAM OF HAND: CPT

## 2022-05-03 PROCEDURE — 99214 OFFICE O/P EST MOD 30 MIN: CPT | Performed by: SURGERY

## 2022-05-03 PROCEDURE — 99213 OFFICE O/P EST LOW 20 MIN: CPT

## 2022-05-03 RX ORDER — BACITRACIN ZINC AND POLYMYXIN B SULFATE 500; 1000 [USP'U]/G; [USP'U]/G
OINTMENT TOPICAL ONCE
Status: CANCELLED | OUTPATIENT
Start: 2022-05-03 | End: 2022-05-03

## 2022-05-03 RX ORDER — GINSENG 100 MG
CAPSULE ORAL ONCE
Status: CANCELLED | OUTPATIENT
Start: 2022-05-03 | End: 2022-05-03

## 2022-05-03 RX ORDER — CLOBETASOL PROPIONATE 0.5 MG/G
OINTMENT TOPICAL ONCE
Status: CANCELLED | OUTPATIENT
Start: 2022-05-03 | End: 2022-05-03

## 2022-05-03 RX ORDER — LIDOCAINE 40 MG/G
CREAM TOPICAL ONCE
Status: CANCELLED | OUTPATIENT
Start: 2022-05-03 | End: 2022-05-03

## 2022-05-03 RX ORDER — LIDOCAINE 50 MG/G
OINTMENT TOPICAL ONCE
Status: CANCELLED | OUTPATIENT
Start: 2022-05-03 | End: 2022-05-03

## 2022-05-03 RX ORDER — LIDOCAINE HYDROCHLORIDE 40 MG/ML
SOLUTION TOPICAL ONCE
Status: CANCELLED | OUTPATIENT
Start: 2022-05-03 | End: 2022-05-03

## 2022-05-03 RX ORDER — BACITRACIN, NEOMYCIN, POLYMYXIN B 400; 3.5; 5 [USP'U]/G; MG/G; [USP'U]/G
OINTMENT TOPICAL ONCE
Status: CANCELLED | OUTPATIENT
Start: 2022-05-03 | End: 2022-05-03

## 2022-05-03 RX ORDER — BACITRACIN ZINC AND POLYMYXIN B SULFATE 500; 1000 [USP'U]/G; [USP'U]/G
OINTMENT TOPICAL ONCE
Status: DISCONTINUED | OUTPATIENT
Start: 2022-05-03 | End: 2022-05-04 | Stop reason: HOSPADM

## 2022-05-03 RX ORDER — BETAMETHASONE DIPROPIONATE 0.05 %
OINTMENT (GRAM) TOPICAL ONCE
Status: CANCELLED | OUTPATIENT
Start: 2022-05-03 | End: 2022-05-03

## 2022-05-03 RX ORDER — DOXYCYCLINE HYCLATE 100 MG/1
100 CAPSULE ORAL 2 TIMES DAILY
COMMUNITY
Start: 2022-05-01 | End: 2022-05-22

## 2022-05-03 RX ORDER — GENTAMICIN SULFATE 1 MG/G
OINTMENT TOPICAL ONCE
Status: CANCELLED | OUTPATIENT
Start: 2022-05-03 | End: 2022-05-03

## 2022-05-03 RX ORDER — LIDOCAINE HYDROCHLORIDE 20 MG/ML
JELLY TOPICAL ONCE
Status: CANCELLED | OUTPATIENT
Start: 2022-05-03 | End: 2022-05-03

## 2022-05-03 RX ORDER — LIDOCAINE 40 MG/G
CREAM TOPICAL ONCE
Status: DISCONTINUED | OUTPATIENT
Start: 2022-05-03 | End: 2022-05-04 | Stop reason: HOSPADM

## 2022-05-03 NOTE — PLAN OF CARE
Discharge instructions given. Patient verbalized understanding. Return to [facility] in 1 week(s).   Called/faxed orders to  [facility]
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
## 2022-05-10 ENCOUNTER — HOSPITAL ENCOUNTER (OUTPATIENT)
Dept: WOUND CARE | Age: 87
Discharge: HOME | End: 2022-05-10

## 2022-05-17 ENCOUNTER — HOSPITAL ENCOUNTER (OUTPATIENT)
Dept: WOUND CARE | Age: 87
Discharge: HOME | End: 2022-05-17
Payer: COMMERCIAL

## 2022-05-17 VITALS — DIASTOLIC BLOOD PRESSURE: 73 MMHG | HEART RATE: 49 BPM | SYSTOLIC BLOOD PRESSURE: 125 MMHG | RESPIRATION RATE: 15 BRPM

## 2022-05-17 DIAGNOSIS — L97.522 CHRONIC FOOT ULCER WITH FAT LAYER EXPOSED, LEFT (HCC): ICD-10-CM

## 2022-05-17 DIAGNOSIS — S61.402A OPEN WOUND OF LEFT HAND, INITIAL ENCOUNTER: Primary | ICD-10-CM

## 2022-05-17 DIAGNOSIS — S81.801A OPEN WOUND OF RIGHT LOWER LEG, INITIAL ENCOUNTER: ICD-10-CM

## 2022-05-17 PROCEDURE — 99213 OFFICE O/P EST LOW 20 MIN: CPT

## 2022-05-17 PROCEDURE — 99213 OFFICE O/P EST LOW 20 MIN: CPT | Performed by: SURGERY

## 2022-05-17 RX ORDER — LIDOCAINE HYDROCHLORIDE 40 MG/ML
SOLUTION TOPICAL ONCE
Status: CANCELLED | OUTPATIENT
Start: 2022-05-17 | End: 2022-05-17

## 2022-05-17 RX ORDER — GINSENG 100 MG
CAPSULE ORAL ONCE
Status: CANCELLED | OUTPATIENT
Start: 2022-05-17 | End: 2022-05-17

## 2022-05-17 RX ORDER — BETAMETHASONE DIPROPIONATE 0.05 %
OINTMENT (GRAM) TOPICAL ONCE
Status: CANCELLED | OUTPATIENT
Start: 2022-05-17 | End: 2022-05-17

## 2022-05-17 RX ORDER — CLOBETASOL PROPIONATE 0.5 MG/G
OINTMENT TOPICAL ONCE
Status: CANCELLED | OUTPATIENT
Start: 2022-05-17 | End: 2022-05-17

## 2022-05-17 RX ORDER — BACITRACIN ZINC AND POLYMYXIN B SULFATE 500; 1000 [USP'U]/G; [USP'U]/G
OINTMENT TOPICAL ONCE
Status: DISCONTINUED | OUTPATIENT
Start: 2022-05-17 | End: 2022-05-18 | Stop reason: HOSPADM

## 2022-05-17 RX ORDER — LIDOCAINE 40 MG/G
CREAM TOPICAL ONCE
Status: CANCELLED | OUTPATIENT
Start: 2022-05-17 | End: 2022-05-17

## 2022-05-17 RX ORDER — BACITRACIN ZINC AND POLYMYXIN B SULFATE 500; 1000 [USP'U]/G; [USP'U]/G
OINTMENT TOPICAL ONCE
Status: CANCELLED | OUTPATIENT
Start: 2022-05-17 | End: 2022-05-17

## 2022-05-17 RX ORDER — LIDOCAINE HYDROCHLORIDE 20 MG/ML
JELLY TOPICAL ONCE
Status: CANCELLED | OUTPATIENT
Start: 2022-05-17 | End: 2022-05-17

## 2022-05-17 RX ORDER — LIDOCAINE 40 MG/G
CREAM TOPICAL ONCE
Status: DISCONTINUED | OUTPATIENT
Start: 2022-05-17 | End: 2022-05-18 | Stop reason: HOSPADM

## 2022-05-17 RX ORDER — LIDOCAINE 50 MG/G
OINTMENT TOPICAL ONCE
Status: CANCELLED | OUTPATIENT
Start: 2022-05-17 | End: 2022-05-17

## 2022-05-17 RX ORDER — GENTAMICIN SULFATE 1 MG/G
OINTMENT TOPICAL ONCE
Status: CANCELLED | OUTPATIENT
Start: 2022-05-17 | End: 2022-05-17

## 2022-05-17 RX ORDER — BACITRACIN, NEOMYCIN, POLYMYXIN B 400; 3.5; 5 [USP'U]/G; MG/G; [USP'U]/G
OINTMENT TOPICAL ONCE
Status: CANCELLED | OUTPATIENT
Start: 2022-05-17 | End: 2022-05-17

## 2022-05-17 NOTE — PLAN OF CARE
Discharge instructions given. Patient verbalized understanding. Return to [facility] in 2 week(s).

## 2022-05-17 NOTE — PROGRESS NOTES
Louis Mcfadden  Progress Note       Annie Porras  AGE: 80 y.o. GENDER: male  : 1935  TODAY'S DATE:  2022    Subjective:     Chief Complaint   Patient presents with    Wound Check     Follow up left hand         HISTORY of PRESENT ILLNESS HPI     Annie Porras is a 80 y.o. male who presents today for wound evaluation.    History of Wound: Left hand wound    Wound Pain:  moderate  Severity:   10   Wound Type:  traumatic  Modifying Factors:  none  Associated Signs/Symptoms:  none        PAST MEDICAL HISTORY        Diagnosis Date    Atrial fibrillation (HCC)     CAD (coronary artery disease)     Diarrhea 2017    History of skin cancer     Hyperlipidemia     Hypertension     Insomnia 2020    Kidney stones 2017       PAST SURGICAL HISTORY    Past Surgical History:   Procedure Laterality Date    APPENDECTOMY      BACK SURGERY      x4    CORONARY ARTERY BYPASS GRAFT  1981    CORONARY ARTERY BYPASS GRAFT  2001    JOINT REPLACEMENT Right     knee replacement    KNEE SURGERY  2005    right     PROSTATE SURGERY      SHOULDER SURGERY      both    SKIN CANCER EXCISION      TOTAL KNEE ARTHROPLASTY Right     [place]       FAMILY HISTORY    Family History   Problem Relation Age of Onset    Heart Attack Mother     Cancer Mother     Diabetes Mother     Heart Disease Father     Heart Attack Father     High Blood Pressure Father     High Cholesterol Father     Cancer Sister         brain, lung, spine       SOCIAL HISTORY    Social History     Tobacco Use    Smoking status: Former Smoker     Packs/day: 2.00     Years: 10.00     Pack years: 20.00     Types: Cigarettes     Quit date: 1975     Years since quittin.4    Smokeless tobacco: Never Used   Vaping Use    Vaping Use: Never used   Substance Use Topics    Alcohol use: Not Currently     Alcohol/week: 1.0 standard drink     Types: 1 Shots of liquor per week     Comment: minimal alcohol consumption    Drug use: No       ALLERGIES    Allergies   Allergen Reactions    Codeine Itching    Flomax [Tamsulosin Hcl] Other (See Comments)     dizziness       MEDICATIONS    Current Outpatient Medications on File Prior to Encounter   Medication Sig Dispense Refill    doxycycline hyclate (VIBRAMYCIN) 100 MG capsule Take 100 mg by mouth 2 times daily      losartan (COZAAR) 50 MG tablet Take 1 tablet by mouth daily 90 tablet 4    potassium chloride (KLOR-CON M) 20 MEQ extended release tablet Take 1 tablet by mouth daily 90 tablet 4    carvedilol (COREG) 3.125 MG tablet Take 1 tablet by mouth 2 times daily (with meals) 180 tablet 4    furosemide (LASIX) 20 MG tablet Take 1 tablet by mouth daily 90 tablet 4    XARELTO 20 MG TABS tablet Take 1 tablet by mouth Daily with supper 90 tablet 4    ALPRAZolam (XANAX) 2 MG tablet       albuterol sulfate HFA (VENTOLIN HFA) 108 (90 Base) MCG/ACT inhaler Inhale 2 puffs into the lungs 4 times daily as needed for Wheezing (Patient not taking: Reported on 3/21/2022) 3 Inhaler 1    famotidine (PEPCID) 20 MG tablet Take 20 mg by mouth 2 times daily      Compression Stockings MISC by Does not apply route 20-30 mm hg compression stockings, bilateral, knee high 1 each 0    amitriptyline (ELAVIL) 100 MG tablet Take 100 mg by mouth nightly   2    nitroGLYCERIN (NITROSTAT) 0.4 MG SL tablet Place 1 tablet under the tongue every 5 minutes as needed for Chest pain 25 tablet 3    Ascorbic Acid (VITAMIN C) 500 MG tablet Take 1,000 mg by mouth daily       vitamin E 400 UNIT capsule Take 800 Units by mouth daily       vitamin D (CHOLECALCIFEROL) 1000 UNIT TABS tablet Take 1,000 Units by mouth 2 times daily        No current facility-administered medications on file prior to encounter.        REVIEW OF SYSTEMS    Constitutional: negative  Eyes: negative  Ears, nose, mouth, throat, and face: negative  Gastrointestinal: negative  Behavioral/Psych: negative      Objective:      BP 125/73   Pulse (!) 49   Resp 15     PHYSICAL EXAM    General Appearance: alert and oriented to person, place and time, well-developed and well-nourished, in no acute distress  Head: normocephalic and atraumatic  Neck: neck supple and non tender without mass, no thyromegaly or thyroid nodules, no cervical lymphadenopathy   Abdomen: soft, non-tender, non-distended, normal bowel sounds, no masses or organomegaly      Assessment:     Patient Active Problem List   Diagnosis    Mixed hyperlipidemia    Essential hypertension    Coronary artery disease involving native coronary artery of native heart without angina pectoris    Bilateral carotid artery stenosis    Knee pain, bilateral    Right knee DJD    Left knee DJD    Cellulitis of leg without foot, left    Cholelithiasis    Septic shock (HCC)    Hematuria    Acute respiratory failure with hypoxemia (HCC)    Urinary tract infection with hematuria    Cellulitis of left lower extremity    Hypotension    Sepsis ([address]    ODILIA (acute kidney injury) ([address]    Paroxysmal atrial fibrillation (HCC)    Lactic acidosis    Dizziness    Diarrhea    History of DVT (deep vein thrombosis)    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    Venous insufficiency    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)    Suspected COVID-19 virus infection    Tachycardia    Tachypnea    Leukopenia    Elevated lactic acid level    Moderate protein-calorie malnutrition (HCC)    Fever    Hypomagnesemia    Acute pulmonary edema (HCC)    Morbid obesity due to excess calories (HCC)    Insomnia    Dyspnea    Chronic combined systolic and diastolic congestive heart failure (HCC)    Open wound of right lower leg    Chronic foot ulcer with fat layer exposed, left ([address]    Open wound of left hand, initial encounter       Wound 05/03/22 Hand Anterior; Left #1 (Active)   Wound Image   05/03/22 0923   Wound Etiology Traumatic 05/17/22 1016   Wound Cleansed Cleansed with saline 05/17/22 1016   Dressing/Treatment Antibacterial ointment;Dry dressing 05/03/22 1058   Wound Length (cm) 2.4 cm 05/17/22 1016   Wound Width (cm) 2 cm 05/17/22 1016   Wound Depth (cm) 0.1 cm 05/17/22 1016   Wound Surface Area (cm^2) 4.8 cm^2 05/17/22 1016   Change in Wound Size % (l*w) 65.37 05/17/22 1016   Wound Volume (cm^3) 0.48 cm^3 05/17/22 1016   Wound Healing % 65 05/17/22 1016   Wound Assessment Slough 05/17/22 1016   Drainage Amount Small 05/17/22 1016   Drainage Description Serosanguinous 05/17/22 1016   Odor None 05/17/22 1016   Sameera-wound Assessment Fragile 05/17/22 1016   Margins Attached edges; Defined edges 05/17/22 1016   Number of days: 15     Pleasant 80year-old male with a superficial flap type injury to the dorsum of the left hand. The skin was laid down similar to a split-thickness skin graft at last week's visit. Most of it has taken to the underlying wound. Overall, the wound is progressing well. Plan:     Continue PSO/Adaptic and a dry bandage to the site. We will add a Tubigrip for control of swelling. Follow-up in the wound center in 2 weeks.     Treatment Plan          Written Patient Discharge Instructions Given            Electronically signed by Fredia Mortimer, MD on 5/17/2022 at 10:38 AM

## 2022-05-31 ENCOUNTER — HOSPITAL ENCOUNTER (OUTPATIENT)
Dept: WOUND CARE | Age: 87
Discharge: HOME | End: 2022-05-31

## 2022-06-02 ENCOUNTER — HOSPITAL ENCOUNTER (OUTPATIENT)
Dept: WOUND CARE | Age: 87
Discharge: HOME | End: 2022-06-02
Payer: COMMERCIAL

## 2022-06-02 VITALS
TEMPERATURE: 96.6 F | HEART RATE: 84 BPM | RESPIRATION RATE: 16 BRPM | DIASTOLIC BLOOD PRESSURE: 83 MMHG | SYSTOLIC BLOOD PRESSURE: 140 MMHG

## 2022-06-02 DIAGNOSIS — L97.522 CHRONIC FOOT ULCER WITH FAT LAYER EXPOSED, LEFT (HCC): ICD-10-CM

## 2022-06-02 DIAGNOSIS — S81.801A OPEN WOUND OF RIGHT LOWER LEG, INITIAL ENCOUNTER: ICD-10-CM

## 2022-06-02 DIAGNOSIS — S61.402D OPEN WOUND OF LEFT HAND, SUBSEQUENT ENCOUNTER: ICD-10-CM

## 2022-06-02 DIAGNOSIS — S61.402A OPEN WOUND OF LEFT HAND, INITIAL ENCOUNTER: Primary | ICD-10-CM

## 2022-06-02 PROCEDURE — 11042 DBRDMT SUBQ TIS 1ST 20SQCM/<: CPT

## 2022-06-02 PROCEDURE — 11042 DBRDMT SUBQ TIS 1ST 20SQCM/<: CPT | Performed by: NURSE PRACTITIONER

## 2022-06-02 RX ORDER — LIDOCAINE 40 MG/G
CREAM TOPICAL ONCE
Status: DISCONTINUED | OUTPATIENT
Start: 2022-06-02 | End: 2022-06-03 | Stop reason: HOSPADM

## 2022-06-02 RX ORDER — LIDOCAINE 40 MG/G
CREAM TOPICAL ONCE
Status: CANCELLED | OUTPATIENT
Start: 2022-06-02 | End: 2022-06-02

## 2022-06-02 RX ORDER — BACITRACIN ZINC AND POLYMYXIN B SULFATE 500; 1000 [USP'U]/G; [USP'U]/G
OINTMENT TOPICAL ONCE
Status: CANCELLED | OUTPATIENT
Start: 2022-06-02 | End: 2022-06-02

## 2022-06-02 RX ORDER — GENTAMICIN SULFATE 1 MG/G
OINTMENT TOPICAL ONCE
Status: CANCELLED | OUTPATIENT
Start: 2022-06-02 | End: 2022-06-02

## 2022-06-02 RX ORDER — BETAMETHASONE DIPROPIONATE 0.05 %
OINTMENT (GRAM) TOPICAL ONCE
Status: CANCELLED | OUTPATIENT
Start: 2022-06-02 | End: 2022-06-02

## 2022-06-02 RX ORDER — BACITRACIN, NEOMYCIN, POLYMYXIN B 400; 3.5; 5 [USP'U]/G; MG/G; [USP'U]/G
OINTMENT TOPICAL ONCE
Status: CANCELLED | OUTPATIENT
Start: 2022-06-02 | End: 2022-06-02

## 2022-06-02 RX ORDER — CLOBETASOL PROPIONATE 0.5 MG/G
OINTMENT TOPICAL ONCE
Status: CANCELLED | OUTPATIENT
Start: 2022-06-02 | End: 2022-06-02

## 2022-06-02 RX ORDER — LIDOCAINE HYDROCHLORIDE 20 MG/ML
JELLY TOPICAL ONCE
Status: CANCELLED | OUTPATIENT
Start: 2022-06-02 | End: 2022-06-02

## 2022-06-02 RX ORDER — LIDOCAINE 50 MG/G
OINTMENT TOPICAL ONCE
Status: CANCELLED | OUTPATIENT
Start: 2022-06-02 | End: 2022-06-02

## 2022-06-02 RX ORDER — LIDOCAINE HYDROCHLORIDE 40 MG/ML
SOLUTION TOPICAL ONCE
Status: CANCELLED | OUTPATIENT
Start: 2022-06-02 | End: 2022-06-02

## 2022-06-02 RX ORDER — GINSENG 100 MG
CAPSULE ORAL ONCE
Status: CANCELLED | OUTPATIENT
Start: 2022-06-02 | End: 2022-06-02

## 2022-06-02 NOTE — PLAN OF CARE
Discharge instructions given. Patient verbalized understanding. Return to [facility] in 2 week(s).   Continue with ABX

## 2022-06-03 PROBLEM — S61.402D: Status: ACTIVE | Noted: 2022-06-03

## 2022-06-03 NOTE — PROGRESS NOTES
Louis Mcfadden  Progress Note and Procedure Note      Jean-Paul Ryan  AGE: 80 y.o. GENDER: male  : 1935  TODAY'S DATE:  2022    Subjective:     Chief Complaint   Patient presents with    Wound Check     left arm         HISTORY of PRESENT ILLNESS HPI  Jean-Paul Ryan is a 80 y.o. male who presents today for wound evaluation.    History of Wound: Left hand wound, skin tear     Wound Pain:  moderate  Severity:  4 / 10   Wound Type:  traumatic  Modifying Factors:  none  Associated Signs/Symptoms:  none     PAST MEDICAL HISTORY        Diagnosis Date    Atrial fibrillation (HCC)     CAD (coronary artery disease)     Diarrhea 2017    History of skin cancer     Hyperlipidemia     Hypertension     Insomnia 2020    Kidney stones 2017    Open wound of left hand, subsequent encounter 6/3/2022       PAST SURGICAL HISTORY    Past Surgical History:   Procedure Laterality Date    APPENDECTOMY      BACK SURGERY      x4    CORONARY ARTERY BYPASS GRAFT  1981    CORONARY ARTERY BYPASS GRAFT      JOINT REPLACEMENT Right     knee replacement    KNEE SURGERY      right     PROSTATE SURGERY      SHOULDER SURGERY      both    SKIN CANCER EXCISION      TOTAL KNEE ARTHROPLASTY Right     [place]       FAMILY HISTORY    Family History   Problem Relation Age of Onset    Heart Attack Mother     Cancer Mother     Diabetes Mother     Heart Disease Father     Heart Attack Father     High Blood Pressure Father     High Cholesterol Father     Cancer Sister         brain, lung, spine       SOCIAL HISTORY    Social History     Tobacco Use    Smoking status: Former Smoker     Packs/day: 2.00     Years: 10.00     Pack years: 20.00     Types: Cigarettes     Quit date: 1975     Years since quittin.4    Smokeless tobacco: Never Used   Vaping Use    Vaping Use: Never used   Substance Use Topics    Alcohol use: Not Currently     Alcohol/week: 1.0 standard drink     Types: 1 Shots of liquor per week     Comment: minimal alcohol consumption    Drug use: No       ALLERGIES    Allergies   Allergen Reactions    Codeine Itching    Flomax [Tamsulosin Hcl] Other (See Comments)     dizziness       MEDICATIONS    Current Outpatient Medications on File Prior to Encounter   Medication Sig Dispense Refill    losartan (COZAAR) 50 MG tablet Take 1 tablet by mouth daily 90 tablet 4    potassium chloride (KLOR-CON M) 20 MEQ extended release tablet Take 1 tablet by mouth daily 90 tablet 4    carvedilol (COREG) 3.125 MG tablet Take 1 tablet by mouth 2 times daily (with meals) 180 tablet 4    furosemide (LASIX) 20 MG tablet Take 1 tablet by mouth daily 90 tablet 4    XARELTO 20 MG TABS tablet Take 1 tablet by mouth Daily with supper 90 tablet 4    ALPRAZolam (XANAX) 2 MG tablet       albuterol sulfate HFA (VENTOLIN HFA) 108 (90 Base) MCG/ACT inhaler Inhale 2 puffs into the lungs 4 times daily as needed for Wheezing (Patient not taking: Reported on 3/21/2022) 3 Inhaler 1    famotidine (PEPCID) 20 MG tablet Take 20 mg by mouth 2 times daily      Compression Stockings MISC by Does not apply route 20-30 mm hg compression stockings, bilateral, knee high 1 each 0    amitriptyline (ELAVIL) 100 MG tablet Take 100 mg by mouth nightly   2    nitroGLYCERIN (NITROSTAT) 0.4 MG SL tablet Place 1 tablet under the tongue every 5 minutes as needed for Chest pain 25 tablet 3    Ascorbic Acid (VITAMIN C) 500 MG tablet Take 1,000 mg by mouth daily       vitamin E 400 UNIT capsule Take 800 Units by mouth daily       vitamin D (CHOLECALCIFEROL) 1000 UNIT TABS tablet Take 1,000 Units by mouth 2 times daily        No current facility-administered medications on file prior to encounter. REVIEW OF SYSTEMS    Pertinent items are noted in HPI.       Objective:      BP (!) 140/83   Pulse 84   Temp (!) 96.6 °F (35.9 °C) (Infrared)   Resp 16     PHYSICAL EXAM    General Appearance: alert and oriented to person, place and time  Skin: warm and dry  Head: normocephalic and atraumatic  Eyes: pupils equal, round, and reactive to light  Pulmonary/Chest:  normal air movement, no respiratory distress  Cardiovascular: normal rate and regular rhythm      Assessment:     Patient Active Problem List   Diagnosis    Mixed hyperlipidemia    Essential hypertension    Coronary artery disease involving native coronary artery of native heart without angina pectoris    Bilateral carotid artery stenosis    Knee pain, bilateral    Right knee DJD    Left knee DJD    Cellulitis of leg without foot, left    Cholelithiasis    Septic shock (HCC)    Hematuria    Acute respiratory failure with hypoxemia ([address]    Urinary tract infection with hematuria    Cellulitis of left lower extremity    Hypotension    Sepsis ([address]    ODILIA (acute kidney injury) ([address]    Paroxysmal atrial fibrillation (HCC)    Lactic acidosis    Dizziness    Diarrhea    History of DVT (deep vein thrombosis)    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    Venous insufficiency    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)    Suspected COVID-19 virus infection    Tachycardia    Tachypnea    Leukopenia    Elevated lactic acid level    Moderate protein-calorie malnutrition (HCC)    Fever    Hypomagnesemia    Acute pulmonary edema (HCC)    Morbid obesity due to excess calories (HCC)    Insomnia    Dyspnea    Chronic combined systolic and diastolic congestive heart failure (HCC)    Open wound of right lower leg    Chronic foot ulcer with fat layer exposed, left ([address]    Open wound of left hand, initial encounter    Open wound of left hand, subsequent encounter       Procedure Note    Performed by: SEJAL Weiss CNP    Consent obtained: Yes    Time out taken:  Yes    Pain Control: Anesthetic  Anesthetic: 4% Lidocaine Cream     Debridement:Excisional Debridement    Using curette and forceps the wound was sharply debrided    down through and including the removal of epidermis, dermis and subcutaneous tissue. Devitalized Tissue Debrided:  fibrin, biofilm and slough    Pre Debridement Measurements:  Are located in the Wound Documentation Flow Sheet    Wound #: 1     Post  Debridement Measurements:  Wound 05/03/22 Hand Anterior; Left #1 (Active)   Wound Image   05/03/22 0923   Wound Etiology Traumatic 06/02/22 1448   Wound Cleansed Cleansed with saline 06/02/22 1448   Dressing/Treatment Antibacterial ointment;Dry dressing 05/03/22 1058   Wound Length (cm) 0.8 cm 06/02/22 1448   Wound Width (cm) 0.7 cm 06/02/22 1448   Wound Depth (cm) 0.1 cm 06/02/22 1448   Wound Surface Area (cm^2) 0.56 cm^2 06/02/22 1448   Change in Wound Size % (l*w) 95.96 06/02/22 1448   Wound Volume (cm^3) 0.056 cm^3 06/02/22 1448   Wound Healing % 96 06/02/22 1448   Post-Procedure Length (cm) 0.9 cm 06/02/22 1451   Post-Procedure Width (cm) 0.8 cm 06/02/22 1451   Post-Procedure Depth (cm) 0.15 cm 06/02/22 1451   Post-Procedure Surface Area (cm^2) 0.72 cm^2 06/02/22 1451   Post-Procedure Volume (cm^3) 0.108 cm^3 06/02/22 1451   Wound Assessment Bleeding 06/02/22 1451   Drainage Amount Moderate 06/02/22 1451   Drainage Description Serosanguinous 06/02/22 1451   Odor None 06/02/22 1448   Sameera-wound Assessment Fragile 06/02/22 1448   Margins Attached edges 06/02/22 1448   Number of days: 31           Total Surface Area Debrided:  0.72 sq cm     Percentage of wound debrided 100%    Bleeding:  Minimal    Hemostasis Achieved:  not needed    Procedural Pain:  1  / 10     Post Procedural Pain:  0 / 10     Response to treatment:  Well tolerated by patient. Plan:     The nature of the patient's condition was explained in depth.  The patient was informed that their compliance to the treatment plan is paramount to successful healing and prevention of further ulceration and/or infection Discharge Treatment   Dressing care:  Antibiotic ointment, adaptic, dry dressing, 1 layer tubi  from hand to forearm (cut hole for thumb)- change every 2 days.         Written Patient Discharge Instructions Given            Electronically signed by SEJAL Diaz CNP on 6/3/2022 at 11:46 AM

## 2022-06-14 ENCOUNTER — HOSPITAL ENCOUNTER (OUTPATIENT)
Dept: WOUND CARE | Age: 87
Discharge: HOME | End: 2022-06-14
Payer: COMMERCIAL

## 2022-06-14 PROCEDURE — 99212 OFFICE O/P EST SF 10 MIN: CPT

## 2022-06-14 PROCEDURE — 99212 OFFICE O/P EST SF 10 MIN: CPT | Performed by: SURGERY

## 2022-06-14 RX ORDER — LIDOCAINE HYDROCHLORIDE 20 MG/ML
JELLY TOPICAL ONCE
Status: CANCELLED | OUTPATIENT
Start: 2022-06-14 | End: 2022-06-14

## 2022-06-14 RX ORDER — GINSENG 100 MG
CAPSULE ORAL ONCE
Status: CANCELLED | OUTPATIENT
Start: 2022-06-14 | End: 2022-06-14

## 2022-06-14 RX ORDER — BACITRACIN, NEOMYCIN, POLYMYXIN B 400; 3.5; 5 [USP'U]/G; MG/G; [USP'U]/G
OINTMENT TOPICAL ONCE
Status: CANCELLED | OUTPATIENT
Start: 2022-06-14 | End: 2022-06-14

## 2022-06-14 RX ORDER — BETAMETHASONE DIPROPIONATE 0.05 %
OINTMENT (GRAM) TOPICAL ONCE
Status: CANCELLED | OUTPATIENT
Start: 2022-06-14 | End: 2022-06-14

## 2022-06-14 RX ORDER — LIDOCAINE 50 MG/G
OINTMENT TOPICAL ONCE
Status: CANCELLED | OUTPATIENT
Start: 2022-06-14 | End: 2022-06-14

## 2022-06-14 RX ORDER — LIDOCAINE HYDROCHLORIDE 40 MG/ML
SOLUTION TOPICAL ONCE
Status: CANCELLED | OUTPATIENT
Start: 2022-06-14 | End: 2022-06-14

## 2022-06-14 RX ORDER — CLOBETASOL PROPIONATE 0.5 MG/G
OINTMENT TOPICAL ONCE
Status: CANCELLED | OUTPATIENT
Start: 2022-06-14 | End: 2022-06-14

## 2022-06-14 RX ORDER — BACITRACIN ZINC AND POLYMYXIN B SULFATE 500; 1000 [USP'U]/G; [USP'U]/G
OINTMENT TOPICAL ONCE
Status: CANCELLED | OUTPATIENT
Start: 2022-06-14 | End: 2022-06-14

## 2022-06-14 RX ORDER — LIDOCAINE 40 MG/G
CREAM TOPICAL ONCE
Status: CANCELLED | OUTPATIENT
Start: 2022-06-14 | End: 2022-06-14

## 2022-06-14 RX ORDER — GENTAMICIN SULFATE 1 MG/G
OINTMENT TOPICAL ONCE
Status: CANCELLED | OUTPATIENT
Start: 2022-06-14 | End: 2022-06-14

## 2022-06-14 NOTE — PROGRESS NOTES
Louis Mcfadden  Progress Note       Fantasma Henry  AGE: 80 y.o. GENDER: male  : 1935  TODAY'S DATE:  2022    Subjective:     Chief Complaint   Patient presents with    Wound Check     Left hand wound follow up         HISTORY of PRESENT ILLNESS HPI     Fantasma Henry is a 80 y.o. male who presents today for wound evaluation.    History of Wound: Left hand wound    Wound Pain:  none  Severity:  0 / 10   Wound Type:  traumatic  Modifying Factors:  none  Associated Signs/Symptoms:  none        PAST MEDICAL HISTORY        Diagnosis Date    Atrial fibrillation (HCC)     CAD (coronary artery disease)     Diarrhea 2017    History of skin cancer     Hyperlipidemia     Hypertension     Insomnia 2020    Kidney stones 2017    Open wound of left hand, subsequent encounter 6/3/2022       PAST SURGICAL HISTORY    Past Surgical History:   Procedure Laterality Date    APPENDECTOMY      BACK SURGERY      x4    CORONARY ARTERY BYPASS GRAFT  1981    CORONARY ARTERY BYPASS GRAFT  2001    JOINT REPLACEMENT Right     knee replacement    KNEE SURGERY  2005    right     PROSTATE SURGERY      SHOULDER SURGERY      both    SKIN CANCER EXCISION      TOTAL KNEE ARTHROPLASTY Right     [place]       FAMILY HISTORY    Family History   Problem Relation Age of Onset    Heart Attack Mother     Cancer Mother     Diabetes Mother     Heart Disease Father     Heart Attack Father     High Blood Pressure Father     High Cholesterol Father     Cancer Sister         brain, lung, spine       SOCIAL HISTORY    Social History     Tobacco Use    Smoking status: Former Smoker     Packs/day: 2.00     Years: 10.00     Pack years: 20.00     Types: Cigarettes     Quit date: 1975     Years since quittin.4    Smokeless tobacco: Never Used   Vaping Use    Vaping Use: Never used   Substance Use Topics    Alcohol use: Not Currently     Alcohol/week: 1.0 standard drink Types: 1 Shots of liquor per week     Comment: minimal alcohol consumption    Drug use: No       ALLERGIES    Allergies   Allergen Reactions    Codeine Itching    Flomax [Tamsulosin Hcl] Other (See Comments)     dizziness       MEDICATIONS    Current Outpatient Medications on File Prior to Encounter   Medication Sig Dispense Refill    losartan (COZAAR) 50 MG tablet Take 1 tablet by mouth daily 90 tablet 4    potassium chloride (KLOR-CON M) 20 MEQ extended release tablet Take 1 tablet by mouth daily 90 tablet 4    carvedilol (COREG) 3.125 MG tablet Take 1 tablet by mouth 2 times daily (with meals) 180 tablet 4    furosemide (LASIX) 20 MG tablet Take 1 tablet by mouth daily 90 tablet 4    XARELTO 20 MG TABS tablet Take 1 tablet by mouth Daily with supper 90 tablet 4    ALPRAZolam (XANAX) 2 MG tablet       albuterol sulfate HFA (VENTOLIN HFA) 108 (90 Base) MCG/ACT inhaler Inhale 2 puffs into the lungs 4 times daily as needed for Wheezing (Patient not taking: Reported on 3/21/2022) 3 Inhaler 1    famotidine (PEPCID) 20 MG tablet Take 20 mg by mouth 2 times daily      Compression Stockings MISC by Does not apply route 20-30 mm hg compression stockings, bilateral, knee high 1 each 0    amitriptyline (ELAVIL) 100 MG tablet Take 100 mg by mouth nightly   2    nitroGLYCERIN (NITROSTAT) 0.4 MG SL tablet Place 1 tablet under the tongue every 5 minutes as needed for Chest pain 25 tablet 3    Ascorbic Acid (VITAMIN C) 500 MG tablet Take 1,000 mg by mouth daily       vitamin E 400 UNIT capsule Take 800 Units by mouth daily       vitamin D (CHOLECALCIFEROL) 1000 UNIT TABS tablet Take 1,000 Units by mouth 2 times daily        No current facility-administered medications on file prior to encounter. REVIEW OF SYSTEMS    Constitutional: negative  Eyes: negative  Gastrointestinal: negative  Behavioral/Psych: negative      Objective: There were no vitals taken for this visit.     PHYSICAL EXAM    General Appearance: alert and oriented to person, place and time, well-developed and well-nourished, in no acute distress  Head: normocephalic and atraumatic  Neck: neck supple and non tender without mass, no thyromegaly or thyroid nodules, no cervical lymphadenopathy   Abdomen: soft, non-tender, non-distended, normal bowel sounds, no masses or organomegaly      Assessment:     Patient Active Problem List   Diagnosis    Mixed hyperlipidemia    Essential hypertension    Coronary artery disease involving native coronary artery of native heart without angina pectoris    Bilateral carotid artery stenosis    Knee pain, bilateral    Right knee DJD    Left knee DJD    Cellulitis of leg without foot, left    Cholelithiasis    Septic shock (HCC)    Hematuria    Acute respiratory failure with hypoxemia ([address]    Urinary tract infection with hematuria    Cellulitis of left lower extremity    Hypotension    Sepsis ([address]    ODILIA (acute kidney injury) ([address]    Paroxysmal atrial fibrillation (HCC)    Lactic acidosis    Dizziness    Diarrhea    History of DVT (deep vein thrombosis)    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    Venous insufficiency    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)    Suspected COVID-19 virus infection    Tachycardia    Tachypnea    Leukopenia    Elevated lactic acid level    Moderate protein-calorie malnutrition (HCC)    Fever    Hypomagnesemia    Acute pulmonary edema (HCC)    Morbid obesity due to excess calories (HCC)    Insomnia    Dyspnea    Chronic combined systolic and diastolic congestive heart failure (HCC)    Open wound of right lower leg    Chronic foot ulcer with fat layer exposed, left ([address]    Open wound of left hand, initial encounter    Open wound of left hand, subsequent encounter       80year-old male seen in follow-up for a traumatic wound on the dorsum of the left hand.   The wound has completely healed. Plan:     Follow-up as needed.     Treatment Plan          Written Patient Discharge Instructions Given            Electronically signed by Mariano Estrada MD on 6/14/2022 at 2:09 PM

## 2022-06-14 NOTE — PLAN OF CARE
Discharge instructions given. Patient verbalized understanding.   Return to [facility] as needed  Wound healed

## 2022-08-29 ENCOUNTER — TELEPHONE (OUTPATIENT)
Dept: CARDIOLOGY CLINIC | Age: 87
End: 2022-08-29

## 2022-08-29 NOTE — TELEPHONE ENCOUNTER
LOV : 03/21/2022 [facility]  NOV : 09/19/2022 [facility]    Provided the patient with 2 bottles/boxes of Xarelto   LOT #: [id number]  Exp : 04/24    Called the patient and advised that samples have been placed upfront and are ready for pickup. Patient voiced understanding.

## 2022-08-29 NOTE — TELEPHONE ENCOUNTER
Medication Samples    Medication: XARELTO       Dosage of the medication: 90    How are you taking this medication (QD, BID, TID, QID, PRN):  Take 1 tablet by mouth Daily with supper     in the office or Mail to your home?  in the office    NOTE: Pt daughter work here at [facility] and if the office have any sample she will  for Pt.   Please advise

## 2022-09-19 ENCOUNTER — OFFICE VISIT (OUTPATIENT)
Dept: CARDIOLOGY CLINIC | Age: 87
End: 2022-09-19
Payer: MEDICARE

## 2022-09-19 ENCOUNTER — HOSPITAL ENCOUNTER (OUTPATIENT)
Age: 87
Discharge: HOME | End: 2022-09-19
Payer: MEDICARE

## 2022-09-19 VITALS
BODY MASS INDEX: 33.18 KG/M2 | HEART RATE: 42 BPM | SYSTOLIC BLOOD PRESSURE: 126 MMHG | OXYGEN SATURATION: 96 % | WEIGHT: 237 LBS | HEIGHT: 71 IN | DIASTOLIC BLOOD PRESSURE: 64 MMHG

## 2022-09-19 DIAGNOSIS — I48.0 PAROXYSMAL ATRIAL FIBRILLATION (HCC): ICD-10-CM

## 2022-09-19 DIAGNOSIS — I10 ESSENTIAL HYPERTENSION: ICD-10-CM

## 2022-09-19 DIAGNOSIS — Z86.718 HISTORY OF DVT (DEEP VEIN THROMBOSIS): ICD-10-CM

## 2022-09-19 DIAGNOSIS — R06.00 DYSPNEA, UNSPECIFIED TYPE: ICD-10-CM

## 2022-09-19 DIAGNOSIS — R73.09 ELEVATED GLUCOSE: ICD-10-CM

## 2022-09-19 DIAGNOSIS — R00.1 BRADYCARDIA: ICD-10-CM

## 2022-09-19 DIAGNOSIS — I65.23 BILATERAL CAROTID ARTERY STENOSIS: ICD-10-CM

## 2022-09-19 DIAGNOSIS — E78.2 MIXED HYPERLIPIDEMIA: ICD-10-CM

## 2022-09-19 DIAGNOSIS — I50.42 CHRONIC COMBINED SYSTOLIC AND DIASTOLIC CONGESTIVE HEART FAILURE (HCC): ICD-10-CM

## 2022-09-19 DIAGNOSIS — I25.10 CORONARY ARTERY DISEASE INVOLVING NATIVE CORONARY ARTERY OF NATIVE HEART WITHOUT ANGINA PECTORIS: Primary | ICD-10-CM

## 2022-09-19 LAB
ANION GAP SERPL CALCULATED.3IONS-SCNC: 12 MMOL/L (ref 3–16)
BUN BLDV-MCNC: 17 MG/DL (ref 7–20)
CALCIUM SERPL-MCNC: 9.3 MG/DL (ref 8.3–10.6)
CHLORIDE BLD-SCNC: 103 MMOL/L (ref 99–110)
CO2: 25 MMOL/L (ref 21–32)
CREAT SERPL-MCNC: 0.9 MG/DL (ref 0.8–1.3)
GFR AFRICAN AMERICAN: >60
GFR NON-AFRICAN AMERICAN: >60
GLUCOSE BLD-MCNC: 104 MG/DL (ref 70–99)
HCT VFR BLD CALC: 40 % (ref 40.5–52.5)
HEMOGLOBIN: 13.5 G/DL (ref 13.5–17.5)
MCH RBC QN AUTO: 31.5 PG (ref 26–34)
MCHC RBC AUTO-ENTMCNC: 33.7 G/DL (ref 31–36)
MCV RBC AUTO: 93.4 FL (ref 80–100)
PDW BLD-RTO: 14 % (ref 12.4–15.4)
PLATELET # BLD: 129 K/UL (ref 135–450)
PMV BLD AUTO: 8.3 FL (ref 5–10.5)
POTASSIUM SERPL-SCNC: 4.4 MMOL/L (ref 3.5–5.1)
RBC # BLD: 4.28 M/UL (ref 4.2–5.9)
SODIUM BLD-SCNC: 140 MMOL/L (ref 136–145)
TSH REFLEX: 2.91 UIU/ML (ref 0.27–4.2)
WBC # BLD: 5.7 K/UL (ref 4–11)

## 2022-09-19 PROCEDURE — 80048 BASIC METABOLIC PNL TOTAL CA: CPT

## 2022-09-19 PROCEDURE — 84443 ASSAY THYROID STIM HORMONE: CPT

## 2022-09-19 PROCEDURE — 85027 COMPLETE CBC AUTOMATED: CPT

## 2022-09-19 PROCEDURE — 93000 ELECTROCARDIOGRAM COMPLETE: CPT | Performed by: INTERNAL MEDICINE

## 2022-09-19 PROCEDURE — 83036 HEMOGLOBIN GLYCOSYLATED A1C: CPT

## 2022-09-19 PROCEDURE — 1123F ACP DISCUSS/DSCN MKR DOCD: CPT | Performed by: INTERNAL MEDICINE

## 2022-09-19 PROCEDURE — 36415 COLL VENOUS BLD VENIPUNCTURE: CPT

## 2022-09-19 PROCEDURE — 99214 OFFICE O/P EST MOD 30 MIN: CPT | Performed by: INTERNAL MEDICINE

## 2022-09-19 RX ORDER — LOSARTAN POTASSIUM 50 MG/1
50 TABLET ORAL DAILY
Qty: 90 TABLET | Refills: 4 | Status: SHIPPED | OUTPATIENT
Start: 2022-09-19

## 2022-09-19 RX ORDER — FUROSEMIDE 20 MG/1
20 TABLET ORAL DAILY
Qty: 90 TABLET | Refills: 4 | Status: SHIPPED | OUTPATIENT
Start: 2022-09-19

## 2022-09-19 RX ORDER — POTASSIUM CHLORIDE 20 MEQ/1
20 TABLET, EXTENDED RELEASE ORAL DAILY
Qty: 90 TABLET | Refills: 4 | Status: SHIPPED | OUTPATIENT
Start: 2022-09-19

## 2022-09-19 NOTE — PROGRESS NOTES
[facility]  Cardiac Follow up       Referring Provider:  Jim Echevarria MD     Chief Complaint   Patient presents with    Hypertension    Coronary Artery Disease    Hyperlipidemia      History of Present Illness:  Mr. Sammy Lester is an 80year old gentleman here today in follow up for CHF, CAD, hypertension, hyperlipidemia, afib and PVD. In 2014, he was diagnosed with a DVT and completed 6 months of treatment with Xarelto. He was seen by a hematologist, initially worked up for lupus anticoagulant, but most recent testing did not show evidence of this. Prior to last visit, he was admitted with sepsis and developed afib. He was started on Eliquis. Now on Xarelto. Remains on Xarelto. No bleeding. He is doing well. Denies chest pain or dyspnea. Mild chronic edema. Walks with walker. Past Medical History:   has a past medical history of Atrial fibrillation ([facility] [address] CAD (coronary artery disease), Diarrhea, History of skin cancer, Hyperlipidemia, Hypertension, Insomnia, Kidney stones, and Open wound of left hand, subsequent encounter. Surgical History:   has a past surgical history that includes back surgery; knee surgery (2005); shoulder surgery; Appendectomy; Coronary artery bypass graft (1981); Coronary artery bypass graft (2001); Prostate surgery; Skin cancer excision; joint replacement (Right); and Total knee arthroplasty (Right, 2015). Social History:   reports that he quit smoking about 47 years ago. His smoking use included cigarettes. He has a 20.00 pack-year smoking history. He has never used smokeless tobacco. He reports that he does not currently use alcohol after a past usage of about 1.0 standard drink per week. He reports that he does not use drugs.      Family History:  family history includes Cancer in his mother and sister; Diabetes in his mother; Heart Attack in his father and mother; Heart Disease in his father; High Blood Pressure in his father; High Cholesterol in his father. Home Medications:  Outpatient Encounter Medications as of 9/19/2022   Medication Sig Dispense Refill    losartan (COZAAR) 50 MG tablet Take 1 tablet by mouth daily 90 tablet 4    potassium chloride (KLOR-CON M) 20 MEQ extended release tablet Take 1 tablet by mouth daily 90 tablet 4    carvedilol (COREG) 3.125 MG tablet Take 1 tablet by mouth 2 times daily (with meals) 180 tablet 4    furosemide (LASIX) 20 MG tablet Take 1 tablet by mouth daily 90 tablet 4    XARELTO 20 MG TABS tablet Take 1 tablet by mouth Daily with supper 90 tablet 4    ALPRAZolam (XANAX) 2 MG tablet       famotidine (PEPCID) 20 MG tablet Take 20 mg by mouth 2 times daily      amitriptyline (ELAVIL) 100 MG tablet Take 100 mg by mouth nightly   2    Ascorbic Acid (VITAMIN C) 500 MG tablet Take 1,000 mg by mouth daily       vitamin E 400 UNIT capsule Take 800 Units by mouth daily       albuterol sulfate HFA (VENTOLIN HFA) 108 (90 Base) MCG/ACT inhaler Inhale 2 puffs into the lungs 4 times daily as needed for Wheezing (Patient not taking: No sig reported) 3 Inhaler 1    Compression Stockings MISC by Does not apply route 20-30 mm hg compression stockings, bilateral, knee high 1 each 0    nitroGLYCERIN (NITROSTAT) 0.4 MG SL tablet Place 1 tablet under the tongue every 5 minutes as needed for Chest pain 25 tablet 3    vitamin D (CHOLECALCIFEROL) 1000 UNIT TABS tablet Take 1,000 Units by mouth 2 times daily        No facility-administered encounter medications on file as of 9/19/2022. Allergies:  Codeine and Flomax [tamsulosin hcl]     [x] Medications and dosages reviewed.   ROS:  [x]Full ROS obtained and negative except as mentioned in HPI      Physical Examination:    Vitals:    09/19/22 1352   BP: 126/64   Pulse: (!) 42   SpO2: 96%      /64 (Site: Left Upper Arm, Position: Sitting, Cuff Size: Large Adult)   Pulse (!) 42   Ht 5' 11\" (1.803 m)   Wt 237 lb (107.5 kg)   SpO2 96%   BMI 33.05 kg/m²     GENERAL: Elderly obese male in wheel chair in NAD  NEUROLOGICAL: Alert and oriented  PSYCH: Calm affect  SKIN: Warm and dry, No obvious Rash  HEENT: Normocephalic, Sclera non-icteric, mucus membranes moist  NECK: supple, JVP normal  CAROTID: Normal upstroke, no bruits  CARDIAC: Normal PMI, regular bradycardic rate and rhythm, normal S1S2, no murmur, rub, or gallop  RESPIRATORY: Normal respiratory effort, Clear bilaterally  EXTREMITIES: Trivial LE edema. MUSCULOSKELETAL: no chest wall tenderness, no c/o joint pain. GASTROINTESTINAL: normal bowel sounds, soft, non-tender, no bruit    Echo 7/14/16:  Normal left ventricle size, wall thickness and systolic function with an EF of 55%. Mild aortic stenosis with mean gradient of 12mmHg. Trivial aortic regurgitation is present. Mild to moderate tricuspid regurgitation with RVSP estimated at 47 mmHg    ECHO 4/2020  Summary   Suboptimal image quality. Definity contrast administered. Left ventricular cavity size is normal.   There is asymmetric hypertrophy of the basal septum. Ejection fraction is visually estimated to be 40-45%. There is mild to moderate diffuse hypokinesis. Diastolic filling parameters suggest grade I diastolic dysfunction. Mitral valve leaflets appear mildly thickened. Trivial mitral regurgitation is present. The left atrium is mildly dilated. Aortic valve leaflets appear thickened. Trivial aortic regurgitation is present. The right ventricle is enlarged. Right ventricular systolic function is moderately reduced. TAPSE 1.1 cm   Tricuspid valve is structurally normal.   Mild tricuspid regurgitation. The right atrium is severely dilated. Estimated pulmonary artery systolic pressure is moderately elevated at 29-41   mmHg assuming a right atrial pressure of 15 mmHg. Assessment:     1. CAD (coronary artery disease)/CABG 1981 & 2001:   Stable without angina. Continue medical therapy. Lexiscan Myoview 2014> Normal perfusion/EF.  Repeat myoview 8/2018-fixed inferior, scar vs diaphragm   2. HTN (hypertension): Controlled. Stop coreg and continue losartan    /64 (Site: Left Upper Arm, Position: Sitting, Cuff Size: Large Adult)   Pulse (!) 42   Ht 5' 11\" (1.803 m)   Wt 237 lb (107.5 kg)   SpO2 96%   BMI 33.05 kg/m²      3. Hyperlipidemia: ZXB=256 Stopped lipitor due to abdominal complaints/diarrhea    4. Carotid Stenosis:  2008 dopplers> less than 40 % bilaterally. Stable   5. Atrial Fibrillation (paroxysmal):  PAF. HR controlled. Asymptomatic. Continue Xarelto  6. DVT: Diagnosed Spring 2014>  Xarelto for 6 months. ? Hypercoagulable. Repeat testing negative. On Xarelto. Continue  7. Dysnpea/CHF: EF=40-45%. Improved. Follow. Aldactone stopped due to diarrhea.  Continue  losartan, Stop coreg due to bradycardia       Plan:  Stop coreg  Check labs today including TSH    Thank you for allowing me to participate in the care of this individual.      Ariadna Olson M.D., [facility] [place]

## 2022-09-20 LAB
ESTIMATED AVERAGE GLUCOSE: 96.8 MG/DL
HBA1C MFR BLD: 5 %

## 2022-09-21 ENCOUNTER — TELEPHONE (OUTPATIENT)
Dept: CARDIOLOGY CLINIC | Age: 87
End: 2022-09-21

## 2022-09-21 NOTE — TELEPHONE ENCOUNTER
Discussed with patient's wife. She verbalized understanding.  Lab results mailed to home address per her request.

## 2022-09-21 NOTE — TELEPHONE ENCOUNTER
----- Message from Jose Ortiz MD sent at 9/20/2022  2:39 PM EDT -----  Labs good  Platelets slightly low  Discuss with Dr. Jemal Taylor.    Adrian Manuel

## 2022-10-24 DIAGNOSIS — I48.0 PAROXYSMAL ATRIAL FIBRILLATION (HCC): ICD-10-CM

## 2022-10-24 RX ORDER — RIVAROXABAN 20 MG/1
20 TABLET, FILM COATED ORAL
Qty: 90 TABLET | Refills: 4 | COMMUNITY
Start: 2022-10-24 | End: 2022-11-07 | Stop reason: SDUPTHER

## 2022-10-24 NOTE — TELEPHONE ENCOUNTER
Called pt and let him know that 2 bottles where placed at  for p/u with verbal understanding from pt.

## 2022-10-24 NOTE — TELEPHONE ENCOUNTER
Sample requested: xarelto     Strength: 20 mg    Dosage: once daily    Patient's call back number: [phone]   Please call to advise.

## 2022-10-24 NOTE — TELEPHONE ENCOUNTER
Pt called stating that when the samples are ready for  to call him on his home phone:  [phone].   Please advise

## 2022-11-07 ENCOUNTER — TELEPHONE (OUTPATIENT)
Dept: CARDIOLOGY CLINIC | Age: 87
End: 2022-11-07

## 2022-11-07 DIAGNOSIS — I48.0 PAROXYSMAL ATRIAL FIBRILLATION (HCC): ICD-10-CM

## 2022-11-07 RX ORDER — RIVAROXABAN 20 MG/1
20 TABLET, FILM COATED ORAL
Qty: 90 TABLET | Refills: 4 | COMMUNITY
Start: 2022-11-07 | End: 2022-11-16 | Stop reason: SDUPTHER

## 2022-11-07 NOTE — TELEPHONE ENCOUNTER
LOV : 09/19/2022  NOV : On recall list for 03/18/2023 [facility]    Provided the patient with 2 bottles/boxes of Xarelto   LOT #: [id number]  Exp : 03/25    Called the patient and advised that samples have been placed upfront and are ready for pickup. Patient voiced understanding.

## 2022-11-07 NOTE — TELEPHONE ENCOUNTER
Medication Samples    Medication:  XARELTO      Dosage of the medication:  20mg    How are you taking this medication (QD, BID, TID, QID, PRN):  Take 1 tablet by mouth Daily with supper     in the office or Mail to your home?      in the office - Per General Sleigh please call when ready

## 2022-11-15 ENCOUNTER — TELEPHONE (OUTPATIENT)
Dept: CARDIOLOGY CLINIC | Age: 87
End: 2022-11-15

## 2022-11-15 DIAGNOSIS — I48.0 PAROXYSMAL ATRIAL FIBRILLATION (HCC): ICD-10-CM

## 2022-11-15 NOTE — TELEPHONE ENCOUNTER
Medication Samples    Medication: XARELTO    Dosage of the medication: 20mg    How are you taking this medication (QD, BID, TID, QID, PRN):  Take 1 tablet by mouth Daily with supper     in the office or Mail to your home?      in the office- Please call when ready for -  Please advise

## 2022-11-16 RX ORDER — RIVAROXABAN 20 MG/1
20 TABLET, FILM COATED ORAL
Qty: 90 TABLET | Refills: 4 | COMMUNITY
Start: 2022-11-16 | End: 2022-12-01 | Stop reason: SDUPTHER

## 2022-11-16 NOTE — TELEPHONE ENCOUNTER
LOV : 09/19/2022 [facility]  NOV : On recall list for 03/18/2023    Provided the patient with 2 bottles/boxes of Xarelto   LOT #: [id number]  Exp : 10/24    Called the patient and advised that samples have been placed upfront and are ready for pickup. Patient voiced understanding.

## 2022-12-01 DIAGNOSIS — I48.0 PAROXYSMAL ATRIAL FIBRILLATION (HCC): ICD-10-CM

## 2022-12-01 RX ORDER — RIVAROXABAN 20 MG/1
20 TABLET, FILM COATED ORAL
Qty: 90 TABLET | Refills: 4 | COMMUNITY
Start: 2022-12-01

## 2022-12-19 ENCOUNTER — TELEPHONE (OUTPATIENT)
Dept: CARDIOLOGY CLINIC | Age: 87
End: 2022-12-19

## 2022-12-19 DIAGNOSIS — I48.0 PAROXYSMAL ATRIAL FIBRILLATION (HCC): ICD-10-CM

## 2022-12-19 NOTE — TELEPHONE ENCOUNTER
Sample requested: xarelto    Strength: 20 mg    Dosage: once daily    Patient's call back number: [phone]     Pt is out of medication and is in the donut hole.

## 2022-12-20 RX ORDER — RIVAROXABAN 20 MG/1
20 TABLET, FILM COATED ORAL
Qty: 90 TABLET | Refills: 4 | COMMUNITY
Start: 2022-12-20

## 2022-12-20 NOTE — TELEPHONE ENCOUNTER
Pt called to check on the samples for Xarelto. Per Pt he is out and is in a donut hole. Please call Pt if there are any samples at:  [phone].   Please advise

## 2023-01-03 DIAGNOSIS — I48.0 PAROXYSMAL ATRIAL FIBRILLATION (HCC): ICD-10-CM

## 2023-01-03 RX ORDER — RIVAROXABAN 20 MG/1
TABLET, FILM COATED ORAL
Qty: 90 TABLET | Refills: 0 | OUTPATIENT
Start: 2023-01-03

## 2023-01-04 ENCOUNTER — TELEPHONE (OUTPATIENT)
Dept: CARDIOLOGY CLINIC | Age: 88
End: 2023-01-04

## 2023-01-04 DIAGNOSIS — I48.0 PAROXYSMAL ATRIAL FIBRILLATION (HCC): ICD-10-CM

## 2023-01-04 RX ORDER — RIVAROXABAN 20 MG/1
TABLET, FILM COATED ORAL
Qty: 90 TABLET | Refills: 0 | Status: SHIPPED | OUTPATIENT
Start: 2023-01-04

## 2023-01-04 NOTE — TELEPHONE ENCOUNTER
Medication Refill    Medication needing refilled:  XARELTO    Dosage of the medication:  20mg    How are you taking this medication (QD, BID, TID, QID, PRN):  Take 1 tablet by mouth Daily with supper    30 or 90 day supply called in: 90    When will you run out of your medication:  01/04    Which Pharmacy are we sending the medication to?:    [facility] [address] - [address], [address] [phone] - F [phone]       NOTE: Per Madeline Carlson, wife is out today and if this is not going to be called in today she is requesting 1 bottle of sample until the Pt can get his refill.   Please advise
RX is penned waiting to be signed off on.
coffee

## 2023-01-04 NOTE — TELEPHONE ENCOUNTER
Received refill request for Xarelto from [facility].     Last ov:09/19/2022 [facility]    Last labs:9/19/2022 [facility]    Next appointment:03/03/2023 [facility]

## 2023-01-05 RX ORDER — RIVAROXABAN 20 MG/1
TABLET, FILM COATED ORAL
Qty: 90 TABLET | Refills: 0 | OUTPATIENT
Start: 2023-01-05

## 2023-03-03 ENCOUNTER — OFFICE VISIT (OUTPATIENT)
Dept: CARDIOLOGY CLINIC | Age: 88
End: 2023-03-03
Payer: MEDICARE

## 2023-03-03 VITALS
SYSTOLIC BLOOD PRESSURE: 128 MMHG | HEIGHT: 71 IN | WEIGHT: 249.2 LBS | DIASTOLIC BLOOD PRESSURE: 70 MMHG | BODY MASS INDEX: 34.89 KG/M2 | OXYGEN SATURATION: 96 % | HEART RATE: 50 BPM

## 2023-03-03 DIAGNOSIS — Z86.718 HISTORY OF DVT (DEEP VEIN THROMBOSIS): ICD-10-CM

## 2023-03-03 DIAGNOSIS — I25.10 CORONARY ARTERY DISEASE INVOLVING NATIVE CORONARY ARTERY OF NATIVE HEART WITHOUT ANGINA PECTORIS: Primary | ICD-10-CM

## 2023-03-03 DIAGNOSIS — E78.2 MIXED HYPERLIPIDEMIA: ICD-10-CM

## 2023-03-03 DIAGNOSIS — R06.00 DYSPNEA, UNSPECIFIED TYPE: ICD-10-CM

## 2023-03-03 DIAGNOSIS — I65.23 BILATERAL CAROTID ARTERY STENOSIS: ICD-10-CM

## 2023-03-03 DIAGNOSIS — I10 ESSENTIAL HYPERTENSION: ICD-10-CM

## 2023-03-03 DIAGNOSIS — I48.0 PAROXYSMAL ATRIAL FIBRILLATION (HCC): ICD-10-CM

## 2023-03-03 DIAGNOSIS — I50.42 CHRONIC COMBINED SYSTOLIC AND DIASTOLIC CONGESTIVE HEART FAILURE (HCC): ICD-10-CM

## 2023-03-03 PROCEDURE — G8417 CALC BMI ABV UP PARAM F/U: HCPCS | Performed by: INTERNAL MEDICINE

## 2023-03-03 PROCEDURE — G8484 FLU IMMUNIZE NO ADMIN: HCPCS | Performed by: INTERNAL MEDICINE

## 2023-03-03 PROCEDURE — 1036F TOBACCO NON-USER: CPT | Performed by: INTERNAL MEDICINE

## 2023-03-03 PROCEDURE — G8427 DOCREV CUR MEDS BY ELIG CLIN: HCPCS | Performed by: INTERNAL MEDICINE

## 2023-03-03 PROCEDURE — 1123F ACP DISCUSS/DSCN MKR DOCD: CPT | Performed by: INTERNAL MEDICINE

## 2023-03-03 PROCEDURE — 99214 OFFICE O/P EST MOD 30 MIN: CPT | Performed by: INTERNAL MEDICINE

## 2023-03-03 NOTE — PROGRESS NOTES
[address]  Cardiac Follow up       Referring Provider:  David Noriega MD     Chief Complaint   Patient presents with    6 Month Follow-Up    Coronary Artery Disease    Hypertension    Hyperlipidemia      History of Present Illness:  Mr. Bertina Mortimer is an 80year old gentleman here today in follow up for CHF, CAD, hypertension, hyperlipidemia, afib and PVD. In 2014, he was diagnosed with a DVT and completed 6 months of treatment with Xarelto. He was seen by a hematologist, initially worked up for lupus anticoagulant, but most recent testing did not show evidence of this. Prior to last visit, he was admitted with sepsis and developed afib. He was started on Eliquis. Now on Xarelto. He is doing well. No bleeding on Xarelto. No chest pain. Remains in wheel chair. Past Medical History:   has a past medical history of Atrial fibrillation ([address] CAD (coronary artery disease), Diarrhea, History of skin cancer, Hyperlipidemia, Hypertension, Insomnia, Kidney stones, and Open wound of left hand, subsequent encounter. Surgical History:   has a past surgical history that includes back surgery; knee surgery (2005); shoulder surgery; Appendectomy; Coronary artery bypass graft (1981); Coronary artery bypass graft (2001); Prostate surgery; Skin cancer excision; joint replacement (Right); and Total knee arthroplasty (Right, 2015). Social History:   reports that he quit smoking about 48 years ago. His smoking use included cigarettes. He has a 20.00 pack-year smoking history. He has never used smokeless tobacco. He reports that he does not currently use alcohol after a past usage of about 1.0 standard drink per week. He reports that he does not use drugs. Family History:  family history includes Cancer in his mother and sister; Diabetes in his mother; Heart Attack in his father and mother; Heart Disease in his father; High Blood Pressure in his father; High Cholesterol in his father.      Home Medications:  Outpatient Encounter Medications as of 3/3/2023   Medication Sig Dispense Refill    XARELTO 20 MG TABS tablet TAKE 1 TABLET BY MOUTH EVERY DAY WITH SUPPER 90 tablet 0    losartan (COZAAR) 50 MG tablet Take 1 tablet by mouth daily 90 tablet 4    potassium chloride (KLOR-CON M) 20 MEQ extended release tablet Take 1 tablet by mouth daily 90 tablet 4    furosemide (LASIX) 20 MG tablet Take 1 tablet by mouth daily 90 tablet 4    ALPRAZolam (XANAX) 2 MG tablet       famotidine (PEPCID) 20 MG tablet Take 20 mg by mouth 2 times daily      Compression Stockings MISC by Does not apply route 20-30 mm hg compression stockings, bilateral, knee high 1 each 0    amitriptyline (ELAVIL) 100 MG tablet Take 100 mg by mouth nightly   2    nitroGLYCERIN (NITROSTAT) 0.4 MG SL tablet Place 1 tablet under the tongue every 5 minutes as needed for Chest pain 25 tablet 3    Ascorbic Acid (VITAMIN C) 500 MG tablet Take 1,000 mg by mouth daily       vitamin E 400 UNIT capsule Take 800 Units by mouth daily       vitamin D (CHOLECALCIFEROL) 1000 UNIT TABS tablet Take 1,000 Units by mouth 2 times daily       albuterol sulfate HFA (VENTOLIN HFA) 108 (90 Base) MCG/ACT inhaler Inhale 2 puffs into the lungs 4 times daily as needed for Wheezing (Patient not taking: No sig reported) 3 Inhaler 1     No facility-administered encounter medications on file as of 3/3/2023. Allergies:  Codeine and Flomax [tamsulosin hcl]     [x] Medications and dosages reviewed.   ROS:  [x]Full ROS obtained and negative except as mentioned in HPI      Physical Examination:    Vitals:    03/03/23 1411   BP: 128/70   Pulse: 50   SpO2: 96%        /70 (Site: Right Upper Arm, Position: Sitting, Cuff Size: Medium Adult)   Pulse 50   Ht 5' 11\" (1.803 m)   Wt 249 lb 3.2 oz (113 kg)   SpO2 96%   BMI 34.76 kg/m²     GENERAL: Elderly obese male in wheel chair in NAD  NEUROLOGICAL: Alert and oriented  PSYCH: Calm affect  SKIN: Warm and dry, No obvious Rash  HEENT: Normocephalic, Sclera non-icteric, mucus membranes moist  NECK: supple, JVP normal  CAROTID: Normal upstroke, no bruits  CARDIAC: Normal PMI, regular bradycardic rate and rhythm, normal S1S2, No murmur, rub, or gallop  RESPIRATORY: Normal respiratory effort, Clear bilaterally  EXTREMITIES: Trivial LE edema. MUSCULOSKELETAL: no chest wall tenderness, no c/o joint pain. GASTROINTESTINAL: normal bowel sounds, soft, non-tender, no bruit    Echo 7/14/16:  Normal left ventricle size, wall thickness and systolic function with an EF of 55%. Mild aortic stenosis with mean gradient of 12mmHg. Trivial aortic regurgitation is present. Mild to moderate tricuspid regurgitation with RVSP estimated at 47 mmHg    ECHO 4/2020  Summary   Suboptimal image quality. Definity contrast administered. Left ventricular cavity size is normal.   There is asymmetric hypertrophy of the basal septum. Ejection fraction is visually estimated to be 40-45%. There is mild to moderate diffuse hypokinesis. Diastolic filling parameters suggest grade I diastolic dysfunction. Mitral valve leaflets appear mildly thickened. Trivial mitral regurgitation is present. The left atrium is mildly dilated. Aortic valve leaflets appear thickened. Trivial aortic regurgitation is present. The right ventricle is enlarged. Right ventricular systolic function is moderately reduced. TAPSE 1.1 cm   Tricuspid valve is structurally normal.   Mild tricuspid regurgitation. The right atrium is severely dilated. Estimated pulmonary artery systolic pressure is moderately elevated at 29-41   mmHg assuming a right atrial pressure of 15 mmHg. Assessment:     1. CAD (coronary artery disease)/CABG 1981 & 2001:   Stable without angina. Continue medical therapy. I do not recommend ischemic testing at his age without symptoms  Lexiscan Myoview 2014> Normal perfusion/EF. Repeat myoview 8/2018-fixed inferior, scar vs diaphragm   2.  HTN (hypertension): Well controlled. Continue losartan    /70 (Site: Right Upper Arm, Position: Sitting, Cuff Size: Medium Adult)   Pulse 50   Ht 5' 11\" (1.803 m)   Wt 249 lb 3.2 oz (113 kg)   SpO2 96%   BMI 34.76 kg/m²      3. Hyperlipidemia: OMU=432 Stopped lipitor due to abdominal complaints/diarrhea    4. Carotid Stenosis:  2008 dopplers> less than 40 % bilaterally. Stable   5. Atrial Fibrillation (paroxysmal):  PAF. HR controlled. Asymptomatic. Continue Xarelto 20 mg (CrCl=76)  6. DVT: Diagnosed Spring 2014>  Xarelto for 6 months. ? Hypercoagulable. Repeat testing negative. On Xarelto. Continue  7. Dysnpea/CHF: EF=40-45%. Improved. Follow. Aldactone stopped due to diarrhea.  Continue  losartan, Stopped coreg due to bradycardia       Plan:  Stable  Same meds  F/u 6 months    Thank you for allowing me to participate in the care of this individual.      Emmy Manley M.D., [facility] [place]

## 2023-03-06 ENCOUNTER — TELEPHONE (OUTPATIENT)
Dept: CARDIOLOGY CLINIC | Age: 88
End: 2023-03-06

## 2023-03-06 NOTE — TELEPHONE ENCOUNTER
Madeline Carlson, wife called to speak w/Ofelia to ask if the mmk can use the lab drawn from  01/06 instead of the Pt getting another lab drawn.   Please advise

## 2023-04-06 DIAGNOSIS — I48.0 PAROXYSMAL ATRIAL FIBRILLATION (HCC): ICD-10-CM

## 2023-04-07 RX ORDER — RIVAROXABAN 20 MG/1
TABLET, FILM COATED ORAL
Qty: 90 TABLET | Refills: 4 | Status: SHIPPED | OUTPATIENT
Start: 2023-04-07

## 2023-04-07 NOTE — TELEPHONE ENCOUNTER
Received refill request for Xarelto from [facility].     Last ov:03/03/2023 [facility]    Last labs:09/19/2022 [facility]    Last Refill:01/04/2023     Next appointment:09/13/2023 [facility]

## 2023-06-15 PROBLEM — E44.0 MODERATE PROTEIN-CALORIE MALNUTRITION (HCC): Status: RESOLVED | Noted: 2020-04-16 | Resolved: 2023-06-15

## 2023-06-15 PROBLEM — R50.9 FEVER: Status: RESOLVED | Noted: 2020-04-16 | Resolved: 2023-06-15

## 2023-06-15 PROBLEM — L97.522 CHRONIC FOOT ULCER WITH FAT LAYER EXPOSED, LEFT (HCC): Status: RESOLVED | Noted: 2022-01-03 | Resolved: 2023-06-15

## 2023-06-15 PROBLEM — J81.0 ACUTE PULMONARY EDEMA (HCC): Status: RESOLVED | Noted: 2020-04-16 | Resolved: 2023-06-15

## 2023-06-15 PROBLEM — R06.00 DYSPNEA: Status: RESOLVED | Noted: 2020-05-27 | Resolved: 2023-06-15

## 2023-06-15 PROBLEM — Z95.1 HISTORY OF CORONARY ARTERY BYPASS GRAFT X 2: Status: ACTIVE | Noted: 2023-06-15

## 2023-06-15 PROBLEM — R79.89 ELEVATED LACTIC ACID LEVEL: Status: RESOLVED | Noted: 2020-04-16 | Resolved: 2023-06-15

## 2023-06-15 PROBLEM — E83.42 HYPOMAGNESEMIA: Status: RESOLVED | Noted: 2020-04-16 | Resolved: 2023-06-15

## 2023-06-15 PROBLEM — R06.82 TACHYPNEA: Status: RESOLVED | Noted: 2020-04-16 | Resolved: 2023-06-15

## 2023-06-15 PROBLEM — R00.0 TACHYCARDIA: Status: RESOLVED | Noted: 2020-04-16 | Resolved: 2023-06-15

## 2023-06-15 PROBLEM — R19.7 DIARRHEA: Status: RESOLVED | Noted: 2017-04-12 | Resolved: 2023-06-15

## 2023-06-15 PROBLEM — D72.819 LEUKOPENIA: Status: RESOLVED | Noted: 2020-04-16 | Resolved: 2023-06-15

## 2023-06-15 PROBLEM — Z20.822 SUSPECTED COVID-19 VIRUS INFECTION: Status: RESOLVED | Noted: 2020-04-16 | Resolved: 2023-06-15

## 2023-06-26 ENCOUNTER — TELEPHONE (OUTPATIENT)
Dept: CARDIOLOGY CLINIC | Age: 88
End: 2023-06-26

## 2023-06-26 DIAGNOSIS — R60.0 LOCALIZED EDEMA: Primary | ICD-10-CM

## 2023-06-26 DIAGNOSIS — I50.9 CONGESTIVE HEART FAILURE, UNSPECIFIED HF CHRONICITY, UNSPECIFIED HEART FAILURE TYPE (HCC): ICD-10-CM

## 2023-06-28 ENCOUNTER — TELEPHONE (OUTPATIENT)
Dept: CARDIOLOGY CLINIC | Age: 88
End: 2023-06-28

## 2023-06-28 ENCOUNTER — HOSPITAL ENCOUNTER (OUTPATIENT)
Age: 88
Discharge: HOME | End: 2023-06-28
Payer: MEDICARE

## 2023-06-28 DIAGNOSIS — R60.0 LOCALIZED EDEMA: ICD-10-CM

## 2023-06-28 DIAGNOSIS — I48.0 PAROXYSMAL ATRIAL FIBRILLATION (HCC): ICD-10-CM

## 2023-06-28 DIAGNOSIS — I50.9 CONGESTIVE HEART FAILURE, UNSPECIFIED HF CHRONICITY, UNSPECIFIED HEART FAILURE TYPE (HCC): ICD-10-CM

## 2023-06-28 LAB
ANION GAP SERPL CALCULATED.3IONS-SCNC: 13 MMOL/L (ref 3–16)
BUN SERPL-MCNC: 28 MG/DL (ref 7–20)
CALCIUM SERPL-MCNC: 9.1 MG/DL (ref 8.3–10.6)
CHLORIDE SERPL-SCNC: 98 MMOL/L (ref 99–110)
CO2 SERPL-SCNC: 27 MMOL/L (ref 21–32)
CREAT SERPL-MCNC: 1.5 MG/DL (ref 0.8–1.3)
DEPRECATED RDW RBC AUTO: 14.2 % (ref 12.4–15.4)
GFR SERPLBLD CREATININE-BSD FMLA CKD-EPI: 45 ML/MIN/{1.73_M2}
GLUCOSE SERPL-MCNC: 109 MG/DL (ref 70–99)
HCT VFR BLD AUTO: 39.9 % (ref 40.5–52.5)
HGB BLD-MCNC: 13.6 G/DL (ref 13.5–17.5)
MCH RBC QN AUTO: 31.9 PG (ref 26–34)
MCHC RBC AUTO-ENTMCNC: 34.2 G/DL (ref 31–36)
MCV RBC AUTO: 93.2 FL (ref 80–100)
NT-PROBNP SERPL-MCNC: 518 PG/ML (ref 0–449)
PLATELET # BLD AUTO: 145 K/UL (ref 135–450)
PMV BLD AUTO: 8.5 FL (ref 5–10.5)
POTASSIUM SERPL-SCNC: 4.5 MMOL/L (ref 3.5–5.1)
RBC # BLD AUTO: 4.28 M/UL (ref 4.2–5.9)
SODIUM SERPL-SCNC: 138 MMOL/L (ref 136–145)
WBC # BLD AUTO: 5.4 K/UL (ref 4–11)

## 2023-06-28 PROCEDURE — 83880 ASSAY OF NATRIURETIC PEPTIDE: CPT

## 2023-06-28 PROCEDURE — 85027 COMPLETE CBC AUTOMATED: CPT

## 2023-06-28 PROCEDURE — 36415 COLL VENOUS BLD VENIPUNCTURE: CPT

## 2023-06-28 PROCEDURE — 80048 BASIC METABOLIC PNL TOTAL CA: CPT

## 2023-07-03 DIAGNOSIS — F51.01 PRIMARY INSOMNIA: Primary | ICD-10-CM

## 2023-07-03 RX ORDER — ALPRAZOLAM 2 MG/1
TABLET ORAL
Qty: 30 TABLET | Refills: 0 | OUTPATIENT
Start: 2023-07-03

## 2023-07-03 RX ORDER — ALPRAZOLAM 2 MG/1
TABLET ORAL
OUTPATIENT
Start: 2023-07-03

## 2023-07-03 RX ORDER — ALPRAZOLAM 2 MG/1
TABLET ORAL
Qty: 30 TABLET | Refills: 0 | Status: SHIPPED | OUTPATIENT
Start: 2023-07-03 | End: 2023-08-01

## 2023-08-01 DIAGNOSIS — F51.01 PRIMARY INSOMNIA: ICD-10-CM

## 2023-08-01 RX ORDER — ALPRAZOLAM 2 MG/1
TABLET ORAL
Qty: 30 TABLET | Refills: 0 | Status: SHIPPED | OUTPATIENT
Start: 2023-08-01 | End: 2023-08-31

## 2023-08-04 ENCOUNTER — HOSPITAL ENCOUNTER (OUTPATIENT)
Age: 88
Discharge: HOME | End: 2023-08-04
Payer: MEDICARE

## 2023-08-04 ENCOUNTER — OFFICE VISIT (OUTPATIENT)
Dept: CARDIOLOGY CLINIC | Age: 88
End: 2023-08-04
Payer: MEDICARE

## 2023-08-04 VITALS
BODY MASS INDEX: 34.44 KG/M2 | OXYGEN SATURATION: 98 % | WEIGHT: 246 LBS | HEIGHT: 71 IN | HEART RATE: 64 BPM | DIASTOLIC BLOOD PRESSURE: 64 MMHG | SYSTOLIC BLOOD PRESSURE: 110 MMHG

## 2023-08-04 DIAGNOSIS — Z79.899 LONG-TERM USE OF HIGH-RISK MEDICATION: ICD-10-CM

## 2023-08-04 DIAGNOSIS — I10 PRIMARY HYPERTENSION: ICD-10-CM

## 2023-08-04 DIAGNOSIS — R60.0 LOCALIZED EDEMA: ICD-10-CM

## 2023-08-04 DIAGNOSIS — I25.10 CORONARY ARTERY DISEASE INVOLVING NATIVE CORONARY ARTERY OF NATIVE HEART WITHOUT ANGINA PECTORIS: ICD-10-CM

## 2023-08-04 DIAGNOSIS — I50.22 CHRONIC SYSTOLIC CONGESTIVE HEART FAILURE (HCC): Primary | ICD-10-CM

## 2023-08-04 LAB
ANION GAP SERPL CALCULATED.3IONS-SCNC: 9 MMOL/L (ref 3–16)
BUN SERPL-MCNC: 22 MG/DL (ref 7–20)
CALCIUM SERPL-MCNC: 9.2 MG/DL (ref 8.3–10.6)
CHLORIDE SERPL-SCNC: 104 MMOL/L (ref 99–110)
CO2 SERPL-SCNC: 26 MMOL/L (ref 21–32)
CREAT SERPL-MCNC: 1.3 MG/DL (ref 0.8–1.3)
GFR SERPLBLD CREATININE-BSD FMLA CKD-EPI: 53 ML/MIN/{1.73_M2}
GLUCOSE SERPL-MCNC: 105 MG/DL (ref 70–99)
POTASSIUM SERPL-SCNC: 4.4 MMOL/L (ref 3.5–5.1)
SODIUM SERPL-SCNC: 139 MMOL/L (ref 136–145)

## 2023-08-04 PROCEDURE — 99214 OFFICE O/P EST MOD 30 MIN: CPT | Performed by: NURSE PRACTITIONER

## 2023-08-04 PROCEDURE — 1036F TOBACCO NON-USER: CPT | Performed by: NURSE PRACTITIONER

## 2023-08-04 PROCEDURE — G8417 CALC BMI ABV UP PARAM F/U: HCPCS | Performed by: NURSE PRACTITIONER

## 2023-08-04 PROCEDURE — G8427 DOCREV CUR MEDS BY ELIG CLIN: HCPCS | Performed by: NURSE PRACTITIONER

## 2023-08-04 PROCEDURE — 80048 BASIC METABOLIC PNL TOTAL CA: CPT

## 2023-08-04 PROCEDURE — 1123F ACP DISCUSS/DSCN MKR DOCD: CPT | Performed by: NURSE PRACTITIONER

## 2023-08-04 PROCEDURE — 36415 COLL VENOUS BLD VENIPUNCTURE: CPT

## 2023-08-04 RX ORDER — AMPICILLIN TRIHYDRATE 250 MG
CAPSULE ORAL
COMMUNITY

## 2023-08-04 NOTE — PROGRESS NOTES
[facility]     Outpatient Follow Up Note    Anahy Arroyo is 80 y.o. male who presents today with a history of HFrEF, CM/EF 40-45%, HTN, CAD s/p CABG  & , PAF and hyperlipidemai  His other hx includes : DVT '14      Interval hx: 23:   BUN 28/Cr 1.5. Per MMK- Decrease Lasix 20 mg to daily. Limit sodium in diet. CHIEF COMPLAINT / HPI:  Follow Up secondary to medication adjustment / HFrEF; his BUN increased from 17 > 28 and creatinine 0.9 > 1.5    Subjective:     About a month or two ago, he resumed 20 mg bid of lasix once his legs started to swell. He cut back as inst the end of  d/t labs    His legs are ok on 20 mg daily. His wife thinks in hindsight, his fluids came around [facility] when he'd eaten too much of the wrong things (chips, snacks,etc)    He denies significant chest pain. There is no SOB/POWELL. The patient denies orthopnea/PND. He sleeps in a recliner for comfort: knees / back discomfort. The patient has swelling in his legs at varying degree. They look pretty thin in the morning. The patients weight is down some at 246# (4# few months) . The patient is not experiencing palpitations or dizziness. His home BP runs 110/50. These symptoms show no change since the last OV. With regard to medication therapy the patient has been compliant with prescribed regimen. They have tolerated therapy to date.      Past Medical History:   Diagnosis Date    Atrial fibrillation (HCC)     CAD (coronary artery disease)     Diarrhea 2017    History of skin cancer     Hyperlipidemia     Hypertension     Insomnia 2020    Kidney stones 2017    Open wound of left hand, subsequent encounter 6/3/2022     Social History:    Social History     Tobacco Use   Smoking Status Former    Packs/day: 2.00    Years: 10.00    Pack years: 20.00    Types: Cigarettes    Quit date: 1975    Years since quittin.6   Smokeless Tobacco Never     Current Medications:  Current Outpatient Medications

## 2023-08-07 ENCOUNTER — TELEPHONE (OUTPATIENT)
Dept: CARDIOLOGY CLINIC | Age: 88
End: 2023-08-07

## 2023-08-10 ENCOUNTER — TELEPHONE (OUTPATIENT)
Dept: CARDIOLOGY CLINIC | Age: 88
End: 2023-08-10

## 2023-08-10 DIAGNOSIS — I48.0 PAROXYSMAL ATRIAL FIBRILLATION (HCC): ICD-10-CM

## 2023-08-10 RX ORDER — AMITRIPTYLINE HYDROCHLORIDE 150 MG/1
150 TABLET ORAL NIGHTLY
Qty: 90 TABLET | Refills: 0 | Status: SHIPPED | OUTPATIENT
Start: 2023-08-10

## 2023-08-10 NOTE — TELEPHONE ENCOUNTER
Sample requested: xarelto    Strength: 20 mg     Dosage:  once daily    Patient's call back number:    [phone]    Pt is in donut hole and it is over $110

## 2023-08-11 NOTE — TELEPHONE ENCOUNTER
Provided the patient with 2 bottles/boxes of Xarelto   LOT #: [id number]  Exp : 03/25    Called the patient and advised that samples have been placed upfront and are ready for pickup. Patient voiced understanding.

## 2023-08-31 ENCOUNTER — TELEPHONE (OUTPATIENT)
Dept: FAMILY MEDICINE CLINIC | Age: 88
End: 2023-08-31

## 2023-08-31 DIAGNOSIS — F51.01 PRIMARY INSOMNIA: ICD-10-CM

## 2023-08-31 RX ORDER — ALPRAZOLAM 2 MG/1
TABLET ORAL
Qty: 30 TABLET | Refills: 0 | Status: SHIPPED | OUTPATIENT
Start: 2023-08-31 | End: 2023-09-30

## 2023-08-31 NOTE — TELEPHONE ENCOUNTER
Pt calling in, stated he only has 1 tablet left of xanax and would like a refill sen to [facility] on [address].     Please Advise

## 2023-09-07 ENCOUNTER — HOSPITAL ENCOUNTER (OUTPATIENT)
Age: 88
Discharge: HOME | End: 2023-09-07
Payer: MEDICARE

## 2023-09-07 DIAGNOSIS — I48.0 PAROXYSMAL ATRIAL FIBRILLATION (HCC): ICD-10-CM

## 2023-09-07 LAB
ANION GAP SERPL CALCULATED.3IONS-SCNC: 9 MMOL/L (ref 3–16)
BUN SERPL-MCNC: 20 MG/DL (ref 7–20)
CALCIUM SERPL-MCNC: 9.3 MG/DL (ref 8.3–10.6)
CHLORIDE SERPL-SCNC: 102 MMOL/L (ref 99–110)
CO2 SERPL-SCNC: 28 MMOL/L (ref 21–32)
CREAT SERPL-MCNC: 1.1 MG/DL (ref 0.8–1.3)
DEPRECATED RDW RBC AUTO: 13.5 % (ref 12.4–15.4)
GFR SERPLBLD CREATININE-BSD FMLA CKD-EPI: >60 ML/MIN/{1.73_M2}
GLUCOSE SERPL-MCNC: 102 MG/DL (ref 70–99)
HCT VFR BLD AUTO: 42.3 % (ref 40.5–52.5)
HGB BLD-MCNC: 14.1 G/DL (ref 13.5–17.5)
MCH RBC QN AUTO: 30.9 PG (ref 26–34)
MCHC RBC AUTO-ENTMCNC: 33.4 G/DL (ref 31–36)
MCV RBC AUTO: 92.6 FL (ref 80–100)
PLATELET # BLD AUTO: 159 K/UL (ref 135–450)
PMV BLD AUTO: 8.6 FL (ref 5–10.5)
POTASSIUM SERPL-SCNC: 4.6 MMOL/L (ref 3.5–5.1)
RBC # BLD AUTO: 4.57 M/UL (ref 4.2–5.9)
SODIUM SERPL-SCNC: 139 MMOL/L (ref 136–145)
WBC # BLD AUTO: 5.8 K/UL (ref 4–11)

## 2023-09-07 PROCEDURE — 80048 BASIC METABOLIC PNL TOTAL CA: CPT

## 2023-09-07 PROCEDURE — 85027 COMPLETE CBC AUTOMATED: CPT

## 2023-09-07 PROCEDURE — 36415 COLL VENOUS BLD VENIPUNCTURE: CPT

## 2023-09-07 NOTE — TELEPHONE ENCOUNTER
Medication Samples    Medication:  XARELTO    Dosage of the medication:  20 MG     How are you taking this medication (QD, BID, TID, QID, PRN):  Take 1 tablet by mouth Daily with supper     in the office or Mail to your home?  in the office -  Please call when ready.   Thank you

## 2023-09-14 ENCOUNTER — OFFICE VISIT (OUTPATIENT)
Dept: CARDIOLOGY CLINIC | Age: 88
End: 2023-09-14
Payer: MEDICARE

## 2023-09-14 VITALS
HEART RATE: 66 BPM | DIASTOLIC BLOOD PRESSURE: 76 MMHG | WEIGHT: 246 LBS | OXYGEN SATURATION: 96 % | BODY MASS INDEX: 34.44 KG/M2 | SYSTOLIC BLOOD PRESSURE: 126 MMHG | HEIGHT: 71 IN

## 2023-09-14 DIAGNOSIS — E78.2 MIXED HYPERLIPIDEMIA: ICD-10-CM

## 2023-09-14 DIAGNOSIS — Z86.718 HISTORY OF DVT (DEEP VEIN THROMBOSIS): ICD-10-CM

## 2023-09-14 DIAGNOSIS — I50.42 CHRONIC COMBINED SYSTOLIC AND DIASTOLIC CONGESTIVE HEART FAILURE (HCC): ICD-10-CM

## 2023-09-14 DIAGNOSIS — I25.10 CORONARY ARTERY DISEASE INVOLVING NATIVE CORONARY ARTERY OF NATIVE HEART WITHOUT ANGINA PECTORIS: Primary | ICD-10-CM

## 2023-09-14 DIAGNOSIS — I65.23 BILATERAL CAROTID ARTERY STENOSIS: ICD-10-CM

## 2023-09-14 DIAGNOSIS — I48.0 PAROXYSMAL ATRIAL FIBRILLATION (HCC): ICD-10-CM

## 2023-09-14 DIAGNOSIS — I10 ESSENTIAL HYPERTENSION: ICD-10-CM

## 2023-09-14 PROCEDURE — 1036F TOBACCO NON-USER: CPT | Performed by: INTERNAL MEDICINE

## 2023-09-14 PROCEDURE — 99214 OFFICE O/P EST MOD 30 MIN: CPT | Performed by: INTERNAL MEDICINE

## 2023-09-14 PROCEDURE — 1123F ACP DISCUSS/DSCN MKR DOCD: CPT | Performed by: INTERNAL MEDICINE

## 2023-09-14 PROCEDURE — G8427 DOCREV CUR MEDS BY ELIG CLIN: HCPCS | Performed by: INTERNAL MEDICINE

## 2023-09-14 PROCEDURE — G8417 CALC BMI ABV UP PARAM F/U: HCPCS | Performed by: INTERNAL MEDICINE

## 2023-09-14 RX ORDER — FUROSEMIDE 20 MG/1
20 TABLET ORAL DAILY
Qty: 90 TABLET | Refills: 4 | Status: SHIPPED | OUTPATIENT
Start: 2023-09-14

## 2023-09-14 RX ORDER — POTASSIUM CHLORIDE 20 MEQ/1
10 TABLET, EXTENDED RELEASE ORAL 2 TIMES DAILY
Qty: 180 TABLET | Refills: 4 | Status: SHIPPED | OUTPATIENT
Start: 2023-09-14

## 2023-09-14 RX ORDER — LOSARTAN POTASSIUM 50 MG/1
50 TABLET ORAL DAILY
Qty: 90 TABLET | Refills: 4 | Status: SHIPPED | OUTPATIENT
Start: 2023-09-14

## 2023-09-14 NOTE — PROGRESS NOTES
[facility]  Cardiac Follow up       Referring Provider:  Summer Moran MD     Chief Complaint   Patient presents with    Hypertension     Pt denies cardiac symptoms at this time. Coronary Artery Disease    Hyperlipidemia    Atrial Fibrillation      History of Present Illness:  Mr. Deepak Acosta is an 80year old gentleman here today in follow up for CHF, CAD, hypertension, hyperlipidemia, afib and PVD. In 2014, he was diagnosed with a DVT and completed 6 months of treatment with Xarelto. He was seen by a hematologist, initially worked up for lupus anticoagulant, but most recent testing did not show evidence of this. Prior to last visit, he was admitted with sepsis and developed afib. He was started on Eliquis. Now on Xarelto. He is doing well. Some dental issues, but no cardiac issues. They are having their 72 anniversary in November. Past Medical History:   has a past medical history of Atrial fibrillation ([address]), CAD (coronary artery disease), Diarrhea, History of skin cancer, Hyperlipidemia, Hypertension, Insomnia, Kidney stones, and Open wound of left hand, subsequent encounter. Surgical History:   has a past surgical history that includes back surgery; knee surgery (2005); shoulder surgery; Appendectomy; Coronary artery bypass graft (1981); Coronary artery bypass graft (2001); Prostate surgery; Skin cancer excision; joint replacement (Right); and Total knee arthroplasty (Right, 2015). Social History:   reports that he quit smoking about 48 years ago. His smoking use included cigarettes. He has a 20.00 pack-year smoking history. He has never used smokeless tobacco. He reports that he does not currently use alcohol after a past usage of about 1.0 standard drink of alcohol per week. He reports that he does not use drugs.      Family History:  family history includes Cancer in his mother and sister; Diabetes in his mother; Heart Attack in his father and mother; Heart Disease in his

## 2023-09-21 ENCOUNTER — TELEPHONE (OUTPATIENT)
Dept: CARDIOLOGY CLINIC | Age: 88
End: 2023-09-21

## 2023-09-21 NOTE — TELEPHONE ENCOUNTER
FYI-Wife states pt is eligible for [facility] pt assistance for xarelto. States [facility] should be getting a request from [facility] for a  3 mo script.

## 2023-09-22 DIAGNOSIS — I48.0 PAROXYSMAL ATRIAL FIBRILLATION (HCC): ICD-10-CM

## 2023-09-22 NOTE — TELEPHONE ENCOUNTER
Received refill request for Xarelto from [address] [place] [address].     Last ov:09/14/2023 [facility]    Last labs:09/07/2023 [facility]    Next appointment:On recall list for 03/12/2024 [facility]

## 2023-09-30 DIAGNOSIS — F51.01 PRIMARY INSOMNIA: ICD-10-CM

## 2023-10-02 DIAGNOSIS — F51.01 PRIMARY INSOMNIA: ICD-10-CM

## 2023-10-02 RX ORDER — ALPRAZOLAM 2 MG/1
2 TABLET ORAL NIGHTLY
Qty: 30 TABLET | Refills: 0 | OUTPATIENT
Start: 2023-10-02 | End: 2023-11-01

## 2023-10-02 RX ORDER — ALPRAZOLAM 2 MG/1
TABLET ORAL
Qty: 30 TABLET | Refills: 0 | Status: SHIPPED | OUTPATIENT
Start: 2023-10-02 | End: 2023-10-31

## 2023-10-26 ENCOUNTER — TELEPHONE (OUTPATIENT)
Dept: FAMILY MEDICINE CLINIC | Age: 88
End: 2023-10-26

## 2023-10-26 RX ORDER — NYSTATIN 100000 U/G
CREAM TOPICAL
Qty: 30 G | Refills: 3 | Status: SHIPPED | OUTPATIENT
Start: 2023-10-26

## 2023-10-26 NOTE — TELEPHONE ENCOUNTER
ANTHONY called in requesting   [address] for her     She said Dr. Nicole Hatch originally prescribed it but he does not see him anymore    Please send to [address]    Please Advise

## 2023-10-30 DIAGNOSIS — F51.01 PRIMARY INSOMNIA: ICD-10-CM

## 2023-10-31 DIAGNOSIS — F51.01 PRIMARY INSOMNIA: ICD-10-CM

## 2023-10-31 RX ORDER — ALPRAZOLAM 2 MG/1
TABLET ORAL
Qty: 30 TABLET | Refills: 0 | Status: SHIPPED | OUTPATIENT
Start: 2023-10-31 | End: 2023-11-30

## 2023-11-01 RX ORDER — ALPRAZOLAM 2 MG/1
2 TABLET ORAL NIGHTLY
Qty: 30 TABLET | Refills: 0 | OUTPATIENT
Start: 2023-11-01 | End: 2023-12-01

## 2023-11-01 RX ORDER — ALPRAZOLAM 2 MG/1
TABLET ORAL
Qty: 30 TABLET | Refills: 0 | OUTPATIENT
Start: 2023-11-01 | End: 2023-12-01

## 2023-11-01 NOTE — TELEPHONE ENCOUNTER
Can we ensure this went through yesterday? I've received a few duplicate requests for this med.  Thank you

## 2023-11-07 RX ORDER — AMITRIPTYLINE HYDROCHLORIDE 150 MG/1
150 TABLET ORAL
Qty: 90 TABLET | Refills: 0 | Status: SHIPPED | OUTPATIENT
Start: 2023-11-07

## 2023-11-20 ENCOUNTER — TELEPHONE (OUTPATIENT)
Dept: CARDIOLOGY CLINIC | Age: 88
End: 2023-11-20

## 2023-11-20 DIAGNOSIS — R60.9 EDEMA, UNSPECIFIED TYPE: Primary | ICD-10-CM

## 2023-11-20 NOTE — TELEPHONE ENCOUNTER
Per [facility]- Okay to take Lasix twice a day as needed  for weight gain and swelling. Get labs next week. Discussed with patient. He verbalized understanding.

## 2023-11-20 NOTE — TELEPHONE ENCOUNTER
Pt called to inform the office that he is retaining water while taking lasix 1x daily. Pt wants to know what to do for his weight gain. Per Pt can the Pt take 2 lasix instead of one. Please call to advise.   Thank you

## 2023-11-27 DIAGNOSIS — F51.01 PRIMARY INSOMNIA: ICD-10-CM

## 2023-11-27 RX ORDER — ALPRAZOLAM 2 MG/1
TABLET ORAL
Qty: 30 TABLET | Refills: 0 | Status: SHIPPED | OUTPATIENT
Start: 2023-11-27 | End: 2023-12-27

## 2023-11-29 ENCOUNTER — HOSPITAL ENCOUNTER (OUTPATIENT)
Age: 88
Discharge: HOME | End: 2023-11-29
Payer: MEDICARE

## 2023-11-29 DIAGNOSIS — R60.9 EDEMA, UNSPECIFIED TYPE: ICD-10-CM

## 2023-11-29 LAB
ANION GAP SERPL CALCULATED.3IONS-SCNC: 8 MMOL/L (ref 3–16)
BUN SERPL-MCNC: 21 MG/DL (ref 7–20)
CALCIUM SERPL-MCNC: 9.3 MG/DL (ref 8.3–10.6)
CHLORIDE SERPL-SCNC: 103 MMOL/L (ref 99–110)
CO2 SERPL-SCNC: 30 MMOL/L (ref 21–32)
CREAT SERPL-MCNC: 1.2 MG/DL (ref 0.8–1.3)
GFR SERPLBLD CREATININE-BSD FMLA CKD-EPI: 58 ML/MIN/{1.73_M2}
GLUCOSE SERPL-MCNC: 104 MG/DL (ref 70–99)
POTASSIUM SERPL-SCNC: 4.9 MMOL/L (ref 3.5–5.1)
SODIUM SERPL-SCNC: 141 MMOL/L (ref 136–145)

## 2023-11-29 PROCEDURE — 80048 BASIC METABOLIC PNL TOTAL CA: CPT

## 2023-11-29 PROCEDURE — 36415 COLL VENOUS BLD VENIPUNCTURE: CPT

## 2023-11-30 ENCOUNTER — TELEPHONE (OUTPATIENT)
Dept: CARDIOLOGY CLINIC | Age: 88
End: 2023-11-30

## 2023-11-30 NOTE — TELEPHONE ENCOUNTER
----- Message from Javan iNeves RN sent at 11/30/2023  4:56 PM EST -----  Per [facility]- Tell pt. their labs are good. F/u in clinic as planned.

## 2023-12-13 ENCOUNTER — TELEPHONE (OUTPATIENT)
Dept: FAMILY MEDICINE CLINIC | Age: 88
End: 2023-12-13

## 2023-12-13 ENCOUNTER — OFFICE VISIT (OUTPATIENT)
Dept: FAMILY MEDICINE CLINIC | Age: 88
End: 2023-12-13
Payer: MEDICARE

## 2023-12-13 VITALS
HEART RATE: 84 BPM | RESPIRATION RATE: 16 BRPM | WEIGHT: 246 LBS | BODY MASS INDEX: 34.44 KG/M2 | OXYGEN SATURATION: 96 % | SYSTOLIC BLOOD PRESSURE: 120 MMHG | DIASTOLIC BLOOD PRESSURE: 76 MMHG | HEIGHT: 71 IN

## 2023-12-13 DIAGNOSIS — I87.2 VENOUS STASIS DERMATITIS OF BOTH LOWER EXTREMITIES: ICD-10-CM

## 2023-12-13 DIAGNOSIS — S81.801A WOUND OF RIGHT LOWER EXTREMITY, INITIAL ENCOUNTER: ICD-10-CM

## 2023-12-13 DIAGNOSIS — I87.323 CHRONIC VENOUS HTN W INFLAMMATION OF BILATERAL LOW EXTRM: ICD-10-CM

## 2023-12-13 DIAGNOSIS — I48.0 PAROXYSMAL ATRIAL FIBRILLATION (HCC): ICD-10-CM

## 2023-12-13 DIAGNOSIS — I10 ESSENTIAL HYPERTENSION: Primary | ICD-10-CM

## 2023-12-13 DIAGNOSIS — F51.01 PRIMARY INSOMNIA: ICD-10-CM

## 2023-12-13 DIAGNOSIS — H61.23 EXCESSIVE CERUMEN IN EAR CANAL, BILATERAL: ICD-10-CM

## 2023-12-13 PROCEDURE — G8417 CALC BMI ABV UP PARAM F/U: HCPCS | Performed by: FAMILY MEDICINE

## 2023-12-13 PROCEDURE — G8484 FLU IMMUNIZE NO ADMIN: HCPCS | Performed by: FAMILY MEDICINE

## 2023-12-13 PROCEDURE — 99214 OFFICE O/P EST MOD 30 MIN: CPT | Performed by: FAMILY MEDICINE

## 2023-12-13 PROCEDURE — 69209 REMOVE IMPACTED EAR WAX UNI: CPT | Performed by: FAMILY MEDICINE

## 2023-12-13 PROCEDURE — G8427 DOCREV CUR MEDS BY ELIG CLIN: HCPCS | Performed by: FAMILY MEDICINE

## 2023-12-13 PROCEDURE — 1123F ACP DISCUSS/DSCN MKR DOCD: CPT | Performed by: FAMILY MEDICINE

## 2023-12-13 PROCEDURE — 1036F TOBACCO NON-USER: CPT | Performed by: FAMILY MEDICINE

## 2023-12-13 RX ORDER — CEPHALEXIN 500 MG/1
500 CAPSULE ORAL 3 TIMES DAILY
Qty: 30 CAPSULE | Refills: 0 | Status: SHIPPED | OUTPATIENT
Start: 2023-12-13 | End: 2023-12-23

## 2023-12-13 RX ORDER — AMMONIUM LACTATE 12 G/100G
CREAM TOPICAL
Qty: 385 G | Refills: 4 | Status: SHIPPED | OUTPATIENT
Start: 2023-12-13 | End: 2024-01-12

## 2023-12-13 NOTE — PROGRESS NOTES
12/13/2023    Lilibeth Kinsey is a 80 y.o. male here to establish care with me. 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 He follows with Cardiology Dr. Keiry Gutierres  - he is on lasix, losartan, and Xarelto  - not on BB due to hx of bradycardia  - not on statin due to side effects    Insomnia  - on amitriptyline and Xanax for many years (40+). Previously saw a Psychiatrist who has since retired. Reports doing well on this with sleep    Venous stasis  - hx of ulcers, no current ulcers or wounds  - he sees a Podiatrist regularly, Dr. Kylah Guillen  - uses a walker to ambulate. Has spinal stenosis. Upper body strength is good. Notes legs are not as strong. Struggles with chronic hip / knee pain    HTN  BP Readings from Last 3 Encounters:   12/13/23 120/76   09/14/23 126/76   08/04/23 110/64   On losartan 50mg    Would like ears checked, concern for cerumen build up    Review of Systems    Prior to Visit Medications    Medication Sig Taking? Authorizing Provider   ALPRAZolam Ashok Pencil) 2 MG tablet TAKE 1 TABLET BY MOUTH AT BEDTIME Yes Day, Mahad Mead MD   amitriptyline (ELAVIL) 150 MG tablet TAKE 1 TABLET BY MOUTH AT BEDTIME Yes Day, Mahad Mead MD   nystatin (MYCOSTATIN) 496395 UNIT/GM cream Apply topically 2 times daily.  Yes Rose Aranda MD   rivaroxaban (XARELTO) 20 MG TABS tablet Take 1 tablet by mouth Daily with supper Yes Lord Seda MD   furosemide (LASIX) 20 MG tablet Take 1 tablet by mouth daily Yes Lord Seda MD   losartan (COZAAR) 50 MG tablet Take 1 tablet by mouth daily Yes Lord Seda MD   potassium chloride (KLOR-CON M) 20 MEQ extended release tablet Take 0.5 tablets by mouth 2 times daily Yes Lord Seda MD   Coenzyme Q10 (COQ10) 200 MG CAPS Take by mouth Yes Provider, MD Chinyere   famotidine (PEPCID) 20 MG tablet

## 2023-12-28 DIAGNOSIS — F51.01 PRIMARY INSOMNIA: ICD-10-CM

## 2023-12-28 RX ORDER — ALPRAZOLAM 2 MG/1
TABLET ORAL
Qty: 30 TABLET | Refills: 0 | Status: SHIPPED | OUTPATIENT
Start: 2023-12-28 | End: 2024-01-27

## 2024-01-27 DIAGNOSIS — F51.01 PRIMARY INSOMNIA: ICD-10-CM

## 2024-01-29 RX ORDER — ALPRAZOLAM 2 MG/1
TABLET ORAL
Qty: 30 TABLET | Refills: 0 | Status: SHIPPED | OUTPATIENT
Start: 2024-01-29 | End: 2024-02-28

## 2024-02-05 RX ORDER — AMITRIPTYLINE HYDROCHLORIDE 150 MG/1
150 TABLET ORAL
Qty: 90 TABLET | Refills: 1 | Status: SHIPPED | OUTPATIENT
Start: 2024-02-05

## 2024-02-29 DIAGNOSIS — F51.01 PRIMARY INSOMNIA: ICD-10-CM

## 2024-02-29 RX ORDER — ALPRAZOLAM 2 MG/1
2 TABLET ORAL NIGHTLY
Qty: 30 TABLET | Refills: 0 | Status: SHIPPED | OUTPATIENT
Start: 2024-02-29 | End: 2024-03-30

## 2024-03-05 ENCOUNTER — HOSPITAL ENCOUNTER (OUTPATIENT)
Age: 89
Discharge: HOME | End: 2024-03-05
Payer: MEDICARE

## 2024-03-05 ENCOUNTER — OFFICE VISIT (OUTPATIENT)
Dept: CARDIOLOGY CLINIC | Age: 89
End: 2024-03-05
Payer: MEDICARE

## 2024-03-05 VITALS
HEIGHT: 71 IN | SYSTOLIC BLOOD PRESSURE: 124 MMHG | WEIGHT: 244.4 LBS | OXYGEN SATURATION: 95 % | DIASTOLIC BLOOD PRESSURE: 62 MMHG | HEART RATE: 66 BPM | BODY MASS INDEX: 34.22 KG/M2

## 2024-03-05 DIAGNOSIS — E78.2 MIXED HYPERLIPIDEMIA: ICD-10-CM

## 2024-03-05 DIAGNOSIS — I25.10 CORONARY ARTERY DISEASE INVOLVING NATIVE CORONARY ARTERY OF NATIVE HEART WITHOUT ANGINA PECTORIS: ICD-10-CM

## 2024-03-05 DIAGNOSIS — I25.10 CORONARY ARTERY DISEASE INVOLVING NATIVE CORONARY ARTERY OF NATIVE HEART WITHOUT ANGINA PECTORIS: Primary | ICD-10-CM

## 2024-03-05 DIAGNOSIS — I10 ESSENTIAL HYPERTENSION: ICD-10-CM

## 2024-03-05 DIAGNOSIS — I65.23 BILATERAL CAROTID ARTERY STENOSIS: ICD-10-CM

## 2024-03-05 DIAGNOSIS — I50.42 CHRONIC COMBINED SYSTOLIC AND DIASTOLIC CONGESTIVE HEART FAILURE (HCC): ICD-10-CM

## 2024-03-05 DIAGNOSIS — Z86.718 HISTORY OF DVT (DEEP VEIN THROMBOSIS): ICD-10-CM

## 2024-03-05 LAB
ANION GAP SERPL CALCULATED.3IONS-SCNC: 8 MMOL/L (ref 3–16)
BUN SERPL-MCNC: 19 MG/DL (ref 7–20)
CALCIUM SERPL-MCNC: 9 MG/DL (ref 8.3–10.6)
CHLORIDE SERPL-SCNC: 102 MMOL/L (ref 99–110)
CO2 SERPL-SCNC: 27 MMOL/L (ref 21–32)
CREAT SERPL-MCNC: 1.2 MG/DL (ref 0.8–1.3)
GFR SERPLBLD CREATININE-BSD FMLA CKD-EPI: 58 ML/MIN/{1.73_M2}
GLUCOSE SERPL-MCNC: 110 MG/DL (ref 70–99)
POTASSIUM SERPL-SCNC: 4.3 MMOL/L (ref 3.5–5.1)
SODIUM SERPL-SCNC: 137 MMOL/L (ref 136–145)

## 2024-03-05 PROCEDURE — G8417 CALC BMI ABV UP PARAM F/U: HCPCS | Performed by: INTERNAL MEDICINE

## 2024-03-05 PROCEDURE — 80048 BASIC METABOLIC PNL TOTAL CA: CPT

## 2024-03-05 PROCEDURE — 99214 OFFICE O/P EST MOD 30 MIN: CPT | Performed by: INTERNAL MEDICINE

## 2024-03-05 PROCEDURE — 1036F TOBACCO NON-USER: CPT | Performed by: INTERNAL MEDICINE

## 2024-03-05 PROCEDURE — 1123F ACP DISCUSS/DSCN MKR DOCD: CPT | Performed by: INTERNAL MEDICINE

## 2024-03-05 PROCEDURE — 36415 COLL VENOUS BLD VENIPUNCTURE: CPT

## 2024-03-05 PROCEDURE — G8484 FLU IMMUNIZE NO ADMIN: HCPCS | Performed by: INTERNAL MEDICINE

## 2024-03-05 PROCEDURE — G8427 DOCREV CUR MEDS BY ELIG CLIN: HCPCS | Performed by: INTERNAL MEDICINE

## 2024-03-05 NOTE — PROGRESS NOTES
Myoview 2014> Normal perfusion/EF. Repeat myoview 8/2018-fixed inferior, scar vs diaphragm   2. HTN (hypertension):  Controlled. Continue losartan    /62 (Site: Right Upper Arm, Position: Sitting, Cuff Size: Medium Adult)   Pulse 66   Ht 1.803 m (5' 10.98\")   Wt 110.9 kg (244 lb 6.4 oz)   SpO2 95%   BMI 34.10 kg/m²      3. Hyperlipidemia: LHS=891 Stopped lipitor due to abdominal complaints/diarrhea. Unchanged    4. Carotid Stenosis:  2008 dopplers> less than 40 % bilaterally. Stable   5.  Atrial Fibrillation (paroxysmal):  PAF. HR controlled. Remains asymptomatic.  Continue Xarelto 20 mg (CrCl=75) Labs today  6.  DVT: Diagnosed Spring 2014>  Xarelto for 6 months. ? Hypercoagulable. Repeat testing negative. On Xarelto. Continue  7.  Dysnpea/CHF: EF=40-45%. Improved. Follow.  Aldactone stopped due to diarrhea. Continue  losartan, Stopped coreg due to bradycardia       Plan:  Stable  Same meds  Repeat chem-7 today  F/u 6 months    Thank you for allowing me to participate in the care of this individual.      Oral Mario M.D., [facility]

## 2024-03-27 DIAGNOSIS — F51.01 PRIMARY INSOMNIA: ICD-10-CM

## 2024-03-28 DIAGNOSIS — F51.01 PRIMARY INSOMNIA: ICD-10-CM

## 2024-03-28 RX ORDER — ALPRAZOLAM 2 MG/1
2 TABLET ORAL NIGHTLY PRN
Qty: 90 TABLET | Refills: 0 | Status: SHIPPED | OUTPATIENT
Start: 2024-03-28 | End: 2024-06-26

## 2024-03-28 RX ORDER — ALPRAZOLAM 2 MG/1
TABLET ORAL
Qty: 30 TABLET | Refills: 0 | OUTPATIENT
Start: 2024-03-28

## 2024-04-16 RX ORDER — FAMOTIDINE 20 MG/1
20 TABLET, FILM COATED ORAL 2 TIMES DAILY
Qty: 180 TABLET | Refills: 1 | Status: SHIPPED | OUTPATIENT
Start: 2024-04-16

## 2024-04-17 ENCOUNTER — HOSPITAL ENCOUNTER (OUTPATIENT)
Dept: GENERAL RADIOLOGY | Age: 89
Discharge: HOME | End: 2024-04-17
Attending: FAMILY MEDICINE
Payer: MEDICARE

## 2024-04-17 ENCOUNTER — OFFICE VISIT (OUTPATIENT)
Dept: FAMILY MEDICINE CLINIC | Age: 89
End: 2024-04-17
Payer: MEDICARE

## 2024-04-17 ENCOUNTER — HOSPITAL ENCOUNTER (OUTPATIENT)
Age: 89
Discharge: HOME | End: 2024-04-17
Payer: MEDICARE

## 2024-04-17 VITALS
RESPIRATION RATE: 16 BRPM | DIASTOLIC BLOOD PRESSURE: 74 MMHG | HEIGHT: 71 IN | BODY MASS INDEX: 34.09 KG/M2 | OXYGEN SATURATION: 95 % | SYSTOLIC BLOOD PRESSURE: 120 MMHG | HEART RATE: 74 BPM

## 2024-04-17 DIAGNOSIS — R06.09 DYSPNEA ON EXERTION: ICD-10-CM

## 2024-04-17 DIAGNOSIS — M54.32 LEFT SIDED SCIATICA: Primary | ICD-10-CM

## 2024-04-17 DIAGNOSIS — M54.32 LEFT SIDED SCIATICA: ICD-10-CM

## 2024-04-17 LAB
ANION GAP SERPL CALCULATED.3IONS-SCNC: 7 MMOL/L (ref 3–16)
BUN SERPL-MCNC: 20 MG/DL (ref 7–20)
CALCIUM SERPL-MCNC: 9.1 MG/DL (ref 8.3–10.6)
CHLORIDE SERPL-SCNC: 102 MMOL/L (ref 99–110)
CO2 SERPL-SCNC: 28 MMOL/L (ref 21–32)
CREAT SERPL-MCNC: 1.2 MG/DL (ref 0.8–1.3)
GFR SERPLBLD CREATININE-BSD FMLA CKD-EPI: 58 ML/MIN/{1.73_M2}
GLUCOSE SERPL-MCNC: 113 MG/DL (ref 70–99)
NT-PROBNP SERPL-MCNC: 884 PG/ML (ref 0–449)
POTASSIUM SERPL-SCNC: 4.9 MMOL/L (ref 3.5–5.1)
SODIUM SERPL-SCNC: 137 MMOL/L (ref 136–145)

## 2024-04-17 PROCEDURE — 99214 OFFICE O/P EST MOD 30 MIN: CPT | Performed by: FAMILY MEDICINE

## 2024-04-17 PROCEDURE — G8427 DOCREV CUR MEDS BY ELIG CLIN: HCPCS | Performed by: FAMILY MEDICINE

## 2024-04-17 PROCEDURE — 1123F ACP DISCUSS/DSCN MKR DOCD: CPT | Performed by: FAMILY MEDICINE

## 2024-04-17 PROCEDURE — 1036F TOBACCO NON-USER: CPT | Performed by: FAMILY MEDICINE

## 2024-04-17 PROCEDURE — G8417 CALC BMI ABV UP PARAM F/U: HCPCS | Performed by: FAMILY MEDICINE

## 2024-04-17 PROCEDURE — 72100 X-RAY EXAM L-S SPINE 2/3 VWS: CPT

## 2024-04-17 RX ORDER — PREDNISONE 10 MG/1
TABLET ORAL
Qty: 20 TABLET | Refills: 0 | Status: SHIPPED | OUTPATIENT
Start: 2024-04-17

## 2024-04-17 ASSESSMENT — PATIENT HEALTH QUESTIONNAIRE - PHQ9
1. LITTLE INTEREST OR PLEASURE IN DOING THINGS: NOT AT ALL
2. FEELING DOWN, DEPRESSED OR HOPELESS: NOT AT ALL
SUM OF ALL RESPONSES TO PHQ QUESTIONS 1-9: 0
SUM OF ALL RESPONSES TO PHQ QUESTIONS 1-9: 0
SUM OF ALL RESPONSES TO PHQ9 QUESTIONS 1 & 2: 0
SUM OF ALL RESPONSES TO PHQ QUESTIONS 1-9: 0
SUM OF ALL RESPONSES TO PHQ QUESTIONS 1-9: 0

## 2024-04-17 NOTE — PROGRESS NOTES
4/17/2024    This is a 88 y.o. male   Chief Complaint   Patient presents with    Fall     Pt had a couple of falls and is having a lot of pain in his left side and a lot of weakness in his left side.  Wop states she hears rattles in his chest when he breathes.  He is having issues walking and getting up from a seated position      HPI    Here for concerns for changes in mobility  - first noticed this about 5 days ago. He notes an aching pain in his left leg. He has had a couple of falls in the past couple of weeks (these were falls into things, did not hit head or have LOC).  - leg pain is improved by lying down. Worse with movement. Notes more difficulty getting to stand up out of his wheelchair, now requiring assistance over the past 5 erazo or so.   - denies back pain, but has known spinal disease    Separately, his wife and daughter have noticed that he has been short of breath with exertion, when up and moving in the home. No shortness of breath at rest. Resting helps recover. No chest pain.   - reports home daily weights have been stable  - no increased in baseline leg swelling    Review of Systems     Current Outpatient Medications   Medication Sig Dispense Refill    predniSONE (DELTASONE) 10 MG tablet Take 4 tabs by mouth daily for 2 days, 3 tabs for 2 days, 2 tabs for 2 days, 1 tab for 2 days 20 tablet 0    famotidine (PEPCID) 20 MG tablet Take 1 tablet by mouth 2 times daily 180 tablet 1    ALPRAZolam (XANAX) 2 MG tablet Take 1 tablet by mouth nightly as needed for Sleep for up to 90 days. Max Daily Amount: 2 mg 90 tablet 0    amitriptyline (ELAVIL) 150 MG tablet TAKE 1 TABLET BY MOUTH AT BEDTIME 90 tablet 1    nystatin (MYCOSTATIN) 388660 UNIT/GM cream Apply topically 2 times daily. 30 g 3    rivaroxaban (XARELTO) 20 MG TABS tablet Take 1 tablet by mouth Daily with supper 90 tablet 4    furosemide (LASIX) 20 MG tablet Take 1 tablet by mouth daily 90 tablet 4    losartan (COZAAR) 50 MG tablet Take 1 tablet

## 2024-04-21 DIAGNOSIS — I48.0 PAROXYSMAL ATRIAL FIBRILLATION (HCC): ICD-10-CM

## 2024-04-22 NOTE — TELEPHONE ENCOUNTER
Received refill request for Xarelto from [facility] pharmacy.    Last ov:03/05/2024 [facility]    Last labs:04/17/2024 [facility]    Last Refill:09/22/2023    Next appointment:11/12/2024 [facility]

## 2024-04-23 RX ORDER — RIVAROXABAN 20 MG/1
TABLET, FILM COATED ORAL
Qty: 90 TABLET | Refills: 4 | Status: SHIPPED | OUTPATIENT
Start: 2024-04-23

## 2024-04-25 ENCOUNTER — TELEPHONE (OUTPATIENT)
Dept: FAMILY MEDICINE CLINIC | Age: 89
End: 2024-04-25

## 2024-04-25 DIAGNOSIS — M54.42 CHRONIC LEFT-SIDED LOW BACK PAIN WITH LEFT-SIDED SCIATICA: Primary | ICD-10-CM

## 2024-04-25 DIAGNOSIS — G89.29 CHRONIC LEFT-SIDED LOW BACK PAIN WITH LEFT-SIDED SCIATICA: Primary | ICD-10-CM

## 2024-04-25 NOTE — TELEPHONE ENCOUNTER
Pt called in having left leg pain pt has finished the steroids today pt wants to know what else can he take ?   Over the counter ? Or can something else be sent to pharmacy ?       COVID-19 ruled out COVID-19 ruled out

## 2024-04-26 RX ORDER — GABAPENTIN 100 MG/1
100 CAPSULE ORAL 2 TIMES DAILY
Qty: 60 CAPSULE | Refills: 0 | Status: SHIPPED | OUTPATIENT
Start: 2024-04-26 | End: 2024-05-26

## 2024-04-26 NOTE — TELEPHONE ENCOUNTER
Called wop and gave her all the information.  She states pt has had 4 back surgeries and injections in his back in the past not sure if maybe he needs another injection in his back ,  FYI they are going to call and schedule the MRI

## 2024-04-26 NOTE — TELEPHONE ENCOUNTER
Pt called back in stated the prednisone did not help at all still has pain starts at the hip and goes down the leg nothing that had been prescribed has helped     Please advise

## 2024-04-26 NOTE — TELEPHONE ENCOUNTER
Please ask Rufus how his symptoms are - did they improve at all with the prednisone, and if so, how much? That can help guide our next steps. Thank you

## 2024-04-26 NOTE — TELEPHONE ENCOUNTER
Rx sent for gabapentin  It can cause dizziness or fatigue, so be careful when first taking    He needs an MRI of his low back to further assess this - I've placed the order  Let us know if symptoms are not improving, may need re-evaluation over the next two weeks if not improving

## 2024-05-10 ENCOUNTER — HOSPITAL ENCOUNTER (OUTPATIENT)
Dept: MRI IMAGING | Age: 89
Discharge: HOME | End: 2024-05-10
Payer: MEDICARE

## 2024-05-10 DIAGNOSIS — M54.42 CHRONIC LEFT-SIDED LOW BACK PAIN WITH LEFT-SIDED SCIATICA: ICD-10-CM

## 2024-05-10 DIAGNOSIS — G89.29 CHRONIC LEFT-SIDED LOW BACK PAIN WITH LEFT-SIDED SCIATICA: ICD-10-CM

## 2024-05-10 PROCEDURE — 72148 MRI LUMBAR SPINE W/O DYE: CPT

## 2024-05-14 ENCOUNTER — TELEPHONE (OUTPATIENT)
Dept: FAMILY MEDICINE CLINIC | Age: 89
End: 2024-05-14

## 2024-05-14 DIAGNOSIS — M48.062 SPINAL STENOSIS OF LUMBAR REGION WITH NEUROGENIC CLAUDICATION: Primary | ICD-10-CM

## 2024-05-14 NOTE — TELEPHONE ENCOUNTER
Please give referral info, thank you    Referral Details       Referred By   Referred To    Jack Shelton MD   [address]   [facility] [address]   Phone: [phone]   Fax: [phone]    Diagnoses: Spinal stenosis of lumbar region with neurogenic claudication   Order: [facility] - Elijah Brandon Md, Pain Management, [facility]   Reason: Specialty Services Required    Elijah Brandon MD   [address]   [facility] [address]   Phone: [phone]   Fax: [phone]

## 2024-05-17 ENCOUNTER — NURSE ONLY (OUTPATIENT)
Dept: FAMILY MEDICINE CLINIC | Age: 89
End: 2024-05-17
Payer: MEDICARE

## 2024-05-17 ENCOUNTER — TELEPHONE (OUTPATIENT)
Dept: FAMILY MEDICINE CLINIC | Age: 89
End: 2024-05-17

## 2024-05-17 DIAGNOSIS — R30.0 DYSURIA: Primary | ICD-10-CM

## 2024-05-17 LAB
BILIRUBIN, POC: NEGATIVE
BLOOD URINE, POC: ABNORMAL
CLARITY, POC: CLEAR
COLOR, POC: YELLOW
GLUCOSE URINE, POC: NEGATIVE
KETONES, POC: NEGATIVE
LEUKOCYTE EST, POC: ABNORMAL
NITRITE, POC: NEGATIVE
PH, POC: 6
PROTEIN, POC: NEGATIVE
SPECIFIC GRAVITY, POC: 1.01
UROBILINOGEN, POC: ABNORMAL

## 2024-05-17 PROCEDURE — 81002 URINALYSIS NONAUTO W/O SCOPE: CPT | Performed by: FAMILY MEDICINE

## 2024-05-17 RX ORDER — AMOXICILLIN AND CLAVULANATE POTASSIUM 875; 125 MG/1; MG/1
1 TABLET, FILM COATED ORAL 2 TIMES DAILY
Qty: 14 TABLET | Refills: 0 | Status: SHIPPED | OUTPATIENT
Start: 2024-05-17 | End: 2024-05-24

## 2024-05-17 NOTE — PROGRESS NOTES
Rx sent to cover for UTI given results and his symptoms    If he has any worsening or systemic symptoms recommend going to ER for eval.

## 2024-05-17 NOTE — TELEPHONE ENCOUNTER
ANTHONY called in stating that Dr. Shelton was going to call in a prescription for her  for his UTI    She would like to know when that will be called in?    Please Advise

## 2024-05-19 LAB
BACTERIA UR CULT: ABNORMAL
BACTERIA UR CULT: ABNORMAL
ORGANISM: ABNORMAL
ORGANISM: ABNORMAL

## 2024-05-20 ENCOUNTER — TELEPHONE (OUTPATIENT)
Dept: FAMILY MEDICINE CLINIC | Age: 89
End: 2024-05-20

## 2024-05-20 LAB
BACTERIA UR CULT: ABNORMAL
BACTERIA UR CULT: ABNORMAL
ORGANISM: ABNORMAL
ORGANISM: ABNORMAL

## 2024-05-20 NOTE — TELEPHONE ENCOUNTER
Called and spoke to patient.  Symptoms improving significantly.  He will finish antibiotics.  Urinary symptoms improved, fatigue improved.  I asked him to call if his symptoms worsen at all.    At this point, Enterococcus is likely what was causing his symptoms.  Pseudomonas may have been a subclinical colonization.  We will hold off on further antibiotics

## 2024-06-17 ENCOUNTER — NURSE ONLY (OUTPATIENT)
Dept: FAMILY MEDICINE CLINIC | Age: 89
End: 2024-06-17
Payer: MEDICARE

## 2024-06-17 DIAGNOSIS — R41.0 CONFUSION: Primary | ICD-10-CM

## 2024-06-17 LAB
BILIRUBIN, POC: NORMAL
BLOOD URINE, POC: NORMAL
CLARITY, POC: CLEAR
COLOR, POC: YELLOW
GLUCOSE URINE, POC: NORMAL
KETONES, POC: NORMAL
LEUKOCYTE EST, POC: NORMAL
NITRITE, POC: NORMAL
PH, POC: 5
PROTEIN, POC: NORMAL
SPECIFIC GRAVITY, POC: 1.03
UROBILINOGEN, POC: 0.2

## 2024-06-17 PROCEDURE — 81002 URINALYSIS NONAUTO W/O SCOPE: CPT | Performed by: FAMILY MEDICINE

## 2024-06-17 RX ORDER — AMOXICILLIN AND CLAVULANATE POTASSIUM 875; 125 MG/1; MG/1
1 TABLET, FILM COATED ORAL 2 TIMES DAILY
Qty: 20 TABLET | Refills: 0 | Status: SHIPPED | OUTPATIENT
Start: 2024-06-17 | End: 2024-06-27

## 2024-06-19 ENCOUNTER — TELEPHONE (OUTPATIENT)
Dept: FAMILY MEDICINE CLINIC | Age: 89
End: 2024-06-19

## 2024-06-19 DIAGNOSIS — N39.0 RECURRENT UTI: Primary | ICD-10-CM

## 2024-06-19 DIAGNOSIS — F51.01 PRIMARY INSOMNIA: ICD-10-CM

## 2024-06-19 NOTE — TELEPHONE ENCOUNTER
Spouse calling in, stated that she thinks she got a call from [facility] regarding a referral. Spouse would like to know where and what the referral was for.

## 2024-06-20 ENCOUNTER — TELEPHONE (OUTPATIENT)
Dept: FAMILY MEDICINE CLINIC | Age: 89
End: 2024-06-20

## 2024-06-20 LAB
BACTERIA UR CULT: ABNORMAL
ORGANISM: ABNORMAL
ORGANISM: ABNORMAL

## 2024-06-20 RX ORDER — ALPRAZOLAM 2 MG/1
2 TABLET ORAL NIGHTLY PRN
Qty: 90 TABLET | Refills: 0 | Status: SHIPPED | OUTPATIENT
Start: 2024-06-20 | End: 2024-09-18

## 2024-06-20 RX ORDER — CIPROFLOXACIN 500 MG/1
500 TABLET, FILM COATED ORAL 2 TIMES DAILY
Qty: 14 TABLET | Refills: 0 | Status: SHIPPED | OUTPATIENT
Start: 2024-06-20 | End: 2024-06-27

## 2024-06-20 NOTE — TELEPHONE ENCOUNTER
Called and spoke with wife patient.  He is feeling much better.  Based on cultures, replacing current antibiotic with Cipro.    Also stressed importance of following with urology for workup for potential underlying issues related to recurrent UTI   Spine appears normal, range of motion is not limited, no muscle or joint tenderness. Normal sensation in right thumb, normal cap refill, normal ROM active and passive with no pain.

## 2024-06-24 ENCOUNTER — TELEPHONE (OUTPATIENT)
Dept: FAMILY MEDICINE CLINIC | Age: 89
End: 2024-06-24

## 2024-06-24 NOTE — TELEPHONE ENCOUNTER
On call note:  Received call from Jarred's wife reporting pink tinged urine.  He is currently being treated for recurrent UTI with ciprofloxacin.  He has no pain or fevers.  WOP wanting to know if she needs to take him to the emergency room.  He does have upcoming appointment with urology this coming week.    Because he is asymptomatic and his urine is only pink tinged, OK to monitor over the weekend and keep visit with urology this coming week.  If he were to develop fevers, worsening hematuria or pain over the weekend, he should be evaluated in the ER.  WOP verbalized understanding and agrees with plan.

## 2024-07-09 ENCOUNTER — TELEPHONE (OUTPATIENT)
Dept: FAMILY MEDICINE CLINIC | Age: 89
End: 2024-07-09

## 2024-07-09 NOTE — TELEPHONE ENCOUNTER
ANTHONY called in wants to speak to Fallon   Pt saw specialist and is going for a CT scan tomorrow  to check for kidney stones her question is should she bring a urine sample in? She said his urine seems to be getting lighter and lighter in color

## 2024-07-10 ENCOUNTER — HOSPITAL ENCOUNTER (OUTPATIENT)
Dept: GENERAL RADIOLOGY | Age: 89
Discharge: HOME | End: 2024-07-10
Payer: MEDICARE

## 2024-07-10 ENCOUNTER — HOSPITAL ENCOUNTER (OUTPATIENT)
Age: 89
Discharge: HOME | End: 2024-07-10
Payer: MEDICARE

## 2024-07-10 ENCOUNTER — OFFICE VISIT (OUTPATIENT)
Dept: FAMILY MEDICINE CLINIC | Age: 89
End: 2024-07-10
Payer: MEDICARE

## 2024-07-10 ENCOUNTER — HOSPITAL ENCOUNTER (OUTPATIENT)
Dept: CT IMAGING | Age: 89
Discharge: HOME | End: 2024-07-10
Payer: MEDICARE

## 2024-07-10 VITALS
DIASTOLIC BLOOD PRESSURE: 58 MMHG | SYSTOLIC BLOOD PRESSURE: 118 MMHG | HEART RATE: 73 BPM | HEIGHT: 71 IN | OXYGEN SATURATION: 97 % | BODY MASS INDEX: 32.9 KG/M2 | RESPIRATION RATE: 20 BRPM | TEMPERATURE: 98.6 F | WEIGHT: 235 LBS

## 2024-07-10 DIAGNOSIS — M79.672 LEFT FOOT PAIN: Primary | ICD-10-CM

## 2024-07-10 DIAGNOSIS — M79.672 LEFT FOOT PAIN: ICD-10-CM

## 2024-07-10 DIAGNOSIS — R41.82 ALTERED MENTAL STATUS, UNSPECIFIED ALTERED MENTAL STATUS TYPE: ICD-10-CM

## 2024-07-10 DIAGNOSIS — Z91.81 AT HIGH RISK FOR FALLS: ICD-10-CM

## 2024-07-10 DIAGNOSIS — R31.0 GROSS HEMATURIA: ICD-10-CM

## 2024-07-10 DIAGNOSIS — N39.0 URINARY TRACT INFECTION WITHOUT HEMATURIA, SITE UNSPECIFIED: ICD-10-CM

## 2024-07-10 PROCEDURE — 74176 CT ABD & PELVIS W/O CONTRAST: CPT

## 2024-07-10 PROCEDURE — 1036F TOBACCO NON-USER: CPT | Performed by: FAMILY MEDICINE

## 2024-07-10 PROCEDURE — 73630 X-RAY EXAM OF FOOT: CPT

## 2024-07-10 PROCEDURE — 99213 OFFICE O/P EST LOW 20 MIN: CPT | Performed by: FAMILY MEDICINE

## 2024-07-10 PROCEDURE — 1123F ACP DISCUSS/DSCN MKR DOCD: CPT | Performed by: FAMILY MEDICINE

## 2024-07-10 PROCEDURE — G8427 DOCREV CUR MEDS BY ELIG CLIN: HCPCS | Performed by: FAMILY MEDICINE

## 2024-07-10 PROCEDURE — G8417 CALC BMI ABV UP PARAM F/U: HCPCS | Performed by: FAMILY MEDICINE

## 2024-07-10 SDOH — ECONOMIC STABILITY: HOUSING INSECURITY
IN THE LAST 12 MONTHS, WAS THERE A TIME WHEN YOU DID NOT HAVE A STEADY PLACE TO SLEEP OR SLEPT IN A SHELTER (INCLUDING NOW)?: NO

## 2024-07-10 SDOH — ECONOMIC STABILITY: INCOME INSECURITY: HOW HARD IS IT FOR YOU TO PAY FOR THE VERY BASICS LIKE FOOD, HOUSING, MEDICAL CARE, AND HEATING?: NOT HARD AT ALL

## 2024-07-10 SDOH — ECONOMIC STABILITY: FOOD INSECURITY: WITHIN THE PAST 12 MONTHS, THE FOOD YOU BOUGHT JUST DIDN'T LAST AND YOU DIDN'T HAVE MONEY TO GET MORE.: NEVER TRUE

## 2024-07-10 SDOH — ECONOMIC STABILITY: FOOD INSECURITY: WITHIN THE PAST 12 MONTHS, YOU WORRIED THAT YOUR FOOD WOULD RUN OUT BEFORE YOU GOT MONEY TO BUY MORE.: NEVER TRUE

## 2024-07-10 NOTE — PROGRESS NOTES
compression stockings, bilateral, knee high 1 each 0    nitroGLYCERIN (NITROSTAT) 0.4 MG SL tablet Place 1 tablet under the tongue every 5 minutes as needed for Chest pain 25 tablet 3    Ascorbic Acid (VITAMIN C) 500 MG tablet Take 1 tablet by mouth daily      vitamin E 400 UNIT capsule Take 2 capsules by mouth daily      vitamin D (CHOLECALCIFEROL) 1000 UNIT TABS tablet Take 1 tablet by mouth 2 times daily      gabapentin (NEURONTIN) 100 MG capsule Take 1 capsule by mouth 2 times daily for 30 days. Intended supply: 90 days 60 capsule 0    predniSONE (DELTASONE) 10 MG tablet Take 4 tabs by mouth daily for 2 days, 3 tabs for 2 days, 2 tabs for 2 days, 1 tab for 2 days (Patient not taking: Reported on 7/10/2024) 20 tablet 0     No current facility-administered medications for this visit.       BP (!) 118/58 (Site: Right Upper Arm, Position: Sitting, Cuff Size: Medium Adult)   Pulse 73   Temp 98.6 °F (37 °C) (Oral)   Resp 20   Ht 1.803 m (5' 10.98\")   Wt 106.6 kg (235 lb)   SpO2 97%   BMI 32.79 kg/m²     Physical Exam  Left foot: Base of fifth metatarsal with tenderness to palpation and bony irregularity palpated.  Distal swelling and bruising noted.  No dorsal foot tenderness.  No tenderness to the the lateral or medial malleoli    Wt Readings from Last 3 Encounters:   07/10/24 106.6 kg (235 lb)   03/05/24 110.9 kg (244 lb 6.4 oz)   12/13/23 111.6 kg (246 lb)       BP Readings from Last 3 Encounters:   07/10/24 (!) 118/58   04/17/24 120/74   03/05/24 124/62         Assessment/Plan:  1. Left foot pain  Check x-ray due to concern for fracture and bony tenderness  - XR FOOT LEFT (MIN 3 VIEWS); Future    2. At high risk for falls  Some of these falls have been due to recent UTIs and the most recent fall was due to a chair giving out while he was leaning against it per report.  -Continue to use a walker to ambulate.  -Discussed home physical therapy to help with strength and mobility which he will consider but

## 2024-07-11 ENCOUNTER — TELEPHONE (OUTPATIENT)
Dept: FAMILY MEDICINE CLINIC | Age: 89
End: 2024-07-11

## 2024-07-11 NOTE — TELEPHONE ENCOUNTER
Pt calling in, asked if results were in for Xray and CT Scan     Advised pt Radiologist has not signed the xray and ct scan, once they sign it after review it will go to Dr. Shelton to look at the Radiology note then we will call pt with results.    Pt stated that he would wait for a call from Dr. Shelton's office.

## 2024-07-23 ENCOUNTER — ANESTHESIA EVENT (OUTPATIENT)
Dept: OPERATING ROOM | Age: 89
End: 2024-07-23
Payer: MEDICARE

## 2024-07-23 NOTE — PROGRESS NOTES
Per Dr. Cristobal pt may have toast before 0600 on day of surgery and clear liquids up to 1200. Pt aware that if surgery time moves up then he should follow npo after midnight.

## 2024-07-23 NOTE — PROGRESS NOTES
Name_______________________________________Printed:____________________  Date and time of surgery_7/26/24 @ 1600_______________________Arrival Time:__1430 main___/per office____   1. The instructions given regarding when and if a patient needs to stop oral intake prior to surgery varies.Follow the specific instructions you were given                  __x_Nothing to eat or to drink after Midnight the night before.(May have toast before 0600 and clear liquids til 1200 if surgery stays at 1600 per anesthesia)                   ____Carbo loading or instructions will be given to select patients-if you have been given those instructions -please do the following                           The evening before your surgery after dinner before midnight drink 40 ounces of gatorade.If you are diabetic use sugar free.  The morning of surgery drink 40 ounces of water.This needs to be finished 3 hours prior to your surgery start time.    2. Take the following pills with a small sip of water on the morning of surgery__pepcid_________________________________________________                  Do not take blood pressure medications ending in pril or sartan the jurgen prior to surgery or the morning of surgery. Dr Thornton's patient are not to take any medications the AM of surgery.         3. Aspirin, Ibuprofen, Advil, Naproxen, Vitamin E and other Anti-inflammatory products and supplements should be stopped for 5 -7days before surgery or as directed by your physician.   4. Check with your Doctor regarding stopping Plavix, Coumadin,Eliquis, Lovenox,Effient,Pradaxa,Xarelto, Fragmin or other blood thinners and follow their instructions.   5. Do not smoke, and do not drink any alcoholic beverages 24 hours prior to surgery.  This includes NA Beer.Refrain from the usage of any recreational drugs.   6. You may brush your teeth and gargle the morning of surgery.  DO NOT SWALLOW WATER   7. You MUST make arrangements for a responsible adult to stay on

## 2024-07-24 ENCOUNTER — HOSPITAL ENCOUNTER (OUTPATIENT)
Dept: WOUND CARE | Age: 89
Discharge: HOME | End: 2024-07-24
Attending: PODIATRIST
Payer: MEDICARE

## 2024-07-24 VITALS
TEMPERATURE: 98 F | RESPIRATION RATE: 22 BRPM | HEART RATE: 94 BPM | DIASTOLIC BLOOD PRESSURE: 73 MMHG | SYSTOLIC BLOOD PRESSURE: 142 MMHG

## 2024-07-24 DIAGNOSIS — S61.402D OPEN WOUND OF LEFT HAND, SUBSEQUENT ENCOUNTER: Primary | ICD-10-CM

## 2024-07-24 DIAGNOSIS — L97.822 NON-PRS CHRONIC ULCER OTH PRT L LOW LEG W FAT LAYER EXPOSED (HCC): ICD-10-CM

## 2024-07-24 DIAGNOSIS — S61.402A OPEN WOUND OF LEFT HAND, INITIAL ENCOUNTER: ICD-10-CM

## 2024-07-24 PROCEDURE — 11042 DBRDMT SUBQ TIS 1ST 20SQCM/<: CPT

## 2024-07-24 PROCEDURE — 99213 OFFICE O/P EST LOW 20 MIN: CPT

## 2024-07-24 RX ORDER — IBUPROFEN 200 MG
TABLET ORAL ONCE
OUTPATIENT
Start: 2024-07-24 | End: 2024-07-24

## 2024-07-24 RX ORDER — CIPROFLOXACIN 500 MG/1
500 TABLET, FILM COATED ORAL
COMMUNITY
Start: 2024-07-20

## 2024-07-24 RX ORDER — SODIUM CHLOR/HYPOCHLOROUS ACID 0.033 %
SOLUTION, IRRIGATION IRRIGATION ONCE
OUTPATIENT
Start: 2024-07-24 | End: 2024-07-24

## 2024-07-24 RX ORDER — BACITRACIN ZINC AND POLYMYXIN B SULFATE 500; 1000 [USP'U]/G; [USP'U]/G
OINTMENT TOPICAL ONCE
OUTPATIENT
Start: 2024-07-24 | End: 2024-07-24

## 2024-07-24 RX ORDER — LIDOCAINE 50 MG/G
OINTMENT TOPICAL ONCE
OUTPATIENT
Start: 2024-07-24 | End: 2024-07-24

## 2024-07-24 RX ORDER — BETAMETHASONE DIPROPIONATE 0.5 MG/G
CREAM TOPICAL ONCE
OUTPATIENT
Start: 2024-07-24 | End: 2024-07-24

## 2024-07-24 RX ORDER — LIDOCAINE 40 MG/G
CREAM TOPICAL ONCE
OUTPATIENT
Start: 2024-07-24 | End: 2024-07-24

## 2024-07-24 RX ORDER — LIDOCAINE HYDROCHLORIDE 40 MG/ML
SOLUTION TOPICAL ONCE
OUTPATIENT
Start: 2024-07-24 | End: 2024-07-24

## 2024-07-24 RX ORDER — CLOBETASOL PROPIONATE 0.5 MG/G
OINTMENT TOPICAL ONCE
OUTPATIENT
Start: 2024-07-24 | End: 2024-07-24

## 2024-07-24 RX ORDER — GINSENG 100 MG
CAPSULE ORAL ONCE
OUTPATIENT
Start: 2024-07-24 | End: 2024-07-24

## 2024-07-24 RX ORDER — TRIAMCINOLONE ACETONIDE 1 MG/G
OINTMENT TOPICAL ONCE
OUTPATIENT
Start: 2024-07-24 | End: 2024-07-24

## 2024-07-24 RX ORDER — LIDOCAINE HYDROCHLORIDE 20 MG/ML
JELLY TOPICAL ONCE
OUTPATIENT
Start: 2024-07-24 | End: 2024-07-24

## 2024-07-24 RX ORDER — LIDOCAINE HYDROCHLORIDE 40 MG/ML
SOLUTION TOPICAL ONCE
Status: COMPLETED | OUTPATIENT
Start: 2024-07-24 | End: 2024-07-24

## 2024-07-24 RX ORDER — GENTAMICIN SULFATE 1 MG/G
OINTMENT TOPICAL ONCE
OUTPATIENT
Start: 2024-07-24 | End: 2024-07-24

## 2024-07-24 RX ADMIN — LIDOCAINE HYDROCHLORIDE: 40 SOLUTION TOPICAL at 15:00

## 2024-07-24 NOTE — PATIENT INSTRUCTIONS
[facility] Wound Care Physician Orders and Discharge Instructions  [facility]  [address], [address]  [place], [place] [address]  Telephone: [phone]      FAX [phone]  MONDAY - THURSDAY 8:00 am - 4:30 pm and Friday 8:00 am - 12:00 pm.        NAME:  Jarred Suarez  YOB: 1935  MEDICAL RECORD NUMBER:  [id number]  DATE:  7/24/2024      Return Appointment:  [x] Return Appointment: With Dr Jos Monroe  in  1 Week(s)  [] Wound and dressing supply provider:   [] ECF or Home Healthcare:  [] Wound Assessment: [] Physician or NP scheduled for Wound Assessment:   [] Orders placed during your visit:      Important Reminders:   Please wash hands with soap and water before and after every dressing change.  Do not scrub wounds.  Keep wounds dry in shower unless otherwise instructed by the physician.  SMOKING can slow would healing. Stop smoking as soon as possible to improve healing and prevent further complications associated with smoking.      Sameera-Wound Topical Treatments:  Do not apply lotions, creams, or ointments to wound bed unless directed.   [] Apply moisturizing lotion to skin surrounding the wound prior to dressing change.  [] Apply antifungal ointment to skin surrounding the wound prior to dressing change.  [] Apply thin film of no sting moisture barrier ointment to skin immediately around      wound.  [] Other:       Wound Location: LEFT LATERAL LOWER LEG    Wound Cleansing: Vashe soak for 5 minutes prior to dressing change    Primary Dressing:  [x] SILVER ALGINATE  []     Secondary Dressing:  [x] MEPILEX BORDER (LARGE TODAY IN CLINIC)  []       Dressing Frequency:  [x] THREE TIMES A WEEK  [] Do Not Change Dressing      Compression and Edema Control:  [] Wear Home Compression Stockings   [x] Spandagrip to: Left Leg and Right Leg   Size: []Low compression 5-10 mm/Hg      [x]Medium compression 10-20 mm/Hg           []High compression  20-30 mm/Hg  [] Ace Wrap

## 2024-07-24 NOTE — PROGRESS NOTES
[facility]   Progress Note and Procedure Note      Jarred Suarez  MEDICAL RECORD NUMBER:  [id number]  AGE: 88 y.o.   GENDER: male  : 1935  EPISODE DATE:  2024    Subjective:     Chief Complaint   Patient presents with    Wound Check     Initial visit for left lower leg, traumatic wound from fall.          HISTORY of PRESENT ILLNESS HPI     Jarred Suarez is a 88 y.o. male who presents today for wound/ulcer evaluation.   History of Wound Context: Patient presents as a NEW PATIENT complaining of a wound on his left lower leg. Patient reports he fell on 2024 and hit the leg against his walker. Patient states his wife is helping him perform band aid changes to the wound. Patient reports some clear to yellow drainage on the band aids. Patient states he has very little pain from the wound. Patient has a complicated medical history including CAD, atrial fibrillation and history of DVT. Patient states he is currently taking Cipro for a UTI.     Wound/Ulcer Pain Timing/Severity: intermittent, mild  Quality of pain: aching  Severity:  2 / 10   Modifying Factors: Pain worsens with pressure to the wound  Associated Signs/Symptoms: edema and drainage    Ulcer Identification:  Ulcer Type: venous and traumatic    Contributing Factors: edema, decreased mobility, obesity, and anticoagulation therapy    Acute Wound: Laceration    PAST MEDICAL HISTORY        Diagnosis Date    Atrial fibrillation (HCC)     CAD (coronary artery disease)     Diarrhea 2017    History of skin cancer     Hyperlipidemia     Hypertension     Insomnia 2020    Kidney stones 2017    Open wound of left hand, subsequent encounter 6/3/2022       PAST SURGICAL HISTORY    Past Surgical History:   Procedure Laterality Date    APPENDECTOMY      BACK SURGERY      x4    CORONARY ARTERY BYPASS GRAFT  1981    CORONARY ARTERY BYPASS GRAFT  2001    JOINT REPLACEMENT Right     knee replacement    KNEE

## 2024-07-26 ENCOUNTER — HOSPITAL ENCOUNTER (OUTPATIENT)
Age: 89
Setting detail: OUTPATIENT SURGERY
Discharge: HOME | End: 2024-07-26
Attending: UROLOGY | Admitting: UROLOGY
Payer: MEDICARE

## 2024-07-26 ENCOUNTER — ANESTHESIA (OUTPATIENT)
Dept: OPERATING ROOM | Age: 89
End: 2024-07-26
Payer: MEDICARE

## 2024-07-26 ENCOUNTER — APPOINTMENT (OUTPATIENT)
Dept: GENERAL RADIOLOGY | Age: 89
End: 2024-07-26
Attending: UROLOGY
Payer: MEDICARE

## 2024-07-26 VITALS
RESPIRATION RATE: 15 BRPM | OXYGEN SATURATION: 99 % | DIASTOLIC BLOOD PRESSURE: 76 MMHG | HEIGHT: 71 IN | HEART RATE: 69 BPM | SYSTOLIC BLOOD PRESSURE: 138 MMHG | WEIGHT: 242 LBS | BODY MASS INDEX: 33.88 KG/M2 | TEMPERATURE: 97.6 F

## 2024-07-26 DIAGNOSIS — R31.0 GROSS HEMATURIA: ICD-10-CM

## 2024-07-26 PROBLEM — N21.0 BLADDER CALCULI: Status: ACTIVE | Noted: 2024-07-26

## 2024-07-26 LAB
ANION GAP SERPL CALCULATED.3IONS-SCNC: 13 MMOL/L (ref 3–16)
BUN SERPL-MCNC: 21 MG/DL (ref 7–20)
CALCIUM SERPL-MCNC: 9.6 MG/DL (ref 8.3–10.6)
CHLORIDE SERPL-SCNC: 100 MMOL/L (ref 99–110)
CO2 SERPL-SCNC: 26 MMOL/L (ref 21–32)
CREAT SERPL-MCNC: 1.3 MG/DL (ref 0.8–1.3)
DEPRECATED RDW RBC AUTO: 14.3 % (ref 12.4–15.4)
GFR SERPLBLD CREATININE-BSD FMLA CKD-EPI: 53 ML/MIN/{1.73_M2}
GLUCOSE SERPL-MCNC: 117 MG/DL (ref 70–99)
HCT VFR BLD AUTO: 41.1 % (ref 40.5–52.5)
HGB BLD-MCNC: 13.9 G/DL (ref 13.5–17.5)
MCH RBC QN AUTO: 32.1 PG (ref 26–34)
MCHC RBC AUTO-ENTMCNC: 33.9 G/DL (ref 31–36)
MCV RBC AUTO: 94.5 FL (ref 80–100)
PLATELET # BLD AUTO: 147 K/UL (ref 135–450)
PMV BLD AUTO: 7.3 FL (ref 5–10.5)
POTASSIUM SERPL-SCNC: 4.1 MMOL/L (ref 3.5–5.1)
RBC # BLD AUTO: 4.34 M/UL (ref 4.2–5.9)
SODIUM SERPL-SCNC: 139 MMOL/L (ref 136–145)
WBC # BLD AUTO: 5.2 K/UL (ref 4–11)

## 2024-07-26 PROCEDURE — 82365 CALCULUS SPECTROSCOPY: CPT

## 2024-07-26 PROCEDURE — 7100000001 HC PACU RECOVERY - ADDTL 15 MIN: Performed by: UROLOGY

## 2024-07-26 PROCEDURE — 3700000001 HC ADD 15 MINUTES (ANESTHESIA): Performed by: UROLOGY

## 2024-07-26 PROCEDURE — 36415 COLL VENOUS BLD VENIPUNCTURE: CPT

## 2024-07-26 PROCEDURE — 6370000000 HC RX 637 (ALT 250 FOR IP): Performed by: UROLOGY

## 2024-07-26 PROCEDURE — 80048 BASIC METABOLIC PNL TOTAL CA: CPT

## 2024-07-26 PROCEDURE — 6360000002 HC RX W HCPCS: Performed by: UROLOGY

## 2024-07-26 PROCEDURE — 7100000011 HC PHASE II RECOVERY - ADDTL 15 MIN: Performed by: UROLOGY

## 2024-07-26 PROCEDURE — 7100000010 HC PHASE II RECOVERY - FIRST 15 MIN: Performed by: UROLOGY

## 2024-07-26 PROCEDURE — 3700000000 HC ANESTHESIA ATTENDED CARE: Performed by: UROLOGY

## 2024-07-26 PROCEDURE — 3600000014 HC SURGERY LEVEL 4 ADDTL 15MIN: Performed by: UROLOGY

## 2024-07-26 PROCEDURE — 2580000003 HC RX 258: Performed by: UROLOGY

## 2024-07-26 PROCEDURE — C1726 CATH, BAL DIL, NON-VASCULAR: HCPCS | Performed by: UROLOGY

## 2024-07-26 PROCEDURE — 7100000000 HC PACU RECOVERY - FIRST 15 MIN: Performed by: UROLOGY

## 2024-07-26 PROCEDURE — 85027 COMPLETE CBC AUTOMATED: CPT

## 2024-07-26 PROCEDURE — 2720000010 HC SURG SUPPLY STERILE: Performed by: UROLOGY

## 2024-07-26 PROCEDURE — 6360000002 HC RX W HCPCS: Performed by: NURSE ANESTHETIST, CERTIFIED REGISTERED

## 2024-07-26 PROCEDURE — C1769 GUIDE WIRE: HCPCS | Performed by: UROLOGY

## 2024-07-26 PROCEDURE — 2500000003 HC RX 250 WO HCPCS: Performed by: NURSE ANESTHETIST, CERTIFIED REGISTERED

## 2024-07-26 PROCEDURE — 2709999900 HC NON-CHARGEABLE SUPPLY: Performed by: UROLOGY

## 2024-07-26 PROCEDURE — 3600000004 HC SURGERY LEVEL 4 BASE: Performed by: UROLOGY

## 2024-07-26 RX ORDER — LIDOCAINE HYDROCHLORIDE 20 MG/ML
INJECTION, SOLUTION EPIDURAL; INFILTRATION; INTRACAUDAL; PERINEURAL PRN
Status: DISCONTINUED | OUTPATIENT
Start: 2024-07-26 | End: 2024-07-26 | Stop reason: SDUPTHER

## 2024-07-26 RX ORDER — HYDRALAZINE HYDROCHLORIDE 20 MG/ML
10 INJECTION INTRAMUSCULAR; INTRAVENOUS
Status: DISCONTINUED | OUTPATIENT
Start: 2024-07-26 | End: 2024-07-26 | Stop reason: HOSPADM

## 2024-07-26 RX ORDER — SODIUM CHLORIDE 9 MG/ML
INJECTION, SOLUTION INTRAVENOUS PRN
Status: DISCONTINUED | OUTPATIENT
Start: 2024-07-26 | End: 2024-07-26 | Stop reason: HOSPADM

## 2024-07-26 RX ORDER — PROPOFOL 10 MG/ML
INJECTION, EMULSION INTRAVENOUS PRN
Status: DISCONTINUED | OUTPATIENT
Start: 2024-07-26 | End: 2024-07-26 | Stop reason: SDUPTHER

## 2024-07-26 RX ORDER — ONDANSETRON 2 MG/ML
INJECTION INTRAMUSCULAR; INTRAVENOUS PRN
Status: DISCONTINUED | OUTPATIENT
Start: 2024-07-26 | End: 2024-07-26 | Stop reason: SDUPTHER

## 2024-07-26 RX ORDER — HYDROMORPHONE HYDROCHLORIDE 2 MG/ML
0.25 INJECTION, SOLUTION INTRAMUSCULAR; INTRAVENOUS; SUBCUTANEOUS EVERY 5 MIN PRN
Status: DISCONTINUED | OUTPATIENT
Start: 2024-07-26 | End: 2024-07-26 | Stop reason: HOSPADM

## 2024-07-26 RX ORDER — LABETALOL HYDROCHLORIDE 5 MG/ML
10 INJECTION, SOLUTION INTRAVENOUS
Status: DISCONTINUED | OUTPATIENT
Start: 2024-07-26 | End: 2024-07-26 | Stop reason: HOSPADM

## 2024-07-26 RX ORDER — MAGNESIUM HYDROXIDE 1200 MG/15ML
LIQUID ORAL CONTINUOUS PRN
Status: COMPLETED | OUTPATIENT
Start: 2024-07-26 | End: 2024-07-26

## 2024-07-26 RX ORDER — SODIUM CHLORIDE 0.9 % (FLUSH) 0.9 %
5-40 SYRINGE (ML) INJECTION EVERY 12 HOURS SCHEDULED
Status: DISCONTINUED | OUTPATIENT
Start: 2024-07-26 | End: 2024-07-26 | Stop reason: HOSPADM

## 2024-07-26 RX ORDER — OXYCODONE HYDROCHLORIDE 5 MG/1
5 TABLET ORAL
Status: DISCONTINUED | OUTPATIENT
Start: 2024-07-26 | End: 2024-07-26 | Stop reason: HOSPADM

## 2024-07-26 RX ORDER — PHENAZOPYRIDINE HYDROCHLORIDE 100 MG/1
100 TABLET, FILM COATED ORAL 3 TIMES DAILY PRN
Qty: 15 TABLET | Refills: 0 | Status: SHIPPED | OUTPATIENT
Start: 2024-07-26 | End: 2024-07-31

## 2024-07-26 RX ORDER — SODIUM CHLORIDE, SODIUM LACTATE, POTASSIUM CHLORIDE, CALCIUM CHLORIDE 600; 310; 30; 20 MG/100ML; MG/100ML; MG/100ML; MG/100ML
INJECTION, SOLUTION INTRAVENOUS CONTINUOUS
Status: DISCONTINUED | OUTPATIENT
Start: 2024-07-26 | End: 2024-07-26 | Stop reason: HOSPADM

## 2024-07-26 RX ORDER — EPHEDRINE SULFATE 50 MG/ML
INJECTION INTRAVENOUS PRN
Status: DISCONTINUED | OUTPATIENT
Start: 2024-07-26 | End: 2024-07-26 | Stop reason: SDUPTHER

## 2024-07-26 RX ORDER — LIDOCAINE HYDROCHLORIDE 20 MG/ML
JELLY TOPICAL
Status: COMPLETED | OUTPATIENT
Start: 2024-07-26 | End: 2024-07-26

## 2024-07-26 RX ORDER — PROCHLORPERAZINE EDISYLATE 5 MG/ML
5 INJECTION INTRAMUSCULAR; INTRAVENOUS
Status: DISCONTINUED | OUTPATIENT
Start: 2024-07-26 | End: 2024-07-26 | Stop reason: HOSPADM

## 2024-07-26 RX ORDER — FENTANYL CITRATE 50 UG/ML
25 INJECTION, SOLUTION INTRAMUSCULAR; INTRAVENOUS EVERY 5 MIN PRN
Status: DISCONTINUED | OUTPATIENT
Start: 2024-07-26 | End: 2024-07-26 | Stop reason: HOSPADM

## 2024-07-26 RX ORDER — DEXAMETHASONE SODIUM PHOSPHATE 4 MG/ML
INJECTION, SOLUTION INTRA-ARTICULAR; INTRALESIONAL; INTRAMUSCULAR; INTRAVENOUS; SOFT TISSUE PRN
Status: DISCONTINUED | OUTPATIENT
Start: 2024-07-26 | End: 2024-07-26 | Stop reason: SDUPTHER

## 2024-07-26 RX ORDER — SODIUM CHLORIDE 0.9 % (FLUSH) 0.9 %
5-40 SYRINGE (ML) INJECTION PRN
Status: DISCONTINUED | OUTPATIENT
Start: 2024-07-26 | End: 2024-07-26 | Stop reason: HOSPADM

## 2024-07-26 RX ORDER — ONDANSETRON 2 MG/ML
4 INJECTION INTRAMUSCULAR; INTRAVENOUS
Status: DISCONTINUED | OUTPATIENT
Start: 2024-07-26 | End: 2024-07-26 | Stop reason: HOSPADM

## 2024-07-26 RX ORDER — FENTANYL CITRATE 50 UG/ML
INJECTION, SOLUTION INTRAMUSCULAR; INTRAVENOUS PRN
Status: DISCONTINUED | OUTPATIENT
Start: 2024-07-26 | End: 2024-07-26 | Stop reason: SDUPTHER

## 2024-07-26 RX ORDER — LIDOCAINE HYDROCHLORIDE 10 MG/ML
0.5 INJECTION, SOLUTION EPIDURAL; INFILTRATION; INTRACAUDAL; PERINEURAL ONCE
Status: DISCONTINUED | OUTPATIENT
Start: 2024-07-26 | End: 2024-07-26 | Stop reason: HOSPADM

## 2024-07-26 RX ADMIN — CEFAZOLIN 2000 MG: 2 INJECTION, POWDER, FOR SOLUTION INTRAMUSCULAR; INTRAVENOUS at 16:54

## 2024-07-26 RX ADMIN — EPHEDRINE SULFATE 10 MG: 50 INJECTION, SOLUTION INTRAVENOUS at 16:50

## 2024-07-26 RX ADMIN — EPHEDRINE SULFATE 10 MG: 50 INJECTION, SOLUTION INTRAVENOUS at 17:02

## 2024-07-26 RX ADMIN — LIDOCAINE HYDROCHLORIDE 100 MG: 20 INJECTION, SOLUTION EPIDURAL; INFILTRATION; INTRACAUDAL; PERINEURAL at 16:46

## 2024-07-26 RX ADMIN — ONDANSETRON 4 MG: 2 INJECTION INTRAMUSCULAR; INTRAVENOUS at 16:52

## 2024-07-26 RX ADMIN — FENTANYL CITRATE 50 MCG: 50 INJECTION, SOLUTION INTRAMUSCULAR; INTRAVENOUS at 17:14

## 2024-07-26 RX ADMIN — PROPOFOL 110 MG: 10 INJECTION, EMULSION INTRAVENOUS at 16:46

## 2024-07-26 RX ADMIN — DEXAMETHASONE SODIUM PHOSPHATE 8 MG: 4 INJECTION, SOLUTION INTRAMUSCULAR; INTRAVENOUS at 16:52

## 2024-07-26 RX ADMIN — FENTANYL CITRATE 25 MCG: 50 INJECTION, SOLUTION INTRAMUSCULAR; INTRAVENOUS at 16:41

## 2024-07-26 RX ADMIN — FENTANYL CITRATE 25 MCG: 50 INJECTION, SOLUTION INTRAMUSCULAR; INTRAVENOUS at 16:36

## 2024-07-26 RX ADMIN — SODIUM CHLORIDE, POTASSIUM CHLORIDE, SODIUM LACTATE AND CALCIUM CHLORIDE: 600; 310; 30; 20 INJECTION, SOLUTION INTRAVENOUS at 16:35

## 2024-07-26 RX ADMIN — EPHEDRINE SULFATE 10 MG: 50 INJECTION, SOLUTION INTRAVENOUS at 17:11

## 2024-07-26 RX ADMIN — EPHEDRINE SULFATE 10 MG: 50 INJECTION, SOLUTION INTRAVENOUS at 17:06

## 2024-07-26 RX ADMIN — EPHEDRINE SULFATE 10 MG: 50 INJECTION, SOLUTION INTRAVENOUS at 16:53

## 2024-07-26 ASSESSMENT — PAIN - FUNCTIONAL ASSESSMENT: PAIN_FUNCTIONAL_ASSESSMENT: 0-10

## 2024-07-26 ASSESSMENT — ENCOUNTER SYMPTOMS: SHORTNESS OF BREATH: 0

## 2024-07-26 NOTE — PROGRESS NOTES
Pt awake-reorienting self to surroundings, pt feels like he has \"amnesia\". Pleasant and following commands,  family at bedside, pt denies pain. Vss. Phase 1 discharge criteria met

## 2024-07-26 NOTE — OP NOTE
Urology Operative Report  [facility]    Provider: Raza Perry MD Patient ID:  Admission Date: 2024 Name: Jarred Suarez  OR Date: 2024  MRN: [id number]   Patient Location: OR/NONE : 1935  Attending: Raza Perry MD Date of Service: 2024  PCP: Jack Shelton MD     Date of Operation: 2024    Preoperative Diagnosis: Bladder stones.    Postoperative Diagnosis: Bladder stones, urethral stricture (the bladder neck contracture)    Procedure:    1.  Rigid cystourethroscopy  2.  Urethral dilation  3.  Laser lithotripsy of bladder calculus  4.  Evacuation of bladder calculus    Surgeon:   Raza Perry MD    Anesthesia: General LMA anesthesia    Indications: Jarred Suarez is a 88 y.o. male who presents for the above named surgery. Informed consent was obtained and the risks, benefits, and details of the procedure were explained to the patient who elected to proceed.    Details of Procedure:  The patient was brought to the operating room and placed in the supine position on the operating room table. SCDs were placed on the lower extremities. Following induction of anesthesia the patient was positioned in a lithotomy position, all pressure points were padded, and the genitals were prepped and draped in the usual sterile fashion. A routine timeout was performed, confirming the patient, procedure, site, risk of fire, patient allergies and confirming that preoperative antibiotics had been administered prior to beginning.      A 21 Latvian rigid cystoscope was advanced down the urethra.  At the level of the bladder neck there was noted to be a contracture which prevented the 21 Latvian scope from passing.  I therefore advanced a sensor wire under direct vision into the space of the bladder.  The cystoscope was removed.  Over the wire we used the S-curve Cook dilators to sequentially dilate the urethra up to a size 20 Latvian.  The cystoscope was then able to pass

## 2024-07-26 NOTE — PROGRESS NOTES
With assist of 3 RN's, pt dressed , IV removed and placed in wheelchair. Pt very weak and unsteady on feet. Baseline report from handoff. Family at bedside to assist pt with getting in car. Pt discharged with instructions and prescription, no complications, hand off complete

## 2024-07-26 NOTE — H&P
Urology History and Physical  Inpatient Setting - [facility]    Provider: Raza Perry MD  Patient ID:  Admission Date: 2024 Name: Jarred Suarez  OR Date: n/a MRN: [id number]   Patient Location: OR/NONE : 1935  Attending: Raza Perry MD Date of Service: 2024  PCP: Jack Shelton MD     Diagnoses:  Bladder stones    Assessment/Plan:  Continue to the OR as planned for cystoscopy with treatment of bladder stones.     All the patients questions were answered in detail. He understands the plan as listed above.                                                                                                                                               _____________________________________________________________    CC: Pre-op    HPI: Jarred Suarez is a 88 y.o. male.  The history and physical exam was reviewed. The patient was seen and examined in pre-op. He had a chance to ask questions which were answered. There has been no interval changes. Plan to continue to the OR    Past Medical History:   He has a past medical history of Atrial fibrillation (HCC), CAD (coronary artery disease), Diarrhea (2017), History of skin cancer, Hyperlipidemia, Hypertension, Insomnia (2020), Kidney stones (2017), and Open wound of left hand, subsequent encounter (6/3/2022).    Past Surgical History:  He has a past surgical history that includes back surgery; knee surgery (); shoulder surgery; Appendectomy; Coronary artery bypass graft (); Coronary artery bypass graft (); Prostate surgery; Skin cancer excision; joint replacement (Right); and Total knee arthroplasty (Right, 2015).     Allergies:   Allergies   Allergen Reactions    Codeine Itching    Flomax [Tamsulosin Hcl] Other (See Comments)     dizziness       Social History:  He reports that he quit smoking about 49 years ago. His smoking use included cigarettes. He started smoking about 59 years ago. He has a  20.0 pack-year smoking history. He has never used smokeless tobacco. He reports that he does not currently use alcohol after a past usage of about 1.0 standard drink of alcohol per week. He reports that he does not use drugs.    Family History:  family history includes Cancer in his mother and sister; Diabetes in his mother; Heart Attack in his father and mother; Heart Disease in his father; High Blood Pressure in his father; High Cholesterol in his father.    Medications:   Scheduled Meds:   lidocaine PF  0.5 mL IntraDERmal Once    ceFAZolin  2,000 mg IntraVENous On Call to OR     Continuous Infusions:   lactated ringers IV soln       PRN Meds:    Review of Systems:  Constitutional: Negative for fever    Genitourinary: see HPI  Eyes: negative for sudden change in vision  EENT: no complaints  Cardiovascular: Negative for chest pain  Respiratory: Negative for shortness of breath  Gastrointestinal: Negative for nausea  Musculoskeletal: Negative for back pain   Neurological: Negative for weakness  Psychiatric: Negative for anxiety  Integumentary: Negative for rashes or adenopathy     Physical Exam:  There were no vitals filed for this visit.  Constitutional: NAD, well-developed, well-nourished.  Cardiovascular: Regular rate. No peripheral edema  Respiratory: Respirations are even and non-labored. No audible breath sounds.   Psychiatric: A + O x 3, normal affect. Insight appears intact.     Raza Perry MD  7/26/2024

## 2024-07-26 NOTE — DISCHARGE INSTRUCTIONS
Follow up with Dr. Perry with any questions, concerns, results, and an appointment after your procedure in the time period recommended.         UROLOGY DISCHARGE INSTRUCTIONS    Follow your surgeons instructions.  Your urine may look pink or red and it may burn when you urinate. You may also feel like you have to urinate often.  Call your surgeon if your temperature is higher than 101 degrees, your urine is grossly bloody, or has large clots.  Drink plenty of fluids unless your physician advises against it.  If you can not urinate once you are home, call your surgeon or go to the Emergency Room.  If you are discharged with a catheter in your bladder, follow instructions on care and removal. (See cathter removal/care instructions)  Take medications as directed.Take pain medication with food. Do not drive or operate machinery while taking narcotics.  Call your surgeon for any problems or questions        SEDATION DISCHARGE INSTRUCTIONS  7/26/2024    Wear your seatbelt home.  You are under the influence of drugs. Do not drink alcohol, drive, operate machinery, or make any important decisions or sign any legal documents for 24 hours  A responsible adult needs to be with you for 24 hours.  You may experience lightheadedness, dizziness, nausea, heightened emotions and/or sleepiness following surgery.  Rest at home today- increase activity as tolerated.  Progress slowly to a regular diet and drink plenty of fluids unless your physician has instructed you otherwise.  If nausea becomes a problem, call your physician.  Coughing, sore throat, and muscle aches are other side effects of anesthesia and should improve with time.  Do not drive or operate machinery while taking narcotics.  ATTENTION FEMALE PATIENTS: if you use an oral contraceptive or birth control pill, you need to use an additional or alternative method for pregnancy prevention for the next thirty days.  Medications given today may render your contraceptive

## 2024-07-26 NOTE — PROGRESS NOTES
Discussed discharge instructions with pt and family members. No questions at this time. Pt tolerating sips of fluids and crackers, denies pain.

## 2024-07-26 NOTE — ANESTHESIA PRE PROCEDURE
Department of Anesthesiology  Preprocedure Note       Name:  Jarred Suarez   Age:  88 y.o.  :  1935                                          MRN:  [id number]         Date:  2024      Surgeon: Surgeon(s):  Raza Perry MD    Procedure: Procedure(s):  CYSTOSCOPY BLADDER STONE REMOVAL WITH HOLMIUM LASER - FORTEC (CONF# [id number])    Medications prior to admission:   Prior to Admission medications    Medication Sig Start Date End Date Taking? Authorizing Provider   ciprofloxacin (CIPRO) 500 MG tablet 1 tablet 24   ProviderChinyere MD   ALPRAZolam (XANAX) 2 MG tablet Take 1 tablet by mouth nightly as needed for Sleep for up to 90 days. Max Daily Amount: 2 mg  Patient taking differently: Take 0.5 mg by mouth nightly as needed for Sleep. 24  Day, Jack CRUZ MD   rivaroxaban (XARELTO) 20 MG TABS tablet TAKE 1 TABLET BY MOUTH EVERY DAY WITH SUPPER 24   Oral Mario MD   predniSONE (DELTASONE) 10 MG tablet Take 4 tabs by mouth daily for 2 days, 3 tabs for 2 days, 2 tabs for 2 days, 1 tab for 2 days  Patient not taking: Reported on 7/10/2024 4/17/24   Day, Jack CRUZ MD   famotidine (PEPCID) 20 MG tablet Take 1 tablet by mouth 2 times daily 24   Day, Jack CRUZ MD   amitriptyline (ELAVIL) 150 MG tablet TAKE 1 TABLET BY MOUTH AT BEDTIME 24   Day, Jack CRUZ MD   nystatin (MYCOSTATIN) 122663 UNIT/GM cream Apply topically 2 times daily. 10/26/23   Day, Jack CRUZ MD   furosemide (LASIX) 20 MG tablet Take 1 tablet by mouth daily 23   Oral Mario MD   losartan (COZAAR) 50 MG tablet Take 1 tablet by mouth daily 23   Oral Mario MD   potassium chloride (KLOR-CON M) 20 MEQ extended release tablet Take 0.5 tablets by mouth 2 times daily 23   Oral Mario MD   Coenzyme Q10 (COQ10) 200 MG CAPS Take by mouth    Chinyere José MD   Compression Stockings MISC by Does not apply route 20-30 mm hg compression stockings, bilateral, knee high 19   Felipe,

## 2024-07-26 NOTE — ANESTHESIA POSTPROCEDURE EVALUATION
Department of Anesthesiology  Postprocedure Note    Patient: Jarred Suarez  MRN: [id number]  YOB: 1935  Date of evaluation: 7/26/2024    Procedure Summary       Date: 07/26/24 Room / Location: [address]    Anesthesia Start: 1635 Anesthesia Stop: 1729    Procedure: CYSTOSCOPY WITH URETHRAL DILATION AND  BLADDER STONE REMOVAL WITH HOLMIUM LASER - FORTEC (CONF# [id number]) Diagnosis:       Gross hematuria      (Gross hematuria [R31.0])    Surgeons: Raza Perry MD Responsible Provider: Boby James MD    Anesthesia Type: general ASA Status: 3            Anesthesia Type: No value filed.    Samaria Phase I: Samaria Score: 8    Samaria Phase II:      Anesthesia Post Evaluation    Patient location during evaluation: PACU  Patient participation: complete - patient participated  Level of consciousness: awake and alert  Pain score: 3  Airway patency: patent  Nausea & Vomiting: no nausea  Cardiovascular status: blood pressure returned to baseline  Respiratory status: acceptable  Hydration status: euvolemic  Pain management: adequate    No notable events documented.

## 2024-07-26 NOTE — PROGRESS NOTES
Pt admitted to PACU, awakening and following commands, vss, O2 saturations stable on simple mask 8L. NSR on tele

## 2024-07-29 ENCOUNTER — HOSPITAL ENCOUNTER (OUTPATIENT)
Dept: WOUND CARE | Age: 89
Discharge: HOME | End: 2024-07-29

## 2024-07-30 LAB
APPEARANCE STONE: NORMAL
COMPN STONE: NORMAL
SPECIMEN WT: 850 MG

## 2024-07-31 ENCOUNTER — HOSPITAL ENCOUNTER (OUTPATIENT)
Dept: WOUND CARE | Age: 89
Discharge: HOME | End: 2024-07-31
Attending: PODIATRIST
Payer: MEDICARE

## 2024-07-31 VITALS
HEART RATE: 80 BPM | DIASTOLIC BLOOD PRESSURE: 82 MMHG | RESPIRATION RATE: 16 BRPM | TEMPERATURE: 98.2 F | SYSTOLIC BLOOD PRESSURE: 121 MMHG

## 2024-07-31 DIAGNOSIS — L97.822 NON-PRS CHRONIC ULCER OTH PRT L LOW LEG W FAT LAYER EXPOSED (HCC): Primary | ICD-10-CM

## 2024-07-31 PROCEDURE — 11042 DBRDMT SUBQ TIS 1ST 20SQCM/<: CPT

## 2024-07-31 RX ORDER — AMMONIUM LACTATE 12 G/100G
LOTION TOPICAL
Qty: 222 ML | Refills: 5 | Status: SHIPPED | OUTPATIENT
Start: 2024-07-31

## 2024-07-31 RX ORDER — LIDOCAINE 40 MG/G
CREAM TOPICAL ONCE
OUTPATIENT
Start: 2024-07-31 | End: 2024-07-31

## 2024-07-31 RX ORDER — GENTAMICIN SULFATE 1 MG/G
OINTMENT TOPICAL ONCE
OUTPATIENT
Start: 2024-07-31 | End: 2024-07-31

## 2024-07-31 RX ORDER — CLOBETASOL PROPIONATE 0.5 MG/G
OINTMENT TOPICAL ONCE
OUTPATIENT
Start: 2024-07-31 | End: 2024-07-31

## 2024-07-31 RX ORDER — BETAMETHASONE DIPROPIONATE 0.5 MG/G
CREAM TOPICAL ONCE
OUTPATIENT
Start: 2024-07-31 | End: 2024-07-31

## 2024-07-31 RX ORDER — TRIAMCINOLONE ACETONIDE 1 MG/G
OINTMENT TOPICAL ONCE
OUTPATIENT
Start: 2024-07-31 | End: 2024-07-31

## 2024-07-31 RX ORDER — SODIUM CHLOR/HYPOCHLOROUS ACID 0.033 %
SOLUTION, IRRIGATION IRRIGATION ONCE
OUTPATIENT
Start: 2024-07-31 | End: 2024-07-31

## 2024-07-31 RX ORDER — LIDOCAINE HYDROCHLORIDE 20 MG/ML
JELLY TOPICAL ONCE
OUTPATIENT
Start: 2024-07-31 | End: 2024-07-31

## 2024-07-31 RX ORDER — GINSENG 100 MG
CAPSULE ORAL ONCE
OUTPATIENT
Start: 2024-07-31 | End: 2024-07-31

## 2024-07-31 RX ORDER — LIDOCAINE HYDROCHLORIDE 40 MG/ML
SOLUTION TOPICAL ONCE
Status: COMPLETED | OUTPATIENT
Start: 2024-07-31 | End: 2024-07-31

## 2024-07-31 RX ORDER — BACITRACIN ZINC AND POLYMYXIN B SULFATE 500; 1000 [USP'U]/G; [USP'U]/G
OINTMENT TOPICAL ONCE
OUTPATIENT
Start: 2024-07-31 | End: 2024-07-31

## 2024-07-31 RX ORDER — LIDOCAINE HYDROCHLORIDE 40 MG/ML
SOLUTION TOPICAL ONCE
OUTPATIENT
Start: 2024-07-31 | End: 2024-07-31

## 2024-07-31 RX ORDER — IBUPROFEN 200 MG
TABLET ORAL ONCE
OUTPATIENT
Start: 2024-07-31 | End: 2024-07-31

## 2024-07-31 RX ORDER — LIDOCAINE 50 MG/G
OINTMENT TOPICAL ONCE
OUTPATIENT
Start: 2024-07-31 | End: 2024-07-31

## 2024-07-31 RX ADMIN — LIDOCAINE HYDROCHLORIDE: 40 SOLUTION TOPICAL at 14:39

## 2024-07-31 NOTE — PATIENT INSTRUCTIONS
SUPPLIES, CONTINUE TO USE THE SUPPLIES YOU HAVE AVAILABLE UNTIL YOU ARE ABLE TO REACH US. IT IS MOST IMPORTANT TO KEEP THE WOUND COVERED AT ALL TIMES.         Physician Signature:_______________________    Date: ___________ Time:  ____________          Dr Jos Monroe

## 2024-07-31 NOTE — PROGRESS NOTES
strength 5/5 with PF/DF/I and E of both feet. Decreased but stable ROM of 1st MTPJ bilateral without pain or crepitus.      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    Performed by: Jos Monroe DPM    Consent obtained:  Yes    Time out taken:  Yes    Pain Control: Anesthetic  Anesthetic: 4% Lidocaine Liquid Topical       Debridement: Excisional Debridement    Using curette the wound(s)/ulcer(s) was/were debrided down through and including the removal of epidermis, dermis, and subcutaneous tissue.        Devitalized Tissue Debrided:  fibrin, biofilm, and clots    Pre Debridement Measurements:  Are located in the Wound/Ulcer Documentation Flow Sheet    Diabetic/Pressure/Non Pressure Ulcers only:  Ulcer: Non-Pressure ulcer, fat layer exposed     Wound/Ulcer #: 1    Post Debridement Measurements:  Wound/Ulcer Descriptions are Pre Debridement except measurements:    Wound 07/24/24 Leg Left;Lower;Lateral #1 (Active)   Wound Image   07/24/24 1450   Wound Etiology Venous 07/24/24 1450   Dressing Status Old drainage noted 07/31/24 1431   Wound Cleansed Vashe 07/24/24 1528   Dressing/Treatment Alginate with Ag;Silicone border 07/24/24 1528   Wound Length (cm) 3.3 cm 07/31/24 1431   Wound Width (cm) 4.5 cm 07/31/24 1431   Wound Depth (cm) 0.1 cm 07/31/24 1431   Wound Surface Area (cm^2) 14.85 cm^2 07/31/24 1431   Change in Wound Size % (l*w) 15.14 07/31/24 1431   Wound Volume (cm^3) 1.485 cm^3 07/31/24 1431   Wound Healing % 15 07/31/24 1431   Post-Procedure Length (cm) 3.4 cm 07/31/24 1451   Post-Procedure Width (cm) 4.6 cm 07/31/24 1451   Post-Procedure Depth (cm) 0.2 cm

## 2024-08-01 RX ORDER — AMITRIPTYLINE HYDROCHLORIDE 150 MG/1
150 TABLET ORAL
Qty: 90 TABLET | Refills: 0 | Status: SHIPPED | OUTPATIENT
Start: 2024-08-01

## 2024-08-07 ENCOUNTER — HOSPITAL ENCOUNTER (OUTPATIENT)
Dept: WOUND CARE | Age: 89
Discharge: HOME | End: 2024-08-07
Attending: PODIATRIST
Payer: MEDICARE

## 2024-08-07 VITALS — HEART RATE: 65 BPM | DIASTOLIC BLOOD PRESSURE: 68 MMHG | TEMPERATURE: 98 F | SYSTOLIC BLOOD PRESSURE: 134 MMHG

## 2024-08-07 DIAGNOSIS — L97.822 NON-PRS CHRONIC ULCER OTH PRT L LOW LEG W FAT LAYER EXPOSED (HCC): Primary | ICD-10-CM

## 2024-08-07 PROCEDURE — 11042 DBRDMT SUBQ TIS 1ST 20SQCM/<: CPT

## 2024-08-07 RX ORDER — BETAMETHASONE DIPROPIONATE 0.5 MG/G
CREAM TOPICAL ONCE
OUTPATIENT
Start: 2024-08-07 | End: 2024-08-07

## 2024-08-07 RX ORDER — GINSENG 100 MG
CAPSULE ORAL ONCE
OUTPATIENT
Start: 2024-08-07 | End: 2024-08-07

## 2024-08-07 RX ORDER — LIDOCAINE HYDROCHLORIDE 40 MG/ML
SOLUTION TOPICAL ONCE
Status: COMPLETED | OUTPATIENT
Start: 2024-08-07 | End: 2024-08-07

## 2024-08-07 RX ORDER — GENTAMICIN SULFATE 1 MG/G
OINTMENT TOPICAL ONCE
OUTPATIENT
Start: 2024-08-07 | End: 2024-08-07

## 2024-08-07 RX ORDER — LIDOCAINE HYDROCHLORIDE 40 MG/ML
SOLUTION TOPICAL ONCE
OUTPATIENT
Start: 2024-08-07 | End: 2024-08-07

## 2024-08-07 RX ORDER — LIDOCAINE 40 MG/G
CREAM TOPICAL ONCE
OUTPATIENT
Start: 2024-08-07 | End: 2024-08-07

## 2024-08-07 RX ORDER — TRIAMCINOLONE ACETONIDE 1 MG/G
OINTMENT TOPICAL ONCE
OUTPATIENT
Start: 2024-08-07 | End: 2024-08-07

## 2024-08-07 RX ORDER — SODIUM CHLOR/HYPOCHLOROUS ACID 0.033 %
SOLUTION, IRRIGATION IRRIGATION ONCE
OUTPATIENT
Start: 2024-08-07 | End: 2024-08-07

## 2024-08-07 RX ORDER — CLOBETASOL PROPIONATE 0.5 MG/G
OINTMENT TOPICAL ONCE
OUTPATIENT
Start: 2024-08-07 | End: 2024-08-07

## 2024-08-07 RX ORDER — LIDOCAINE HYDROCHLORIDE 20 MG/ML
JELLY TOPICAL ONCE
OUTPATIENT
Start: 2024-08-07 | End: 2024-08-07

## 2024-08-07 RX ORDER — IBUPROFEN 200 MG
TABLET ORAL ONCE
OUTPATIENT
Start: 2024-08-07 | End: 2024-08-07

## 2024-08-07 RX ORDER — BACITRACIN ZINC AND POLYMYXIN B SULFATE 500; 1000 [USP'U]/G; [USP'U]/G
OINTMENT TOPICAL ONCE
OUTPATIENT
Start: 2024-08-07 | End: 2024-08-07

## 2024-08-07 RX ORDER — LIDOCAINE 50 MG/G
OINTMENT TOPICAL ONCE
OUTPATIENT
Start: 2024-08-07 | End: 2024-08-07

## 2024-08-07 RX ADMIN — LIDOCAINE HYDROCHLORIDE: 40 SOLUTION TOPICAL at 13:54

## 2024-08-07 ASSESSMENT — PAIN SCALES - GENERAL
PAINLEVEL_OUTOF10: 0
PAINLEVEL_OUTOF10: 0

## 2024-08-07 NOTE — PATIENT INSTRUCTIONS
[facility] Wound Care Physician Orders and Discharge Instructions  [facility]  [address], [address]  [place], [place] [address]  Telephone: [phone]      FAX [phone]  MONDAY - THURSDAY 8:00 am - 4:30 pm and Friday 8:00 am - 12:00 pm.        NAME:  Jarred Suarez  YOB: 1935  MEDICAL RECORD NUMBER:  [id number]  DATE:  8/7/2024      Return Appointment:  [x] Return Appointment: With Dr Jos Monroe  in  1 Week(s)  [] Wound and dressing supply provider:   [x] ECF or Home Healthcare: [facility]  [] Wound Assessment: [] Physician or NP scheduled for Wound Assessment:   [] Orders placed during your visit:       Important Reminders:   Please wash hands with soap and water before and after every dressing change.  Do not scrub wounds.  Keep wounds dry in shower unless otherwise instructed by the physician.  SMOKING can slow would healing. Stop smoking as soon as possible to improve healing and prevent further complications associated with smoking.      Sameera-Wound Topical Treatments:  Do not apply lotions, creams, or ointments to wound bed unless directed.   [] Apply moisturizing lotion to skin surrounding the wound prior to dressing change.  [] Apply antifungal ointment to skin surrounding the wound prior to dressing change.  [] Apply thin film of no sting moisture barrier ointment to skin immediately around      wound.  [] Other:       Wound Location: LEFT LATERAL LOWER LEG    Wound Cleansing: Vashe soak for 5 minutes prior to dressing change    Primary Dressing:  [x] SILVER ALGINATE  []     Secondary Dressing:  [x] MEPILEX BORDER   []       Dressing Frequency:  [x] THREE TIMES A WEEK  [] Do Not Change Dressing      Compression and Edema Control:  [] Wear Home Compression Stockings   [x] Spandagrip to: Left Leg and Right Leg   Size: []Low compression 5-10 mm/Hg      [x]Medium compression 10-20 mm/Hg           []High compression  20-30 mm/Hg  [] Ace Wrap Toes to Knee to

## 2024-08-07 NOTE — PROGRESS NOTES
[facility] Wound Care Center   Progress Note and Procedure Note      Jarred Suarez  MEDICAL RECORD NUMBER:  [id number]  AGE: 88 y.o.   GENDER: male  : 1935  EPISODE DATE:  2024    Subjective:     Chief Complaint   Patient presents with    Wound Check     Follow up visit for left lower leg wound         HISTORY of PRESENT ILLNESS HPI     Jarred Suarez is a 88 y.o. male who presents today for wound/ulcer evaluation.   History of Wound Context: Patient presents complaining of a wound on his left lower leg. Patient reports he fell on 2024 and hit the leg against his walker. Patient states his wife is helping him perform band aid changes to the wound. Patient reports some clear to yellow drainage on the band aids. Patient states he has very little pain from the wound. Patient has a complicated medical history including CAD, atrial fibrillation and history of DVT. Patient recently completed Cipro for a UTI.     Patient also wanted to have his toenails trimmed today. Patient states the toenails are long and thick which makes his toes ache.     Wound/Ulcer Pain Timing/Severity: intermittent, mild  Quality of pain: aching  Severity:  2 / 10   Modifying Factors: Pain worsens with pressure to the wound  Associated Signs/Symptoms: edema    Ulcer Identification:  Ulcer Type: venous and traumatic    Contributing Factors: edema, decreased mobility, obesity, and anticoagulation therapy    Acute Wound: Laceration    PAST MEDICAL HISTORY        Diagnosis Date    Atrial fibrillation (HCC)     CAD (coronary artery disease)     Diarrhea 2017    History of skin cancer     Hyperlipidemia     Hypertension     Insomnia 2020    Kidney stones 2017    Open wound of left hand, subsequent encounter 6/3/2022       PAST SURGICAL HISTORY    Past Surgical History:   Procedure Laterality Date    APPENDECTOMY      BACK SURGERY      x4    CORONARY ARTERY BYPASS GRAFT  1981    CORONARY ARTERY

## 2024-08-14 ENCOUNTER — HOSPITAL ENCOUNTER (OUTPATIENT)
Dept: WOUND CARE | Age: 89
Discharge: HOME | End: 2024-08-14
Attending: PODIATRIST
Payer: MEDICARE

## 2024-08-14 VITALS
SYSTOLIC BLOOD PRESSURE: 134 MMHG | TEMPERATURE: 97.2 F | RESPIRATION RATE: 18 BRPM | DIASTOLIC BLOOD PRESSURE: 82 MMHG | HEART RATE: 68 BPM

## 2024-08-14 DIAGNOSIS — L97.822 NON-PRS CHRONIC ULCER OTH PRT L LOW LEG W FAT LAYER EXPOSED (HCC): Primary | ICD-10-CM

## 2024-08-14 PROCEDURE — 11042 DBRDMT SUBQ TIS 1ST 20SQCM/<: CPT

## 2024-08-14 RX ORDER — LIDOCAINE 40 MG/G
CREAM TOPICAL ONCE
OUTPATIENT
Start: 2024-08-14 | End: 2024-08-14

## 2024-08-14 RX ORDER — GENTAMICIN SULFATE 1 MG/G
OINTMENT TOPICAL ONCE
OUTPATIENT
Start: 2024-08-14 | End: 2024-08-14

## 2024-08-14 RX ORDER — LIDOCAINE HYDROCHLORIDE 20 MG/ML
JELLY TOPICAL ONCE
OUTPATIENT
Start: 2024-08-14 | End: 2024-08-14

## 2024-08-14 RX ORDER — LIDOCAINE HYDROCHLORIDE 40 MG/ML
SOLUTION TOPICAL ONCE
Status: COMPLETED | OUTPATIENT
Start: 2024-08-14 | End: 2024-08-14

## 2024-08-14 RX ORDER — CLOBETASOL PROPIONATE 0.5 MG/G
OINTMENT TOPICAL ONCE
OUTPATIENT
Start: 2024-08-14 | End: 2024-08-14

## 2024-08-14 RX ORDER — BETAMETHASONE DIPROPIONATE 0.5 MG/G
CREAM TOPICAL ONCE
OUTPATIENT
Start: 2024-08-14 | End: 2024-08-14

## 2024-08-14 RX ORDER — SODIUM CHLOR/HYPOCHLOROUS ACID 0.033 %
SOLUTION, IRRIGATION IRRIGATION ONCE
OUTPATIENT
Start: 2024-08-14 | End: 2024-08-14

## 2024-08-14 RX ORDER — LIDOCAINE 50 MG/G
OINTMENT TOPICAL ONCE
OUTPATIENT
Start: 2024-08-14 | End: 2024-08-14

## 2024-08-14 RX ORDER — BACITRACIN ZINC AND POLYMYXIN B SULFATE 500; 1000 [USP'U]/G; [USP'U]/G
OINTMENT TOPICAL ONCE
OUTPATIENT
Start: 2024-08-14 | End: 2024-08-14

## 2024-08-14 RX ORDER — TRIAMCINOLONE ACETONIDE 1 MG/G
OINTMENT TOPICAL ONCE
OUTPATIENT
Start: 2024-08-14 | End: 2024-08-14

## 2024-08-14 RX ORDER — GINSENG 100 MG
CAPSULE ORAL ONCE
OUTPATIENT
Start: 2024-08-14 | End: 2024-08-14

## 2024-08-14 RX ORDER — LIDOCAINE HYDROCHLORIDE 40 MG/ML
SOLUTION TOPICAL ONCE
OUTPATIENT
Start: 2024-08-14 | End: 2024-08-14

## 2024-08-14 RX ORDER — IBUPROFEN 200 MG
TABLET ORAL ONCE
OUTPATIENT
Start: 2024-08-14 | End: 2024-08-14

## 2024-08-14 RX ADMIN — LIDOCAINE HYDROCHLORIDE: 40 SOLUTION TOPICAL at 13:55

## 2024-08-14 NOTE — PATIENT INSTRUCTIONS
[facility] Wound Care Physician Orders and Discharge Instructions  [facility]  [address], [address]  [place], [place] [address]  Telephone: [phone]      FAX [phone]  MONDAY - THURSDAY 8:00 am - 4:30 pm and Friday 8:00 am - 12:00 pm.        NAME:  Jarred Suarez  YOB: 1935  MEDICAL RECORD NUMBER:  [id number]  DATE:  8/14/2024      Return Appointment:  [x] Return Appointment: With Dr Jos Monroe  in  1 Week(s)  [] Wound and dressing supply provider:   [x] ECF or Home Healthcare: [facility]  [] Wound Assessment: [] Physician or NP scheduled for Wound Assessment:   [] Orders placed during your visit:       Important Reminders:   Please wash hands with soap and water before and after every dressing change.  Do not scrub wounds.  Keep wounds dry in shower unless otherwise instructed by the physician.  SMOKING can slow would healing. Stop smoking as soon as possible to improve healing and prevent further complications associated with smoking.      Sameera-Wound Topical Treatments:  Do not apply lotions, creams, or ointments to wound bed unless directed.   [] Apply moisturizing lotion to skin surrounding the wound prior to dressing change.  [] Apply antifungal ointment to skin surrounding the wound prior to dressing change.  [] Apply thin film of no sting moisture barrier ointment to skin immediately around      wound.  [] Other:       Wound Location: LEFT LATERAL LOWER LEG    Wound Cleansing: Vashe soak for 5 minutes prior to dressing change    Primary Dressing:  [x] SILVER ALGINATE  []     Secondary Dressing:  [x] MEPILEX BORDER   []       Dressing Frequency:  [x] THREE TIMES A WEEK  [] Do Not Change Dressing      Compression and Edema Control:  [] Wear Home Compression Stockings   [x] Spandagrip to: Left Leg and Right Leg   Size: []Low compression 5-10 mm/Hg      [x]Medium compression 10-20 mm/Hg           []High compression  20-30 mm/Hg  [] Ace Wrap Toes to Knee to

## 2024-08-21 ENCOUNTER — HOSPITAL ENCOUNTER (OUTPATIENT)
Dept: WOUND CARE | Age: 89
Discharge: HOME | End: 2024-08-21
Attending: PODIATRIST
Payer: MEDICARE

## 2024-08-21 VITALS
RESPIRATION RATE: 16 BRPM | SYSTOLIC BLOOD PRESSURE: 116 MMHG | HEART RATE: 53 BPM | DIASTOLIC BLOOD PRESSURE: 65 MMHG | TEMPERATURE: 98.2 F

## 2024-08-21 DIAGNOSIS — L97.822 NON-PRS CHRONIC ULCER OTH PRT L LOW LEG W FAT LAYER EXPOSED (HCC): Primary | ICD-10-CM

## 2024-08-21 PROCEDURE — 11042 DBRDMT SUBQ TIS 1ST 20SQCM/<: CPT

## 2024-08-21 RX ORDER — LIDOCAINE HYDROCHLORIDE 40 MG/ML
SOLUTION TOPICAL ONCE
Status: COMPLETED | OUTPATIENT
Start: 2024-08-21 | End: 2024-08-21

## 2024-08-21 RX ORDER — BACITRACIN ZINC AND POLYMYXIN B SULFATE 500; 1000 [USP'U]/G; [USP'U]/G
OINTMENT TOPICAL ONCE
OUTPATIENT
Start: 2024-08-21 | End: 2024-08-21

## 2024-08-21 RX ORDER — LIDOCAINE HYDROCHLORIDE 20 MG/ML
JELLY TOPICAL ONCE
OUTPATIENT
Start: 2024-08-21 | End: 2024-08-21

## 2024-08-21 RX ORDER — IBUPROFEN 200 MG
TABLET ORAL ONCE
OUTPATIENT
Start: 2024-08-21 | End: 2024-08-21

## 2024-08-21 RX ORDER — LIDOCAINE HYDROCHLORIDE 40 MG/ML
SOLUTION TOPICAL ONCE
OUTPATIENT
Start: 2024-08-21 | End: 2024-08-21

## 2024-08-21 RX ORDER — LIDOCAINE 50 MG/G
OINTMENT TOPICAL ONCE
OUTPATIENT
Start: 2024-08-21 | End: 2024-08-21

## 2024-08-21 RX ORDER — LIDOCAINE 40 MG/G
CREAM TOPICAL ONCE
OUTPATIENT
Start: 2024-08-21 | End: 2024-08-21

## 2024-08-21 RX ORDER — CLOBETASOL PROPIONATE 0.5 MG/G
OINTMENT TOPICAL ONCE
OUTPATIENT
Start: 2024-08-21 | End: 2024-08-21

## 2024-08-21 RX ORDER — SODIUM CHLOR/HYPOCHLOROUS ACID 0.033 %
SOLUTION, IRRIGATION IRRIGATION ONCE
OUTPATIENT
Start: 2024-08-21 | End: 2024-08-21

## 2024-08-21 RX ORDER — GINSENG 100 MG
CAPSULE ORAL ONCE
OUTPATIENT
Start: 2024-08-21 | End: 2024-08-21

## 2024-08-21 RX ORDER — TRIAMCINOLONE ACETONIDE 1 MG/G
OINTMENT TOPICAL ONCE
OUTPATIENT
Start: 2024-08-21 | End: 2024-08-21

## 2024-08-21 RX ORDER — BETAMETHASONE DIPROPIONATE 0.5 MG/G
CREAM TOPICAL ONCE
OUTPATIENT
Start: 2024-08-21 | End: 2024-08-21

## 2024-08-21 RX ORDER — GENTAMICIN SULFATE 1 MG/G
OINTMENT TOPICAL ONCE
OUTPATIENT
Start: 2024-08-21 | End: 2024-08-21

## 2024-08-21 RX ADMIN — LIDOCAINE HYDROCHLORIDE: 40 SOLUTION TOPICAL at 14:13

## 2024-08-21 NOTE — PROGRESS NOTES
provider:   [x] ECF or Home Healthcare: [place] [facility]  [] Wound Assessment: [] Physician or NP scheduled for Wound Assessment:   [] Orders placed during your visit:       Important Reminders:   Please wash hands with soap and water before and after every dressing change.  Do not scrub wounds.  Keep wounds dry in shower unless otherwise instructed by the physician.  SMOKING can slow would healing. Stop smoking as soon as possible to improve healing and prevent further complications associated with smoking.      Sameera-Wound Topical Treatments:  Do not apply lotions, creams, or ointments to wound bed unless directed.   [] Apply moisturizing lotion to skin surrounding the wound prior to dressing change.  [] Apply antifungal ointment to skin surrounding the wound prior to dressing change.  [] Apply thin film of no sting moisture barrier ointment to skin immediately around      wound.  [] Other:       Wound Location: LEFT LATERAL LOWER LEG    Wound Cleansing: Vashe soak for 5 minutes prior to dressing change    Primary Dressing:  [x] SILVER ALGINATE  []     Secondary Dressing:  [x] MEPILEX BORDER   []       Dressing Frequency:  [x] THREE TIMES A WEEK  [] Do Not Change Dressing      Compression and Edema Control:  [] Wear Home Compression Stockings   [x] Spandagrip to: Left Leg and Right Leg   Size: []Low compression 5-10 mm/Hg      [x]Medium compression 10-20 mm/Hg           []High compression  20-30 mm/Hg  [] Ace Wrap Toes to Knee to    [] Multilayer Compression Wrap:  to    Do not get leg(s) with wrap wet.  If wraps become too tight call the center or completely remove the wrap.       Pressure Relief and Off Loading:  [] Off-loading when [] walking  [] in bed [] sitting   Turn every 2 hours when in bed   Avoid putting direct pressure on the site of the wound. Limit side lying to 30 degree tilt. Limit elevating the head of the bed greater than 30 degrees.                                      [x] Assistive Devices   WALKER  Use as

## 2024-08-21 NOTE — PATIENT INSTRUCTIONS
[facility] Wound Care Physician Orders and Discharge Instructions  [facility]  [address], [address]  [place], [place] [address]  Telephone: [phone]      FAX [phone]  MONDAY - THURSDAY 8:00 am - 4:30 pm and Friday 8:00 am - 12:00 pm.        NAME:  Jarred Suarez  YOB: 1935  MEDICAL RECORD NUMBER:  [id number]  DATE:  8/21/2024      Return Appointment:  [x] Return Appointment: With Dr Jos Monroe  in  1 Week(s)  [] Wound and dressing supply provider:   [x] ECF or Home Healthcare: [facility]  [] Wound Assessment: [] Physician or NP scheduled for Wound Assessment:   [] Orders placed during your visit:       Important Reminders:   Please wash hands with soap and water before and after every dressing change.  Do not scrub wounds.  Keep wounds dry in shower unless otherwise instructed by the physician.  SMOKING can slow would healing. Stop smoking as soon as possible to improve healing and prevent further complications associated with smoking.      Sameera-Wound Topical Treatments:  Do not apply lotions, creams, or ointments to wound bed unless directed.   [] Apply moisturizing lotion to skin surrounding the wound prior to dressing change.  [] Apply antifungal ointment to skin surrounding the wound prior to dressing change.  [] Apply thin film of no sting moisture barrier ointment to skin immediately around      wound.  [] Other:       Wound Location: LEFT LATERAL LOWER LEG    Wound Cleansing: Vashe soak for 5 minutes prior to dressing change    Primary Dressing:  [x] SILVER ALGINATE  []     Secondary Dressing:  [x] MEPILEX BORDER   []       Dressing Frequency:  [x] THREE TIMES A WEEK  [] Do Not Change Dressing      Compression and Edema Control:  [] Wear Home Compression Stockings   [x] Spandagrip to: Left Leg and Right Leg   Size: []Low compression 5-10 mm/Hg      [x]Medium compression 10-20 mm/Hg           []High compression  20-30 mm/Hg  [] Ace Wrap Toes to Knee to

## 2024-08-28 ENCOUNTER — HOSPITAL ENCOUNTER (OUTPATIENT)
Dept: WOUND CARE | Age: 89
Discharge: HOME | End: 2024-08-28
Attending: PODIATRIST
Payer: MEDICARE

## 2024-08-28 VITALS
SYSTOLIC BLOOD PRESSURE: 135 MMHG | RESPIRATION RATE: 16 BRPM | DIASTOLIC BLOOD PRESSURE: 73 MMHG | HEART RATE: 64 BPM | TEMPERATURE: 98.2 F

## 2024-08-28 DIAGNOSIS — L97.822 NON-PRS CHRONIC ULCER OTH PRT L LOW LEG W FAT LAYER EXPOSED (HCC): Primary | ICD-10-CM

## 2024-08-28 PROCEDURE — 11042 DBRDMT SUBQ TIS 1ST 20SQCM/<: CPT

## 2024-08-28 RX ORDER — LIDOCAINE HYDROCHLORIDE 40 MG/ML
SOLUTION TOPICAL ONCE
Status: COMPLETED | OUTPATIENT
Start: 2024-08-28 | End: 2024-08-28

## 2024-08-28 RX ORDER — SODIUM CHLOR/HYPOCHLOROUS ACID 0.033 %
SOLUTION, IRRIGATION IRRIGATION ONCE
OUTPATIENT
Start: 2024-08-28 | End: 2024-08-28

## 2024-08-28 RX ORDER — CLOBETASOL PROPIONATE 0.5 MG/G
OINTMENT TOPICAL ONCE
OUTPATIENT
Start: 2024-08-28 | End: 2024-08-28

## 2024-08-28 RX ORDER — SILVER SULFADIAZINE 10 MG/G
CREAM TOPICAL ONCE
OUTPATIENT
Start: 2024-08-28 | End: 2024-08-28

## 2024-08-28 RX ORDER — LIDOCAINE HYDROCHLORIDE 20 MG/ML
JELLY TOPICAL ONCE
OUTPATIENT
Start: 2024-08-28 | End: 2024-08-28

## 2024-08-28 RX ORDER — BACITRACIN ZINC AND POLYMYXIN B SULFATE 500; 1000 [USP'U]/G; [USP'U]/G
OINTMENT TOPICAL ONCE
OUTPATIENT
Start: 2024-08-28 | End: 2024-08-28

## 2024-08-28 RX ORDER — TRIAMCINOLONE ACETONIDE 1 MG/G
OINTMENT TOPICAL ONCE
OUTPATIENT
Start: 2024-08-28 | End: 2024-08-28

## 2024-08-28 RX ORDER — LIDOCAINE 40 MG/G
CREAM TOPICAL ONCE
OUTPATIENT
Start: 2024-08-28 | End: 2024-08-28

## 2024-08-28 RX ORDER — GINSENG 100 MG
CAPSULE ORAL ONCE
OUTPATIENT
Start: 2024-08-28 | End: 2024-08-28

## 2024-08-28 RX ORDER — NEOMYCIN/BACITRACIN/POLYMYXINB 3.5-400-5K
OINTMENT (GRAM) TOPICAL ONCE
OUTPATIENT
Start: 2024-08-28 | End: 2024-08-28

## 2024-08-28 RX ORDER — LIDOCAINE 50 MG/G
OINTMENT TOPICAL ONCE
OUTPATIENT
Start: 2024-08-28 | End: 2024-08-28

## 2024-08-28 RX ORDER — BETAMETHASONE DIPROPIONATE 0.5 MG/G
CREAM TOPICAL ONCE
OUTPATIENT
Start: 2024-08-28 | End: 2024-08-28

## 2024-08-28 RX ORDER — GENTAMICIN SULFATE 1 MG/G
OINTMENT TOPICAL ONCE
OUTPATIENT
Start: 2024-08-28 | End: 2024-08-28

## 2024-08-28 RX ORDER — MUPIROCIN 20 MG/G
OINTMENT TOPICAL ONCE
OUTPATIENT
Start: 2024-08-28 | End: 2024-08-28

## 2024-08-28 RX ORDER — LIDOCAINE HYDROCHLORIDE 40 MG/ML
SOLUTION TOPICAL ONCE
OUTPATIENT
Start: 2024-08-28 | End: 2024-08-28

## 2024-08-28 RX ADMIN — LIDOCAINE HYDROCHLORIDE: 40 SOLUTION TOPICAL at 13:52

## 2024-08-28 ASSESSMENT — PAIN SCALES - GENERAL: PAINLEVEL_OUTOF10: 0

## 2024-08-28 NOTE — PROGRESS NOTES
Decreased but stable ROM of 1st MTPJ bilateral without pain or crepitus.     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    Performed by: Jos Monroe DPM    Consent obtained:  Yes    Time out taken:  Yes    Pain Control: Anesthetic  Anesthetic: 4% Lidocaine Liquid Topical       Debridement: Excisional Debridement    Using curette the wound(s)/ulcer(s) was/were debrided down through and including the removal of epidermis, dermis, and subcutaneous tissue.        Devitalized Tissue Debrided:  fibrin and biofilm    Pre Debridement Measurements:  Are located in the Wound/Ulcer Documentation Flow Sheet    Diabetic/Pressure/Non Pressure Ulcers only:  Ulcer: Non-Pressure ulcer, fat layer exposed     Wound/Ulcer #: 1    Post Debridement Measurements:  Wound/Ulcer Descriptions are Pre Debridement except measurements:    Wound 07/24/24 Leg Left;Lower;Lateral #1 (Active)   Wound Image   08/07/24 1346   Wound Etiology Venous 07/24/24 1450   Dressing Status Old drainage noted 08/28/24 1348   Wound Cleansed Vashe 08/21/24 1426   Dressing/Treatment Alginate with Ag;Silicone border 08/21/24 1519   Wound Length (cm) 1.8 cm 08/28/24 1348   Wound Width (cm) 1 cm 08/28/24 1348   Wound Depth (cm) 0.1 cm 08/28/24 1348   Wound Surface Area (cm^2) 1.8 cm^2 08/28/24 1348   Change in Wound Size % (l*w) 89.71 08/28/24 1348   Wound Volume (cm^3) 0.18 cm^3 08/28/24 1348   Wound Healing % 90 08/28/24 1348   Post-Procedure Length (cm) 1.9 cm 08/28/24 1357   Post-Procedure Width (cm) 1.1 cm 08/28/24 1357   Post-Procedure Depth (cm) 0.2 cm 08/28/24 1357   Post-Procedure Surface Area (cm^2) 2.09 cm^2  Tolerated      Dietary:   Continue your diet as tolerated.  Protein is a key nutrient in helping to repair damaged tissue and promote new tissue growth. Good sources of protein include milk, yogurt, cheese, fish, lean meat and beans.  If you are DIABETIC, having diabetes can make it hard for wounds to heal. Try to keep your blood sugar within it's target range.  Limit Sodium, Alcohol and Sugar.    Pain:   Please Note some pain, drainage and/or bleeding might be expected after seeing the provider. TO HELP ALLEVIATE PAIN WE RECOMMEND THE FOLLOWING  Elevate the affected limb.  Use over the counter medications as permitted by your family doctor.  For Persistent Pain not relieved by the above interventions, please notify your family doctor.        : [place]   Electronically signed by Fallon Travis RN on 8/28/2024 at 1:58 PM      Wound Care Center Information: Should you experience any significant changes in your wound(s) or have questions about your wound care, please contact the [facility] Wound Center at [phone] MONDAY - THURSDAY 8:00 am - 4:30 pm and Friday 8:00 am - 12:30 pm.  If you need help with your wound outside these hours and cannot wait until we are again available, contact your PCP or go to the hospital emergency room.     PLEASE NOTE: IF YOU ARE UNABLE TO OBTAIN WOUND SUPPLIES, CONTINUE TO USE THE SUPPLIES YOU HAVE AVAILABLE UNTIL YOU ARE ABLE TO REACH US. IT IS MOST IMPORTANT TO KEEP THE WOUND COVERED AT ALL TIMES.         Physician Signature:_______________________    Date: ___________ Time:  ____________          Dr Jos Monroe     Electronically signed by Jos Monroe DPM on 8/28/2024 at 2:03 PM

## 2024-08-28 NOTE — PATIENT INSTRUCTIONS
[facility] Wound Care Physician Orders and Discharge Instructions  [facility]  [address], [address]  [place], [place] [address]  Telephone: [phone]      FAX [phone]  MONDAY - THURSDAY 8:00 am - 4:30 pm and Friday 8:00 am - 12:00 pm.        NAME:  Jarred Suarez  YOB: 1935  MEDICAL RECORD NUMBER:  [id number]  DATE:  8/28/2024      Return Appointment:  [x] Return Appointment: With Dr Jos Monroe  in  1 Week(s)  [] Wound and dressing supply provider:   [x] ECF or Home Healthcare: [facility]  [] Wound Assessment: [] Physician or NP scheduled for Wound Assessment:   [] Orders placed during your visit:       Important Reminders:   Please wash hands with soap and water before and after every dressing change.  Do not scrub wounds.  Keep wounds dry in shower unless otherwise instructed by the physician.  SMOKING can slow would healing. Stop smoking as soon as possible to improve healing and prevent further complications associated with smoking.      Sameera-Wound Topical Treatments:  Do not apply lotions, creams, or ointments to wound bed unless directed.   [] Apply moisturizing lotion to skin surrounding the wound prior to dressing change.  [] Apply antifungal ointment to skin surrounding the wound prior to dressing change.  [] Apply thin film of no sting moisture barrier ointment to skin immediately around      wound.  [] Other:       Wound Location: LEFT LATERAL LOWER LEG    Wound Cleansing: Vashe soak for 5 minutes prior to dressing change    Primary Dressing:  [x] SILVER ALGINATE  []     Secondary Dressing:  [x] MEPILEX BORDER   []       Dressing Frequency:  [x] THREE TIMES A WEEK  [] Do Not Change Dressing      Compression and Edema Control:  [] Wear Home Compression Stockings   [x] Spandagrip to: Left Leg and Right Leg   Size: []Low compression 5-10 mm/Hg      [x]Medium compression 10-20 mm/Hg           []High compression  20-30 mm/Hg  [] Ace Wrap Toes to Knee to   ABLE TO REACH US. IT IS MOST IMPORTANT TO KEEP THE WOUND COVERED AT ALL TIMES.         Physician Signature:_______________________    Date: ___________ Time:  ____________          Dr Jos Monroe

## 2024-08-30 ENCOUNTER — OFFICE VISIT (OUTPATIENT)
Dept: CARDIOLOGY CLINIC | Age: 89
End: 2024-08-30
Payer: MEDICARE

## 2024-08-30 VITALS
DIASTOLIC BLOOD PRESSURE: 50 MMHG | SYSTOLIC BLOOD PRESSURE: 98 MMHG | OXYGEN SATURATION: 97 % | HEART RATE: 55 BPM | BODY MASS INDEX: 32.9 KG/M2 | WEIGHT: 235 LBS | HEIGHT: 71 IN

## 2024-08-30 DIAGNOSIS — E78.2 MIXED HYPERLIPIDEMIA: ICD-10-CM

## 2024-08-30 DIAGNOSIS — I10 ESSENTIAL HYPERTENSION: ICD-10-CM

## 2024-08-30 DIAGNOSIS — I50.42 CHRONIC COMBINED SYSTOLIC AND DIASTOLIC CONGESTIVE HEART FAILURE (HCC): ICD-10-CM

## 2024-08-30 DIAGNOSIS — I25.10 CORONARY ARTERY DISEASE INVOLVING NATIVE CORONARY ARTERY OF NATIVE HEART WITHOUT ANGINA PECTORIS: Primary | ICD-10-CM

## 2024-08-30 DIAGNOSIS — I48.0 PAROXYSMAL ATRIAL FIBRILLATION (HCC): ICD-10-CM

## 2024-08-30 DIAGNOSIS — I65.23 BILATERAL CAROTID ARTERY STENOSIS: ICD-10-CM

## 2024-08-30 DIAGNOSIS — Z86.718 HISTORY OF DVT (DEEP VEIN THROMBOSIS): ICD-10-CM

## 2024-08-30 PROCEDURE — 99214 OFFICE O/P EST MOD 30 MIN: CPT | Performed by: INTERNAL MEDICINE

## 2024-08-30 PROCEDURE — 1036F TOBACCO NON-USER: CPT | Performed by: INTERNAL MEDICINE

## 2024-08-30 PROCEDURE — 1123F ACP DISCUSS/DSCN MKR DOCD: CPT | Performed by: INTERNAL MEDICINE

## 2024-08-30 PROCEDURE — G8427 DOCREV CUR MEDS BY ELIG CLIN: HCPCS | Performed by: INTERNAL MEDICINE

## 2024-08-30 PROCEDURE — G8417 CALC BMI ABV UP PARAM F/U: HCPCS | Performed by: INTERNAL MEDICINE

## 2024-08-30 RX ORDER — LOSARTAN POTASSIUM 25 MG/1
25 TABLET ORAL DAILY
Qty: 90 TABLET | Refills: 4 | Status: SHIPPED | OUTPATIENT
Start: 2024-08-30

## 2024-08-30 RX ORDER — FUROSEMIDE 20 MG
20 TABLET ORAL DAILY
Qty: 90 TABLET | Refills: 4 | Status: SHIPPED | OUTPATIENT
Start: 2024-08-30

## 2024-08-30 RX ORDER — POTASSIUM CHLORIDE 1500 MG/1
10 TABLET, EXTENDED RELEASE ORAL 2 TIMES DAILY
Qty: 180 TABLET | Refills: 4 | Status: SHIPPED | OUTPATIENT
Start: 2024-08-30

## 2024-08-30 NOTE — PROGRESS NOTES
[facility]  Cardiac Follow up       Referring Provider:  Jack Shelton MD     Chief Complaint   Patient presents with    Fatigue      History of Present Illness:  Mr. Jarred Suarez is an 81 year old gentleman here today in follow up for CHF, CAD, hypertension, hyperlipidemia, afib and PVD.  In 2014, he was diagnosed with a DVT and completed 6 months of treatment with Xarelto.  He was seen by a hematologist, initially worked up for lupus anticoagulant, but most recent testing did not show evidence of this.  Prior to last visit, he was admitted with sepsis and developed afib. He was started on Eliquis. Now on Xarelto.    He is doing well from a cardiac standpoint. No dyspnea or chest pain. Orthopedic issues. Knee pain, unable to stand straight, left ankle unstable. His family is worried because if her tries to stand he is very weak and shakes. I attempted to have him  the office. He needs several people to help him stand. Remains bent at the waist about 90 degree. Very unstable. Unable to check BP standing as he is too unstable.        Past Medical History:   has a past medical history of Atrial fibrillation (HCC), CAD (coronary artery disease), Diarrhea, History of skin cancer, Hyperlipidemia, Hypertension, Insomnia, Kidney stones, and Open wound of left hand, subsequent encounter.    Surgical History:   has a past surgical history that includes back surgery; knee surgery (2005); shoulder surgery; Appendectomy; Coronary artery bypass graft (1981); Coronary artery bypass graft (2001); Prostate surgery; Skin cancer excision; joint replacement (Right); Total knee arthroplasty (Right, 2015); and Cystoscopy (N/A, 7/26/2024).     Social History:   reports that he quit smoking about 49 years ago. His smoking use included cigarettes. He started smoking about 59 years ago. He has a 20 pack-year smoking history. He has never used smokeless tobacco. He reports that he does not currently use alcohol after a  to moderate tricuspid regurgitation with RVSP estimated at 47 mmHg    ECHO 4/2020  Summary   Suboptimal image quality.   Definity contrast administered.   Left ventricular cavity size is normal.   There is asymmetric hypertrophy of the basal septum.   Ejection fraction is visually estimated to be 40-45%.   There is mild to moderate diffuse hypokinesis.   Diastolic filling parameters suggest grade I diastolic dysfunction.   Mitral valve leaflets appear mildly thickened.   Trivial mitral regurgitation is present.   The left atrium is mildly dilated.   Aortic valve leaflets appear thickened.   Trivial aortic regurgitation is present.   The right ventricle is enlarged.   Right ventricular systolic function is moderately reduced.   TAPSE 1.1 cm   Tricuspid valve is structurally normal.   Mild tricuspid regurgitation.   The right atrium is severely dilated.   Estimated pulmonary artery systolic pressure is moderately elevated at 29-41   mmHg assuming a right atrial pressure of 15 mmHg.      Assessment:     1. CAD (coronary artery disease)/CABG 1981 & 2001:   Remains stable without angina.  Continue medical therapy.   I do not recommend ischemic testing at his age without symptoms  Lexiscan Myoview 2014> Normal perfusion/EF. Repeat myoview 8/2018-fixed inferior, scar vs diaphragm   2. HTN (hypertension):  Low normal. Repeat /60. Decrease cozaar to 25 mg   BP (!) 98/50 (Site: Left Upper Arm, Position: Sitting, Cuff Size: Large Adult)   Pulse 55   Ht 1.803 m (5' 11\")   Wt 106.6 kg (235 lb)   SpO2 97%   BMI 32.78 kg/m²      3. Hyperlipidemia: SLP=206 Stopped lipitor due to abdominal complaints/diarrhea. Unchanged    4. Carotid Stenosis:  2008 dopplers> less than 40 % bilaterally. Stable   5.  Atrial Fibrillation (paroxysmal):  PAF. HR controlled. Remains asymptomatic.  Continue Xarelto 20 mg (CrCl=59)   6.  DVT: Diagnosed Spring 2014>  Xarelto for 6 months. ? Hypercoagulable. Repeat testing negative. On Xarelto.

## 2024-09-04 ENCOUNTER — TELEPHONE (OUTPATIENT)
Dept: FAMILY MEDICINE CLINIC | Age: 89
End: 2024-09-04

## 2024-09-04 ENCOUNTER — HOSPITAL ENCOUNTER (OUTPATIENT)
Dept: WOUND CARE | Age: 89
Discharge: HOME | End: 2024-09-04
Attending: PODIATRIST
Payer: MEDICARE

## 2024-09-04 VITALS
SYSTOLIC BLOOD PRESSURE: 106 MMHG | RESPIRATION RATE: 16 BRPM | TEMPERATURE: 97.9 F | HEART RATE: 71 BPM | DIASTOLIC BLOOD PRESSURE: 64 MMHG

## 2024-09-04 DIAGNOSIS — L97.822 NON-PRS CHRONIC ULCER OTH PRT L LOW LEG W FAT LAYER EXPOSED (HCC): Primary | ICD-10-CM

## 2024-09-04 PROCEDURE — 11042 DBRDMT SUBQ TIS 1ST 20SQCM/<: CPT

## 2024-09-04 RX ORDER — LIDOCAINE HYDROCHLORIDE 20 MG/ML
JELLY TOPICAL ONCE
OUTPATIENT
Start: 2024-09-04 | End: 2024-09-04

## 2024-09-04 RX ORDER — LIDOCAINE 40 MG/G
CREAM TOPICAL ONCE
OUTPATIENT
Start: 2024-09-04 | End: 2024-09-04

## 2024-09-04 RX ORDER — SODIUM CHLOR/HYPOCHLOROUS ACID 0.033 %
SOLUTION, IRRIGATION IRRIGATION ONCE
OUTPATIENT
Start: 2024-09-04 | End: 2024-09-04

## 2024-09-04 RX ORDER — SILVER SULFADIAZINE 10 MG/G
CREAM TOPICAL ONCE
OUTPATIENT
Start: 2024-09-04 | End: 2024-09-04

## 2024-09-04 RX ORDER — GINSENG 100 MG
CAPSULE ORAL ONCE
OUTPATIENT
Start: 2024-09-04 | End: 2024-09-04

## 2024-09-04 RX ORDER — LIDOCAINE HYDROCHLORIDE 40 MG/ML
SOLUTION TOPICAL ONCE
OUTPATIENT
Start: 2024-09-04 | End: 2024-09-04

## 2024-09-04 RX ORDER — MUPIROCIN 20 MG/G
OINTMENT TOPICAL ONCE
OUTPATIENT
Start: 2024-09-04 | End: 2024-09-04

## 2024-09-04 RX ORDER — NYSTATIN 100000 U/G
CREAM TOPICAL
Qty: 30 G | Refills: 2 | Status: SHIPPED | OUTPATIENT
Start: 2024-09-04

## 2024-09-04 RX ORDER — NEOMYCIN/BACITRACIN/POLYMYXINB 3.5-400-5K
OINTMENT (GRAM) TOPICAL ONCE
OUTPATIENT
Start: 2024-09-04 | End: 2024-09-04

## 2024-09-04 RX ORDER — LIDOCAINE 50 MG/G
OINTMENT TOPICAL ONCE
OUTPATIENT
Start: 2024-09-04 | End: 2024-09-04

## 2024-09-04 RX ORDER — LIDOCAINE HYDROCHLORIDE 40 MG/ML
SOLUTION TOPICAL ONCE
Status: COMPLETED | OUTPATIENT
Start: 2024-09-04 | End: 2024-09-04

## 2024-09-04 RX ORDER — BETAMETHASONE DIPROPIONATE 0.5 MG/G
CREAM TOPICAL ONCE
OUTPATIENT
Start: 2024-09-04 | End: 2024-09-04

## 2024-09-04 RX ORDER — CLOBETASOL PROPIONATE 0.5 MG/G
OINTMENT TOPICAL ONCE
OUTPATIENT
Start: 2024-09-04 | End: 2024-09-04

## 2024-09-04 RX ORDER — BACITRACIN ZINC AND POLYMYXIN B SULFATE 500; 1000 [USP'U]/G; [USP'U]/G
OINTMENT TOPICAL ONCE
OUTPATIENT
Start: 2024-09-04 | End: 2024-09-04

## 2024-09-04 RX ORDER — TRIAMCINOLONE ACETONIDE 1 MG/G
OINTMENT TOPICAL ONCE
OUTPATIENT
Start: 2024-09-04 | End: 2024-09-04

## 2024-09-04 RX ORDER — GENTAMICIN SULFATE 1 MG/G
OINTMENT TOPICAL ONCE
OUTPATIENT
Start: 2024-09-04 | End: 2024-09-04

## 2024-09-04 RX ADMIN — LIDOCAINE HYDROCHLORIDE: 40 SOLUTION TOPICAL at 14:21

## 2024-09-04 ASSESSMENT — PAIN SCALES - GENERAL: PAINLEVEL_OUTOF10: 0

## 2024-09-04 NOTE — PROGRESS NOTES
help promote wound healing. Patient reported he has compression stockings at home, but they feel too tight and are hard for him to put on. Provided patient with SpandiGrip stockings to help control edema.     Treatment Note please see attached Discharge Instructions    Written patient dismissal instructions given to patient and signed by patient or POA.           Patient Instructions   [facility] Wound Care Physician Orders and Discharge Instructions  [facility]  [address], [address]  [place], [place] [address]  Telephone: [phone]      FAX [phone]  MONDAY - THURSDAY 8:00 am - 4:30 pm and Friday 8:00 am - 12:00 pm.        NAME:  Jarred Suarez  YOB: 1935  MEDICAL RECORD NUMBER:  [id number]  DATE:  9/4/2024      Return Appointment:  [x] Return Appointment: With Dr Jos Monroe  in  1 Week(s)  [] Wound and dressing supply provider:   [x] ECF or Home Healthcare: [facility]  [] Wound Assessment: [] Physician or NP scheduled for Wound Assessment:   [] Orders placed during your visit:       Important Reminders:   Please wash hands with soap and water before and after every dressing change.  Do not scrub wounds.  Keep wounds dry in shower unless otherwise instructed by the physician.  SMOKING can slow would healing. Stop smoking as soon as possible to improve healing and prevent further complications associated with smoking.      Sameera-Wound Topical Treatments:  Do not apply lotions, creams, or ointments to wound bed unless directed.   [] Apply moisturizing lotion to skin surrounding the wound prior to dressing change.  [] Apply antifungal ointment to skin surrounding the wound prior to dressing change.  [] Apply thin film of no sting moisture barrier ointment to skin immediately around      wound.  [] Other:       Wound Location: LEFT LATERAL AND DISTAL LOWER LEG    Wound Cleansing: None    Primary Dressing:  [x] BETADINE  []     Secondary Dressing:  [x] GAUZE  [x]

## 2024-09-04 NOTE — TELEPHONE ENCOUNTER
I do not see this has been prescribed by Dr Shelton before.  Called and spoke w pt and he states he doesn't know what he uses this for but will have wife call us when she gets home.

## 2024-09-04 NOTE — PATIENT INSTRUCTIONS
WOUND COVERED AT ALL TIMES.         Physician Signature:_______________________    Date: ___________ Time:  ____________          Dr Jos Monroe

## 2024-09-04 NOTE — TELEPHONE ENCOUNTER
WOP called in stated the cream is used for yeast infections pt wife said Dr Shelton prescribed this before

## 2024-09-12 ENCOUNTER — HOSPITAL ENCOUNTER (OUTPATIENT)
Dept: WOUND CARE | Age: 89
Discharge: HOME | End: 2024-09-12
Attending: PODIATRIST
Payer: MEDICARE

## 2024-09-12 VITALS
DIASTOLIC BLOOD PRESSURE: 67 MMHG | SYSTOLIC BLOOD PRESSURE: 134 MMHG | TEMPERATURE: 97.7 F | RESPIRATION RATE: 18 BRPM | HEART RATE: 60 BPM

## 2024-09-12 DIAGNOSIS — L97.822 NON-PRS CHRONIC ULCER OTH PRT L LOW LEG W FAT LAYER EXPOSED (HCC): Primary | ICD-10-CM

## 2024-09-12 PROCEDURE — 29580 STRAPPING UNNA BOOT: CPT

## 2024-09-12 PROCEDURE — 11042 DBRDMT SUBQ TIS 1ST 20SQCM/<: CPT

## 2024-09-12 RX ORDER — TRIAMCINOLONE ACETONIDE 1 MG/G
OINTMENT TOPICAL ONCE
OUTPATIENT
Start: 2024-09-12 | End: 2024-09-12

## 2024-09-12 RX ORDER — BETAMETHASONE DIPROPIONATE 0.5 MG/G
CREAM TOPICAL ONCE
OUTPATIENT
Start: 2024-09-12 | End: 2024-09-12

## 2024-09-12 RX ORDER — LIDOCAINE 40 MG/G
CREAM TOPICAL ONCE
OUTPATIENT
Start: 2024-09-12 | End: 2024-09-12

## 2024-09-12 RX ORDER — LIDOCAINE HYDROCHLORIDE 20 MG/ML
JELLY TOPICAL ONCE
OUTPATIENT
Start: 2024-09-12 | End: 2024-09-12

## 2024-09-12 RX ORDER — CLOBETASOL PROPIONATE 0.5 MG/G
OINTMENT TOPICAL ONCE
OUTPATIENT
Start: 2024-09-12 | End: 2024-09-12

## 2024-09-12 RX ORDER — SILVER SULFADIAZINE 10 MG/G
CREAM TOPICAL ONCE
OUTPATIENT
Start: 2024-09-12 | End: 2024-09-12

## 2024-09-12 RX ORDER — SODIUM CHLOR/HYPOCHLOROUS ACID 0.033 %
SOLUTION, IRRIGATION IRRIGATION ONCE
OUTPATIENT
Start: 2024-09-12 | End: 2024-09-12

## 2024-09-12 RX ORDER — NEOMYCIN/BACITRACIN/POLYMYXINB 3.5-400-5K
OINTMENT (GRAM) TOPICAL ONCE
OUTPATIENT
Start: 2024-09-12 | End: 2024-09-12

## 2024-09-12 RX ORDER — LIDOCAINE HYDROCHLORIDE 40 MG/ML
SOLUTION TOPICAL ONCE
Status: COMPLETED | OUTPATIENT
Start: 2024-09-12 | End: 2024-09-12

## 2024-09-12 RX ORDER — MUPIROCIN 20 MG/G
OINTMENT TOPICAL ONCE
OUTPATIENT
Start: 2024-09-12 | End: 2024-09-12

## 2024-09-12 RX ORDER — GINSENG 100 MG
CAPSULE ORAL ONCE
OUTPATIENT
Start: 2024-09-12 | End: 2024-09-12

## 2024-09-12 RX ORDER — LIDOCAINE HYDROCHLORIDE 40 MG/ML
SOLUTION TOPICAL ONCE
OUTPATIENT
Start: 2024-09-12 | End: 2024-09-12

## 2024-09-12 RX ORDER — GENTAMICIN SULFATE 1 MG/G
OINTMENT TOPICAL ONCE
OUTPATIENT
Start: 2024-09-12 | End: 2024-09-12

## 2024-09-12 RX ORDER — BACITRACIN ZINC AND POLYMYXIN B SULFATE 500; 1000 [USP'U]/G; [USP'U]/G
OINTMENT TOPICAL ONCE
OUTPATIENT
Start: 2024-09-12 | End: 2024-09-12

## 2024-09-12 RX ORDER — LIDOCAINE 50 MG/G
OINTMENT TOPICAL ONCE
OUTPATIENT
Start: 2024-09-12 | End: 2024-09-12

## 2024-09-12 RX ADMIN — LIDOCAINE HYDROCHLORIDE: 40 SOLUTION TOPICAL at 15:03

## 2024-09-14 DIAGNOSIS — F51.01 PRIMARY INSOMNIA: ICD-10-CM

## 2024-09-16 RX ORDER — ALPRAZOLAM 2 MG
TABLET ORAL
Qty: 90 TABLET | Refills: 0 | Status: SHIPPED | OUTPATIENT
Start: 2024-09-16 | End: 2024-10-16

## 2024-09-18 ENCOUNTER — HOSPITAL ENCOUNTER (OUTPATIENT)
Dept: WOUND CARE | Age: 89
Discharge: HOME | End: 2024-09-18
Attending: PODIATRIST
Payer: MEDICARE

## 2024-09-18 VITALS
HEART RATE: 62 BPM | RESPIRATION RATE: 18 BRPM | DIASTOLIC BLOOD PRESSURE: 72 MMHG | TEMPERATURE: 97.7 F | SYSTOLIC BLOOD PRESSURE: 139 MMHG

## 2024-09-18 DIAGNOSIS — L97.822 NON-PRS CHRONIC ULCER OTH PRT L LOW LEG W FAT LAYER EXPOSED (HCC): Primary | ICD-10-CM

## 2024-09-18 DIAGNOSIS — M62.40 CONTRACTION, TENDON: ICD-10-CM

## 2024-09-18 PROCEDURE — 29580 STRAPPING UNNA BOOT: CPT

## 2024-09-18 PROCEDURE — 11042 DBRDMT SUBQ TIS 1ST 20SQCM/<: CPT

## 2024-09-18 RX ORDER — MUPIROCIN 20 MG/G
OINTMENT TOPICAL ONCE
OUTPATIENT
Start: 2024-09-18 | End: 2024-09-18

## 2024-09-18 RX ORDER — NEOMYCIN/BACITRACIN/POLYMYXINB 3.5-400-5K
OINTMENT (GRAM) TOPICAL ONCE
OUTPATIENT
Start: 2024-09-18 | End: 2024-09-18

## 2024-09-18 RX ORDER — TRIAMCINOLONE ACETONIDE 1 MG/G
OINTMENT TOPICAL ONCE
OUTPATIENT
Start: 2024-09-18 | End: 2024-09-18

## 2024-09-18 RX ORDER — GINSENG 100 MG
CAPSULE ORAL ONCE
OUTPATIENT
Start: 2024-09-18 | End: 2024-09-18

## 2024-09-18 RX ORDER — LIDOCAINE 40 MG/G
CREAM TOPICAL ONCE
OUTPATIENT
Start: 2024-09-18 | End: 2024-09-18

## 2024-09-18 RX ORDER — SODIUM CHLOR/HYPOCHLOROUS ACID 0.033 %
SOLUTION, IRRIGATION IRRIGATION ONCE
OUTPATIENT
Start: 2024-09-18 | End: 2024-09-18

## 2024-09-18 RX ORDER — BETAMETHASONE DIPROPIONATE 0.5 MG/G
CREAM TOPICAL ONCE
OUTPATIENT
Start: 2024-09-18 | End: 2024-09-18

## 2024-09-18 RX ORDER — LIDOCAINE HYDROCHLORIDE 40 MG/ML
SOLUTION TOPICAL ONCE
Status: COMPLETED | OUTPATIENT
Start: 2024-09-18 | End: 2024-09-18

## 2024-09-18 RX ORDER — GENTAMICIN SULFATE 1 MG/G
OINTMENT TOPICAL ONCE
OUTPATIENT
Start: 2024-09-18 | End: 2024-09-18

## 2024-09-18 RX ORDER — SILVER SULFADIAZINE 10 MG/G
CREAM TOPICAL ONCE
OUTPATIENT
Start: 2024-09-18 | End: 2024-09-18

## 2024-09-18 RX ORDER — LIDOCAINE HYDROCHLORIDE 40 MG/ML
SOLUTION TOPICAL ONCE
OUTPATIENT
Start: 2024-09-18 | End: 2024-09-18

## 2024-09-18 RX ORDER — BACITRACIN ZINC AND POLYMYXIN B SULFATE 500; 1000 [USP'U]/G; [USP'U]/G
OINTMENT TOPICAL ONCE
OUTPATIENT
Start: 2024-09-18 | End: 2024-09-18

## 2024-09-18 RX ORDER — LIDOCAINE 50 MG/G
OINTMENT TOPICAL ONCE
OUTPATIENT
Start: 2024-09-18 | End: 2024-09-18

## 2024-09-18 RX ORDER — LIDOCAINE HYDROCHLORIDE 20 MG/ML
JELLY TOPICAL ONCE
OUTPATIENT
Start: 2024-09-18 | End: 2024-09-18

## 2024-09-18 RX ORDER — CLOBETASOL PROPIONATE 0.5 MG/G
OINTMENT TOPICAL ONCE
OUTPATIENT
Start: 2024-09-18 | End: 2024-09-18

## 2024-09-18 RX ADMIN — LIDOCAINE HYDROCHLORIDE: 40 SOLUTION TOPICAL at 14:51

## 2024-09-18 ASSESSMENT — PAIN SCALES - GENERAL
PAINLEVEL_OUTOF10: 0
PAINLEVEL_OUTOF10: 0

## 2024-09-25 ENCOUNTER — HOSPITAL ENCOUNTER (OUTPATIENT)
Dept: WOUND CARE | Age: 89
Discharge: HOME | End: 2024-09-25
Attending: PODIATRIST
Payer: MEDICARE

## 2024-09-25 VITALS
RESPIRATION RATE: 18 BRPM | DIASTOLIC BLOOD PRESSURE: 69 MMHG | TEMPERATURE: 97.6 F | HEART RATE: 63 BPM | SYSTOLIC BLOOD PRESSURE: 128 MMHG

## 2024-09-25 DIAGNOSIS — L97.822 NON-PRS CHRONIC ULCER OTH PRT L LOW LEG W FAT LAYER EXPOSED (HCC): Primary | ICD-10-CM

## 2024-09-25 PROCEDURE — 29580 STRAPPING UNNA BOOT: CPT

## 2024-09-25 PROCEDURE — 11042 DBRDMT SUBQ TIS 1ST 20SQCM/<: CPT

## 2024-09-25 RX ORDER — SILVER SULFADIAZINE 10 MG/G
CREAM TOPICAL ONCE
OUTPATIENT
Start: 2024-09-25 | End: 2024-09-25

## 2024-09-25 RX ORDER — GENTAMICIN SULFATE 1 MG/G
OINTMENT TOPICAL ONCE
OUTPATIENT
Start: 2024-09-25 | End: 2024-09-25

## 2024-09-25 RX ORDER — LIDOCAINE HYDROCHLORIDE 20 MG/ML
JELLY TOPICAL ONCE
OUTPATIENT
Start: 2024-09-25 | End: 2024-09-25

## 2024-09-25 RX ORDER — BACITRACIN ZINC AND POLYMYXIN B SULFATE 500; 1000 [USP'U]/G; [USP'U]/G
OINTMENT TOPICAL ONCE
OUTPATIENT
Start: 2024-09-25 | End: 2024-09-25

## 2024-09-25 RX ORDER — LIDOCAINE 50 MG/G
OINTMENT TOPICAL ONCE
OUTPATIENT
Start: 2024-09-25 | End: 2024-09-25

## 2024-09-25 RX ORDER — GINSENG 100 MG
CAPSULE ORAL ONCE
OUTPATIENT
Start: 2024-09-25 | End: 2024-09-25

## 2024-09-25 RX ORDER — SODIUM CHLOR/HYPOCHLOROUS ACID 0.033 %
SOLUTION, IRRIGATION IRRIGATION ONCE
OUTPATIENT
Start: 2024-09-25 | End: 2024-09-25

## 2024-09-25 RX ORDER — TRIAMCINOLONE ACETONIDE 1 MG/G
OINTMENT TOPICAL ONCE
OUTPATIENT
Start: 2024-09-25 | End: 2024-09-25

## 2024-09-25 RX ORDER — LIDOCAINE HYDROCHLORIDE 40 MG/ML
SOLUTION TOPICAL ONCE
OUTPATIENT
Start: 2024-09-25 | End: 2024-09-25

## 2024-09-25 RX ORDER — MUPIROCIN 20 MG/G
OINTMENT TOPICAL ONCE
OUTPATIENT
Start: 2024-09-25 | End: 2024-09-25

## 2024-09-25 RX ORDER — NEOMYCIN/BACITRACIN/POLYMYXINB 3.5-400-5K
OINTMENT (GRAM) TOPICAL ONCE
OUTPATIENT
Start: 2024-09-25 | End: 2024-09-25

## 2024-09-25 RX ORDER — LIDOCAINE HYDROCHLORIDE 40 MG/ML
SOLUTION TOPICAL ONCE
Status: COMPLETED | OUTPATIENT
Start: 2024-09-25 | End: 2024-09-25

## 2024-09-25 RX ORDER — LIDOCAINE 40 MG/G
CREAM TOPICAL ONCE
OUTPATIENT
Start: 2024-09-25 | End: 2024-09-25

## 2024-09-25 RX ORDER — CLOBETASOL PROPIONATE 0.5 MG/G
OINTMENT TOPICAL ONCE
OUTPATIENT
Start: 2024-09-25 | End: 2024-09-25

## 2024-09-25 RX ORDER — BETAMETHASONE DIPROPIONATE 0.5 MG/G
CREAM TOPICAL ONCE
OUTPATIENT
Start: 2024-09-25 | End: 2024-09-25

## 2024-09-25 RX ADMIN — LIDOCAINE HYDROCHLORIDE: 40 SOLUTION TOPICAL at 15:01

## 2024-09-25 ASSESSMENT — PAIN SCALES - GENERAL
PAINLEVEL_OUTOF10: 0
PAINLEVEL_OUTOF10: 0

## 2024-09-27 ENCOUNTER — TELEPHONE (OUTPATIENT)
Dept: FAMILY MEDICINE CLINIC | Age: 89
End: 2024-09-27

## 2024-09-27 ENCOUNTER — LAB (OUTPATIENT)
Dept: FAMILY MEDICINE CLINIC | Age: 89
End: 2024-09-27
Payer: MEDICARE

## 2024-09-27 DIAGNOSIS — R30.0 DYSURIA: Primary | ICD-10-CM

## 2024-09-27 LAB
BILIRUBIN, POC: ABNORMAL
BLOOD URINE, POC: ABNORMAL
CLARITY, POC: ABNORMAL
COLOR, POC: ABNORMAL
GLUCOSE URINE, POC: ABNORMAL MG/DL
KETONES, POC: ABNORMAL MG/DL
LEUKOCYTE EST, POC: ABNORMAL
NITRITE, POC: ABNORMAL
PH, POC: 5.5
PROTEIN, POC: ABNORMAL MG/DL
SPECIFIC GRAVITY, POC: 1.02
UROBILINOGEN, POC: ABNORMAL MG/DL

## 2024-09-27 PROCEDURE — 81002 URINALYSIS NONAUTO W/O SCOPE: CPT | Performed by: FAMILY MEDICINE

## 2024-09-29 LAB — BACTERIA UR CULT: NORMAL

## 2024-10-02 ENCOUNTER — HOSPITAL ENCOUNTER (OUTPATIENT)
Dept: WOUND CARE | Age: 89
Discharge: HOME | End: 2024-10-02
Attending: PODIATRIST
Payer: MEDICARE

## 2024-10-02 VITALS
RESPIRATION RATE: 18 BRPM | SYSTOLIC BLOOD PRESSURE: 114 MMHG | HEART RATE: 57 BPM | DIASTOLIC BLOOD PRESSURE: 65 MMHG | TEMPERATURE: 97.3 F

## 2024-10-02 DIAGNOSIS — L97.822 NON-PRS CHRONIC ULCER OTH PRT L LOW LEG W FAT LAYER EXPOSED (HCC): Primary | ICD-10-CM

## 2024-10-02 PROCEDURE — 29580 STRAPPING UNNA BOOT: CPT

## 2024-10-02 PROCEDURE — 11042 DBRDMT SUBQ TIS 1ST 20SQCM/<: CPT

## 2024-10-02 RX ORDER — CLOBETASOL PROPIONATE 0.5 MG/G
OINTMENT TOPICAL ONCE
OUTPATIENT
Start: 2024-10-02 | End: 2024-10-02

## 2024-10-02 RX ORDER — LIDOCAINE 40 MG/G
CREAM TOPICAL ONCE
OUTPATIENT
Start: 2024-10-02 | End: 2024-10-02

## 2024-10-02 RX ORDER — LIDOCAINE HYDROCHLORIDE 20 MG/ML
JELLY TOPICAL ONCE
OUTPATIENT
Start: 2024-10-02 | End: 2024-10-02

## 2024-10-02 RX ORDER — TRIAMCINOLONE ACETONIDE 1 MG/G
OINTMENT TOPICAL ONCE
OUTPATIENT
Start: 2024-10-02 | End: 2024-10-02

## 2024-10-02 RX ORDER — LIDOCAINE HYDROCHLORIDE 40 MG/ML
SOLUTION TOPICAL ONCE
Status: COMPLETED | OUTPATIENT
Start: 2024-10-02 | End: 2024-10-02

## 2024-10-02 RX ORDER — GENTAMICIN SULFATE 1 MG/G
OINTMENT TOPICAL ONCE
OUTPATIENT
Start: 2024-10-02 | End: 2024-10-02

## 2024-10-02 RX ORDER — LIDOCAINE 50 MG/G
OINTMENT TOPICAL ONCE
OUTPATIENT
Start: 2024-10-02 | End: 2024-10-02

## 2024-10-02 RX ORDER — MUPIROCIN 20 MG/G
OINTMENT TOPICAL ONCE
OUTPATIENT
Start: 2024-10-02 | End: 2024-10-02

## 2024-10-02 RX ORDER — LIDOCAINE HYDROCHLORIDE 40 MG/ML
SOLUTION TOPICAL ONCE
OUTPATIENT
Start: 2024-10-02 | End: 2024-10-02

## 2024-10-02 RX ORDER — NEOMYCIN/BACITRACIN/POLYMYXINB 3.5-400-5K
OINTMENT (GRAM) TOPICAL ONCE
OUTPATIENT
Start: 2024-10-02 | End: 2024-10-02

## 2024-10-02 RX ORDER — BETAMETHASONE DIPROPIONATE 0.5 MG/G
CREAM TOPICAL ONCE
OUTPATIENT
Start: 2024-10-02 | End: 2024-10-02

## 2024-10-02 RX ORDER — BACITRACIN ZINC AND POLYMYXIN B SULFATE 500; 1000 [USP'U]/G; [USP'U]/G
OINTMENT TOPICAL ONCE
OUTPATIENT
Start: 2024-10-02 | End: 2024-10-02

## 2024-10-02 RX ORDER — SODIUM CHLOR/HYPOCHLOROUS ACID 0.033 %
SOLUTION, IRRIGATION IRRIGATION ONCE
OUTPATIENT
Start: 2024-10-02 | End: 2024-10-02

## 2024-10-02 RX ORDER — GINSENG 100 MG
CAPSULE ORAL ONCE
OUTPATIENT
Start: 2024-10-02 | End: 2024-10-02

## 2024-10-02 RX ORDER — SILVER SULFADIAZINE 10 MG/G
CREAM TOPICAL ONCE
OUTPATIENT
Start: 2024-10-02 | End: 2024-10-02

## 2024-10-02 RX ADMIN — LIDOCAINE HYDROCHLORIDE: 40 SOLUTION TOPICAL at 13:54

## 2024-10-02 ASSESSMENT — PAIN SCALES - GENERAL
PAINLEVEL_OUTOF10: 0
PAINLEVEL_OUTOF10: 0

## 2024-10-02 NOTE — PLAN OF CARE
Unna Boot Application   Below Knee    NAME:  Jarred Suarez  YOB: 1935  MEDICAL RECORD NUMBER:  [id number]  DATE:  10/2/2024    Unna boot: Applied moisturizing agent to dry skin as needed.   Appied primary and secondary dressing as ordered.  Applied Unna roll from toes to knee overlapping each time.   Applied ace wrap or coban from toes to below the knee.   Secured with tape and/or metal clips covered with tape.   Instructed patient/caregiver to keep dressing dry and intact. DO NOT REMOVE DRESSING.   Instructed pt/family/caregiver to report excessive draining, loose bandage, wet dressing, severe pain or tingling in toes.  Applied Unna Boot dressing below the knee to left lower leg.    Unna Boot(s) were applied per  Guidelines.     Electronically signed by Lindsey Tijreina RN on 10/2/2024 at 3:28 PM

## 2024-10-02 NOTE — PROGRESS NOTES
applied to his left lower extremity.     Treatment Note please see attached Discharge Instructions    Written patient dismissal instructions given to patient and signed by patient or POA.           Patient Instructions   [facility] Wound Care Physician Orders and Discharge Instructions  [facility]  [address], [address]  [place], [place] [address]  Telephone: [phone]      FAX [phone]  MONDAY - THURSDAY 8:00 am - 4:30 pm and Friday 8:00 am - 12:00 pm.        NAME:  Jarred Suarez  YOB: 1935  MEDICAL RECORD NUMBER:  [id number]  DATE:  10/2/2024      Return Appointment:  [x] Return Appointment: With Dr Jos Monroe  in  1 Week(s)  [] Wound and dressing supply provider:   [x] ECF or Home Healthcare: [facility]  [] Wound Assessment: [] Physician or NP scheduled for Wound Assessment:   [] Orders placed during your visit:       Important Reminders:   Please wash hands with soap and water before and after every dressing change.  Do not scrub wounds.  Keep wounds dry in shower unless otherwise instructed by the physician.  SMOKING can slow would healing. Stop smoking as soon as possible to improve healing and prevent further complications associated with smoking.      Sameera-Wound Topical Treatments:  Do not apply lotions, creams, or ointments to wound bed unless directed.   [] Apply moisturizing lotion to skin surrounding the wound prior to dressing change.  [] Apply antifungal ointment to skin surrounding the wound prior to dressing change.  [] Apply thin film of no sting moisture barrier ointment to skin immediately around      wound.  [] Other:       Wound Location: LEFT DISTAL LOWER LEG    Wound Cleansing: None    Primary Dressing:  [x] COLLAGEN WITH SILVER, LIGHTLY MOISTENED WITH SALINE  []     Secondary Dressing:  [x] GAUZE  []       Dressing Frequency:  []   [x] Do Not Change

## 2024-10-02 NOTE — PATIENT INSTRUCTIONS
[facility] Wound Care Physician Orders and Discharge Instructions  [facility]  [address], [address]  [place], [place] [address]  Telephone: [phone]      FAX [phone]  MONDAY - THURSDAY 8:00 am - 4:30 pm and Friday 8:00 am - 12:00 pm.        NAME:  Jarred Suarez  YOB: 1935  MEDICAL RECORD NUMBER:  [id number]  DATE:  10/2/2024      Return Appointment:  [x] Return Appointment: With Dr Jos Monroe  in  1 Week(s)  [] Wound and dressing supply provider:   [x] ECF or Home Healthcare: [facility]  [] Wound Assessment: [] Physician or NP scheduled for Wound Assessment:   [] Orders placed during your visit:       Important Reminders:   Please wash hands with soap and water before and after every dressing change.  Do not scrub wounds.  Keep wounds dry in shower unless otherwise instructed by the physician.  SMOKING can slow would healing. Stop smoking as soon as possible to improve healing and prevent further complications associated with smoking.      Sameera-Wound Topical Treatments:  Do not apply lotions, creams, or ointments to wound bed unless directed.   [] Apply moisturizing lotion to skin surrounding the wound prior to dressing change.  [] Apply antifungal ointment to skin surrounding the wound prior to dressing change.  [] Apply thin film of no sting moisture barrier ointment to skin immediately around      wound.  [] Other:       Wound Location: LEFT DISTAL LOWER LEG    Wound Cleansing: None    Primary Dressing:  [x] COLLAGEN WITH SILVER, LIGHTLY MOISTENED WITH SALINE  []     Secondary Dressing:  [x] GAUZE  []       Dressing Frequency:  []   [x] Do Not Change Dressing    _________________________________________________________________________________________________      Wound Location: LEFT PROXIMAL LOWER LEG    Wound Cleansing: None    Primary Dressing:  [x] POLYSPORIN  []     Secondary Dressing:  [x] MEPILEX BORDER (AT [facility])/ BAND-AID AT HOME  []       Dressing

## 2024-10-07 ENCOUNTER — OFFICE VISIT (OUTPATIENT)
Dept: ORTHOPEDIC SURGERY | Age: 89
End: 2024-10-07
Payer: MEDICARE

## 2024-10-07 VITALS — HEIGHT: 71 IN | BODY MASS INDEX: 32.9 KG/M2 | WEIGHT: 235 LBS

## 2024-10-07 DIAGNOSIS — M21.172 ACQUIRED VARUS DEFORMITY OF LEFT ANKLE: ICD-10-CM

## 2024-10-07 DIAGNOSIS — M25.572 LEFT ANKLE PAIN, UNSPECIFIED CHRONICITY: Primary | ICD-10-CM

## 2024-10-07 PROCEDURE — G8417 CALC BMI ABV UP PARAM F/U: HCPCS | Performed by: STUDENT IN AN ORGANIZED HEALTH CARE EDUCATION/TRAINING PROGRAM

## 2024-10-07 PROCEDURE — G8427 DOCREV CUR MEDS BY ELIG CLIN: HCPCS | Performed by: STUDENT IN AN ORGANIZED HEALTH CARE EDUCATION/TRAINING PROGRAM

## 2024-10-07 PROCEDURE — 1036F TOBACCO NON-USER: CPT | Performed by: STUDENT IN AN ORGANIZED HEALTH CARE EDUCATION/TRAINING PROGRAM

## 2024-10-07 PROCEDURE — G8484 FLU IMMUNIZE NO ADMIN: HCPCS | Performed by: STUDENT IN AN ORGANIZED HEALTH CARE EDUCATION/TRAINING PROGRAM

## 2024-10-07 PROCEDURE — 99213 OFFICE O/P EST LOW 20 MIN: CPT | Performed by: STUDENT IN AN ORGANIZED HEALTH CARE EDUCATION/TRAINING PROGRAM

## 2024-10-07 PROCEDURE — 1123F ACP DISCUSS/DSCN MKR DOCD: CPT | Performed by: STUDENT IN AN ORGANIZED HEALTH CARE EDUCATION/TRAINING PROGRAM

## 2024-10-07 NOTE — PROGRESS NOTES
CHIEF COMPLAINT: Left ankle pain/ Ankle sprain.    DATE OF INJURY: June 2024    History:  Mr. Suarez is a 88 y.o. male presents today for the first visit for evaluation of a left ankle injury which occurred when he rolled his ankle back in June. Over time his ankle began to \"turn in\" and they saw [facility]. [facility] recommended a tall boot and further recommended a custom brace. He did not obtain the brace. He has not been wearing the boot because of a wound at the proximal lateral calf which the boot rubs on. He also has history of falls due to frequent UTI's and feels that the boot makes it more difficult to walk. He doesn't have pain at this time.       Past Medical History:   Diagnosis Date    Atrial fibrillation (HCC)     CAD (coronary artery disease)     Diarrhea 04/12/2017    History of skin cancer     Hyperlipidemia     Hypertension     Insomnia 4/20/2020    Kidney stones 03/2017    Open wound of left hand, subsequent encounter 6/3/2022       Past Surgical History:   Procedure Laterality Date    APPENDECTOMY      BACK SURGERY      x4    CORONARY ARTERY BYPASS GRAFT  1981    CORONARY ARTERY BYPASS GRAFT  2001    CYSTOSCOPY N/A 7/26/2024    CYSTOSCOPY WITH URETHRAL DILATION AND  BLADDER STONE REMOVAL WITH HOLMIUM LASER - FORTEC (CONF# [id number]) performed by Raza Perry MD at [facility] OR    JOINT REPLACEMENT Right     knee replacement    KNEE SURGERY  2005    right     PROSTATE SURGERY      SHOULDER SURGERY      both    SKIN CANCER EXCISION      TOTAL KNEE ARTHROPLASTY Right 2015    [facility]       Current Outpatient Medications on File Prior to Visit   Medication Sig Dispense Refill    amoxicillin-clavulanate (AUGMENTIN) 875-125 MG per tablet Take 1 tablet by mouth 2 times daily for 10 days 20 tablet 0    ALPRAZolam (XANAX) 2 MG tablet TAKE 1 TABLET BY MOUTH NIGHTLY AS NEEDED FOR SLEEP FOR UP TO 90 DAYS. MAX 1 TAB DAILY 90 tablet 0    nystatin (MYCOSTATIN) 560219 UNIT/GM cream Apply topically 2

## 2024-10-09 ENCOUNTER — HOSPITAL ENCOUNTER (OUTPATIENT)
Dept: WOUND CARE | Age: 89
Discharge: HOME | End: 2024-10-09
Attending: PODIATRIST
Payer: MEDICARE

## 2024-10-09 ENCOUNTER — TELEPHONE (OUTPATIENT)
Dept: ORTHOPEDIC SURGERY | Age: 89
End: 2024-10-09

## 2024-10-09 VITALS
RESPIRATION RATE: 18 BRPM | HEART RATE: 82 BPM | DIASTOLIC BLOOD PRESSURE: 73 MMHG | SYSTOLIC BLOOD PRESSURE: 120 MMHG | TEMPERATURE: 97.3 F

## 2024-10-09 DIAGNOSIS — L97.822 NON-PRS CHRONIC ULCER OTH PRT L LOW LEG W FAT LAYER EXPOSED (HCC): Primary | ICD-10-CM

## 2024-10-09 PROCEDURE — 11042 DBRDMT SUBQ TIS 1ST 20SQCM/<: CPT

## 2024-10-09 RX ORDER — TRIAMCINOLONE ACETONIDE 1 MG/G
OINTMENT TOPICAL ONCE
OUTPATIENT
Start: 2024-10-09 | End: 2024-10-09

## 2024-10-09 RX ORDER — SODIUM CHLOR/HYPOCHLOROUS ACID 0.033 %
SOLUTION, IRRIGATION IRRIGATION ONCE
OUTPATIENT
Start: 2024-10-09 | End: 2024-10-09

## 2024-10-09 RX ORDER — LIDOCAINE HYDROCHLORIDE 40 MG/ML
SOLUTION TOPICAL ONCE
Status: COMPLETED | OUTPATIENT
Start: 2024-10-09 | End: 2024-10-09

## 2024-10-09 RX ORDER — NEOMYCIN/BACITRACIN/POLYMYXINB 3.5-400-5K
OINTMENT (GRAM) TOPICAL ONCE
OUTPATIENT
Start: 2024-10-09 | End: 2024-10-09

## 2024-10-09 RX ORDER — LIDOCAINE HYDROCHLORIDE 20 MG/ML
JELLY TOPICAL ONCE
OUTPATIENT
Start: 2024-10-09 | End: 2024-10-09

## 2024-10-09 RX ORDER — GINSENG 100 MG
CAPSULE ORAL ONCE
OUTPATIENT
Start: 2024-10-09 | End: 2024-10-09

## 2024-10-09 RX ORDER — LIDOCAINE HYDROCHLORIDE 40 MG/ML
SOLUTION TOPICAL ONCE
OUTPATIENT
Start: 2024-10-09 | End: 2024-10-09

## 2024-10-09 RX ORDER — LIDOCAINE 40 MG/G
CREAM TOPICAL ONCE
OUTPATIENT
Start: 2024-10-09 | End: 2024-10-09

## 2024-10-09 RX ORDER — BETAMETHASONE DIPROPIONATE 0.5 MG/G
CREAM TOPICAL ONCE
OUTPATIENT
Start: 2024-10-09 | End: 2024-10-09

## 2024-10-09 RX ORDER — GENTAMICIN SULFATE 1 MG/G
OINTMENT TOPICAL ONCE
OUTPATIENT
Start: 2024-10-09 | End: 2024-10-09

## 2024-10-09 RX ORDER — CLOBETASOL PROPIONATE 0.5 MG/G
OINTMENT TOPICAL ONCE
OUTPATIENT
Start: 2024-10-09 | End: 2024-10-09

## 2024-10-09 RX ORDER — SILVER SULFADIAZINE 10 MG/G
CREAM TOPICAL ONCE
OUTPATIENT
Start: 2024-10-09 | End: 2024-10-09

## 2024-10-09 RX ORDER — BACITRACIN ZINC AND POLYMYXIN B SULFATE 500; 1000 [USP'U]/G; [USP'U]/G
OINTMENT TOPICAL ONCE
OUTPATIENT
Start: 2024-10-09 | End: 2024-10-09

## 2024-10-09 RX ORDER — MUPIROCIN 20 MG/G
OINTMENT TOPICAL ONCE
OUTPATIENT
Start: 2024-10-09 | End: 2024-10-09

## 2024-10-09 RX ORDER — LIDOCAINE 50 MG/G
OINTMENT TOPICAL ONCE
OUTPATIENT
Start: 2024-10-09 | End: 2024-10-09

## 2024-10-09 RX ADMIN — LIDOCAINE HYDROCHLORIDE: 40 SOLUTION TOPICAL at 14:20

## 2024-10-09 ASSESSMENT — PAIN SCALES - GENERAL
PAINLEVEL_OUTOF10: 0
PAINLEVEL_OUTOF10: 0

## 2024-10-09 NOTE — PROGRESS NOTES
Dressing      Compression and Edema Control:  [] Wear Home Compression Stockings   [] Spandagrip to: Left Leg and Right Leg   Size: []Low compression 5-10 mm/Hg      [x]Medium compression 10-20 mm/Hg           []High compression  20-30 mm/Hg  [] Ace Wrap Toes to Knee to    [] Multilayer Compression Wrap   Do not get leg(s) with wrap wet.  If wraps become too tight call the center or completely remove the wrap.       Pressure Relief and Off Loading:  [] Off-loading when [] walking  [] in bed [] sitting   Turn every 2 hours when in bed   Avoid putting direct pressure on the site of the wound. Limit side lying to 30 degree tilt. Limit elevating the head of the bed greater than 30 degrees.                                      [x] Assistive Devices   WALKER  Use as instructed by the provider      Activity: Activity as Tolerated      Dietary:   Continue your diet as tolerated.  Protein is a key nutrient in helping to repair damaged tissue and promote new tissue growth. Good sources of protein include milk, yogurt, cheese, fish, lean meat and beans.  If you are DIABETIC, having diabetes can make it hard for wounds to heal. Try to keep your blood sugar within it's target range.  Limit Sodium, Alcohol and Sugar.    Pain:   Please Note some pain, drainage and/or bleeding might be expected after seeing the provider. TO HELP ALLEVIATE PAIN WE RECOMMEND THE FOLLOWING  Elevate the affected limb.  Use over the counter medications as permitted by your family doctor.  For Persistent Pain not relieved by the above interventions, please notify your family doctor.        : [place]   Electronically signed by Fallon Travis RN on 10/9/2024 at 2:25 PM      Wound Care Center Information: Should you experience any significant changes in your wound(s) or have questions about your wound care, please contact the [facility] at [phone] MONDAY - THURSDAY 8:00 am - 4:30 pm and Friday 8:00 am - 12:30 pm.  If you need

## 2024-10-09 NOTE — PATIENT INSTRUCTIONS
YOU ARE ABLE TO REACH US. IT IS MOST IMPORTANT TO KEEP THE WOUND COVERED AT ALL TIMES.         Physician Signature:_______________________    Date: ___________ Time:  ____________          Dr Jos Monroe

## 2024-10-09 NOTE — TELEPHONE ENCOUNTER
Medical Facility Question     Facility Name: [facility]  Contact Name: JEROME  Contact Number: [phone]  Request or Information: ADVISING PATIENT HOME HEALTH WILL BE STARTING THIS WEEK

## 2024-10-11 ENCOUNTER — TELEPHONE (OUTPATIENT)
Dept: FAMILY MEDICINE CLINIC | Age: 89
End: 2024-10-11

## 2024-10-14 ENCOUNTER — OFFICE VISIT (OUTPATIENT)
Dept: FAMILY MEDICINE CLINIC | Age: 89
End: 2024-10-14

## 2024-10-14 VITALS — HEART RATE: 117 BPM | TEMPERATURE: 98.1 F | OXYGEN SATURATION: 91 %

## 2024-10-14 DIAGNOSIS — J20.9 ACUTE BRONCHITIS, UNSPECIFIED ORGANISM: Primary | ICD-10-CM

## 2024-10-14 DIAGNOSIS — Z91.81 AT HIGH RISK FOR INJURY RELATED TO FALL: ICD-10-CM

## 2024-10-14 DIAGNOSIS — M24.272 ANKLE LIGAMENT LAXITY, LEFT: ICD-10-CM

## 2024-10-14 DIAGNOSIS — R26.89 DECREASED MOBILITY: ICD-10-CM

## 2024-10-14 DIAGNOSIS — I87.332 IDIOPATHIC CHRONIC VENOUS HYPERTENSION OF LEFT LOWER EXTREMITY WITH ULCER AND INFLAMMATION (HCC): ICD-10-CM

## 2024-10-14 RX ORDER — AZITHROMYCIN 250 MG/1
TABLET, FILM COATED ORAL
Qty: 6 TABLET | Refills: 0 | Status: SHIPPED | OUTPATIENT
Start: 2024-10-14

## 2024-10-14 NOTE — PROGRESS NOTES
SUPPER 90 tablet 4    famotidine (PEPCID) 20 MG tablet Take 1 tablet by mouth 2 times daily 180 tablet 1    Coenzyme Q10 (COQ10) 200 MG CAPS Take by mouth      Compression Stockings MISC by Does not apply route 20-30 mm hg compression stockings, bilateral, knee high 1 each 0    nitroGLYCERIN (NITROSTAT) 0.4 MG SL tablet Place 1 tablet under the tongue every 5 minutes as needed for Chest pain 25 tablet 3    Ascorbic Acid (VITAMIN C) 500 MG tablet Take 1 tablet by mouth daily      vitamin E 400 UNIT capsule Take 2 capsules by mouth daily      vitamin D (CHOLECALCIFEROL) 1000 UNIT TABS tablet Take 1 tablet by mouth 2 times daily      Misc. Devices MISC - DME device ordered:  night splint   - Length of Need:  3 Months 1 each 0     No current facility-administered medications for this visit.     Pulse (!) 117   Temp 98.1 °F (36.7 °C) (Oral)   SpO2 91%     Physical Exam  Cardiovascular:      Rate and Rhythm: Normal rate and regular rhythm.   Pulmonary:      Effort: Pulmonary effort is normal.      Breath sounds: Normal breath sounds.   Musculoskeletal:      Comments: Severe left ankle ligament laxity    Significant decrease in muscle tone overall, unable to support self standing   Skin:     Comments: Bilateral lower legs and compression stockings/wraps         Wt Readings from Last 3 Encounters:   10/15/24 106.6 kg (235 lb)   10/07/24 106.6 kg (235 lb)   08/30/24 106.6 kg (235 lb)     BP Readings from Last 3 Encounters:   10/09/24 120/73   10/02/24 114/65   09/25/24 128/69         Assessment/Plan:  1. Acute bronchitis, unspecified organism  Secondary worsening symptoms concerning for bacterial infection.  Start azithromycin  - azithromycin (ZITHROMAX) 250 MG tablet; Take 2 tabs by mouth once on day 1, followed by 1 tab by mouth daily for four days  Dispense: 6 tablet; Refill: 0    2. Ankle ligament laxity, left    3. Decreased mobility    4. At high risk for injury related to fall    5. Idiopathic chronic venous

## 2024-10-14 NOTE — TELEPHONE ENCOUNTER
Has a cough   Coughs all night and day  Coughing up phlegm which is yellow  He tried mucinex but that made him cough more  What can he take to get rid of the cough  Pharm confirmed  [facility] on [address]

## 2024-10-15 ENCOUNTER — TELEPHONE (OUTPATIENT)
Dept: FAMILY MEDICINE CLINIC | Age: 89
End: 2024-10-15

## 2024-10-15 ENCOUNTER — OFFICE VISIT (OUTPATIENT)
Dept: ORTHOPEDIC SURGERY | Age: 89
End: 2024-10-15
Payer: MEDICARE

## 2024-10-15 VITALS — HEIGHT: 71 IN | RESPIRATION RATE: 16 BRPM | WEIGHT: 235 LBS | OXYGEN SATURATION: 84 % | BODY MASS INDEX: 32.9 KG/M2

## 2024-10-15 DIAGNOSIS — M21.6X2 ACQUIRED LEFT HINDFOOT VARUS: ICD-10-CM

## 2024-10-15 DIAGNOSIS — M24.573 EQUINUS CONTRACTURE OF ANKLE: ICD-10-CM

## 2024-10-15 DIAGNOSIS — M21.6X9 CAVUS DEFORMITY OF FOOT, ACQUIRED: ICD-10-CM

## 2024-10-15 DIAGNOSIS — M25.572 LEFT ANKLE PAIN, UNSPECIFIED CHRONICITY: Primary | ICD-10-CM

## 2024-10-15 PROCEDURE — 99213 OFFICE O/P EST LOW 20 MIN: CPT | Performed by: ORTHOPAEDIC SURGERY

## 2024-10-15 PROCEDURE — G8427 DOCREV CUR MEDS BY ELIG CLIN: HCPCS | Performed by: ORTHOPAEDIC SURGERY

## 2024-10-15 PROCEDURE — G8484 FLU IMMUNIZE NO ADMIN: HCPCS | Performed by: ORTHOPAEDIC SURGERY

## 2024-10-15 PROCEDURE — G8417 CALC BMI ABV UP PARAM F/U: HCPCS | Performed by: ORTHOPAEDIC SURGERY

## 2024-10-15 PROCEDURE — 1123F ACP DISCUSS/DSCN MKR DOCD: CPT | Performed by: ORTHOPAEDIC SURGERY

## 2024-10-15 PROCEDURE — 1036F TOBACCO NON-USER: CPT | Performed by: ORTHOPAEDIC SURGERY

## 2024-10-15 NOTE — TELEPHONE ENCOUNTER
Pt saw Dr. Fernando in [facility] 10.15.2024 for ankle issues. Dr. Fernando recommended talking with PCP to see if PCP can order an Electric Wheelchair for pt. DOP stated pt was dx with atrophy of the ankles among other things. They would like to know if Dr. Shelton would be able to write a script for an Electric Chair.    Pt and Spouse will advise after script where to send it between [facility] and [facility].     Dr. Fernando also referred pt back to seeing a Neurologist to see if they would be able to help with the ankle issues. Pt will contact [place] to try and get back in with previous neurologist.    Please advise  Pt and Spouse can be reached at [phone]

## 2024-10-15 NOTE — PROGRESS NOTES
[facility] SPECIALTY CARE [facility] ORTHOPAEDICS AND SPINE  [address]  [address]  [place] [address]  Dept: [phone]  Loc: [phone]    Ambulatory Orthopedic Consult      CHIEF COMPLAINT:    Chief Complaint   Patient presents with    Ankle Pain     Left       HISTORY OF PRESENT ILLNESS:      The patient is a 88 y.o. male who is being seen for evaluation of the above, which began around 4/15/2024 atraumatically  . At today's visit, he is using a wheelchair.    History is obtained today from:   [x]  the patient     [x]  EMR     []  one family member/friend    [x]  multiple family members/friends    []  other:      At today's visit, the patient localizes pain to the lateral border of the foot.  He is here today with his daughter and wife.  They report that he has had a gradual worsening of his foot/ankle deformity, with a history of falls over the past 2 months particularly.  He has previously been seen at [facility], and is here today for second opinion.    REVIEW OF SYSTEMS:  Musculoskeletal: See HPI for pertinent positives     Past Medical History:    He  has a past medical history of Atrial fibrillation (HCC), CAD (coronary artery disease), Diarrhea (04/12/2017), History of skin cancer, Hyperlipidemia, Hypertension, Insomnia (4/20/2020), Kidney stones (03/2017), and Open wound of left hand, subsequent encounter (6/3/2022).     Past Surgical History:    He  has a past surgical history that includes back surgery; knee surgery (2005); shoulder surgery; Appendectomy; Coronary artery bypass graft (1981); Coronary artery bypass graft (2001); Prostate surgery; Skin cancer excision; joint replacement (Right); Total knee arthroplasty (Right, 2015); and Cystoscopy (N/A, 7/26/2024).     Current Medications:     Current Outpatient Medications:     azithromycin (ZITHROMAX) 250 MG tablet, Take 2 tabs by mouth once on day 1, followed by 1 tab by

## 2024-10-16 ENCOUNTER — HOSPITAL ENCOUNTER (OUTPATIENT)
Dept: WOUND CARE | Age: 89
Discharge: HOME | End: 2024-10-16
Attending: PODIATRIST
Payer: MEDICARE

## 2024-10-16 ENCOUNTER — TELEPHONE (OUTPATIENT)
Dept: FAMILY MEDICINE CLINIC | Age: 89
End: 2024-10-16

## 2024-10-16 VITALS
RESPIRATION RATE: 18 BRPM | HEART RATE: 76 BPM | DIASTOLIC BLOOD PRESSURE: 76 MMHG | SYSTOLIC BLOOD PRESSURE: 149 MMHG | TEMPERATURE: 98.2 F

## 2024-10-16 DIAGNOSIS — L97.822 NON-PRS CHRONIC ULCER OTH PRT L LOW LEG W FAT LAYER EXPOSED (HCC): Primary | ICD-10-CM

## 2024-10-16 PROCEDURE — 29580 STRAPPING UNNA BOOT: CPT

## 2024-10-16 PROCEDURE — 11042 DBRDMT SUBQ TIS 1ST 20SQCM/<: CPT

## 2024-10-16 RX ORDER — LIDOCAINE HYDROCHLORIDE 20 MG/ML
JELLY TOPICAL ONCE
OUTPATIENT
Start: 2024-10-16 | End: 2024-10-16

## 2024-10-16 RX ORDER — GINSENG 100 MG
CAPSULE ORAL ONCE
OUTPATIENT
Start: 2024-10-16 | End: 2024-10-16

## 2024-10-16 RX ORDER — LIDOCAINE HYDROCHLORIDE 40 MG/ML
SOLUTION TOPICAL ONCE
Status: COMPLETED | OUTPATIENT
Start: 2024-10-16 | End: 2024-10-16

## 2024-10-16 RX ORDER — MUPIROCIN 20 MG/G
OINTMENT TOPICAL ONCE
OUTPATIENT
Start: 2024-10-16 | End: 2024-10-16

## 2024-10-16 RX ORDER — LIDOCAINE HYDROCHLORIDE 40 MG/ML
SOLUTION TOPICAL ONCE
OUTPATIENT
Start: 2024-10-16 | End: 2024-10-16

## 2024-10-16 RX ORDER — TRIAMCINOLONE ACETONIDE 1 MG/G
OINTMENT TOPICAL ONCE
OUTPATIENT
Start: 2024-10-16 | End: 2024-10-16

## 2024-10-16 RX ORDER — BETAMETHASONE DIPROPIONATE 0.5 MG/G
CREAM TOPICAL ONCE
OUTPATIENT
Start: 2024-10-16 | End: 2024-10-16

## 2024-10-16 RX ORDER — CLOBETASOL PROPIONATE 0.5 MG/G
OINTMENT TOPICAL ONCE
OUTPATIENT
Start: 2024-10-16 | End: 2024-10-16

## 2024-10-16 RX ORDER — SODIUM CHLOR/HYPOCHLOROUS ACID 0.033 %
SOLUTION, IRRIGATION IRRIGATION ONCE
OUTPATIENT
Start: 2024-10-16 | End: 2024-10-16

## 2024-10-16 RX ORDER — SILVER SULFADIAZINE 10 MG/G
CREAM TOPICAL ONCE
OUTPATIENT
Start: 2024-10-16 | End: 2024-10-16

## 2024-10-16 RX ORDER — NEOMYCIN/BACITRACIN/POLYMYXINB 3.5-400-5K
OINTMENT (GRAM) TOPICAL ONCE
OUTPATIENT
Start: 2024-10-16 | End: 2024-10-16

## 2024-10-16 RX ORDER — LIDOCAINE 40 MG/G
CREAM TOPICAL ONCE
OUTPATIENT
Start: 2024-10-16 | End: 2024-10-16

## 2024-10-16 RX ORDER — GENTAMICIN SULFATE 1 MG/G
OINTMENT TOPICAL ONCE
OUTPATIENT
Start: 2024-10-16 | End: 2024-10-16

## 2024-10-16 RX ORDER — LIDOCAINE 50 MG/G
OINTMENT TOPICAL ONCE
OUTPATIENT
Start: 2024-10-16 | End: 2024-10-16

## 2024-10-16 RX ORDER — BACITRACIN ZINC AND POLYMYXIN B SULFATE 500; 1000 [USP'U]/G; [USP'U]/G
OINTMENT TOPICAL ONCE
OUTPATIENT
Start: 2024-10-16 | End: 2024-10-16

## 2024-10-16 RX ADMIN — LIDOCAINE HYDROCHLORIDE: 40 SOLUTION TOPICAL at 14:45

## 2024-10-16 ASSESSMENT — PAIN SCALES - GENERAL
PAINLEVEL_OUTOF10: 0
PAINLEVEL_OUTOF10: 0

## 2024-10-16 NOTE — PROGRESS NOTES
exposed (HCC) - Primary    Relevant Orders    Initiate Outpatient Wound Care Protocol        Procedure Note  Indications:  Based on my examination of this patient's wound(s)/ulcer(s) today, debridement is required to promote healing and evaluate the wound base.    Performed by: Jos Monroe DPM    Consent obtained:  Yes    Time out taken:  Yes    Pain Control: Anesthetic  Anesthetic: 4% Lidocaine Liquid Topical       Debridement: Excisional Debridement    Using curette the wound(s)/ulcer(s) was/were debrided down through and including the removal of epidermis, dermis, and subcutaneous tissue.        Devitalized Tissue Debrided:  fibrin, biofilm, and clots    Pre Debridement Measurements:  Are located in the Wound/Ulcer Documentation Flow Sheet    Diabetic/Pressure/Non Pressure Ulcers only:  Ulcer: Non-Pressure ulcer, fat layer exposed     Wound/Ulcer #: 4    Post Debridement Measurements:  Wound/Ulcer Descriptions are Pre Debridement except measurements:    Wound 10/02/24 Leg Left;Lower;Proximal;Lateral #3 (since 10/28/24) (Active)   Wound Image   10/16/24 1436   Dressing/Treatment Collagen with Ag;Moisten with saline;Silicone border 10/09/24 1440   Wound Length (cm) 0 cm 10/16/24 1436   Wound Width (cm) 0 cm 10/16/24 1436   Wound Depth (cm) 0 cm 10/16/24 1436   Wound Surface Area (cm^2) 0 cm^2 10/16/24 1436   Change in Wound Size % (l*w) 100 10/16/24 1436   Wound Volume (cm^3) 0 cm^3 10/16/24 1436   Wound Healing % 100 10/16/24 1436   Post-Procedure Length (cm) 0 cm 10/16/24 1451   Post-Procedure Width (cm) 0 cm 10/16/24 1451   Post-Procedure Depth (cm) 0 cm 10/16/24 1451   Post-Procedure Surface Area (cm^2) 0 cm^2 10/16/24 1451   Post-Procedure Volume (cm^3) 0 cm^3 10/16/24 1451   Wound Assessment Epithelialization 10/16/24 1436   Drainage Amount Small (< 25%) 10/09/24 1410   Drainage Description Serosanguinous 10/09/24 1410   Odor None 10/09/24 1410   Sameera-wound Assessment Dry/flaky;Fragile 10/09/24 1410

## 2024-10-16 NOTE — PATIENT INSTRUCTIONS
[facility] Wound Care Physician Orders and Discharge Instructions  [facility]  [address], [address]  [place], [place] [address]  Telephone: [phone]      FAX [phone]  MONDAY - THURSDAY 8:00 am - 4:30 pm and Friday 8:00 am - 12:00 pm.        NAME:  Jarred Suarez  YOB: 1935  MEDICAL RECORD NUMBER:  [id number]  DATE:  10/16/2024      Return Appointment:  [x] Return Appointment: With Dr Jos Monroe  in  1 Week(s)  [] Wound and dressing supply provider:   [x] ECF or Home Healthcare: [facility]  [] Wound Assessment: [] Physician or NP scheduled for Wound Assessment:   [] Orders placed during your visit:       Important Reminders:   Please wash hands with soap and water before and after every dressing change.  Do not scrub wounds.  Keep wounds dry in shower unless otherwise instructed by the physician.  SMOKING can slow would healing. Stop smoking as soon as possible to improve healing and prevent further complications associated with smoking.      Sameera-Wound Topical Treatments:  Do not apply lotions, creams, or ointments to wound bed unless directed.   [] Apply moisturizing lotion to skin surrounding the wound prior to dressing change.  [] Apply antifungal ointment to skin surrounding the wound prior to dressing change.  [] Apply thin film of no sting moisture barrier ointment to skin immediately around      wound.  [] Other:       Wound Location: LEFT DISTAL LOWER LEG    Wound Cleansing: None    Primary Dressing:  [x] COLLAGEN WITH SILVER, LIGHTLY MOISTENED WITH SALINE  []     Secondary Dressing:  [x] OPTILOCK  []       Dressing Frequency:  []   [x] Do Not Change Dressing      Compression and Edema Control:  [] Wear Home Compression Stockings   [] Spandagrip to: Right Leg   Size: []Low compression 5-10 mm/Hg      [x]Medium compression 10-20 mm/Hg           []High compression  20-30 mm/Hg  [] Ace Wrap Toes to Knee to    [x] Multilayer Compression Wrap:  CO-FLEX WITH

## 2024-10-16 NOTE — TELEPHONE ENCOUNTER
Agree with home health coming out or palliative care  I finished the note documenting his need for an electric wheel chair - let us know where we need to send this rx  To ensure he has good follow up for his ankle, it'll be important for him to see his back surgeon as well

## 2024-10-16 NOTE — PLAN OF CARE
Unna Boot Application   Below Knee    NAME:  Jarred Suarez  YOB: 1935  MEDICAL RECORD NUMBER:  [id number]  DATE:  10/16/2024    Unna boot: Applied moisturizing agent to dry skin as needed.   Appied primary and secondary dressing as ordered.  Applied Unna roll from toes to knee overlapping each time.   Applied ace wrap or coban from toes to below the knee.   Instructed patient/caregiver to keep dressing dry and intact. DO NOT REMOVE DRESSING.   Instructed pt/family/caregiver to report excessive draining, loose bandage, wet dressing, severe pain or tingling in toes.  Applied Unna Boot dressing below the knee to left lower leg.    Coflex with calamine    Unna Boot(s) were applied per  Guidelines.     Electronically signed by Adry Calixto RN on 10/16/2024 at 3:58 PM

## 2024-10-16 NOTE — TELEPHONE ENCOUNTER
Pt had a fall at his home and cut open his wound on his leg.  They are wondering if we can get home health skilled nursing to come out and help with wound, they have been going to wound care but pt has a hard time transferring to wheelchair.  Wop thinks they would benefit from a motorized wheelchair can we put an order in for this too I will find out where they want it sent.

## 2024-10-18 ENCOUNTER — TELEPHONE (OUTPATIENT)
Dept: FAMILY MEDICINE CLINIC | Age: 88
End: 2024-10-18

## 2024-10-18 NOTE — TELEPHONE ENCOUNTER
Wife of pt calling to speak with Fallon in regards to the electric wheelchair   [facility] does not take insurance so they have a few other places to call to see if they take their insurance  But wants to put that on the back burner until he has his procedure done on Monday at the urology group.

## 2024-10-18 NOTE — TELEPHONE ENCOUNTER
Called deena and spoke with her about electric wheelchair.  They are working on trying to get that taken care of she is going to research some places that will take their insurance.  She will call me when she is ready /

## 2024-10-23 ENCOUNTER — HOSPITAL ENCOUNTER (OUTPATIENT)
Dept: WOUND CARE | Age: 89
Discharge: HOME | End: 2024-10-23
Attending: PODIATRIST
Payer: MEDICARE

## 2024-10-23 VITALS
SYSTOLIC BLOOD PRESSURE: 128 MMHG | DIASTOLIC BLOOD PRESSURE: 72 MMHG | HEART RATE: 86 BPM | RESPIRATION RATE: 16 BRPM | TEMPERATURE: 98.6 F

## 2024-10-23 DIAGNOSIS — L97.822 NON-PRS CHRONIC ULCER OTH PRT L LOW LEG W FAT LAYER EXPOSED (HCC): Primary | ICD-10-CM

## 2024-10-23 PROCEDURE — 29580 STRAPPING UNNA BOOT: CPT

## 2024-10-23 PROCEDURE — 11042 DBRDMT SUBQ TIS 1ST 20SQCM/<: CPT

## 2024-10-23 RX ORDER — CLOBETASOL PROPIONATE 0.5 MG/G
OINTMENT TOPICAL ONCE
OUTPATIENT
Start: 2024-10-23 | End: 2024-10-23

## 2024-10-23 RX ORDER — LIDOCAINE HYDROCHLORIDE 20 MG/ML
JELLY TOPICAL ONCE
OUTPATIENT
Start: 2024-10-23 | End: 2024-10-23

## 2024-10-23 RX ORDER — TRIAMCINOLONE ACETONIDE 1 MG/G
OINTMENT TOPICAL ONCE
OUTPATIENT
Start: 2024-10-23 | End: 2024-10-23

## 2024-10-23 RX ORDER — MUPIROCIN 20 MG/G
OINTMENT TOPICAL ONCE
OUTPATIENT
Start: 2024-10-23 | End: 2024-10-23

## 2024-10-23 RX ORDER — GENTAMICIN SULFATE 1 MG/G
OINTMENT TOPICAL ONCE
OUTPATIENT
Start: 2024-10-23 | End: 2024-10-23

## 2024-10-23 RX ORDER — LIDOCAINE HYDROCHLORIDE 40 MG/ML
SOLUTION TOPICAL ONCE
Status: COMPLETED | OUTPATIENT
Start: 2024-10-23 | End: 2024-10-23

## 2024-10-23 RX ORDER — LIDOCAINE 40 MG/G
CREAM TOPICAL ONCE
OUTPATIENT
Start: 2024-10-23 | End: 2024-10-23

## 2024-10-23 RX ORDER — LIDOCAINE HYDROCHLORIDE 40 MG/ML
SOLUTION TOPICAL ONCE
OUTPATIENT
Start: 2024-10-23 | End: 2024-10-23

## 2024-10-23 RX ORDER — BETAMETHASONE DIPROPIONATE 0.5 MG/G
CREAM TOPICAL ONCE
OUTPATIENT
Start: 2024-10-23 | End: 2024-10-23

## 2024-10-23 RX ORDER — GINSENG 100 MG
CAPSULE ORAL ONCE
OUTPATIENT
Start: 2024-10-23 | End: 2024-10-23

## 2024-10-23 RX ORDER — BACITRACIN ZINC AND POLYMYXIN B SULFATE 500; 1000 [USP'U]/G; [USP'U]/G
OINTMENT TOPICAL ONCE
OUTPATIENT
Start: 2024-10-23 | End: 2024-10-23

## 2024-10-23 RX ORDER — LIDOCAINE 50 MG/G
OINTMENT TOPICAL ONCE
OUTPATIENT
Start: 2024-10-23 | End: 2024-10-23

## 2024-10-23 RX ORDER — SILVER SULFADIAZINE 10 MG/G
CREAM TOPICAL ONCE
OUTPATIENT
Start: 2024-10-23 | End: 2024-10-23

## 2024-10-23 RX ORDER — NEOMYCIN/BACITRACIN/POLYMYXINB 3.5-400-5K
OINTMENT (GRAM) TOPICAL ONCE
OUTPATIENT
Start: 2024-10-23 | End: 2024-10-23

## 2024-10-23 RX ORDER — SODIUM CHLOR/HYPOCHLOROUS ACID 0.033 %
SOLUTION, IRRIGATION IRRIGATION ONCE
OUTPATIENT
Start: 2024-10-23 | End: 2024-10-23

## 2024-10-23 RX ADMIN — LIDOCAINE HYDROCHLORIDE: 40 SOLUTION TOPICAL at 14:13

## 2024-10-23 ASSESSMENT — PAIN SCALES - GENERAL: PAINLEVEL_OUTOF10: 0

## 2024-10-23 NOTE — PLAN OF CARE
Unna Boot Application   Below Knee    NAME:  Jarred Suarez  YOB: 1935  MEDICAL RECORD NUMBER:  [id number]  DATE:  10/23/2024    Unna boot: Applied moisturizing agent to dry skin as needed.   Appied primary and secondary dressing as ordered.  Applied Unna roll from toes to knee overlapping each time.   Applied ace wrap or coban from toes to below the knee.   Secured with tape and/or metal clips covered with tape.   Instructed patient/caregiver to keep dressing dry and intact. DO NOT REMOVE DRESSING.   Instructed pt/family/caregiver to report excessive draining, loose bandage, wet dressing, severe pain or tingling in toes.  Applied Unna Boot dressing below the knee to left lower leg.    Unna Boot(s) were applied per  Guidelines.     Electronically signed by Chrissy Hills RN on 10/23/2024 at 2:55 PM

## 2024-10-23 NOTE — PATIENT INSTRUCTIONS
US. IT IS MOST IMPORTANT TO KEEP THE WOUND COVERED AT ALL TIMES.         Physician Signature:_______________________    Date: ___________ Time:  ____________          Dr Jos Monroe

## 2024-10-23 NOTE — PROGRESS NOTES
[facility]   Progress Note and Procedure Note      Jarred Suarez  MEDICAL RECORD NUMBER:  [id number]  AGE: 88 y.o.   GENDER: male  : 1935  EPISODE DATE:  10/23/2024    Subjective:     Chief Complaint   Patient presents with    Wound Check     Follow Up on Left Lower Leg         HISTORY of PRESENT ILLNESS HPI     Jarred Suarez is a 88 y.o. male who presents today for wound/ulcer evaluation.   History of Wound Context: Patient complains of a wound below his left leg of TWO WEEKS duration. Patient reported he hit the leg against his wheelchair.     Wound/Ulcer Pain Timing/Severity: intermittent, mild  Quality of pain: aching  Severity:  2 / 10   Modifying Factors: Pain worsens with pressure to the wound  Associated Signs/Symptoms: edema and pain    Ulcer Identification:  Ulcer Type: venous and traumatic    Contributing Factors: edema, decreased mobility, obesity, and anticoagulation therapy    Acute Wound: Laceration    PAST MEDICAL HISTORY        Diagnosis Date    Atrial fibrillation (HCC)     CAD (coronary artery disease)     Diarrhea 2017    History of skin cancer     Hyperlipidemia     Hypertension     Insomnia 2020    Kidney stones 2017    Open wound of left hand, subsequent encounter 6/3/2022       PAST SURGICAL HISTORY    Past Surgical History:   Procedure Laterality Date    APPENDECTOMY      BACK SURGERY      x4    CORONARY ARTERY BYPASS GRAFT  1981    CORONARY ARTERY BYPASS GRAFT      CYSTOSCOPY N/A 2024    CYSTOSCOPY WITH URETHRAL DILATION AND  BLADDER STONE REMOVAL WITH HOLMIUM LASER - FORTEC (CONF# [id number]) performed by Raza Perry MD at [facility] OR    JOINT REPLACEMENT Right     knee replacement    KNEE SURGERY      right     PROSTATE SURGERY      SHOULDER SURGERY      both    SKIN CANCER EXCISION      TOTAL KNEE ARTHROPLASTY Right     [place]       FAMILY HISTORY    Family History   Problem Relation Age of Onset

## 2024-10-24 ENCOUNTER — TELEPHONE (OUTPATIENT)
Dept: FAMILY MEDICINE CLINIC | Age: 89
End: 2024-10-24

## 2024-10-24 DIAGNOSIS — M54.50 LUMBAR PAIN: Primary | ICD-10-CM

## 2024-10-24 NOTE — TELEPHONE ENCOUNTER
Pt wife called in she tried to call [facility] to schedule appointment she was told they need a referral ?  Pt has been having issues with his foot rolling  and walking on his ankle phone number is [phone] ([place] )  Pt wife also stated the pt is supposed to be getting home health care at home but no one has called her yet ? She wants to know does she need to call or are they supposed to reach out to the pt /    Please advise   (Tomasa)

## 2024-10-24 NOTE — TELEPHONE ENCOUNTER
Called and left a message for [facility] to call back with status of home health gave her my direct line to call back

## 2024-10-28 ENCOUNTER — TELEPHONE (OUTPATIENT)
Dept: FAMILY MEDICINE CLINIC | Age: 89
End: 2024-10-28

## 2024-10-28 DIAGNOSIS — R26.89 DECREASED MOBILITY: ICD-10-CM

## 2024-10-28 DIAGNOSIS — M54.50 CHRONIC BILATERAL LOW BACK PAIN WITHOUT SCIATICA: Primary | ICD-10-CM

## 2024-10-28 DIAGNOSIS — I50.42 CHRONIC COMBINED SYSTOLIC AND DIASTOLIC CONGESTIVE HEART FAILURE (HCC): Primary | ICD-10-CM

## 2024-10-28 DIAGNOSIS — G89.29 CHRONIC BILATERAL LOW BACK PAIN WITHOUT SCIATICA: Primary | ICD-10-CM

## 2024-10-28 DIAGNOSIS — R29.898 LEFT LEG WEAKNESS: ICD-10-CM

## 2024-10-28 DIAGNOSIS — I50.42 CHRONIC COMBINED SYSTOLIC AND DIASTOLIC CONGESTIVE HEART FAILURE (HCC): ICD-10-CM

## 2024-10-28 DIAGNOSIS — I87.332 IDIOPATHIC CHRONIC VENOUS HYPERTENSION OF LEFT LOWER EXTREMITY WITH ULCER AND INFLAMMATION (HCC): ICD-10-CM

## 2024-10-28 NOTE — TELEPHONE ENCOUNTER
Pt does not want a hospital bed at this time.  Pt wants to know if they do pallative care do they still need home health?  They are leaning towards pallative care and want to know what you think.

## 2024-10-28 NOTE — TELEPHONE ENCOUNTER
Spoke with deena.  She wasn't aware that dop called for this.  She is going to discuss this and call us back and let us know.  She wasn't too sure on the difference between palliative care and hospice so I explained it to her.  She will discuss with family and call me back.

## 2024-10-28 NOTE — TELEPHONE ENCOUNTER
CATHERINE Winters called in wants hospice care ordered for the pt   Pt is weakness has progressed the past 2 weeks , pt cannot walk at all , pt wife cannot help the pt   DOP wants a hospital bed in home she is if need of resources they are hoping for in home care to come in and assist the pt      Vianey [phone]

## 2024-10-28 NOTE — TELEPHONE ENCOUNTER
WOABRIL called in the pt is scheduled at [facility] on 11/7/2024 she was told the pt should get a order for a MRI for his spine and this should be ordered by Dr Shelton       Please advise when this is ordered Edmundo said it would be better to have results before the pt is seen by a specialist

## 2024-10-30 ENCOUNTER — TELEPHONE (OUTPATIENT)
Dept: WOUND CARE | Age: 89
End: 2024-10-30

## 2024-10-30 DIAGNOSIS — L97.822 NON-PRS CHRONIC ULCER OTH PRT L LOW LEG W FAT LAYER EXPOSED (HCC): Primary | ICD-10-CM

## 2024-10-30 RX ORDER — AMITRIPTYLINE HYDROCHLORIDE 150 MG/1
150 TABLET ORAL
Qty: 90 TABLET | Refills: 0 | Status: SHIPPED | OUTPATIENT
Start: 2024-10-30

## 2024-10-30 NOTE — TELEPHONE ENCOUNTER
Patient called to cancel appointment for 10/30/24, per family member he is feeling unwell and is unable to get in or out of the car at this time. Patient is currently wearing an unna boot dressing, per Dr. Fer DPM patient to remove and begin daily dressing changes with polysporin and dry dressing with spandagrip. Called to patient and spoke with his wife Antoinette, instructions given to remove unna boot and begin daily dressing changes as ordered by Dr. Monroe. Family member verbalized understanding, states she will wait till this evening when their daughter arrives and begin dressing changes as instructed. No further questions or concerns at this time.   Electronically signed by Fallon Travis RN on 10/30/2024 at 1:32 PM

## 2024-11-01 ENCOUNTER — TELEPHONE (OUTPATIENT)
Dept: FAMILY MEDICINE CLINIC | Age: 89
End: 2024-11-01

## 2024-11-01 NOTE — TELEPHONE ENCOUNTER
Eunice calling in, They reached spouse and scheduled a meeting with them on 11/6/2024 at 2:00 pm.    KRISTEN

## 2024-11-05 ENCOUNTER — TELEPHONE (OUTPATIENT)
Dept: CARDIOLOGY CLINIC | Age: 89
End: 2024-11-05

## 2024-11-05 ENCOUNTER — HOSPITAL ENCOUNTER (OUTPATIENT)
Dept: MRI IMAGING | Age: 89
Discharge: HOME | End: 2024-11-05
Attending: FAMILY MEDICINE
Payer: MEDICARE

## 2024-11-05 DIAGNOSIS — M54.50 CHRONIC BILATERAL LOW BACK PAIN WITHOUT SCIATICA: ICD-10-CM

## 2024-11-05 DIAGNOSIS — G89.29 CHRONIC BILATERAL LOW BACK PAIN WITHOUT SCIATICA: ICD-10-CM

## 2024-11-05 DIAGNOSIS — R29.898 LEFT LEG WEAKNESS: ICD-10-CM

## 2024-11-05 PROCEDURE — 72148 MRI LUMBAR SPINE W/O DYE: CPT

## 2024-11-05 NOTE — TELEPHONE ENCOUNTER
Echo is ordered for an approximate date of 11/30/24 and expires on 8/30/25.      I spoke to Brigida.  She was able to attach the order to the scheduled appointment on 11/12/24 without any issues this time.

## 2024-11-06 ENCOUNTER — TELEPHONE (OUTPATIENT)
Dept: FAMILY MEDICINE CLINIC | Age: 89
End: 2024-11-06

## 2024-11-06 NOTE — TELEPHONE ENCOUNTER
Steffany from [employer] calling in, they did not receive the medical records requested before seeing pt.     Looked in media it was faxed to [phone] on 11/1    Steffany stated that we have the wrong fax number   Faxed records to Steffany at [phone]

## 2024-11-12 ENCOUNTER — OFFICE VISIT (OUTPATIENT)
Dept: CARDIOLOGY CLINIC | Age: 89
End: 2024-11-12

## 2024-11-12 ENCOUNTER — HOSPITAL ENCOUNTER (OUTPATIENT)
Age: 89
Discharge: HOME | End: 2024-11-14
Payer: MEDICARE

## 2024-11-12 VITALS
WEIGHT: 235 LBS | SYSTOLIC BLOOD PRESSURE: 128 MMHG | DIASTOLIC BLOOD PRESSURE: 72 MMHG | BODY MASS INDEX: 32.9 KG/M2 | HEIGHT: 71 IN

## 2024-11-12 VITALS
WEIGHT: 230 LBS | BODY MASS INDEX: 32.2 KG/M2 | DIASTOLIC BLOOD PRESSURE: 68 MMHG | HEIGHT: 71 IN | SYSTOLIC BLOOD PRESSURE: 126 MMHG | OXYGEN SATURATION: 99 % | HEART RATE: 91 BPM

## 2024-11-12 DIAGNOSIS — I65.23 BILATERAL CAROTID ARTERY STENOSIS: ICD-10-CM

## 2024-11-12 DIAGNOSIS — E78.2 MIXED HYPERLIPIDEMIA: ICD-10-CM

## 2024-11-12 DIAGNOSIS — I50.42 CHRONIC COMBINED SYSTOLIC AND DIASTOLIC CONGESTIVE HEART FAILURE (HCC): ICD-10-CM

## 2024-11-12 DIAGNOSIS — I48.0 PAROXYSMAL ATRIAL FIBRILLATION (HCC): ICD-10-CM

## 2024-11-12 DIAGNOSIS — I10 ESSENTIAL HYPERTENSION: ICD-10-CM

## 2024-11-12 DIAGNOSIS — I25.10 CORONARY ARTERY DISEASE INVOLVING NATIVE CORONARY ARTERY OF NATIVE HEART WITHOUT ANGINA PECTORIS: Primary | ICD-10-CM

## 2024-11-12 DIAGNOSIS — Z86.718 HISTORY OF DVT (DEEP VEIN THROMBOSIS): ICD-10-CM

## 2024-11-12 LAB
ECHO AO ASC DIAM: 3.7 CM
ECHO AO ASCENDING AORTA INDEX: 1.64 CM/M2
ECHO AO ROOT DIAM: 3.5 CM
ECHO AO ROOT INDEX: 1.55 CM/M2
ECHO AV AREA PEAK VELOCITY: 2.1 CM2
ECHO AV AREA VTI: 2.5 CM2
ECHO AV AREA/BSA PEAK VELOCITY: 0.9 CM2/M2
ECHO AV AREA/BSA VTI: 1.1 CM2/M2
ECHO AV MEAN GRADIENT: 6 MMHG
ECHO AV MEAN VELOCITY: 1.2 M/S
ECHO AV PEAK GRADIENT: 12 MMHG
ECHO AV PEAK VELOCITY: 1.8 M/S
ECHO AV VELOCITY RATIO: 0.67
ECHO AV VTI: 33.1 CM
ECHO BSA: 2.31 M2
ECHO EST RA PRESSURE: 8 MMHG
ECHO LA AREA 2C: 24.4 CM2
ECHO LA AREA 4C: 21.3 CM2
ECHO LA MAJOR AXIS: 7 CM
ECHO LA MINOR AXIS: 6.2 CM
ECHO LA VOL BP: 68 ML (ref 18–58)
ECHO LA VOL MOD A2C: 79 ML (ref 18–58)
ECHO LA VOL MOD A4C: 51 ML (ref 18–58)
ECHO LA VOL/BSA BIPLANE: 30 ML/M2 (ref 16–34)
ECHO LA VOLUME INDEX MOD A2C: 35 ML/M2 (ref 16–34)
ECHO LA VOLUME INDEX MOD A4C: 23 ML/M2 (ref 16–34)
ECHO LV E' LATERAL VELOCITY: 14.6 CM/S
ECHO LV E' SEPTAL VELOCITY: 9.6 CM/S
ECHO LV EDV A2C: 147 ML
ECHO LV EDV A4C: 147 ML
ECHO LV EDV INDEX A4C: 65 ML/M2
ECHO LV EDV NDEX A2C: 65 ML/M2
ECHO LV EJECTION FRACTION A2C: 50 %
ECHO LV EJECTION FRACTION A4C: 52 %
ECHO LV EJECTION FRACTION BIPLANE: 52 % (ref 55–100)
ECHO LV ESV A2C: 73 ML
ECHO LV ESV A4C: 71 ML
ECHO LV ESV INDEX A2C: 32 ML/M2
ECHO LV ESV INDEX A4C: 31 ML/M2
ECHO LV FRACTIONAL SHORTENING: 29 % (ref 28–44)
ECHO LV INTERNAL DIMENSION DIASTOLE INDEX: 2.17 CM/M2
ECHO LV INTERNAL DIMENSION DIASTOLIC: 4.9 CM (ref 4.2–5.9)
ECHO LV INTERNAL DIMENSION SYSTOLIC INDEX: 1.55 CM/M2
ECHO LV INTERNAL DIMENSION SYSTOLIC: 3.5 CM
ECHO LV IVSD: 1.2 CM (ref 0.6–1)
ECHO LV MASS 2D: 213.3 G (ref 88–224)
ECHO LV MASS INDEX 2D: 94.4 G/M2 (ref 49–115)
ECHO LV POSTERIOR WALL DIASTOLIC: 1.1 CM (ref 0.6–1)
ECHO LV RELATIVE WALL THICKNESS RATIO: 0.45
ECHO LVOT AREA: 3.1 CM2
ECHO LVOT AV VTI INDEX: 0.78
ECHO LVOT DIAM: 2 CM
ECHO LVOT MEAN GRADIENT: 3 MMHG
ECHO LVOT PEAK GRADIENT: 6 MMHG
ECHO LVOT PEAK VELOCITY: 1.2 M/S
ECHO LVOT STROKE VOLUME INDEX: 35.8 ML/M2
ECHO LVOT SV: 81 ML
ECHO LVOT VTI: 25.8 CM
ECHO MV A VELOCITY: 0.92 M/S
ECHO MV E VELOCITY: 1.36 M/S
ECHO MV E/A RATIO: 1.48
ECHO MV E/E' LATERAL: 9.32
ECHO MV E/E' RATIO (AVERAGED): 11.74
ECHO MV E/E' SEPTAL: 14.17
ECHO PV MAX VELOCITY: 1.1 M/S
ECHO PV PEAK GRADIENT: 5 MMHG
ECHO RA AREA 4C: 22.9 CM2
ECHO RA END SYSTOLIC VOLUME APICAL 4 CHAMBER INDEX BSA: 28 ML/M2
ECHO RA VOLUME: 64 ML
ECHO RIGHT VENTRICULAR SYSTOLIC PRESSURE (RVSP): 31 MMHG
ECHO RV BASAL DIMENSION: 4.5 CM
ECHO RV FREE WALL PEAK S': 9 CM/S
ECHO RV LONGITUDINAL DIMENSION: 7.6 CM
ECHO RV MID DIMENSION: 3 CM
ECHO RV TAPSE: 1.2 CM (ref 1.7–?)
ECHO TV REGURGITANT MAX VELOCITY: 2.38 M/S
ECHO TV REGURGITANT PEAK GRADIENT: 23 MMHG

## 2024-11-12 PROCEDURE — 6360000004 HC RX CONTRAST MEDICATION: Performed by: INTERNAL MEDICINE

## 2024-11-12 PROCEDURE — 93306 TTE W/DOPPLER COMPLETE: CPT | Performed by: INTERNAL MEDICINE

## 2024-11-12 PROCEDURE — C8929 TTE W OR WO FOL WCON,DOPPLER: HCPCS

## 2024-11-12 RX ADMIN — SULFUR HEXAFLUORIDE 2 ML: 60.7; .19; .19 INJECTION, POWDER, LYOPHILIZED, FOR SUSPENSION INTRAVENOUS; INTRAVESICAL at 15:56

## 2024-11-12 NOTE — PROGRESS NOTES
facility-administered encounter medications on file as of 11/12/2024.     Allergies:  Codeine and Flomax [tamsulosin hcl]     [x] Medications and dosages reviewed.  ROS:  [x]Full ROS obtained and negative except as mentioned in HPI      Physical Examination:    Vitals:    11/12/24 1631   BP: 126/68   Pulse: 91   SpO2: 99%            /68 (Site: Left Upper Arm, Position: Sitting, Cuff Size: Medium Adult)   Pulse 91   Ht 1.803 m (5' 10.98\")   Wt 104.3 kg (230 lb)   SpO2 99%   BMI 32.09 kg/m²     GENERAL: Elderly obese male in wheel chair in NAD  NEUROLOGICAL: Alert and oriented  PSYCH: Calm affect  SKIN: Warm and dry, No obvious Rash  HEENT: Normocephalic, Sclera non-icteric, mucus membranes moist  NECK: supple, JVP normal  CAROTID: Normal upstroke, no bruits  CARDIAC: Normal PMI, regular bradycardic rate and rhythm, normal S1S2, No murmur, rub, or gallop  RESPIRATORY: Normal respiratory effort, Clear bilaterally  EXTREMITIES: Chronic LE edema.   MUSCULOSKELETAL: no chest wall tenderness, no c/o joint pain.  GASTROINTESTINAL: normal bowel sounds, soft, non-tender, no bruit    Echo 7/14/16:  Normal left ventricle size, wall thickness and systolic function with an EF of 55%.  Mild aortic stenosis with mean gradient of 12mmHg.  Trivial aortic regurgitation is present.  Mild to moderate tricuspid regurgitation with RVSP estimated at 47 mmHg    ECHO 4/2020  Summary   Suboptimal image quality.   Definity contrast administered.   Left ventricular cavity size is normal.   There is asymmetric hypertrophy of the basal septum.   Ejection fraction is visually estimated to be 40-45%.   There is mild to moderate diffuse hypokinesis.   Diastolic filling parameters suggest grade I diastolic dysfunction.   Mitral valve leaflets appear mildly thickened.   Trivial mitral regurgitation is present.   The left atrium is mildly dilated.   Aortic valve leaflets appear thickened.   Trivial aortic regurgitation is present.   The

## 2024-11-12 NOTE — PATIENT INSTRUCTIONS
Labs when Antoinette gets hers drawn  Continue current medications  Follow up with Dr Mario in 1 year

## 2024-11-18 RX ORDER — FAMOTIDINE 20 MG/1
20 TABLET, FILM COATED ORAL 2 TIMES DAILY
Qty: 180 TABLET | Refills: 0 | Status: SHIPPED | OUTPATIENT
Start: 2024-11-18

## 2024-12-06 ENCOUNTER — TELEPHONE (OUTPATIENT)
Dept: FAMILY MEDICINE CLINIC | Age: 88
End: 2024-12-06

## 2024-12-06 NOTE — TELEPHONE ENCOUNTER
Current orders for therapy are up  Wants to know if they can get a continuation of therapy   Needs a verbal

## 2024-12-13 DIAGNOSIS — F51.01 PRIMARY INSOMNIA: ICD-10-CM

## 2024-12-16 RX ORDER — ALPRAZOLAM 2 MG/1
TABLET ORAL
Qty: 90 TABLET | Refills: 0 | Status: SHIPPED | OUTPATIENT
Start: 2024-12-16 | End: 2025-01-15

## 2024-12-31 ENCOUNTER — TELEPHONE (OUTPATIENT)
Dept: CARDIOLOGY CLINIC | Age: 88
End: 2024-12-31

## 2024-12-31 DIAGNOSIS — I50.42 CHRONIC COMBINED SYSTOLIC AND DIASTOLIC CONGESTIVE HEART FAILURE (HCC): ICD-10-CM

## 2024-12-31 RX ORDER — POTASSIUM CHLORIDE 1500 MG/1
10 TABLET, EXTENDED RELEASE ORAL DAILY
Qty: 180 TABLET | Refills: 4
Start: 2024-12-31

## 2024-12-31 NOTE — TELEPHONE ENCOUNTER
Dr Mario received copied lab results. Labs look stable. Potassium is 4.9. Per [facility]- Decrease Klor Con to 1/2 tab once daily. Notified patient's wife. She verbalized understanding.

## 2025-01-10 ENCOUNTER — TELEPHONE (OUTPATIENT)
Dept: FAMILY MEDICINE CLINIC | Age: 89
End: 2025-01-10

## 2025-01-10 NOTE — TELEPHONE ENCOUNTER
Spouse calling in, stated that 100.8 temp and chills even with blankets on Wednesday. Spouse is concerned about UTI.     Last OV was October     Spouse stated that pt has chronic UTI's and would like to drop off a urine sample.     Per vita have spouse drop off sample but will have to check with Dr. Shelton to see if we can order testing.     Tomasa stated dop Emily will bring in sample.    KRISTEN

## 2025-01-10 NOTE — TELEPHONE ENCOUNTER
Spokewith dr atkins called pt and  would rather him go to an urgent care and seek treatment in case it is not a UTI

## 2025-01-27 ENCOUNTER — TELEPHONE (OUTPATIENT)
Dept: FAMILY MEDICINE CLINIC | Age: 89
End: 2025-01-27

## 2025-01-27 ENCOUNTER — APPOINTMENT (OUTPATIENT)
Dept: GENERAL RADIOLOGY | Age: 89
End: 2025-01-27
Payer: MEDICARE

## 2025-01-27 ENCOUNTER — OFFICE VISIT (OUTPATIENT)
Dept: FAMILY MEDICINE CLINIC | Age: 89
End: 2025-01-27
Payer: MEDICARE

## 2025-01-27 ENCOUNTER — APPOINTMENT (OUTPATIENT)
Dept: CT IMAGING | Age: 89
End: 2025-01-27
Payer: MEDICARE

## 2025-01-27 ENCOUNTER — HOSPITAL ENCOUNTER (INPATIENT)
Age: 89
LOS: 3 days | Discharge: HOME HEALTH | DRG: 690 | End: 2025-01-30
Attending: STUDENT IN AN ORGANIZED HEALTH CARE EDUCATION/TRAINING PROGRAM | Admitting: STUDENT IN AN ORGANIZED HEALTH CARE EDUCATION/TRAINING PROGRAM
Payer: MEDICARE

## 2025-01-27 ENCOUNTER — HOSPITAL ENCOUNTER (EMERGENCY)
Age: 89
Discharge: ANOTHER HOSPITAL | End: 2025-01-27
Attending: EMERGENCY MEDICINE
Payer: MEDICARE

## 2025-01-27 VITALS
HEART RATE: 56 BPM | OXYGEN SATURATION: 98 % | BODY MASS INDEX: 32.09 KG/M2 | RESPIRATION RATE: 20 BRPM | HEIGHT: 71 IN | SYSTOLIC BLOOD PRESSURE: 118 MMHG | TEMPERATURE: 97.7 F | DIASTOLIC BLOOD PRESSURE: 64 MMHG

## 2025-01-27 VITALS
OXYGEN SATURATION: 95 % | BODY MASS INDEX: 34.55 KG/M2 | SYSTOLIC BLOOD PRESSURE: 106 MMHG | RESPIRATION RATE: 18 BRPM | HEART RATE: 90 BPM | DIASTOLIC BLOOD PRESSURE: 87 MMHG | TEMPERATURE: 97.7 F | WEIGHT: 247.58 LBS

## 2025-01-27 DIAGNOSIS — R53.1 GENERALIZED WEAKNESS: ICD-10-CM

## 2025-01-27 DIAGNOSIS — R29.6 FREQUENT FALLS: ICD-10-CM

## 2025-01-27 DIAGNOSIS — N30.00 ACUTE CYSTITIS WITHOUT HEMATURIA: Primary | ICD-10-CM

## 2025-01-27 DIAGNOSIS — I50.42 CHRONIC COMBINED SYSTOLIC AND DIASTOLIC CONGESTIVE HEART FAILURE (HCC): ICD-10-CM

## 2025-01-27 DIAGNOSIS — S81.811A NONINFECTED SKIN TEAR OF RIGHT LOWER EXTREMITY, INITIAL ENCOUNTER: ICD-10-CM

## 2025-01-27 DIAGNOSIS — R53.1 GENERALIZED WEAKNESS: Primary | ICD-10-CM

## 2025-01-27 PROBLEM — N39.0 UTI (URINARY TRACT INFECTION): Status: ACTIVE | Noted: 2025-01-27

## 2025-01-27 LAB
ALBUMIN SERPL-MCNC: 3.8 G/DL (ref 3.4–5)
ALBUMIN/GLOB SERPL: 1.3 {RATIO} (ref 1.1–2.2)
ALP SERPL-CCNC: 89 U/L (ref 40–129)
ALT SERPL-CCNC: 11 U/L (ref 10–40)
ANION GAP SERPL CALCULATED.3IONS-SCNC: 9 MMOL/L (ref 3–16)
AST SERPL-CCNC: 17 U/L (ref 15–37)
BACTERIA URNS QL MICRO: ABNORMAL /HPF
BASOPHILS # BLD: 0 K/UL (ref 0–0.2)
BASOPHILS NFR BLD: 0.6 %
BILIRUB SERPL-MCNC: 0.5 MG/DL (ref 0–1)
BILIRUB UR QL STRIP.AUTO: NEGATIVE
BUN SERPL-MCNC: 20 MG/DL (ref 7–20)
CALCIUM SERPL-MCNC: 9 MG/DL (ref 8.3–10.6)
CHLORIDE SERPL-SCNC: 106 MMOL/L (ref 99–110)
CLARITY UR: ABNORMAL
CO2 SERPL-SCNC: 25 MMOL/L (ref 21–32)
COLOR UR: YELLOW
CREAT SERPL-MCNC: 1.1 MG/DL (ref 0.8–1.3)
DEPRECATED RDW RBC AUTO: 13.7 % (ref 12.4–15.4)
EOSINOPHIL # BLD: 0.2 K/UL (ref 0–0.6)
EOSINOPHIL NFR BLD: 2.9 %
EPI CELLS #/AREA URNS AUTO: 4 /HPF (ref 0–5)
FLUAV + FLUBV AG NOSE IA.RAPID: NOT DETECTED
FLUAV + FLUBV AG NOSE IA.RAPID: NOT DETECTED
GFR SERPLBLD CREATININE-BSD FMLA CKD-EPI: 64 ML/MIN/{1.73_M2}
GLUCOSE SERPL-MCNC: 93 MG/DL (ref 70–99)
GLUCOSE UR STRIP.AUTO-MCNC: NEGATIVE MG/DL
HCT VFR BLD AUTO: 39.5 % (ref 40.5–52.5)
HGB BLD-MCNC: 13.2 G/DL (ref 13.5–17.5)
HGB UR QL STRIP.AUTO: NEGATIVE
HYALINE CASTS #/AREA URNS AUTO: 2 /LPF (ref 0–8)
KETONES UR STRIP.AUTO-MCNC: NEGATIVE MG/DL
LACTATE BLDV-SCNC: 1.2 MMOL/L (ref 0.4–2)
LEUKOCYTE ESTERASE UR QL STRIP.AUTO: ABNORMAL
LYMPHOCYTES # BLD: 0.8 K/UL (ref 1–5.1)
LYMPHOCYTES NFR BLD: 11.9 %
MCH RBC QN AUTO: 31.2 PG (ref 26–34)
MCHC RBC AUTO-ENTMCNC: 33.4 G/DL (ref 31–36)
MCV RBC AUTO: 93.6 FL (ref 80–100)
MONOCYTES # BLD: 0.6 K/UL (ref 0–1.3)
MONOCYTES NFR BLD: 8.8 %
NEUTROPHILS # BLD: 5 K/UL (ref 1.7–7.7)
NEUTROPHILS NFR BLD: 75.8 %
NITRITE UR QL STRIP.AUTO: NEGATIVE
NT-PROBNP SERPL-MCNC: 605 PG/ML (ref 0–449)
PH UR STRIP.AUTO: 5.5 [PH] (ref 5–8)
PLATELET # BLD AUTO: 158 K/UL (ref 135–450)
PMV BLD AUTO: 8 FL (ref 5–10.5)
POTASSIUM SERPL-SCNC: 4.3 MMOL/L (ref 3.5–5.1)
PROT SERPL-MCNC: 6.8 G/DL (ref 6.4–8.2)
PROT UR STRIP.AUTO-MCNC: NEGATIVE MG/DL
RBC # BLD AUTO: 4.23 M/UL (ref 4.2–5.9)
RBC CLUMPS #/AREA URNS AUTO: 2 /HPF (ref 0–4)
SARS-COV-2 RDRP RESP QL NAA+PROBE: NOT DETECTED
SODIUM SERPL-SCNC: 140 MMOL/L (ref 136–145)
SP GR UR STRIP.AUTO: 1.02 (ref 1–1.03)
TROPONIN, HIGH SENSITIVITY: 37 NG/L (ref 0–22)
TROPONIN, HIGH SENSITIVITY: 38 NG/L (ref 0–22)
UA COMPLETE W REFLEX CULTURE PNL UR: YES
UA DIPSTICK W REFLEX MICRO PNL UR: YES
URN SPEC COLLECT METH UR: ABNORMAL
UROBILINOGEN UR STRIP-ACNC: 1 E.U./DL
WBC # BLD AUTO: 6.6 K/UL (ref 4–11)
WBC #/AREA URNS AUTO: 243 /HPF (ref 0–5)

## 2025-01-27 PROCEDURE — 87635 SARS-COV-2 COVID-19 AMP PRB: CPT

## 2025-01-27 PROCEDURE — G8417 CALC BMI ABV UP PARAM F/U: HCPCS | Performed by: NURSE PRACTITIONER

## 2025-01-27 PROCEDURE — 87502 INFLUENZA DNA AMP PROBE: CPT

## 2025-01-27 PROCEDURE — 1200000000 HC SEMI PRIVATE

## 2025-01-27 PROCEDURE — 72125 CT NECK SPINE W/O DYE: CPT

## 2025-01-27 PROCEDURE — 1036F TOBACCO NON-USER: CPT | Performed by: NURSE PRACTITIONER

## 2025-01-27 PROCEDURE — 99214 OFFICE O/P EST MOD 30 MIN: CPT | Performed by: NURSE PRACTITIONER

## 2025-01-27 PROCEDURE — 36415 COLL VENOUS BLD VENIPUNCTURE: CPT

## 2025-01-27 PROCEDURE — 84484 ASSAY OF TROPONIN QUANT: CPT

## 2025-01-27 PROCEDURE — 87186 SC STD MICRODIL/AGAR DIL: CPT

## 2025-01-27 PROCEDURE — 1123F ACP DISCUSS/DSCN MKR DOCD: CPT | Performed by: NURSE PRACTITIONER

## 2025-01-27 PROCEDURE — 81001 URINALYSIS AUTO W/SCOPE: CPT

## 2025-01-27 PROCEDURE — 83880 ASSAY OF NATRIURETIC PEPTIDE: CPT

## 2025-01-27 PROCEDURE — 70450 CT HEAD/BRAIN W/O DYE: CPT

## 2025-01-27 PROCEDURE — 1159F MED LIST DOCD IN RCRD: CPT | Performed by: NURSE PRACTITIONER

## 2025-01-27 PROCEDURE — 87040 BLOOD CULTURE FOR BACTERIA: CPT

## 2025-01-27 PROCEDURE — 87077 CULTURE AEROBIC IDENTIFY: CPT

## 2025-01-27 PROCEDURE — G8427 DOCREV CUR MEDS BY ELIG CLIN: HCPCS | Performed by: NURSE PRACTITIONER

## 2025-01-27 PROCEDURE — 99285 EMERGENCY DEPT VISIT HI MDM: CPT

## 2025-01-27 PROCEDURE — 1160F RVW MEDS BY RX/DR IN RCRD: CPT | Performed by: NURSE PRACTITIONER

## 2025-01-27 PROCEDURE — 93005 ELECTROCARDIOGRAM TRACING: CPT | Performed by: PHYSICIAN ASSISTANT

## 2025-01-27 PROCEDURE — 6360000002 HC RX W HCPCS: Performed by: PHYSICIAN ASSISTANT

## 2025-01-27 PROCEDURE — 83605 ASSAY OF LACTIC ACID: CPT

## 2025-01-27 PROCEDURE — 85025 COMPLETE CBC W/AUTO DIFF WBC: CPT

## 2025-01-27 PROCEDURE — 96365 THER/PROPH/DIAG IV INF INIT: CPT

## 2025-01-27 PROCEDURE — 71045 X-RAY EXAM CHEST 1 VIEW: CPT

## 2025-01-27 PROCEDURE — 87086 URINE CULTURE/COLONY COUNT: CPT

## 2025-01-27 PROCEDURE — 80053 COMPREHEN METABOLIC PANEL: CPT

## 2025-01-27 RX ORDER — ACETAMINOPHEN 650 MG/1
650 SUPPOSITORY RECTAL EVERY 6 HOURS PRN
Status: DISCONTINUED | OUTPATIENT
Start: 2025-01-27 | End: 2025-01-30 | Stop reason: HOSPADM

## 2025-01-27 RX ORDER — CIPROFLOXACIN 2 MG/ML
400 INJECTION, SOLUTION INTRAVENOUS ONCE
Status: COMPLETED | OUTPATIENT
Start: 2025-01-27 | End: 2025-01-27

## 2025-01-27 RX ORDER — ACETAMINOPHEN 325 MG/1
650 TABLET ORAL EVERY 6 HOURS PRN
Status: DISCONTINUED | OUTPATIENT
Start: 2025-01-27 | End: 2025-01-30 | Stop reason: HOSPADM

## 2025-01-27 RX ORDER — ONDANSETRON 2 MG/ML
4 INJECTION INTRAMUSCULAR; INTRAVENOUS EVERY 6 HOURS PRN
Status: DISCONTINUED | OUTPATIENT
Start: 2025-01-27 | End: 2025-01-30 | Stop reason: HOSPADM

## 2025-01-27 RX ORDER — AMITRIPTYLINE HYDROCHLORIDE 150 MG/1
150 TABLET ORAL
Qty: 90 TABLET | Refills: 0 | Status: SHIPPED | OUTPATIENT
Start: 2025-01-27

## 2025-01-27 RX ADMIN — CIPROFLOXACIN 400 MG: 400 INJECTION, SOLUTION INTRAVENOUS at 19:33

## 2025-01-27 SDOH — ECONOMIC STABILITY: FOOD INSECURITY: WITHIN THE PAST 12 MONTHS, YOU WORRIED THAT YOUR FOOD WOULD RUN OUT BEFORE YOU GOT MONEY TO BUY MORE.: NEVER TRUE

## 2025-01-27 SDOH — ECONOMIC STABILITY: FOOD INSECURITY: WITHIN THE PAST 12 MONTHS, THE FOOD YOU BOUGHT JUST DIDN'T LAST AND YOU DIDN'T HAVE MONEY TO GET MORE.: NEVER TRUE

## 2025-01-27 ASSESSMENT — ENCOUNTER SYMPTOMS
SHORTNESS OF BREATH: 1
ABDOMINAL PAIN: 0
COUGH: 0
WHEEZING: 1

## 2025-01-27 NOTE — ED TRIAGE NOTES
Pt presents to ED for multiple falls and according to his wife, he has had an increase in generalized weakness over the past 2 weeks. Wife states he has fallen twice in the past week one of which he hit his head. Pt takes xeralto.

## 2025-01-27 NOTE — TELEPHONE ENCOUNTER
Was unable to to collect the sample  Will try again tomorrow    To add to this note Paulina wanted to let us know that she listened to his lungs and he was wheezing  Two plus Pitting edema   Right leg was weeping yesterday but dry today

## 2025-01-27 NOTE — ED PROVIDER NOTES
[facility] EMERGENCY DEPARTMENT  EMERGENCY DEPARTMENT ENCOUNTER        Pt Name: Jarred Suarez  MRN: [id number]  Birthdate 1935  Date of evaluation: 1/27/2025  Provider: Beth Castro PA-C  PCP: Jack Shelton MD  Note Started: 4:39 PM EST 1/27/25       I have seen and evaluated this patient with my supervising physician Dolly Mahtews, *.      CHIEF COMPLAINT       Chief Complaint   Patient presents with    Fall       HISTORY OF PRESENT ILLNESS: 1 or more Elements     History From: patient, wife, son, and daughter in law    Jarred Suarez is a 89 y.o. male who presents for generalized weakness and 2 falls for the past 1 to 2 weeks.  Wife reports patient has been generally more weak for the past 1 to 2 weeks.  He is having difficulty getting out of his lift chair even with assistance from her.  Then uses a wheelchair.  He has also fallen twice over the past 1 to 2 weeks.  There is no lightheadedness, dizziness, syncope prior to the falls.  one fall was in the bathroom when he lost his balance when trying to plunge the toilet.  He did hit the back of his head.  No LOC.  He is on Xarelto.  The other fall was when he fell from his lift chair while trying to transfer to wheelchair.  Wife reports it was a gentle fall.  Wife and family reports that he has had prior episodes of generalized weakness and falls with urinary tract infection.  The home health nurse came today to collect a urine specimen but he is unable to urinate.  They report he typically does not have symptoms with infection.  Patient denies any dysuria, hematuria or frequency.  Patient denies any headache.  Denies any pain anywhere.  Denies nausea vomiting abdominal pain, chest pain, shortness of breath.  They do report the home health nurse heard some abnormal breath sounds so called PCP office who instructed patient to come to ED.    Nursing Notes were reviewed and agreed with or any disagreements were addressed in

## 2025-01-27 NOTE — TELEPHONE ENCOUNTER
Paulina from [facility] calling  Complaining of a couple of falls over the weekend  He is weak  Last time this happened to the pt he had a UTI  Paulina is going to collect a UACS  She is hoping that Dr. Shelton is Okay with this    Please advise

## 2025-01-27 NOTE — ED PROVIDER NOTES
ED Attending Attestation Note    This patient was seen by the advanced practice provider.     I personally saw the patient. Management plan and care decisions have been discussed with me and I made/approved the management plan and take responsibility for patient management.     Briefly, 89 y.o. male presents with generalized weakness x 1-2 weeks. He has had 2 falls, once when he was plunging toilet, once when he was trying to transfer to wheelchair. Denies LOC. He had head injury in his bathroom. He is requiring more help with transfers. LE Edema, increased work of breathing. Sent to ED for UA and evaluation for CHF.          Focused exam:   Gen: 89 y.o. male, NAD, nontoxic appearing  HEENT: NCAT. MMM, PERRL. EOMI.   CV: RRR w/o MRG  Vascular: intact and symmetric radial and DP pulses bilaterally  Lungs: CTAB. No incr WOB.   Abdomen: Soft, nontender, nondistended. No rebound/guarding.   Neuro: awake and alert, speech clear w/o aphasia; intact and symmetric strength and sensation in all 4 extremities, CN 2-12 intact bilaterally    MDM:   This is an 89-year-old male presenting with generalized weakness and frequent falls.  He does not have evidence of traumatic injury on head CT or CT C-spine.  UA concerning for UTI.  BNP and troponin are somewhat elevated.  He also has leg edema.    Nursing notes, vital signs reviewed.     Records reviewed:   PCP visit for generalized weakness, frequent falls, CHF, skin tear of lower extremity    I independently interpreted the following studies   CT HEAD WO CONTRAST   Final Result   1. No acute intracranial abnormality.   2. No acute osseous abnormality of the cervical spine.   3. Extensive multilevel degenerative disc disease of the cervical spine.         CT CERVICAL SPINE WO CONTRAST   Final Result   1. No acute intracranial abnormality.   2. No acute osseous abnormality of the cervical spine.   3. Extensive multilevel degenerative disc disease of the cervical spine.         XR

## 2025-01-28 LAB
ALBUMIN SERPL-MCNC: 3.2 G/DL (ref 3.4–5)
ALBUMIN/GLOB SERPL: 1.2 {RATIO} (ref 1.1–2.2)
ALP SERPL-CCNC: 84 U/L (ref 40–129)
ALT SERPL-CCNC: 6 U/L (ref 10–40)
ANION GAP SERPL CALCULATED.3IONS-SCNC: 9 MMOL/L (ref 3–16)
AST SERPL-CCNC: 13 U/L (ref 15–37)
BASOPHILS # BLD: 0 K/UL (ref 0–0.2)
BASOPHILS NFR BLD: 0.7 %
BILIRUB SERPL-MCNC: 0.7 MG/DL (ref 0–1)
BUN SERPL-MCNC: 18 MG/DL (ref 7–20)
CALCIUM SERPL-MCNC: 9 MG/DL (ref 8.3–10.6)
CHLORIDE SERPL-SCNC: 105 MMOL/L (ref 99–110)
CK SERPL-CCNC: 71 U/L (ref 39–308)
CO2 SERPL-SCNC: 25 MMOL/L (ref 21–32)
CREAT SERPL-MCNC: 1 MG/DL (ref 0.8–1.3)
DEPRECATED RDW RBC AUTO: 13.8 % (ref 12.4–15.4)
EOSINOPHIL # BLD: 0.3 K/UL (ref 0–0.6)
EOSINOPHIL NFR BLD: 5.1 %
FOLATE SERPL-MCNC: 5.16 NG/ML (ref 4.78–24.2)
GFR SERPLBLD CREATININE-BSD FMLA CKD-EPI: 72 ML/MIN/{1.73_M2}
GLUCOSE SERPL-MCNC: 115 MG/DL (ref 70–99)
HCT VFR BLD AUTO: 36.3 % (ref 40.5–52.5)
HGB BLD-MCNC: 11.9 G/DL (ref 13.5–17.5)
LYMPHOCYTES # BLD: 0.8 K/UL (ref 1–5.1)
LYMPHOCYTES NFR BLD: 15.2 %
MCH RBC QN AUTO: 30.5 PG (ref 26–34)
MCHC RBC AUTO-ENTMCNC: 32.9 G/DL (ref 31–36)
MCV RBC AUTO: 93 FL (ref 80–100)
MONOCYTES # BLD: 0.5 K/UL (ref 0–1.3)
MONOCYTES NFR BLD: 10.9 %
NEUTROPHILS # BLD: 3.4 K/UL (ref 1.7–7.7)
NEUTROPHILS NFR BLD: 68.1 %
PLATELET # BLD AUTO: 145 K/UL (ref 135–450)
PMV BLD AUTO: 7.5 FL (ref 5–10.5)
POTASSIUM SERPL-SCNC: 4.3 MMOL/L (ref 3.5–5.1)
PROT SERPL-MCNC: 5.9 G/DL (ref 6.4–8.2)
RBC # BLD AUTO: 3.91 M/UL (ref 4.2–5.9)
SODIUM SERPL-SCNC: 139 MMOL/L (ref 136–145)
TSH SERPL DL<=0.005 MIU/L-ACNC: 2.34 UIU/ML (ref 0.27–4.2)
VIT B12 SERPL-MCNC: 370 PG/ML (ref 211–911)
WBC # BLD AUTO: 5 K/UL (ref 4–11)

## 2025-01-28 PROCEDURE — 97530 THERAPEUTIC ACTIVITIES: CPT

## 2025-01-28 PROCEDURE — 97535 SELF CARE MNGMENT TRAINING: CPT

## 2025-01-28 PROCEDURE — 6360000002 HC RX W HCPCS: Performed by: NURSE PRACTITIONER

## 2025-01-28 PROCEDURE — 1200000000 HC SEMI PRIVATE

## 2025-01-28 PROCEDURE — 97162 PT EVAL MOD COMPLEX 30 MIN: CPT

## 2025-01-28 PROCEDURE — 6360000002 HC RX W HCPCS: Performed by: STUDENT IN AN ORGANIZED HEALTH CARE EDUCATION/TRAINING PROGRAM

## 2025-01-28 PROCEDURE — 2580000003 HC RX 258: Performed by: STUDENT IN AN ORGANIZED HEALTH CARE EDUCATION/TRAINING PROGRAM

## 2025-01-28 PROCEDURE — 80053 COMPREHEN METABOLIC PANEL: CPT

## 2025-01-28 PROCEDURE — 97166 OT EVAL MOD COMPLEX 45 MIN: CPT

## 2025-01-28 PROCEDURE — 82607 VITAMIN B-12: CPT

## 2025-01-28 PROCEDURE — 85025 COMPLETE CBC W/AUTO DIFF WBC: CPT

## 2025-01-28 PROCEDURE — 36415 COLL VENOUS BLD VENIPUNCTURE: CPT

## 2025-01-28 PROCEDURE — 82746 ASSAY OF FOLIC ACID SERUM: CPT

## 2025-01-28 PROCEDURE — 82550 ASSAY OF CK (CPK): CPT

## 2025-01-28 PROCEDURE — 84443 ASSAY THYROID STIM HORMONE: CPT

## 2025-01-28 PROCEDURE — 6370000000 HC RX 637 (ALT 250 FOR IP): Performed by: STUDENT IN AN ORGANIZED HEALTH CARE EDUCATION/TRAINING PROGRAM

## 2025-01-28 RX ORDER — LOSARTAN POTASSIUM 25 MG/1
25 TABLET ORAL DAILY
Status: DISCONTINUED | OUTPATIENT
Start: 2025-01-28 | End: 2025-01-30 | Stop reason: HOSPADM

## 2025-01-28 RX ORDER — FUROSEMIDE 20 MG/1
20 TABLET ORAL DAILY
Status: DISCONTINUED | OUTPATIENT
Start: 2025-01-28 | End: 2025-01-30 | Stop reason: HOSPADM

## 2025-01-28 RX ORDER — FAMOTIDINE 20 MG/1
20 TABLET, FILM COATED ORAL 2 TIMES DAILY
Status: DISCONTINUED | OUTPATIENT
Start: 2025-01-28 | End: 2025-01-30 | Stop reason: HOSPADM

## 2025-01-28 RX ORDER — AMITRIPTYLINE HYDROCHLORIDE 50 MG/1
150 TABLET ORAL NIGHTLY
Status: DISCONTINUED | OUTPATIENT
Start: 2025-01-28 | End: 2025-01-30 | Stop reason: HOSPADM

## 2025-01-28 RX ORDER — CIPROFLOXACIN 2 MG/ML
400 INJECTION, SOLUTION INTRAVENOUS EVERY 12 HOURS
Status: DISCONTINUED | OUTPATIENT
Start: 2025-01-28 | End: 2025-01-30 | Stop reason: HOSPADM

## 2025-01-28 RX ADMIN — LOSARTAN POTASSIUM 25 MG: 25 TABLET, FILM COATED ORAL at 08:33

## 2025-01-28 RX ADMIN — CIPROFLOXACIN 400 MG: 2 INJECTION, SOLUTION INTRAVENOUS at 20:06

## 2025-01-28 RX ADMIN — ACETAMINOPHEN 650 MG: 325 TABLET ORAL at 20:05

## 2025-01-28 RX ADMIN — FAMOTIDINE 20 MG: 20 TABLET, FILM COATED ORAL at 08:32

## 2025-01-28 RX ADMIN — CEFEPIME 1000 MG: 1 INJECTION, POWDER, FOR SOLUTION INTRAMUSCULAR; INTRAVENOUS at 00:18

## 2025-01-28 RX ADMIN — RIVAROXABAN 20 MG: 20 TABLET, FILM COATED ORAL at 17:03

## 2025-01-28 RX ADMIN — FAMOTIDINE 20 MG: 20 TABLET, FILM COATED ORAL at 00:24

## 2025-01-28 RX ADMIN — AMITRIPTYLINE HYDROCHLORIDE 150 MG: 50 TABLET, FILM COATED ORAL at 20:05

## 2025-01-28 RX ADMIN — FAMOTIDINE 20 MG: 20 TABLET, FILM COATED ORAL at 20:04

## 2025-01-28 RX ADMIN — CEFEPIME 1000 MG: 1 INJECTION, POWDER, FOR SOLUTION INTRAMUSCULAR; INTRAVENOUS at 12:09

## 2025-01-28 RX ADMIN — AMITRIPTYLINE HYDROCHLORIDE 150 MG: 50 TABLET, FILM COATED ORAL at 00:24

## 2025-01-28 RX ADMIN — ACETAMINOPHEN 650 MG: 325 TABLET ORAL at 00:15

## 2025-01-28 RX ADMIN — RIVAROXABAN 20 MG: 20 TABLET, FILM COATED ORAL at 00:24

## 2025-01-28 RX ADMIN — FUROSEMIDE 20 MG: 20 TABLET ORAL at 08:33

## 2025-01-28 NOTE — DISCHARGE INSTR - COC
Continuity of Care Form    Patient Name: Jarred Suarez   :  1935  MRN:  [id number]    Admit date:  2025  Discharge date:  2025    Code Status Order: DNR-CCA   Advance Directives:   Advance Care Flowsheet Documentation             Admitting Physician:  Katiuska Figueroa MD  PCP: Jack Shelton MD    Discharging Nurse: Vianey WOLFF  Discharging Hospital Unit/Room#: 5TN-5582/5582-01  Discharging Unit Phone Number: [phone]    Emergency Contact:   Extended Emergency Contact Information  Primary Emergency Contact: Antoinette Suarez  Address: [address] [place] [address]            [place] [address] [facility]  Home Phone: [phone]  Mobile Phone: [phone]  Relation: Spouse  Secondary Emergency Contact: Vianey Lanier   [facility]  Home Phone: [phone]  Work Phone: [phone]  Relation: Child    Past Surgical History:  Past Surgical History:   Procedure Laterality Date    APPENDECTOMY      BACK SURGERY      x4    CORONARY ARTERY BYPASS GRAFT  1981    CORONARY ARTERY BYPASS GRAFT      CYSTOSCOPY N/A 2024    CYSTOSCOPY WITH URETHRAL DILATION AND  BLADDER STONE REMOVAL WITH HOLMIUM LASER - FORTEC (CONF# [id number]) performed by Raza Perry MD at [facility] OR    JOINT REPLACEMENT Right     knee replacement    KNEE SURGERY  2005    right     PROSTATE SURGERY      SHOULDER SURGERY      both    SKIN CANCER EXCISION      TOTAL KNEE ARTHROPLASTY Right     [place]       Immunization History:   Immunization History   Administered Date(s) Administered    Pneumococcal, PCV-13, PREVNAR 13, (age 6w+), IM, 0.5mL 2016    Pneumococcal, PPSV23, PNEUMOVAX 23, (age 2y+), SC/IM, 0.5mL 2010    TDaP, ADACEL (age 10y-64y), BOOSTRIX (age 10y+), IM, 0.5mL 2022       Active Problems:  Patient Active Problem List   Diagnosis Code    Mixed hyperlipidemia E78.2    Essential hypertension I10    Coronary artery disease involving native coronary artery of native heart without

## 2025-01-28 NOTE — CARE COORDINATION
Case Management Assessment  Initial Evaluation    Date/Time of Evaluation: 1/28/2025 4:47 PM  Assessment Completed by: ETHAN BOUDREAUX RN    If patient is discharged prior to next notation, then this note serves as note for discharge by case management.    Patient Name: Jarred Suarez                   YOB: 1935  Diagnosis: Generalized weakness [R53.1]                   Date / Time: 1/27/2025 10:57 PM    Patient Admission Status: Inpatient   Readmission Risk (Low < 19, Mod (19-27), High > 27): Readmission Risk Score: 14.8    Current PCP: Jack Shelton MD  PCP verified by CM?      Chart Reviewed: Yes      History Provided by: Patient, Child/Family, Significant Other, Medical Record  Patient Orientation: Alert and Oriented, Person, Place, Situation    Patient Cognition: Alert    Hospitalization in the last 30 days (Readmission):  No    If yes, Readmission Assessment in  Navigator will be completed.    Advance Directives:      Code Status: DNR-CCA   Patient's Primary Decision Maker is: Legal Next of Kin    Primary Decision Maker: Antoinette Suarez - Spouse - [phone]    Primary Decision Maker: Ch[place]inaVianey - Child - [phone]    Discharge Planning:    Patient lives with: Spouse/Significant Other Type of Home: House  Primary Care Giver: Spouse  Patient Support Systems include: Spouse/Significant Other, Children, Home Care Staff, Family Members   Current Financial resources: Medicare  Current community resources: ECF/Home Care  Current services prior to admission: Home Care, Durable Medical Equipment            Current DME: Shower Chair, Walker, Wheelchair            Type of Home Care services:  PT, Nursing Services    ADLS  Prior functional level: Assistance with the following:, Bathing, Dressing, Toileting, Cooking, Housework, Shopping, Mobility  Current functional level: Assistance with the following:, Bathing, Dressing, Toileting, Cooking, Housework, Shopping, Mobility    PT AM-PAC: 9 /24  OT

## 2025-01-28 NOTE — PLAN OF CARE
Problem: Chronic Conditions and Co-morbidities  Goal: Patient's chronic conditions and co-morbidity symptoms are monitored and maintained or improved  Outcome: Progressing     Problem: Safety - Adult  Goal: Free from fall injury  Outcome: Progressing     Problem: Skin/Tissue Integrity  Goal: Skin integrity remains intact  Description: 1.  Monitor for areas of redness and/or skin breakdown  2.  Assess vascular access sites hourly  3.  Every 4-6 hours minimum:  Change oxygen saturation probe site  4.  Every 4-6 hours:  If on nasal continuous positive airway pressure, respiratory therapy assess nares and determine need for appliance change or resting period  Outcome: Progressing     Problem: ABCDS Injury Assessment  Goal: Absence of physical injury  Outcome: Progressing     Problem: Neurosensory - Adult  Goal: Achieves stable or improved neurological status  Outcome: Progressing  Goal: Remains free of injury related to seizures activity  Outcome: Progressing  Goal: Achieves maximal functionality and self care  Outcome: Progressing     Problem: Respiratory - Adult  Goal: Achieves optimal ventilation and oxygenation  Outcome: Progressing     Problem: Cardiovascular - Adult  Goal: Maintains optimal cardiac output and hemodynamic stability  Outcome: Progressing  Goal: Absence of cardiac dysrhythmias or at baseline  Outcome: Progressing     Problem: Skin/Tissue Integrity - Adult  Goal: Skin integrity remains intact  Description: 1.  Monitor for areas of redness and/or skin breakdown  2.  Assess vascular access sites hourly  3.  Every 4-6 hours minimum:  Change oxygen saturation probe site  4.  Every 4-6 hours:  If on nasal continuous positive airway pressure, respiratory therapy assess nares and determine need for appliance change or resting period  Outcome: Progressing  Goal: Incisions, wounds, or drain sites healing without S/S of infection  Outcome: Progressing  Goal: Oral mucous membranes remain intact  Outcome:

## 2025-01-28 NOTE — H&P
hemorrhage, no bony abnormality or misalignment.  Cardiomegaly, bibasilar atelectasis.    History from:     Patient and spouse    History of Present Illness:     Chief Complaint: Recurrent falls, generalized weakness    Jarred Suarez is a 89 y.o. male with hypertension, hyperlipidemia, chronic diastolic heart failure, coronary artery disease, paroxysmal atrial fibrillation, ambulatory dysfunction, left foot drop, history of multiple spinal surgeries presented from [facility] due to generalized weakness recurrent falls.  Patient reports that over the past couple of weeks she has had 2 falls.  The first fall was last Wednesday when he was utilizing the plunger in his restroom and lost his balance.  Second fall happened when he was transferring from his wheelchair to bed.  Patient states that he cannot really tell that if he is urinary frequency because he takes Lasix in the morning.  Denies dysuria burning micturition suprapubic abdominal pain.  During my evaluation patient was alert oriented x 3 hemodynamically stable spouse was present at bedside.     Review of Systems:      Pertinent positives and negatives discussed in HPI     Objective:   No intake or output data in the 24 hours ending 01/28/25 0350   Vitals:   Vitals:    01/27/25 2325 01/28/25 0309   BP: (!) 142/78 96/60   Pulse: 83 83   Resp: 20 20   Temp: 98 °F (36.7 °C) 98.4 °F (36.9 °C)   TempSrc: Oral Oral   SpO2: 98% 93%   Weight: 112 kg (246 lb 14.6 oz)    Height: 1.803 m (5' 11\")        Personally Reviewed Medications Prior to Admission     Prior to Admission medications    Medication Sig Start Date End Date Taking? Authorizing Provider   amitriptyline (ELAVIL) 150 MG tablet TAKE 1 TABLET BY MOUTH AT BEDTIME 1/27/25  Yes Jack Shelton MD   potassium chloride (KLOR-CON M) 20 MEQ extended release tablet Take 0.5 tablets by mouth daily 12/31/24  Yes Oral Mario MD   famotidine (PEPCID) 20 MG tablet TAKE 1 TABLET BY MOUTH 2 TIMES A DAY 11/18/24  Yes

## 2025-01-28 NOTE — ED NOTES
[facility] here at this time to pick patient up. All paperwork sent with stretcher including signed EMTALA.

## 2025-01-29 ENCOUNTER — APPOINTMENT (OUTPATIENT)
Dept: GENERAL RADIOLOGY | Age: 89
DRG: 690 | End: 2025-01-29
Attending: STUDENT IN AN ORGANIZED HEALTH CARE EDUCATION/TRAINING PROGRAM
Payer: MEDICARE

## 2025-01-29 LAB
ANION GAP SERPL CALCULATED.3IONS-SCNC: 12 MMOL/L (ref 3–16)
BACTERIA UR CULT: ABNORMAL
BASOPHILS # BLD: 0 K/UL (ref 0–0.2)
BASOPHILS NFR BLD: 0.8 %
BUN SERPL-MCNC: 15 MG/DL (ref 7–20)
CALCIUM SERPL-MCNC: 8.9 MG/DL (ref 8.3–10.6)
CHLORIDE SERPL-SCNC: 102 MMOL/L (ref 99–110)
CO2 SERPL-SCNC: 24 MMOL/L (ref 21–32)
CREAT SERPL-MCNC: 1.1 MG/DL (ref 0.8–1.3)
DEPRECATED RDW RBC AUTO: 13.9 % (ref 12.4–15.4)
EKG DIAGNOSIS: NORMAL
EKG Q-T INTERVAL: 412 MS
EKG QRS DURATION: 154 MS
EKG QTC CALCULATION (BAZETT): 463 MS
EKG R AXIS: 40 DEGREES
EKG T AXIS: 72 DEGREES
EKG VENTRICULAR RATE: 76 BPM
EOSINOPHIL # BLD: 0.1 K/UL (ref 0–0.6)
EOSINOPHIL NFR BLD: 2 %
GFR SERPLBLD CREATININE-BSD FMLA CKD-EPI: 64 ML/MIN/{1.73_M2}
GLUCOSE SERPL-MCNC: 126 MG/DL (ref 70–99)
HCT VFR BLD AUTO: 37.9 % (ref 40.5–52.5)
HGB BLD-MCNC: 12.8 G/DL (ref 13.5–17.5)
LYMPHOCYTES # BLD: 1 K/UL (ref 1–5.1)
LYMPHOCYTES NFR BLD: 18.7 %
MCH RBC QN AUTO: 30.9 PG (ref 26–34)
MCHC RBC AUTO-ENTMCNC: 33.8 G/DL (ref 31–36)
MCV RBC AUTO: 91.4 FL (ref 80–100)
MONOCYTES # BLD: 0.6 K/UL (ref 0–1.3)
MONOCYTES NFR BLD: 10.8 %
NEUTROPHILS # BLD: 3.7 K/UL (ref 1.7–7.7)
NEUTROPHILS NFR BLD: 67.7 %
NT-PROBNP SERPL-MCNC: 1037 PG/ML (ref 0–449)
ORGANISM: ABNORMAL
PLATELET # BLD AUTO: 158 K/UL (ref 135–450)
PMV BLD AUTO: 7.4 FL (ref 5–10.5)
POTASSIUM SERPL-SCNC: 4.4 MMOL/L (ref 3.5–5.1)
RBC # BLD AUTO: 4.14 M/UL (ref 4.2–5.9)
REPORT: NORMAL
RESP PATH DNA+RNA PNL NPH NAA+NON-PROBE: NORMAL
SODIUM SERPL-SCNC: 138 MMOL/L (ref 136–145)
WBC # BLD AUTO: 5.5 K/UL (ref 4–11)

## 2025-01-29 PROCEDURE — 0202U NFCT DS 22 TRGT SARS-COV-2: CPT

## 2025-01-29 PROCEDURE — 94640 AIRWAY INHALATION TREATMENT: CPT

## 2025-01-29 PROCEDURE — 6360000002 HC RX W HCPCS: Performed by: NURSE PRACTITIONER

## 2025-01-29 PROCEDURE — 36415 COLL VENOUS BLD VENIPUNCTURE: CPT

## 2025-01-29 PROCEDURE — 97530 THERAPEUTIC ACTIVITIES: CPT

## 2025-01-29 PROCEDURE — 80048 BASIC METABOLIC PNL TOTAL CA: CPT

## 2025-01-29 PROCEDURE — 6370000000 HC RX 637 (ALT 250 FOR IP): Performed by: NURSE PRACTITIONER

## 2025-01-29 PROCEDURE — 1200000000 HC SEMI PRIVATE

## 2025-01-29 PROCEDURE — 85025 COMPLETE CBC W/AUTO DIFF WBC: CPT

## 2025-01-29 PROCEDURE — 93010 ELECTROCARDIOGRAM REPORT: CPT | Performed by: INTERNAL MEDICINE

## 2025-01-29 PROCEDURE — 71045 X-RAY EXAM CHEST 1 VIEW: CPT

## 2025-01-29 PROCEDURE — 83880 ASSAY OF NATRIURETIC PEPTIDE: CPT

## 2025-01-29 PROCEDURE — 6370000000 HC RX 637 (ALT 250 FOR IP): Performed by: STUDENT IN AN ORGANIZED HEALTH CARE EDUCATION/TRAINING PROGRAM

## 2025-01-29 RX ORDER — ALBUTEROL SULFATE 90 UG/1
2 INHALANT RESPIRATORY (INHALATION) EVERY 4 HOURS PRN
Status: DISCONTINUED | OUTPATIENT
Start: 2025-01-29 | End: 2025-01-30 | Stop reason: HOSPADM

## 2025-01-29 RX ORDER — FUROSEMIDE 10 MG/ML
20 INJECTION INTRAMUSCULAR; INTRAVENOUS ONCE
Status: COMPLETED | OUTPATIENT
Start: 2025-01-29 | End: 2025-01-29

## 2025-01-29 RX ADMIN — RIVAROXABAN 20 MG: 20 TABLET, FILM COATED ORAL at 16:51

## 2025-01-29 RX ADMIN — FUROSEMIDE 20 MG: 20 TABLET ORAL at 09:11

## 2025-01-29 RX ADMIN — FUROSEMIDE 20 MG: 10 INJECTION, SOLUTION INTRAMUSCULAR; INTRAVENOUS at 16:50

## 2025-01-29 RX ADMIN — Medication 2 PUFF: at 09:19

## 2025-01-29 RX ADMIN — LOSARTAN POTASSIUM 25 MG: 25 TABLET, FILM COATED ORAL at 09:12

## 2025-01-29 RX ADMIN — FAMOTIDINE 20 MG: 20 TABLET, FILM COATED ORAL at 09:11

## 2025-01-29 RX ADMIN — CIPROFLOXACIN 400 MG: 2 INJECTION, SOLUTION INTRAVENOUS at 09:18

## 2025-01-29 RX ADMIN — CIPROFLOXACIN 400 MG: 2 INJECTION, SOLUTION INTRAVENOUS at 20:39

## 2025-01-29 RX ADMIN — AMITRIPTYLINE HYDROCHLORIDE 150 MG: 50 TABLET, FILM COATED ORAL at 19:28

## 2025-01-29 RX ADMIN — ACETAMINOPHEN 650 MG: 325 TABLET ORAL at 09:11

## 2025-01-29 RX ADMIN — FAMOTIDINE 20 MG: 20 TABLET, FILM COATED ORAL at 19:28

## 2025-01-29 NOTE — CONSULTS
Urology Consult Note  [facility]     Patient: Jarred Suarez MRN: [id number]  Room/Bed: 5TN-5582/5582-01   YOB: 1935  Age/Sex: 89 y.o.male  Admission Date: 1/27/2025     Date of Service:  1/29/2025    Consulting Provider: Santiago Pressley PA-C CNP  Admitting/Requesting Physician: No admitting provider for patient encounter.  Primary Care Physician: Jack Sheltno MD    Reason for Consult: Wendy    ASSESSMENT/PLAN     88 yo male admitted with weakness and UTI  He is a patient of Dr. Perry. He was last seen in office in September of 2024. He has a history of BPH s/p TURP 2013 with BNC s/p urethral dilation and removal of bladder stones 7/26/24 with subsequent cysto Udil again on 10/21/24. He has planned follow up in March.   Tmax 100. Cr and wbc wnl. Urine cx shows enterobacter.     Recommendations:  Continue abx and follow up cultures  Bladder scan prn. Start 0.4 mg flomax if there is any concern for urinary retention. There are no further plans from our standpoint at this time.   Prior to his cystos last year it appeared incomplete bladder emptying and stones were putting him at risk for Wendy. Further evaluation will be deferred to outpatient setting. Patient is scheduled for office follow up with Dr. Perry 3/12/25 with office pvr at that time.    Call with questions. Otherwise can discharge from a  standpoint once medically cleared.  Will sign out     All patient questions were answered. He understands the plan as listed above.    HISTORY     Chief Complaint: No chief complaint on file.      History of Present Illness: Jarred Suarez is a 89 y.o. male with weakness. Onset of symptoms was days ago with improved course since that time. Symptoms are aggravated by UTI which is recurrent. Symptoms improved with abx and fluids. Associated symptoms include falls. Patient also reports hx BPH s/p TURP 2013, hx BNC with Udil in July and October of 2024. He has tried the

## 2025-01-30 ENCOUNTER — TELEPHONE (OUTPATIENT)
Dept: FAMILY MEDICINE CLINIC | Age: 89
End: 2025-01-30

## 2025-01-30 VITALS
WEIGHT: 239.42 LBS | HEART RATE: 83 BPM | HEIGHT: 71 IN | TEMPERATURE: 98.7 F | DIASTOLIC BLOOD PRESSURE: 75 MMHG | BODY MASS INDEX: 33.52 KG/M2 | RESPIRATION RATE: 16 BRPM | OXYGEN SATURATION: 96 % | SYSTOLIC BLOOD PRESSURE: 122 MMHG

## 2025-01-30 LAB
ANION GAP SERPL CALCULATED.3IONS-SCNC: 13 MMOL/L (ref 3–16)
BUN SERPL-MCNC: 16 MG/DL (ref 7–20)
CALCIUM SERPL-MCNC: 9.4 MG/DL (ref 8.3–10.6)
CHLORIDE SERPL-SCNC: 97 MMOL/L (ref 99–110)
CO2 SERPL-SCNC: 24 MMOL/L (ref 21–32)
CREAT SERPL-MCNC: 1.3 MG/DL (ref 0.8–1.3)
DEPRECATED RDW RBC AUTO: 13.9 % (ref 12.4–15.4)
GFR SERPLBLD CREATININE-BSD FMLA CKD-EPI: 52 ML/MIN/{1.73_M2}
GLUCOSE SERPL-MCNC: 135 MG/DL (ref 70–99)
HCT VFR BLD AUTO: 41.6 % (ref 40.5–52.5)
HGB BLD-MCNC: 14 G/DL (ref 13.5–17.5)
MCH RBC QN AUTO: 30.9 PG (ref 26–34)
MCHC RBC AUTO-ENTMCNC: 33.7 G/DL (ref 31–36)
MCV RBC AUTO: 91.7 FL (ref 80–100)
PLATELET # BLD AUTO: 178 K/UL (ref 135–450)
PMV BLD AUTO: 7.5 FL (ref 5–10.5)
POTASSIUM SERPL-SCNC: 4.1 MMOL/L (ref 3.5–5.1)
RBC # BLD AUTO: 4.54 M/UL (ref 4.2–5.9)
SODIUM SERPL-SCNC: 134 MMOL/L (ref 136–145)
WBC # BLD AUTO: 7.2 K/UL (ref 4–11)

## 2025-01-30 PROCEDURE — 97530 THERAPEUTIC ACTIVITIES: CPT

## 2025-01-30 PROCEDURE — 80048 BASIC METABOLIC PNL TOTAL CA: CPT

## 2025-01-30 PROCEDURE — 6370000000 HC RX 637 (ALT 250 FOR IP): Performed by: STUDENT IN AN ORGANIZED HEALTH CARE EDUCATION/TRAINING PROGRAM

## 2025-01-30 PROCEDURE — 6370000000 HC RX 637 (ALT 250 FOR IP): Performed by: NURSE PRACTITIONER

## 2025-01-30 PROCEDURE — 36415 COLL VENOUS BLD VENIPUNCTURE: CPT

## 2025-01-30 PROCEDURE — 97535 SELF CARE MNGMENT TRAINING: CPT

## 2025-01-30 PROCEDURE — 85027 COMPLETE CBC AUTOMATED: CPT

## 2025-01-30 PROCEDURE — 6360000002 HC RX W HCPCS: Performed by: NURSE PRACTITIONER

## 2025-01-30 RX ORDER — ALBUTEROL SULFATE 90 UG/1
2 INHALANT RESPIRATORY (INHALATION) EVERY 4 HOURS PRN
Qty: 18 G | Refills: 1 | Status: SHIPPED | OUTPATIENT
Start: 2025-01-30

## 2025-01-30 RX ORDER — CIPROFLOXACIN 500 MG/1
500 TABLET, FILM COATED ORAL 2 TIMES DAILY
Qty: 10 TABLET | Refills: 0 | Status: SHIPPED | OUTPATIENT
Start: 2025-01-30 | End: 2025-02-04

## 2025-01-30 RX ORDER — LACTOBACILLUS RHAMNOSUS GG 10B CELL
1 CAPSULE ORAL 2 TIMES DAILY WITH MEALS
Qty: 14 CAPSULE | Refills: 0 | Status: SHIPPED | OUTPATIENT
Start: 2025-01-30 | End: 2025-02-06

## 2025-01-30 RX ORDER — SODIUM CHLORIDE 9 MG/ML
INJECTION, SOLUTION INTRAVENOUS
Status: DISCONTINUED
Start: 2025-01-30 | End: 2025-01-30 | Stop reason: HOSPADM

## 2025-01-30 RX ORDER — LACTOBACILLUS RHAMNOSUS GG 10B CELL
1 CAPSULE ORAL 2 TIMES DAILY WITH MEALS
Status: DISCONTINUED | OUTPATIENT
Start: 2025-01-30 | End: 2025-01-30 | Stop reason: HOSPADM

## 2025-01-30 RX ADMIN — FUROSEMIDE 20 MG: 20 TABLET ORAL at 09:30

## 2025-01-30 RX ADMIN — LOSARTAN POTASSIUM 25 MG: 25 TABLET, FILM COATED ORAL at 09:30

## 2025-01-30 RX ADMIN — FAMOTIDINE 20 MG: 20 TABLET, FILM COATED ORAL at 09:30

## 2025-01-30 RX ADMIN — Medication 1 CAPSULE: at 09:30

## 2025-01-30 RX ADMIN — CIPROFLOXACIN 400 MG: 2 INJECTION, SOLUTION INTRAVENOUS at 10:50

## 2025-01-30 NOTE — CARE COORDINATION
01/30/25 1128   IMM Letter   IMM Letter given to Patient/Family/Significant other/Guardian/POA/by: IMM given to the patient. Patient agreeable to discharge.   IMM Letter date given: 01/30/25   IMM Letter time given: 1122

## 2025-01-30 NOTE — PLAN OF CARE
Problem: Chronic Conditions and Co-morbidities  Goal: Patient's chronic conditions and co-morbidity symptoms are monitored and maintained or improved  Outcome: Progressing     Problem: Safety - Adult  Goal: Free from fall injury  Outcome: Progressing     Problem: Skin/Tissue Integrity  Goal: Skin integrity remains intact  Description: 1.  Monitor for areas of redness and/or skin breakdown  2.  Assess vascular access sites hourly  3.  Every 4-6 hours minimum:  Change oxygen saturation probe site  4.  Every 4-6 hours:  If on nasal continuous positive airway pressure, respiratory therapy assess nares and determine need for appliance change or resting period  Outcome: Progressing  Flowsheets (Taken 1/30/2025 0919)  Skin Integrity Remains Intact: Monitor for areas of redness and/or skin breakdown     Problem: ABCDS Injury Assessment  Goal: Absence of physical injury  Outcome: Progressing     Problem: Neurosensory - Adult  Goal: Achieves stable or improved neurological status  Outcome: Progressing  Goal: Remains free of injury related to seizures activity  Outcome: Progressing  Goal: Achieves maximal functionality and self care  Outcome: Progressing     Problem: Respiratory - Adult  Goal: Achieves optimal ventilation and oxygenation  Outcome: Progressing     Problem: Cardiovascular - Adult  Goal: Maintains optimal cardiac output and hemodynamic stability  Outcome: Progressing  Goal: Absence of cardiac dysrhythmias or at baseline  Outcome: Progressing     Problem: Skin/Tissue Integrity - Adult  Goal: Skin integrity remains intact  Description: 1.  Monitor for areas of redness and/or skin breakdown  2.  Assess vascular access sites hourly  3.  Every 4-6 hours minimum:  Change oxygen saturation probe site  4.  Every 4-6 hours:  If on nasal continuous positive airway pressure, respiratory therapy assess nares and determine need for appliance change or resting period  Outcome: Progressing  Flowsheets (Taken 1/30/2025

## 2025-01-30 NOTE — DISCHARGE SUMMARY
[facility] DISCHARGE SUMMARY    Patient Demographics    Patient. Jarred Suarez  Date of Birth. 1935  MRN. [id number]     Primary care provider. Jack Shelton MD  (Tel: [phone])    Admit date: 1/27/2025    Discharge date (blank if same as Note Date):   Note Date: 1/30/2025     Reason for Hospitalization.   \"Weakness and falls\"      Significant Findings.   Principal Problem:    Generalized weakness  Resolved Problems:    * No resolved hospital problems. *     Problem-based Hospital Course.   Jarred Suarez is a 89 y.o. male with a past medical history of hypertension, hyperlipidemia, chronic diastolic heart failure, coronary artery disease, paroxysmal atrial fibrillation, ambulatory dysfunction, left foot drop, history of multiple spinal surgeries presented from [facility] due to generalized weakness recurrent falls. He was transferred to [facility] due to bed capacity and current 20+ hold at [facility].  Treated with IV abx for UTI.  Urine culture grew 75,000 cfu enterobacter cloacae; sensitive to cipro.  Urology evaluated for recurrent UTI, bladder scan OK, urology no planning or recommending further imaging or procedures, OK for OP follow up.      Assessment and Plan:  Generalized Weakness  Multiple Falls  UTI              - Improved and back to baseline today per patient and daughter                - Blood cultures NGTD              - Treated with IV cefepime, prior enterococcus and pseudomonas UTI switched to cipro 1/29               - Urine culture 75,000 cfu enterobacter cloacae; sensitive to cipro     ~ Treat for 7 days total   - Urology evaluated and no urgent intervention needed, can follow up as OP    - PT/OT recommended ARU, he declined ARU and SNF, he would like to return home to [facility] and home palliative care  Hypertension              - Acceptably controlled.  Continue home treatment  dCHF              - Appears compensated, BNP elevated, however CXR normal and lungs clear,

## 2025-01-30 NOTE — CARE COORDINATION
Case Management -  Discharge Note      Patient Name: Jarred Suarez                   YOB: 1935  Room: [facility]5582/5582-01            Readmission Risk (Low < 19, Mod (19-27), High > 27): Readmission Risk Score: 13.8    Current PCP: Jack Shelton MD      ([facility]) Important Message from Medicare:    Has pt received appropriate compliance notices before being discharged if required: yes  Compliance doc:  [x] 2nd IMM; [] Code 44 [] [place]  Date Given: 1/30/2025 Given By: STEF    PT AM-PAC: 10 /24  OT AM-PAC: 13 /24    Patient/patient representative has been educated on the benefits of Home Health Care as well as the possible risks of declining recommended services. Patient/patient representative has acknowledged the information provided and decided on the following discharge plan. Patient/ patient representative has been provided freedom of choice regarding service provider, supported by basic dialogue that supports the patient's individualized plan of care/goals.    Home Care Information:   Is patient resuming current home health care services: Yes -    Home Care Agency:   [facility] ([facility])  [address] [id number]  Phone: [phone]  Fax: [phone]                     Services: PT/OT/RN  Home Health Order Obtained: Yes    Home health agency notified of discharge.      Financial    Payor: [employer] MEDICARE / Plan: [employer] GOLD PLUS HMO / Product Type: *No Product type* /     Pharmacy:  Potential assistance Purchasing Medications: No  Meds-to-Beds request: Yes      [id number] - [place], [place] - [address] - P [phone] - F [phone]  [address]  [facility] [address]  Phone: [phone] Fax: [phone]      Notes:    Additional Case Management Notes: Patient will discharge home with family. Patient family reports they will transport the patient home at discharge.     Gia from [facility] is aware the patient will discharge home on

## 2025-01-30 NOTE — PLAN OF CARE
Problem: Chronic Conditions and Co-morbidities  Goal: Patient's chronic conditions and co-morbidity symptoms are monitored and maintained or improved  1/29/2025 2043 by Jazmín Hurtado RN  Outcome: Progressing  1/29/2025 1212 by Kathy Johnson RN  Outcome: Progressing     Problem: Safety - Adult  Goal: Free from fall injury  1/29/2025 2043 by Jazmín Hurtado RN  Outcome: Progressing  1/29/2025 1212 by Kathy Johnson RN  Outcome: Progressing     Problem: Skin/Tissue Integrity  Goal: Skin integrity remains intact  Description: 1.  Monitor for areas of redness and/or skin breakdown  2.  Assess vascular access sites hourly  3.  Every 4-6 hours minimum:  Change oxygen saturation probe site  4.  Every 4-6 hours:  If on nasal continuous positive airway pressure, respiratory therapy assess nares and determine need for appliance change or resting period  1/29/2025 2043 by Jazmín Hurtado RN  Outcome: Progressing  1/29/2025 1212 by Kathy Johnson RN  Outcome: Progressing     Problem: ABCDS Injury Assessment  Goal: Absence of physical injury  1/29/2025 2043 by Jazmín Hurtado RN  Outcome: Progressing  1/29/2025 1212 by Kathy Johnson RN  Outcome: Progressing     Problem: Neurosensory - Adult  Goal: Achieves stable or improved neurological status  1/29/2025 2043 by Jazmín Hurtado RN  Outcome: Progressing  1/29/2025 1212 by Kathy Johnson RN  Outcome: Progressing  Goal: Remains free of injury related to seizures activity  1/29/2025 2043 by Jazmín Hurtado RN  Outcome: Progressing  1/29/2025 1212 by Kathy Johnson RN  Outcome: Progressing  Goal: Achieves maximal functionality and self care  1/29/2025 2043 by Jazmín Hurtado RN  Outcome: Progressing  1/29/2025 1212 by Kathy Johnson RN  Outcome: Progressing     Problem: Respiratory - Adult  Goal: Achieves optimal ventilation and oxygenation  1/29/2025 2043 by Jazmín Hurtado RN  Outcome: Progressing  1/29/2025 1212 by Kathy Johnson RN  Outcome: Progressing

## 2025-01-30 NOTE — TELEPHONE ENCOUNTER
Gia from [facility] calling in, stated that pt was dc from [facility] today and they would like to get a verbal for SN, PT, and OT Resumption of Care    Please advise  Gia can be reached at [phone], verbal can be left on voicemail

## 2025-01-30 NOTE — PROGRESS NOTES
Hospitalist Progress Note      Name:  Jarred Suarez /Age/Sex: 1935  (89 y.o. male)   MRN & CSN:  [id number] & [id number] Encounter Date/Time: 2025 7:22 AM EST   Location:  5T-5582/5582-01 PCP: Jack Shelton MD     Attending:Eamon Pollock MD       Hospital Day: 3    Subjective:   Chief Complaint:  \"weakness and falls\"    Jarred Suarez is a 89 y.o. male with a past medical history of hypertension, hyperlipidemia, chronic diastolic heart failure, coronary artery disease, paroxysmal atrial fibrillation, ambulatory dysfunction, left foot drop, history of multiple spinal surgeries presented from [facility] due to generalized weakness recurrent falls. He was transferred to [facility] due to bed capacity and current 20+ hold at [facility].      Interval History:  Today, he is resting in bed.  He has family at bedside.  He denies any dysuria. Denies chills.  Admits to new wheezes.  He has chronic LE swelling, reports that is is actually improved from his baseline.  Denies CP.  No chills.  He had a temp on 100 this am.      Independently reviewed interval ancillary notes from urology.     Assessment and Recommendations   Problem List  Principal Problem:    Generalized weakness  Resolved Problems:    * No resolved hospital problems. *     Assessment and Plan:    Generalized Weakness  Multiple Falls  UTI              - Improved today                - Urine culture pending               - Blood culture spending              - Treating for UTI with IV cefepime, prior enterococcus and pseudomonas UTI will switch to cipro    Hypertension              - Acceptably controlled.     dCHF              - Appears compensated   - Continue lasix 20 mg daily and losartan 25 mg daily     PAF              - Atrial flutter on telemetry with CVR              - Continue Xarelto 20 mg daily   Obesity: Body mass index is 34.44 kg/m².              - Excessive weight is complicating assessment treatment.  It is placing patient at risk 
    Hospitalist Progress Note      Name:  Jarred Suarez /Age/Sex: 1935  (89 y.o. male)   MRN & CSN:  [id number] & [id number] Encounter Date/Time: 2025 8:35 AM EST   Location:  5TN-5582/5582-01 PCP: Jack Shelton MD     Attending:Eamon Pollock MD       Hospital Day: 2    Subjective:   Chief Complaint: weakness and falls  Jarred Suarez is a 89 y.o. male with a past medical history of hypertension, hyperlipidemia, chronic diastolic heart failure, coronary artery disease, paroxysmal atrial fibrillation, ambulatory dysfunction, left foot drop, history of multiple spinal surgeries presented from [facility] due to generalized weakness recurrent falls. He was transferred to [facility] due to bed capacity and current 20+ hold at [facility].      Interval History:  Today, he has been up in the chair and worked with therapy.  He is feeling better today than he was on admission.  He denies dysuria.  He reports chronic debility wheelchair bound and uses lift equipment.     Independently reviewed interval ancillary notes from emergency medicine.     Assessment and Recommendations   Problem List  Principal Problem:    Generalized weakness  Resolved Problems:    * No resolved hospital problems. *     Assessment and Plan:    Generalized Weakness  Multiple Falls  UTI   - Improved today     - Urine culture pending    - Blood culture spending   - Treating for UTI with IV cefepime, prior enterococcus and pseudomonas UTI will switch to cipro    Hypertension   - Acceptably controlled.     dCHF   - Appears compensated   - Continue lasix 20 mg daily and losartan 25 mg daily     PAF   - Atrial flutter on telemetry with CVR   - Continue Xarelto 20 mg daily   Obesity: Body mass index is 34.44 kg/m².   - Excessive weight is complicating assessment treatment.  It is placing patient at risk for multiple co-morbidities as well as early death contributing to the patient's presentations.   - Discussed weight loss and lifestyle 
  [facility] - Inpatient Rehabilitation Department   Phone: [phone]    Physical Therapy    [] Initial Evaluation            [x] Daily Treatment Note         [] Discharge Summary      Patient: Jarred Suarez   : 1935   MRN: [id number]   Date of Service:  2025  Admitting Diagnosis: Generalized weakness  Current Admission Summary: Jarred Suarez is a 89 y.o. male who presents for generalized weakness and 2 falls for the past 1 to 2 weeks.  Wife reports patient has been generally more weak for the past 1 to 2 weeks.  He is having difficulty getting out of his lift chair even with assistance from her.  Then uses a wheelchair.  He has also fallen twice over the past 1 to 2 weeks.  There is no lightheadedness, dizziness, syncope prior to the falls.  one fall was in the bathroom when he lost his balance when trying to plunge the toilet.  He did hit the back of his head.  No LOC.  He is on Xarelto.  The other fall was when he fell from his lift chair while trying to transfer to wheelchair.  Wife reports it was a gentle fall.  Wife and family reports that he has had prior episodes of generalized weakness and falls with urinary tract infection.  The home health nurse came today to collect a urine specimen but he is unable to urinate.  They report he typically does not have symptoms with infection.  Patient denies any dysuria, hematuria or frequency.  Patient denies any headache.  Denies any pain anywhere.  Denies nausea vomiting abdominal pain, chest pain, shortness of breath.  They do report the home health nurse heard some abnormal breath sounds so called PCP office who instructed patient to come to ED.   Past Medical History:  has a past medical history of Atrial fibrillation (HCC), CAD (coronary artery disease), Diarrhea, History of skin cancer, Hyperlipidemia, Hypertension, Insomnia, Kidney stones, and Open wound of left hand, subsequent encounter.  Past Surgical History:  has a past 
  [facility] - Inpatient Rehabilitation Department   Phone: [phone]    Occupational Therapy    [] Initial Evaluation            [x] Daily Treatment Note         [] Discharge Summary      Patient: Jarred Suarez   : 1935   MRN: [id number]   Date of Service:  2025    Admitting Diagnosis:  Generalized weakness  Current Admission Summary: 89 y.o. male with hypertension, hyperlipidemia, chronic diastolic heart failure, coronary artery disease, paroxysmal atrial fibrillation, ambulatory dysfunction, left foot drop, history of multiple spinal surgeries presented from [facility] due to generalized weakness recurrent falls.Generalized weakness, Ambulatory dysfunction and frequent falls  Possibly exacerbated by urinary tract infection with underlying left foot drop, chronic bilateral lower extremity weakness.  Past Medical History:  has a past medical history of Atrial fibrillation (HCC), CAD (coronary artery disease), Diarrhea, History of skin cancer, Hyperlipidemia, Hypertension, Insomnia, Kidney stones, and Open wound of left hand, subsequent encounter.  Past Surgical History:  has a past surgical history that includes back surgery; knee surgery (); shoulder surgery; Appendectomy; Coronary artery bypass graft (); Coronary artery bypass graft (); Prostate surgery; Skin cancer excision; joint replacement (Right); Total knee arthroplasty (Right, ); and Cystoscopy (N/A, 2024).    Discharge Recommendations: Jarred Suarez scored a 13/24 on the AM-PAC ADL Inpatient form. Current research shows that an AM-PAC score of 17 or less is typically not associated with a discharge to the patient's home setting. Based on the patient's AM-PAC score and their current ADL deficits, it is recommended that the patient have 5-7 sessions per week of Occupational Therapy at d/c to increase the patient's independence.  At this time, this patient demonstrates complex nursing, medical, and 
  [facility] - Inpatient Rehabilitation Department   Phone: [phone]    Physical Therapy    [] Initial Evaluation            [x] Daily Treatment Note         [] Discharge Summary      Patient: Jarred Suarez   : 1935   MRN: [id number]   Date of Service:  2025  Admitting Diagnosis: Generalized weakness  Current Admission Summary: Jarred Suarez is a 89 y.o. male who presents for generalized weakness and 2 falls for the past 1 to 2 weeks.  Wife reports patient has been generally more weak for the past 1 to 2 weeks.  He is having difficulty getting out of his lift chair even with assistance from her.  Then uses a wheelchair.  He has also fallen twice over the past 1 to 2 weeks.  There is no lightheadedness, dizziness, syncope prior to the falls.  one fall was in the bathroom when he lost his balance when trying to plunge the toilet.  He did hit the back of his head.  No LOC.  He is on Xarelto.  The other fall was when he fell from his lift chair while trying to transfer to wheelchair.  Wife reports it was a gentle fall.  Wife and family reports that he has had prior episodes of generalized weakness and falls with urinary tract infection.  The home health nurse came today to collect a urine specimen but he is unable to urinate.  They report he typically does not have symptoms with infection.  Patient denies any dysuria, hematuria or frequency.  Patient denies any headache.  Denies any pain anywhere.  Denies nausea vomiting abdominal pain, chest pain, shortness of breath.  They do report the home health nurse heard some abnormal breath sounds so called PCP office who instructed patient to come to ED.   Past Medical History:  has a past medical history of Atrial fibrillation (HCC), CAD (coronary artery disease), Diarrhea, History of skin cancer, Hyperlipidemia, Hypertension, Insomnia, Kidney stones, and Open wound of left hand, subsequent encounter.  Past Surgical History:  has a past 
  [facility] - Inpatient Rehabilitation Department   Phone: [phone]    Physical Therapy    [x] Initial Evaluation            [] Daily Treatment Note         [] Discharge Summary      Patient: Jarred Suarez   : 1935   MRN: [id number]   Date of Service:  2025  Admitting Diagnosis: Generalized weakness  Current Admission Summary: Jarred Suarez is a 89 y.o. male who presents for generalized weakness and 2 falls for the past 1 to 2 weeks.  Wife reports patient has been generally more weak for the past 1 to 2 weeks.  He is having difficulty getting out of his lift chair even with assistance from her.  Then uses a wheelchair.  He has also fallen twice over the past 1 to 2 weeks.  There is no lightheadedness, dizziness, syncope prior to the falls.  one fall was in the bathroom when he lost his balance when trying to plunge the toilet.  He did hit the back of his head.  No LOC.  He is on Xarelto.  The other fall was when he fell from his lift chair while trying to transfer to wheelchair.  Wife reports it was a gentle fall.  Wife and family reports that he has had prior episodes of generalized weakness and falls with urinary tract infection.  The home health nurse came today to collect a urine specimen but he is unable to urinate.  They report he typically does not have symptoms with infection.  Patient denies any dysuria, hematuria or frequency.  Patient denies any headache.  Denies any pain anywhere.  Denies nausea vomiting abdominal pain, chest pain, shortness of breath.  They do report the home health nurse heard some abnormal breath sounds so called PCP office who instructed patient to come to ED.   Past Medical History:  has a past medical history of Atrial fibrillation (HCC), CAD (coronary artery disease), Diarrhea, History of skin cancer, Hyperlipidemia, Hypertension, Insomnia, Kidney stones, and Open wound of left hand, subsequent encounter.  Past Surgical History:  has a past 
4 Eyes Skin Assessment     NAME:  Jarred Suarez  YOB: 1935  MEDICAL RECORD NUMBER:  [id number]    The patient is being assessed for  Admission    I agree that at least one RN has performed a thorough Head to Toe Skin Assessment on the patient. ALL assessment sites listed below have been assessed.      Areas assessed by both nurses:    Head, Face, Ears, Shoulders, Back, Chest, Arms, Elbows, Hands, Sacrum. Buttock, Coccyx, Ischium, Legs. Feet and Heels, and Under Medical Devices         Does the Patient have a Wound? Yes wound(s) were present on assessment. LDA wound assessment was Initiated and completed by RN       Marbin Prevention initiated by RN: Yes  Wound Care Orders initiated by RN: Yes    Pressure Injury (Stage 3,4, Unstageable, DTI, NWPT, and Complex wounds) if present, place Wound referral order by RN under : No    New Ostomies, if present place, Ostomy referral order under : No     Nurse 1 eSignature: Electronically signed by Juliana Dunham RN on 1/28/25 at 12:35 AM EST    **SHARE this note so that the co-signing nurse can place an eSignature**    Nurse 2 eSignature: Electronically signed by NIKOS AMBROSE RN on 1/28/25 at 4:28 AM EST   
CLINICAL PHARMACY NOTE: MEDS TO BEDS    Total # of Prescriptions Filled: 3   The following medications were delivered to the patient:  ACIDOPHILUS/CITRUS PECTIN TABS  CIPROFLOXACIN HCL 500MG TABS  ALBUTEROL SULFATE  AERS    Additional Documentation: Patient wife picked up=signed  Minna Gracia Pharmacy Tech  
RN discharge summary from [address] to home.     This patient has had a discharge order placed. They are returning home and being picked up in the lobby. Discharge paperwork has been printed, highlighted, and gone over with the patient by this RN. Patient understands teaching and has no further questions at this time. IV has been removed with no complications. Telemetry has been removed. Pt has all belongings present.    
Shift assessment completed. Routine vitals obtained. Scheduled medications given. Patient is awake, alert and oriented. Respirations are easy and unlabored. Patient does not appear to be in distress, resting comfortably at this time. Call light within reach.   
Shift assessment completed. Routine vitals stable. Scheduled medications given. Patient is awake, alert and oriented. Respirations are easy and unlabored. Patient does not appear to be in distress, resting comfortably at this time. Call light within reach.   
Shift assessment completed. Routine vitals stable. Scheduled medications given. Patient is awake, alert and oriented. Respirations are easy and unlabored. Patient does not appear to be in distress, resting comfortably at this time. Call light within reach.   
Right: maximum assistance  Scootin person assistance with maxA of 2 to HOB   Comments:  Transfers:  Sit to stand transfer:moderate assistance, bed fully elevated   Stand to sit transfer: 2 person assistance with modA of 2 (due to poor eccentric control sitting into recliner from STEDY)   Bed / Chair transfer: stedy utilized requiring modA sit>stand, modA of 2 stand>sit .    Bed / Chair equipment: no device  Bed / Chair comments: During 2nd session, patient returned to bed from recliner. modA of 1 sit > stand into STEDY, STEDY used to transfer patient to elevated bed. Patient became confused regarding tx and states \"it feels like I can't make it\" (to the bed, as patient positioned at bed with STEDY) Redirected patient, repositioned B feet in STEDY prior to stance. Patient able to complete STS within STEDY CGA/Ashvin with cues, stand to sit onto elevated bed modA of 2 (poor eccentric control). Sit>supine maxA of 2, scooting to HOB maxA of 2. Call light and other needs in reach, bed alarm activated  Comments:  Functional Mobility  No functional mobility completed on this date secondary to unable this date due to weakness, safety concerns.  Balance:  Static Sitting Balance: fair (-): maintains balance at CGA with use of UE support  Dynamic Sitting Balance: fair (-): maintains balance at CGA with use of UE support  Static Standing Balance: poor: requires mod (A) to maintain balance  Dynamic Standing Balance: poor (-): requires max (A) to maintain balance  Comments:    Other Therapeutic Interventions    Functional Outcomes  AM-PAC Inpatient Daily Activity Raw Score: 12                                    Cognition  Problem Solving: decreased awareness of errors, assistance required to identify errors made, assistance required to correct errors made  Initiation: requires cues for some  Sequencing: requires cues for some  Orientation:    alert and oriented x 4  Command Following:   accurately follows one step

## 2025-01-31 ENCOUNTER — CARE COORDINATION (OUTPATIENT)
Dept: CARE COORDINATION | Age: 89
End: 2025-01-31

## 2025-01-31 LAB
BACTERIA BLD CULT ORG #2: NORMAL
BACTERIA BLD CULT: NORMAL

## 2025-01-31 RX ORDER — AMITRIPTYLINE HYDROCHLORIDE 150 MG/1
150 TABLET ORAL
Qty: 90 TABLET | Refills: 0 | OUTPATIENT
Start: 2025-01-31

## 2025-01-31 NOTE — TELEPHONE ENCOUNTER
Lizzette lackey from [facility], wanted to let dr. Shelton know that they are starting SN, PT, and OT for pt 2/1/2025    FYI

## 2025-01-31 NOTE — CARE COORDINATION
Care Transitions Note    Initial Call - Call within 2 business days of discharge: Yes    Attempted to reach patient for transitions of care follow up. Unable to reach patient.    Outreach Attempts:   Unidentified woman answered and stated pt is sleeping and asked for a call after 1pm. Will attempt to reach pt at a later time.     Patient: Jarred Suarez    Patient : 1935   MRN: [id number]    Reason for Admission: FF, weakness, UTI  Discharge Date: 25  RURS: Readmission Risk Score: 13.4    Last Discharge Facility       Date Complaint Diagnosis Description Type Department Provider    25  Chronic combined systolic and diastolic congestive heart failure (HCC) Admission (Discharged) [facility] Eamon Pollock MD    25 Fall Acute cystitis without hematuria ... ED (TRANSFER) [facility]; [facility]            Was this an external facility discharge? No    Follow Up Appointment:   Patient does not have a follow up appointment scheduled at time of call.  UTR - will attempt to speak to pt on next call  Future Appointments         Provider Specialty Dept Phone    2025 1:30 PM Oral Mario MD Cardiology [phone]            Plan for follow-up on next business day.      ALYX Rust, RN   Care Transition Nurse  Mobile: [phone]

## 2025-02-03 ENCOUNTER — CARE COORDINATION (OUTPATIENT)
Dept: CARE COORDINATION | Age: 89
End: 2025-02-03

## 2025-02-03 DIAGNOSIS — I50.42 CHRONIC COMBINED SYSTOLIC AND DIASTOLIC CONGESTIVE HEART FAILURE (HCC): Primary | ICD-10-CM

## 2025-02-03 PROCEDURE — 1111F DSCHRG MED/CURRENT MED MERGE: CPT | Performed by: FAMILY MEDICINE

## 2025-02-03 NOTE — CARE COORDINATION
Care Transitions Note    Initial Call - Call within 2 business days of discharge: Yes    Patient Current Location:  Home: [address] [place] [id number]    Care Transition Nurse contacted the patient by telephone to perform post hospital discharge assessment, verified name and  as identifiers. Provided introduction to self, and explanation of the Care Transition Nurse role.     Patient: Jarred Suarez    Patient : 1935   MRN: [id number]    Reason for Admission: FF, debility, UTI  Discharge Date: 25  RURS: Readmission Risk Score: 13.4      Last Discharge Facility       Date Complaint Diagnosis Description Type Department Provider    25  Chronic combined systolic and diastolic congestive heart failure (HCC) [facility]    25 Fall Acute cystitis without hematuria ... ED (TRANSFER) [facility] Kelly Kirkland MD; [place]...            Was this an external facility discharge? No    Additional needs identified to be addressed with provider   No needs identified             Method of communication with provider: none.    Patients top risk factors for readmission: medical condition-.    Interventions to address risk factors:   Education: .    Care Summary Note: Spoke with pt who states he is doing well. Denies any fever or chills, cp or sob. Denies any falls since discharge - using walker and wheelchair to get around. Denies any urinary issues. States HC has been out to see pt and start care. Medications were reviewed. Pt does not weigh self daily due to not being able to balance self. Denies any questions or concerns at present. Pt to see PCP  and wife to call to make a urology appt. Will continue to follow.     Care Transition Nurse reviewed discharge instructions with patient and spouse/partner. The patient and spouse/partner was given an opportunity to ask questions; all questions answered at this time.. The patient and

## 2025-02-10 ENCOUNTER — CARE COORDINATION (OUTPATIENT)
Dept: CARE COORDINATION | Age: 89
End: 2025-02-10

## 2025-02-10 NOTE — CARE COORDINATION
Care Transitions Note    Follow Up Call     Patient Current Location:  Home: [address]  [place] [facility] [place]  [place] [address]    Care Transition Nurse contacted the patient by telephone. Verified name and  as identifiers.    Additional needs identified to be addressed with provider   No needs identified                 Method of communication with provider: none.    Care Summary Note: Spoke with pt who states he is doing well. Feeling stronger. Denies any urinary issues. States he follows up with urology Friday and they are planning on canceling PCP appt  and making a later appt due to incoming weather. Denies any questions or concerns at present. Active with [facility] and PT to come out today and again Wednesday. Will continue to follow.     Plan of care updates since last contact:  Education: .       Advance Care Planning:   Does patient have an Advance Directive: reviewed during previous call, see note. .    Medication Review:  No changes since last call.     Remote Patient Monitoring:  Offered patient enrollment in the Remote Patient Monitoring (RPM) program for in-home monitoring: Deferred at this time because .; will discuss at next outreach.    Assessments:  No changes since last call    Follow Up Appointment:   Reviewed upcoming appointment(s).  Future Appointments         Provider Specialty Dept Phone    2025 3:00 PM Day, Jack CRUZ MD Family Medicine [phone]    2025 1:30 PM Oral Mario MD Cardiology [phone]            Care Transition Nurse provided contact information.  Plan for follow-up call in 6-10 days based on severity of symptoms and risk factors.  Plan for next call: symptom management-urology appt, PCP appt      ALYX Rust, RN   Care Transition Nurse  Mobile: [phone]

## 2025-02-13 ENCOUNTER — OFFICE VISIT (OUTPATIENT)
Dept: FAMILY MEDICINE CLINIC | Age: 89
End: 2025-02-13

## 2025-02-13 VITALS
SYSTOLIC BLOOD PRESSURE: 130 MMHG | OXYGEN SATURATION: 92 % | RESPIRATION RATE: 16 BRPM | DIASTOLIC BLOOD PRESSURE: 66 MMHG | HEART RATE: 78 BPM

## 2025-02-13 DIAGNOSIS — B37.2 CANDIDAL DERMATITIS: ICD-10-CM

## 2025-02-13 DIAGNOSIS — R23.8 SKIN BREAKDOWN: ICD-10-CM

## 2025-02-13 DIAGNOSIS — N39.0 RECURRENT UTI: ICD-10-CM

## 2025-02-13 DIAGNOSIS — I48.0 PAROXYSMAL ATRIAL FIBRILLATION (HCC): ICD-10-CM

## 2025-02-13 DIAGNOSIS — Z09 HOSPITAL DISCHARGE FOLLOW-UP: Primary | ICD-10-CM

## 2025-02-13 RX ORDER — FLUCONAZOLE 100 MG/1
100 TABLET ORAL DAILY
Qty: 7 TABLET | Refills: 0 | Status: SHIPPED | OUTPATIENT
Start: 2025-02-13 | End: 2025-02-20

## 2025-02-13 RX ORDER — MUPIROCIN 20 MG/G
OINTMENT TOPICAL
Qty: 30 G | Refills: 3 | Status: SHIPPED | OUTPATIENT
Start: 2025-02-13 | End: 2025-02-20

## 2025-02-13 RX ORDER — NYSTATIN 100000 U/G
CREAM TOPICAL
Qty: 30 G | Refills: 1 | Status: SHIPPED | OUTPATIENT
Start: 2025-02-13

## 2025-02-13 NOTE — PROGRESS NOTES
Post-Discharge Transitional Care  Follow Up      Jarerd Suarez   YOB: 1935    Date of Office Visit:  2/13/2025  Date of Hospital Admission: 1/27/25  Date of Hospital Discharge: 1/30/25  Risk of hospital readmission (high >=14%. Medium >=10%) :Readmission Risk Score: 13.4      Care management risk score Rising risk (score 2-5) and Complex Care (Scores >=6): No Risk Score On File     Non face to face  following discharge, date last encounter closed (first attempt may have been earlier): 02/03/2025    Call initiated 2 business days of discharge: Yes    ASSESSMENT/PLAN:   Hospital discharge follow-up  -     KS DISCHARGE MEDS RECONCILED W/ CURRENT OUTPATIENT MED LIST  Recurrent UTI  Paroxysmal atrial fibrillation (HCC)  Candidal dermatitis  -     fluconazole (DIFLUCAN) 100 MG tablet; Take 1 tablet by mouth daily for 7 days, Disp-7 tablet, R-0Normal  -     nystatin (MYCOSTATIN) 152448 UNIT/GM cream; Apply topically 2 times daily., Disp-30 g, R-1, Normal  Skin breakdown  -     mupirocin (BACTROBAN) 2 % ointment; Apply topically 2 times daily on wound, Disp-30 g, R-3, Normal    For his recurrent UTIs, he is following up with urology.  He is pre but had stenosis and stones causing issues.    He has significant candidal dermatitis in the perineal region.  Diflucan and nystatin as above.  Mupirocin as needed for skin breakdown.  We discussed if this worsens or fails to improve, to call us, he would need a wound clinic referral.  There is not appear to be any significant ulcerative lesions or exposure of deeper tissues at this time    He is continuing his Xarelto for A-fib    Medical Decision Making: moderate complexity  No follow-ups on file.           Subjective:   HPI:  Follow up of Hospital problems/diagnosis(es): UTI    Inpatient course: Discharge summary reviewed- see chart.    Interval history/Current status:   Sher was admitted with a UTI.  He required IV antibiotics.  He seemed to recover well.

## 2025-02-17 ENCOUNTER — CARE COORDINATION (OUTPATIENT)
Dept: CARE COORDINATION | Age: 89
End: 2025-02-17

## 2025-02-17 NOTE — CARE COORDINATION
Care Transitions Note    Follow Up Call     Attempted to reach patient for transitions of care follow up.  Unable to reach patient.      Outreach Attempts:   Unable to leave message.     Care Summary Note: Follow up outreach call attempt, no answer.  CTN unable to leave vm as phone rang and rang.  CTN will continue with outreach call attempts.      Follow Up Appointment:   Future Appointments         Provider Specialty Dept Phone    12/2/2025 1:30 PM Oral Mario MD Cardiology [phone]            Plan for follow-up call in 2-5 days based on severity of symptoms and risk factors. Plan for next call: symptom management-.    ALYX Rust, RN   Care Transition Nurse  Mobile: [phone]

## 2025-02-19 ENCOUNTER — CARE COORDINATION (OUTPATIENT)
Dept: CARE COORDINATION | Age: 89
End: 2025-02-19

## 2025-02-19 NOTE — CARE COORDINATION
Care Transitions Note    Follow Up Call     Attempted to reach patient for transitions of care follow up.  Unable to reach patient.      Outreach Attempts:   Unable to leave message.     Patient graduated from the Care Transitions program on 2/19.  Patient/family  UTR pt .      Handoff:   Patient was not referred to the ACM team due to unable to contact patient.      Care Summary Note: Second and final outreach call attempt, no answer. CTN will resolve episode and remain available.      Assessments:  No changes since last call    Upcoming Appointments:    Future Appointments         Provider Specialty Dept Phone    12/2/2025 1:30 PM Oral Mario MD Cardiology [phone]            LENO Pollock RN

## 2025-02-26 PROBLEM — N39.0 UTI (URINARY TRACT INFECTION): Status: RESOLVED | Noted: 2025-01-27 | Resolved: 2025-02-26

## 2025-03-03 RX ORDER — FAMOTIDINE 20 MG/1
20 TABLET, FILM COATED ORAL 2 TIMES DAILY
Qty: 180 TABLET | Refills: 0 | Status: SHIPPED | OUTPATIENT
Start: 2025-03-03

## 2025-03-19 DIAGNOSIS — F51.01 PRIMARY INSOMNIA: ICD-10-CM

## 2025-03-20 RX ORDER — ALPRAZOLAM 2 MG/1
TABLET ORAL
Qty: 90 TABLET | Refills: 0 | Status: SHIPPED | OUTPATIENT
Start: 2025-03-20 | End: 2025-04-19

## 2025-04-01 ENCOUNTER — TELEPHONE (OUTPATIENT)
Dept: FAMILY MEDICINE CLINIC | Age: 89
End: 2025-04-01

## 2025-04-01 RX ORDER — NYSTATIN 100000 [USP'U]/G
POWDER TOPICAL
Qty: 1 EACH | Refills: 1 | Status: SHIPPED | OUTPATIENT
Start: 2025-04-01

## 2025-04-01 NOTE — TELEPHONE ENCOUNTER
Paulina with [facility] calling in, pt has a rash under abdominal fold, would like to know if an antifungal nystantin powder. They cream pt has is not working, the area needs something to dry it.     Pharm confirmed - [facility] [address]    Please advise  Please reach out to pt once rx is sent

## 2025-04-22 ENCOUNTER — TELEPHONE (OUTPATIENT)
Dept: FAMILY MEDICINE CLINIC | Age: 89
End: 2025-04-22

## 2025-04-22 NOTE — TELEPHONE ENCOUNTER
Pt has had runny nose, productive cough w clear sputum for 1 week  Vitals normal but he is wheezing  Using albuterol since Sat w no relief. Dayquil is helping w other symptoms but still wheezing.  Per nurse, pts wife was given Zpac last week for similar symptoms.   OK to call in abx or should he be seen tmrw?    Ok to take Dayquil with amitriptyline?

## 2025-04-22 NOTE — TELEPHONE ENCOUNTER
Paulina RN with [facility] pt has had a cough and runny nose x1 week, wheezing (using albuterol inhaler) since Saturday. Taking Dayquil that is helping but still wheezing.   RN put med in tablet for documentation and comes up for possible interaction with amitriptyline.   Would like to clarify what PCP would like to do for sxs.    Triaged for wheezing. Per Anita send message back.  No open appts today    Please reach out to Spouse at [phone]

## 2025-04-24 ENCOUNTER — OFFICE VISIT (OUTPATIENT)
Dept: FAMILY MEDICINE CLINIC | Age: 89
End: 2025-04-24

## 2025-04-24 VITALS
HEART RATE: 55 BPM | OXYGEN SATURATION: 96 % | RESPIRATION RATE: 20 BRPM | SYSTOLIC BLOOD PRESSURE: 104 MMHG | DIASTOLIC BLOOD PRESSURE: 66 MMHG | TEMPERATURE: 98.1 F

## 2025-04-24 DIAGNOSIS — J20.9 ACUTE BRONCHITIS, UNSPECIFIED ORGANISM: Primary | ICD-10-CM

## 2025-04-24 DIAGNOSIS — R06.2 WHEEZING: ICD-10-CM

## 2025-04-24 RX ORDER — AZITHROMYCIN 250 MG/1
TABLET, FILM COATED ORAL
Qty: 6 TABLET | Refills: 0 | Status: SHIPPED | OUTPATIENT
Start: 2025-04-24

## 2025-04-24 ASSESSMENT — PATIENT HEALTH QUESTIONNAIRE - PHQ9
SUM OF ALL RESPONSES TO PHQ QUESTIONS 1-9: 0
2. FEELING DOWN, DEPRESSED OR HOPELESS: NOT AT ALL
SUM OF ALL RESPONSES TO PHQ QUESTIONS 1-9: 0
1. LITTLE INTEREST OR PLEASURE IN DOING THINGS: NOT AT ALL

## 2025-04-24 NOTE — PROGRESS NOTES
Jarred Suarez (:  1935) is a 89 y.o. male,Established patient, here for evaluation of the following chief complaint(s):  Wheezing (Home health nurse called concerned about wheezing /Pt has been using inhaler and feels like it has helped )      1. Acute bronchitis, unspecified organism  - azithromycin (ZITHROMAX) 250 MG tablet; Take 2 tabs by mouth once on day 1, followed by 1 tab by mouth daily for four days  Dispense: 6 tablet; Refill: 0    2. Wheezing    Cough and wheezing are more consistent with infectious etiology as compared to fluid overload (sick contact at home, productive cough of yellow sputum, no signs of increased leg swelling).  Start azithromycin given significant risk of bacterial infection based on comorbidities.  Call if symptoms worsen or fail to improve    Subjective       HPI    Here for concerns for wheezing. His home health nurse listened to his lungs and was concerned about wheezing. It was noticed 2 days ago. He has also alka coughing. Cough is worse at night.   Cough is productive of yellow sputum.  Uses inhaler which seems to help.  Not feeling short of breath.    No fever or chills.    His wife was recently ill with a bronchitis - like illness.    Review of Systems            Objective     /66   Pulse 55   Temp 98.1 °F (36.7 °C) (Oral)   Resp 20   SpO2 96%    Wt Readings from Last 3 Encounters:   25 108.6 kg (239 lb 6.7 oz)   25 112.3 kg (247 lb 9.2 oz)   24 106.6 kg (235 lb)     BP Readings from Last 3 Encounters:   25 104/66   25 130/66   25 122/75     Physical Exam  HENT:      Head: Normocephalic and atraumatic.   Cardiovascular:      Rate and Rhythm: Normal rate and regular rhythm.   Pulmonary:      Effort: Pulmonary effort is normal.      Comments: Mild diffuse expiratory wheezing  Skin:     Comments: Baseline lower extremity changes without increase in swelling   Neurological:      Mental Status: He is alert.

## 2025-04-28 RX ORDER — AMITRIPTYLINE HYDROCHLORIDE 150 MG/1
150 TABLET ORAL
Qty: 90 TABLET | Refills: 0 | Status: SHIPPED | OUTPATIENT
Start: 2025-04-28

## 2025-05-24 DIAGNOSIS — I48.0 PAROXYSMAL ATRIAL FIBRILLATION (HCC): ICD-10-CM

## 2025-05-27 ENCOUNTER — TELEPHONE (OUTPATIENT)
Dept: FAMILY MEDICINE CLINIC | Age: 89
End: 2025-05-27

## 2025-05-27 ENCOUNTER — CLINICAL SUPPORT (OUTPATIENT)
Dept: FAMILY MEDICINE CLINIC | Age: 89
End: 2025-05-27
Payer: MEDICARE

## 2025-05-27 DIAGNOSIS — R50.9 FEVER, UNSPECIFIED FEVER CAUSE: ICD-10-CM

## 2025-05-27 DIAGNOSIS — R30.0 DYSURIA: Primary | ICD-10-CM

## 2025-05-27 LAB
BILIRUBIN, POC: NORMAL
BLOOD URINE, POC: NORMAL
CLARITY, POC: CLEAR
COLOR, POC: YELLOW
GLUCOSE URINE, POC: NORMAL MG/DL
KETONES, POC: NORMAL MG/DL
LEUKOCYTE EST, POC: NORMAL
NITRITE, POC: NORMAL
PH, POC: 5.5
PROTEIN, POC: NORMAL MG/DL
SPECIFIC GRAVITY, POC: 1.02
UROBILINOGEN, POC: NORMAL MG/DL

## 2025-05-27 PROCEDURE — 81002 URINALYSIS NONAUTO W/O SCOPE: CPT | Performed by: FAMILY MEDICINE

## 2025-05-27 RX ORDER — RIVAROXABAN 20 MG/1
TABLET, FILM COATED ORAL
Qty: 90 TABLET | Refills: 0 | Status: ON HOLD | OUTPATIENT
Start: 2025-05-27

## 2025-05-27 NOTE — PROGRESS NOTES
WOP states pt is having fever and UTI symptoms.  Dropped off urine.  There was nothing in the dip but sent off for culture due to pts history.

## 2025-05-27 NOTE — TELEPHONE ENCOUNTER
Requested Prescriptions     Pending Prescriptions Disp Refills    XARELTO 20 MG TABS tablet [Pharmacy Med Name: Xarelto Oral Tablet 20 MG] 90 tablet 0     Sig: TAKE 1 TABLET BY MOUTH EVERY DAY WITH SUPPER      Last OV:  11/12/2024 [facility]    Next OV: 12/02/2025 [facility]    Last Labs: 01/03/2025 CBC    Last Filled: 04/23/2024 [facility]

## 2025-05-27 NOTE — TELEPHONE ENCOUNTER
Prescription refill:  Requested Prescriptions     Pending Prescriptions Disp Refills    XARELTO 20 MG TABS tablet [Pharmacy Med Name: Xarelto Oral Tablet 20 MG] 90 tablet 0     Sig: TAKE 1 TABLET BY MOUTH EVERY DAY WITH SUPPER       Refill parameters per protocol were met    Last OV: 11/12/2024     Future Appt: 12/2/2025     LABS: 1/30/35

## 2025-05-28 ENCOUNTER — OFFICE VISIT (OUTPATIENT)
Dept: FAMILY MEDICINE CLINIC | Age: 89
End: 2025-05-28
Payer: MEDICARE

## 2025-05-28 ENCOUNTER — HOSPITAL ENCOUNTER (INPATIENT)
Age: 89
LOS: 5 days | Discharge: NURSING, REHAB OR LONG-TERM CARE | DRG: 872 | End: 2025-06-02
Attending: STUDENT IN AN ORGANIZED HEALTH CARE EDUCATION/TRAINING PROGRAM | Admitting: INTERNAL MEDICINE
Payer: MEDICARE

## 2025-05-28 ENCOUNTER — APPOINTMENT (OUTPATIENT)
Dept: GENERAL RADIOLOGY | Age: 89
DRG: 872 | End: 2025-05-28
Payer: MEDICARE

## 2025-05-28 ENCOUNTER — APPOINTMENT (OUTPATIENT)
Dept: CT IMAGING | Age: 89
DRG: 872 | End: 2025-05-28
Payer: MEDICARE

## 2025-05-28 VITALS
SYSTOLIC BLOOD PRESSURE: 126 MMHG | DIASTOLIC BLOOD PRESSURE: 54 MMHG | OXYGEN SATURATION: 96 % | HEART RATE: 65 BPM | TEMPERATURE: 97.7 F | RESPIRATION RATE: 18 BRPM

## 2025-05-28 DIAGNOSIS — R53.81 MALAISE AND FATIGUE: ICD-10-CM

## 2025-05-28 DIAGNOSIS — R53.1 WEAKNESS: ICD-10-CM

## 2025-05-28 DIAGNOSIS — R50.9 FEVER, UNSPECIFIED: Primary | ICD-10-CM

## 2025-05-28 DIAGNOSIS — L03.115 CELLULITIS OF RIGHT LOWER EXTREMITY: ICD-10-CM

## 2025-05-28 DIAGNOSIS — R53.83 MALAISE AND FATIGUE: ICD-10-CM

## 2025-05-28 PROBLEM — E87.20 SEVERE SEPSIS WITH LACTIC ACIDOSIS (HCC): Status: ACTIVE | Noted: 2025-05-28

## 2025-05-28 PROBLEM — R65.20 SEVERE SEPSIS WITH LACTIC ACIDOSIS (HCC): Status: ACTIVE | Noted: 2025-05-28

## 2025-05-28 PROBLEM — A41.9 SEVERE SEPSIS WITH LACTIC ACIDOSIS (HCC): Status: ACTIVE | Noted: 2025-05-28

## 2025-05-28 PROBLEM — R73.9 HYPERGLYCEMIA: Status: ACTIVE | Noted: 2025-05-28

## 2025-05-28 LAB
ALBUMIN SERPL-MCNC: 4.3 G/DL (ref 3.4–5)
ALP SERPL-CCNC: 93 U/L (ref 40–129)
ALT SERPL-CCNC: 20 U/L (ref 10–40)
ANION GAP SERPL CALCULATED.3IONS-SCNC: 17 MMOL/L (ref 3–16)
AST SERPL-CCNC: 26 U/L (ref 15–37)
BACTERIA UR CULT: NORMAL
BACTERIA URNS QL MICRO: NORMAL /HPF
BASE EXCESS BLDV CALC-SCNC: -2.3 MMOL/L
BASOPHILS # BLD: 0 K/UL (ref 0–0.2)
BASOPHILS NFR BLD: 0.2 %
BILIRUB DIRECT SERPL-MCNC: 0.6 MG/DL (ref 0–0.3)
BILIRUB INDIRECT SERPL-MCNC: 0.6 MG/DL (ref 0–1)
BILIRUB SERPL-MCNC: 1.2 MG/DL (ref 0–1)
BILIRUB UR QL STRIP.AUTO: NEGATIVE
BUN SERPL-MCNC: 28 MG/DL (ref 7–20)
CALCIUM SERPL-MCNC: 9.7 MG/DL (ref 8.3–10.6)
CHLORIDE SERPL-SCNC: 97 MMOL/L (ref 99–110)
CLARITY UR: CLEAR
CO2 BLDV-SCNC: 26 MMOL/L
CO2 SERPL-SCNC: 20 MMOL/L (ref 21–32)
COHGB MFR BLDV: 1.6 %
COLOR UR: YELLOW
CREAT SERPL-MCNC: 1.4 MG/DL (ref 0.8–1.3)
DEPRECATED RDW RBC AUTO: 14.3 % (ref 12.4–15.4)
EOSINOPHIL # BLD: 0 K/UL (ref 0–0.6)
EOSINOPHIL NFR BLD: 0 %
EPI CELLS #/AREA URNS AUTO: 1 /HPF (ref 0–5)
GFR SERPLBLD CREATININE-BSD FMLA CKD-EPI: 48 ML/MIN/{1.73_M2}
GLUCOSE SERPL-MCNC: 123 MG/DL (ref 70–99)
GLUCOSE UR STRIP.AUTO-MCNC: NEGATIVE MG/DL
HCO3 BLDV-SCNC: 25 MMOL/L (ref 23–29)
HCT VFR BLD AUTO: 43.1 % (ref 40.5–52.5)
HGB BLD-MCNC: 14.5 G/DL (ref 13.5–17.5)
HGB UR QL STRIP.AUTO: NEGATIVE
HYALINE CASTS #/AREA URNS AUTO: 0 /LPF (ref 0–8)
KETONES UR STRIP.AUTO-MCNC: 15 MG/DL
LACTATE BLDV-SCNC: 1.7 MMOL/L (ref 0.4–1.9)
LACTATE BLDV-SCNC: 3.6 MMOL/L (ref 0.4–1.9)
LEUKOCYTE ESTERASE UR QL STRIP.AUTO: ABNORMAL
LIPASE SERPL-CCNC: 18 U/L (ref 13–60)
LYMPHOCYTES # BLD: 0.7 K/UL (ref 1–5.1)
LYMPHOCYTES NFR BLD: 5.5 %
Lab: NORMAL
MAGNESIUM SERPL-MCNC: 1.78 MG/DL (ref 1.8–2.4)
MCH RBC QN AUTO: 32 PG (ref 26–34)
MCHC RBC AUTO-ENTMCNC: 33.7 G/DL (ref 31–36)
MCV RBC AUTO: 94.8 FL (ref 80–100)
METHGB MFR BLDV: 0.7 %
MONOCYTES # BLD: 0.8 K/UL (ref 0–1.3)
MONOCYTES NFR BLD: 6.9 %
NEUTROPHILS # BLD: 10.4 K/UL (ref 1.7–7.7)
NEUTROPHILS NFR BLD: 87.4 %
NITRITE UR QL STRIP.AUTO: NEGATIVE
NT-PROBNP SERPL-MCNC: 2268 PG/ML (ref 0–449)
O2 THERAPY: ABNORMAL
PCO2 BLDV: 49.2 MMHG (ref 40–50)
PERFORMING INSTRUMENT: NORMAL
PH BLDV: 7.31 [PH] (ref 7.35–7.45)
PH UR STRIP.AUTO: 5 [PH] (ref 5–8)
PLATELET # BLD AUTO: 143 K/UL (ref 135–450)
PMV BLD AUTO: 8.3 FL (ref 5–10.5)
PO2 BLDV: <30 MMHG
POTASSIUM SERPL-SCNC: 4.1 MMOL/L (ref 3.5–5.1)
PROT SERPL-MCNC: 8.1 G/DL (ref 6.4–8.2)
PROT UR STRIP.AUTO-MCNC: 100 MG/DL
QC PASS/FAIL: NORMAL
RBC # BLD AUTO: 4.54 M/UL (ref 4.2–5.9)
RBC CLUMPS #/AREA URNS AUTO: 2 /HPF (ref 0–4)
SAO2 % BLDV: 31 %
SARS-COV-2, POC: NORMAL
SODIUM SERPL-SCNC: 134 MMOL/L (ref 136–145)
SP GR UR STRIP.AUTO: 1.06 (ref 1–1.03)
TROPONIN, HIGH SENSITIVITY: 47 NG/L (ref 0–22)
TROPONIN, HIGH SENSITIVITY: 54 NG/L (ref 0–22)
UA COMPLETE W REFLEX CULTURE PNL UR: ABNORMAL
UA DIPSTICK W REFLEX MICRO PNL UR: YES
URN SPEC COLLECT METH UR: ABNORMAL
UROBILINOGEN UR STRIP-ACNC: 1 E.U./DL
WBC # BLD AUTO: 11.9 K/UL (ref 4–11)
WBC #/AREA URNS AUTO: 2 /HPF (ref 0–5)

## 2025-05-28 PROCEDURE — 2500000003 HC RX 250 WO HCPCS: Performed by: INTERNAL MEDICINE

## 2025-05-28 PROCEDURE — 74177 CT ABD & PELVIS W/CONTRAST: CPT

## 2025-05-28 PROCEDURE — 6360000004 HC RX CONTRAST MEDICATION: Performed by: STUDENT IN AN ORGANIZED HEALTH CARE EDUCATION/TRAINING PROGRAM

## 2025-05-28 PROCEDURE — 87040 BLOOD CULTURE FOR BACTERIA: CPT

## 2025-05-28 PROCEDURE — 83605 ASSAY OF LACTIC ACID: CPT

## 2025-05-28 PROCEDURE — 99285 EMERGENCY DEPT VISIT HI MDM: CPT

## 2025-05-28 PROCEDURE — 96365 THER/PROPH/DIAG IV INF INIT: CPT

## 2025-05-28 PROCEDURE — 87150 DNA/RNA AMPLIFIED PROBE: CPT

## 2025-05-28 PROCEDURE — 71045 X-RAY EXAM CHEST 1 VIEW: CPT

## 2025-05-28 PROCEDURE — 81001 URINALYSIS AUTO W/SCOPE: CPT

## 2025-05-28 PROCEDURE — G8417 CALC BMI ABV UP PARAM F/U: HCPCS | Performed by: FAMILY MEDICINE

## 2025-05-28 PROCEDURE — 80048 BASIC METABOLIC PNL TOTAL CA: CPT

## 2025-05-28 PROCEDURE — 1036F TOBACCO NON-USER: CPT | Performed by: FAMILY MEDICINE

## 2025-05-28 PROCEDURE — 84484 ASSAY OF TROPONIN QUANT: CPT

## 2025-05-28 PROCEDURE — 96375 TX/PRO/DX INJ NEW DRUG ADDON: CPT

## 2025-05-28 PROCEDURE — 72125 CT NECK SPINE W/O DYE: CPT

## 2025-05-28 PROCEDURE — 70450 CT HEAD/BRAIN W/O DYE: CPT

## 2025-05-28 PROCEDURE — 83690 ASSAY OF LIPASE: CPT

## 2025-05-28 PROCEDURE — 87186 SC STD MICRODIL/AGAR DIL: CPT

## 2025-05-28 PROCEDURE — 36415 COLL VENOUS BLD VENIPUNCTURE: CPT

## 2025-05-28 PROCEDURE — 6370000000 HC RX 637 (ALT 250 FOR IP): Performed by: STUDENT IN AN ORGANIZED HEALTH CARE EDUCATION/TRAINING PROGRAM

## 2025-05-28 PROCEDURE — 85025 COMPLETE CBC W/AUTO DIFF WBC: CPT

## 2025-05-28 PROCEDURE — 6370000000 HC RX 637 (ALT 250 FOR IP): Performed by: INTERNAL MEDICINE

## 2025-05-28 PROCEDURE — 83880 ASSAY OF NATRIURETIC PEPTIDE: CPT

## 2025-05-28 PROCEDURE — 1200000000 HC SEMI PRIVATE

## 2025-05-28 PROCEDURE — 80076 HEPATIC FUNCTION PANEL: CPT

## 2025-05-28 PROCEDURE — 1159F MED LIST DOCD IN RCRD: CPT | Performed by: FAMILY MEDICINE

## 2025-05-28 PROCEDURE — 6360000002 HC RX W HCPCS: Performed by: STUDENT IN AN ORGANIZED HEALTH CARE EDUCATION/TRAINING PROGRAM

## 2025-05-28 PROCEDURE — 1123F ACP DISCUSS/DSCN MKR DOCD: CPT | Performed by: FAMILY MEDICINE

## 2025-05-28 PROCEDURE — 93005 ELECTROCARDIOGRAM TRACING: CPT | Performed by: STUDENT IN AN ORGANIZED HEALTH CARE EDUCATION/TRAINING PROGRAM

## 2025-05-28 PROCEDURE — 87426 SARSCOV CORONAVIRUS AG IA: CPT | Performed by: FAMILY MEDICINE

## 2025-05-28 PROCEDURE — 83735 ASSAY OF MAGNESIUM: CPT

## 2025-05-28 PROCEDURE — 82803 BLOOD GASES ANY COMBINATION: CPT

## 2025-05-28 PROCEDURE — 2580000003 HC RX 258: Performed by: STUDENT IN AN ORGANIZED HEALTH CARE EDUCATION/TRAINING PROGRAM

## 2025-05-28 PROCEDURE — 99214 OFFICE O/P EST MOD 30 MIN: CPT | Performed by: FAMILY MEDICINE

## 2025-05-28 PROCEDURE — G8427 DOCREV CUR MEDS BY ELIG CLIN: HCPCS | Performed by: FAMILY MEDICINE

## 2025-05-28 RX ORDER — FAMOTIDINE 20 MG/1
20 TABLET, FILM COATED ORAL 2 TIMES DAILY
Status: DISCONTINUED | OUTPATIENT
Start: 2025-05-28 | End: 2025-06-02 | Stop reason: HOSPADM

## 2025-05-28 RX ORDER — AMITRIPTYLINE HYDROCHLORIDE 50 MG/1
150 TABLET ORAL NIGHTLY PRN
Status: DISCONTINUED | OUTPATIENT
Start: 2025-05-28 | End: 2025-06-02 | Stop reason: HOSPADM

## 2025-05-28 RX ORDER — ACETAMINOPHEN 325 MG/1
650 TABLET ORAL EVERY 6 HOURS PRN
Status: DISCONTINUED | OUTPATIENT
Start: 2025-05-28 | End: 2025-06-02 | Stop reason: HOSPADM

## 2025-05-28 RX ORDER — ACETAMINOPHEN 325 MG/1
650 TABLET ORAL ONCE
Status: COMPLETED | OUTPATIENT
Start: 2025-05-28 | End: 2025-05-28

## 2025-05-28 RX ORDER — LOSARTAN POTASSIUM 25 MG/1
25 TABLET ORAL DAILY
Status: DISCONTINUED | OUTPATIENT
Start: 2025-05-29 | End: 2025-06-02 | Stop reason: HOSPADM

## 2025-05-28 RX ORDER — SODIUM CHLORIDE 0.9 % (FLUSH) 0.9 %
5-40 SYRINGE (ML) INJECTION PRN
Status: DISCONTINUED | OUTPATIENT
Start: 2025-05-28 | End: 2025-06-02 | Stop reason: HOSPADM

## 2025-05-28 RX ORDER — AMMONIUM LACTATE 12 G/100G
LOTION TOPICAL NIGHTLY
Status: DISCONTINUED | OUTPATIENT
Start: 2025-05-28 | End: 2025-06-02 | Stop reason: HOSPADM

## 2025-05-28 RX ORDER — SODIUM CHLORIDE 9 MG/ML
INJECTION, SOLUTION INTRAVENOUS PRN
Status: DISCONTINUED | OUTPATIENT
Start: 2025-05-28 | End: 2025-06-02 | Stop reason: HOSPADM

## 2025-05-28 RX ORDER — ACETAMINOPHEN 650 MG/1
650 SUPPOSITORY RECTAL EVERY 6 HOURS PRN
Status: DISCONTINUED | OUTPATIENT
Start: 2025-05-28 | End: 2025-06-02 | Stop reason: HOSPADM

## 2025-05-28 RX ORDER — SODIUM CHLORIDE, SODIUM LACTATE, POTASSIUM CHLORIDE, AND CALCIUM CHLORIDE .6; .31; .03; .02 G/100ML; G/100ML; G/100ML; G/100ML
30 INJECTION, SOLUTION INTRAVENOUS ONCE
Status: DISCONTINUED | OUTPATIENT
Start: 2025-05-28 | End: 2025-05-29

## 2025-05-28 RX ORDER — 0.9 % SODIUM CHLORIDE 0.9 %
1000 INTRAVENOUS SOLUTION INTRAVENOUS ONCE
Status: COMPLETED | OUTPATIENT
Start: 2025-05-28 | End: 2025-05-28

## 2025-05-28 RX ORDER — SODIUM CHLORIDE 0.9 % (FLUSH) 0.9 %
5-40 SYRINGE (ML) INJECTION EVERY 12 HOURS SCHEDULED
Status: DISCONTINUED | OUTPATIENT
Start: 2025-05-28 | End: 2025-06-02 | Stop reason: HOSPADM

## 2025-05-28 RX ORDER — MICONAZOLE NITRATE 20 MG/G
CREAM TOPICAL 2 TIMES DAILY
Status: DISCONTINUED | OUTPATIENT
Start: 2025-05-28 | End: 2025-05-29

## 2025-05-28 RX ORDER — 0.9 % SODIUM CHLORIDE 0.9 %
500 INTRAVENOUS SOLUTION INTRAVENOUS ONCE
Status: COMPLETED | OUTPATIENT
Start: 2025-05-28 | End: 2025-05-29

## 2025-05-28 RX ORDER — NITROGLYCERIN 0.4 MG/1
0.4 TABLET SUBLINGUAL EVERY 5 MIN PRN
Status: DISCONTINUED | OUTPATIENT
Start: 2025-05-28 | End: 2025-06-02 | Stop reason: HOSPADM

## 2025-05-28 RX ORDER — DOXYCYCLINE HYCLATE 100 MG
100 TABLET ORAL 2 TIMES DAILY
Qty: 20 TABLET | Refills: 0 | Status: ON HOLD | OUTPATIENT
Start: 2025-05-28 | End: 2025-06-07

## 2025-05-28 RX ORDER — ALBUTEROL SULFATE 90 UG/1
2 INHALANT RESPIRATORY (INHALATION) EVERY 4 HOURS PRN
Status: DISCONTINUED | OUTPATIENT
Start: 2025-05-28 | End: 2025-06-02 | Stop reason: HOSPADM

## 2025-05-28 RX ORDER — IOPAMIDOL 755 MG/ML
75 INJECTION, SOLUTION INTRAVASCULAR
Status: COMPLETED | OUTPATIENT
Start: 2025-05-28 | End: 2025-05-28

## 2025-05-28 RX ORDER — ENOXAPARIN SODIUM 100 MG/ML
30 INJECTION SUBCUTANEOUS 2 TIMES DAILY
Status: DISCONTINUED | OUTPATIENT
Start: 2025-05-28 | End: 2025-05-28

## 2025-05-28 RX ADMIN — IOPAMIDOL 75 ML: 755 INJECTION, SOLUTION INTRAVENOUS at 19:51

## 2025-05-28 RX ADMIN — Medication: at 23:41

## 2025-05-28 RX ADMIN — ACETAMINOPHEN 650 MG: 325 TABLET ORAL at 20:16

## 2025-05-28 RX ADMIN — ACETAMINOPHEN 650 MG: 325 TABLET ORAL at 23:41

## 2025-05-28 RX ADMIN — MICONAZOLE NITRATE: 2 CREAM TOPICAL at 23:42

## 2025-05-28 RX ADMIN — CEFEPIME 2000 MG: 2 INJECTION, POWDER, FOR SOLUTION INTRAVENOUS at 18:33

## 2025-05-28 RX ADMIN — SODIUM CHLORIDE, PRESERVATIVE FREE 10 ML: 5 INJECTION INTRAVENOUS at 23:42

## 2025-05-28 RX ADMIN — SODIUM CHLORIDE 1500 MG: 0.9 INJECTION, SOLUTION INTRAVENOUS at 20:08

## 2025-05-28 RX ADMIN — AMITRIPTYLINE HYDROCHLORIDE 150 MG: 50 TABLET ORAL at 23:41

## 2025-05-28 RX ADMIN — SODIUM CHLORIDE 1000 ML: 0.9 INJECTION, SOLUTION INTRAVENOUS at 20:17

## 2025-05-28 ASSESSMENT — LIFESTYLE VARIABLES
HOW OFTEN DO YOU HAVE A DRINK CONTAINING ALCOHOL: NEVER
HOW MANY STANDARD DRINKS CONTAINING ALCOHOL DO YOU HAVE ON A TYPICAL DAY: PATIENT DOES NOT DRINK

## 2025-05-28 NOTE — ED NOTES
Report received from Massiel WOLFF at this time. Pt from triage to room 9. Pt placed on cardiac monitor. Family at bedside.

## 2025-05-28 NOTE — CONSULTS
Clinical Pharmacy Note  Vancomycin Consult    Pharmacy consult received for one-time dose of vancomycin in the Emergency Department per Dr. Santos.    Ht Readings from Last 1 Encounters:   05/28/25 1.829 m (6')        Wt Readings from Last 1 Encounters:   05/28/25 111.8 kg (246 lb 7.6 oz)         Assessment/Plan:  Vancomycin 1500 mg IV x 1 in ED.  If vancomycin is to continue on admission and pharmacy is to manage dosing, please re-consult with admission orders.    Quyen Ramires [facility],5/28/2025,6:11 PM

## 2025-05-28 NOTE — TELEPHONE ENCOUNTER
Please let them know that the urine thus far looks normal.  Will await culture, but no indication for antibiotics right now.  If worsening symptoms and needing to be seen let me know

## 2025-05-28 NOTE — ED PROVIDER NOTES
SpO2 Height Weight Weight Method Percent Weight Change   05/28/25 1900 (!) 160/95 -- (!) 103 30 97 % -- -- -- --   05/28/25 1915 (!) 145/109 -- (!) 105 28 96 % -- -- -- --   05/28/25 1930 (!) 164/83 -- 95 26 100 % -- -- -- --   05/28/25 1945 (!) 161/88 -- 88 30 (!) 67 % -- -- -- --   05/28/25 2000 (!) 155/126 (!) 100.7 °F (38.2 °C) 89 27 96 % -- -- -- --   05/28/25 2100 (!) 180/147 -- 90 (!) 31 93 % -- -- -- --   05/28/25 2115 (!) 123/106 -- 97 23 96 % -- -- -- --   05/28/25 2130 (!) 146/103 -- (!) 108 19 94 % -- -- -- --   05/28/25 2145 (!) 157/129 -- (!) 115 25 98 % -- -- -- --   05/28/25 2200 (!) 145/61 -- 98 21 96 % -- -- -- --   05/28/25 2319 123/76 100 °F (37.8 °C) 100 16 95 % -- -- -- --   05/28/25 2345 -- -- -- -- -- 1.803 m (5' 11\") 114.3 kg (251 lb 15.8 oz) Bed scale 0      Recent Labs     05/28/25  1800 05/28/25  1806   WBC  --  11.9*   CREATININE 1.4*  --    BILITOT 1.2*  --    PLT  --  143         Time Severe Sepsis Identified: 5/28/2025 1800    Fluid Resuscitation Rational: less than 30mL/kg because of a history of CHF NYHA III or IV with symptoms with minimal exertion/at rest.  Instead, 1000 mL bolus was ordered followed by an additional 500 was ordered.  More fluid initially would be potentially detrimental to the patient      Repeat lactate level: improving    Reassessment Exam:   Not applicable. Patient does not have septic shock.      Patient's results were discussed with the patient. Patient's questions were answered. I reviewed the patients medical records and noted there allergies, past medical history, and previous visits, pertinent information summarized in HPI. I reviewed the nursing notes.    Feel that patient requires admission to medicine for further workup and management.         Kelly Santos MD  05/29/25 0020

## 2025-05-28 NOTE — PROGRESS NOTES
Jarred Suarez (:  1935) is a 89 y.o. male,Established patient, here for evaluation of the following chief complaint(s):  Fever (Body aches, fatigue, and on and off fevers x2 days )    1. Fever, unspecified  - POCT COVID-19, Antigen  - CBC with Auto Differential; Future  - Comprehensive Metabolic Panel; Future  - Procalcitonin; Future    2. Malaise and fatigue  - POCT COVID-19, Antigen  - CBC with Auto Differential; Future  - Comprehensive Metabolic Panel; Future  - Procalcitonin; Future    3. Cellulitis of right lower extremity  - CBC with Auto Differential; Future  - Comprehensive Metabolic Panel; Future  - Procalcitonin; Future  - doxycycline hyclate (VIBRA-TABS) 100 MG tablet; Take 1 tablet by mouth 2 times daily for 10 days  Dispense: 20 tablet; Refill: 0    4. Weakness    COVID testing negative    Diffuse weakness with fever, chills, malaise.  Does have signs of cellulitis in the right upper leg, although unclear if this is the main source.    It is unlikely he would be able to improve at home on p.o. antibiotics given the degree of his deterioration over the last 48 hours.  Recommend workup in the hospital, called the ER physician to communicate this.  Appreciate their assistance    Subjective       HPI    Here for concerns for not feeling well. Today is day 3 of symptoms. Started feeling weak and tired. Temp of 100.7, with overall Tmax of 102.9 with chills.   - appetite has been down. Ate chicken noodle soup last night as only meal of the day  - drinking water ok  - no cough or congestion  - no new or different pain  - denies urinary symptoms (no dysuria or frequency other than from diuretic). No urinary odor.  - no vomiting or diarrhea    Review of Systems            Objective     BP (!) 126/54   Pulse 65   Temp 97.7 °F (36.5 °C) (Oral)   Resp 18   SpO2 96%    Wt Readings from Last 3 Encounters:   25 108.6 kg (239 lb 6.7 oz)   25 112.3 kg (247 lb 9.2 oz)   24 106.6 kg

## 2025-05-29 PROBLEM — A49.8 PSEUDOMONAS INFECTION: Status: ACTIVE | Noted: 2025-05-29

## 2025-05-29 LAB
ALBUMIN SERPL-MCNC: 3.2 G/DL (ref 3.4–5)
ALBUMIN/GLOB SERPL: 1.1 {RATIO} (ref 1.1–2.2)
ALP SERPL-CCNC: 72 U/L (ref 40–129)
ALT SERPL-CCNC: 13 U/L (ref 10–40)
ANION GAP SERPL CALCULATED.3IONS-SCNC: 11 MMOL/L (ref 3–16)
AST SERPL-CCNC: 25 U/L (ref 15–37)
BASOPHILS # BLD: 0 K/UL (ref 0–0.2)
BASOPHILS NFR BLD: 0.2 %
BILIRUB SERPL-MCNC: 0.8 MG/DL (ref 0–1)
BUN SERPL-MCNC: 21 MG/DL (ref 7–20)
CALCIUM SERPL-MCNC: 8.7 MG/DL (ref 8.3–10.6)
CHLORIDE SERPL-SCNC: 102 MMOL/L (ref 99–110)
CK SERPL-CCNC: 228 U/L (ref 39–308)
CO2 SERPL-SCNC: 21 MMOL/L (ref 21–32)
CREAT SERPL-MCNC: 1.2 MG/DL (ref 0.8–1.3)
DEPRECATED RDW RBC AUTO: 14.2 % (ref 12.4–15.4)
EKG DIAGNOSIS: NORMAL
EKG Q-T INTERVAL: 314 MS
EKG QRS DURATION: 118 MS
EKG QTC CALCULATION (BAZETT): 419 MS
EKG R AXIS: 266 DEGREES
EKG T AXIS: 50 DEGREES
EKG VENTRICULAR RATE: 107 BPM
EOSINOPHIL # BLD: 0 K/UL (ref 0–0.6)
EOSINOPHIL NFR BLD: 0 %
GFR SERPLBLD CREATININE-BSD FMLA CKD-EPI: 58 ML/MIN/{1.73_M2}
GLUCOSE SERPL-MCNC: 118 MG/DL (ref 70–99)
HCT VFR BLD AUTO: 36.2 % (ref 40.5–52.5)
HGB BLD-MCNC: 12.3 G/DL (ref 13.5–17.5)
LACTATE BLDV-SCNC: 1.7 MMOL/L (ref 0.4–1.9)
LIPASE SERPL-CCNC: 12 U/L (ref 13–60)
LYMPHOCYTES # BLD: 0.6 K/UL (ref 1–5.1)
LYMPHOCYTES NFR BLD: 5.8 %
MCH RBC QN AUTO: 32.1 PG (ref 26–34)
MCHC RBC AUTO-ENTMCNC: 34 G/DL (ref 31–36)
MCV RBC AUTO: 94.4 FL (ref 80–100)
MONOCYTES # BLD: 0.7 K/UL (ref 0–1.3)
MONOCYTES NFR BLD: 6.6 %
NEUTROPHILS # BLD: 8.8 K/UL (ref 1.7–7.7)
NEUTROPHILS NFR BLD: 87.4 %
PLATELET # BLD AUTO: 119 K/UL (ref 135–450)
PMV BLD AUTO: 8.3 FL (ref 5–10.5)
POTASSIUM SERPL-SCNC: 4.4 MMOL/L (ref 3.5–5.1)
PROCALCITONIN SERPL IA-MCNC: 1.23 NG/ML (ref 0–0.15)
PROT SERPL-MCNC: 6.1 G/DL (ref 6.4–8.2)
RBC # BLD AUTO: 3.83 M/UL (ref 4.2–5.9)
REPORT: NORMAL
SODIUM SERPL-SCNC: 134 MMOL/L (ref 136–145)
VANCOMYCIN SERPL-MCNC: 9.8 UG/ML
WBC # BLD AUTO: 10.1 K/UL (ref 4–11)

## 2025-05-29 PROCEDURE — 2500000003 HC RX 250 WO HCPCS: Performed by: INTERNAL MEDICINE

## 2025-05-29 PROCEDURE — 2580000003 HC RX 258: Performed by: STUDENT IN AN ORGANIZED HEALTH CARE EDUCATION/TRAINING PROGRAM

## 2025-05-29 PROCEDURE — 6370000000 HC RX 637 (ALT 250 FOR IP): Performed by: INTERNAL MEDICINE

## 2025-05-29 PROCEDURE — 85025 COMPLETE CBC W/AUTO DIFF WBC: CPT

## 2025-05-29 PROCEDURE — 80202 ASSAY OF VANCOMYCIN: CPT

## 2025-05-29 PROCEDURE — 6370000000 HC RX 637 (ALT 250 FOR IP): Performed by: STUDENT IN AN ORGANIZED HEALTH CARE EDUCATION/TRAINING PROGRAM

## 2025-05-29 PROCEDURE — 87641 MR-STAPH DNA AMP PROBE: CPT

## 2025-05-29 PROCEDURE — 2580000003 HC RX 258: Performed by: INTERNAL MEDICINE

## 2025-05-29 PROCEDURE — 6360000002 HC RX W HCPCS: Performed by: INTERNAL MEDICINE

## 2025-05-29 PROCEDURE — 80053 COMPREHEN METABOLIC PANEL: CPT

## 2025-05-29 PROCEDURE — 2500000003 HC RX 250 WO HCPCS: Performed by: STUDENT IN AN ORGANIZED HEALTH CARE EDUCATION/TRAINING PROGRAM

## 2025-05-29 PROCEDURE — 6360000002 HC RX W HCPCS

## 2025-05-29 PROCEDURE — 97166 OT EVAL MOD COMPLEX 45 MIN: CPT

## 2025-05-29 PROCEDURE — 93010 ELECTROCARDIOGRAM REPORT: CPT | Performed by: INTERNAL MEDICINE

## 2025-05-29 PROCEDURE — 84145 PROCALCITONIN (PCT): CPT

## 2025-05-29 PROCEDURE — 83690 ASSAY OF LIPASE: CPT

## 2025-05-29 PROCEDURE — 97530 THERAPEUTIC ACTIVITIES: CPT

## 2025-05-29 PROCEDURE — 94760 N-INVAS EAR/PLS OXIMETRY 1: CPT

## 2025-05-29 PROCEDURE — G0545 PR INHERENT VISIT TO INPT: HCPCS | Performed by: INTERNAL MEDICINE

## 2025-05-29 PROCEDURE — 97163 PT EVAL HIGH COMPLEX 45 MIN: CPT

## 2025-05-29 PROCEDURE — 1200000000 HC SEMI PRIVATE

## 2025-05-29 PROCEDURE — 99223 1ST HOSP IP/OBS HIGH 75: CPT | Performed by: INTERNAL MEDICINE

## 2025-05-29 PROCEDURE — 36415 COLL VENOUS BLD VENIPUNCTURE: CPT

## 2025-05-29 PROCEDURE — 82550 ASSAY OF CK (CPK): CPT

## 2025-05-29 PROCEDURE — 97110 THERAPEUTIC EXERCISES: CPT

## 2025-05-29 RX ORDER — ONDANSETRON 2 MG/ML
4 INJECTION INTRAMUSCULAR; INTRAVENOUS EVERY 6 HOURS PRN
Status: DISCONTINUED | OUTPATIENT
Start: 2025-05-29 | End: 2025-06-02 | Stop reason: HOSPADM

## 2025-05-29 RX ORDER — POLYETHYLENE GLYCOL 3350 17 G/17G
17 POWDER, FOR SOLUTION ORAL DAILY
Status: DISCONTINUED | OUTPATIENT
Start: 2025-05-29 | End: 2025-06-01

## 2025-05-29 RX ORDER — FLUCONAZOLE 100 MG/1
100 TABLET ORAL DAILY
Status: DISCONTINUED | OUTPATIENT
Start: 2025-05-29 | End: 2025-06-02 | Stop reason: HOSPADM

## 2025-05-29 RX ORDER — SENNA AND DOCUSATE SODIUM 50; 8.6 MG/1; MG/1
2 TABLET, FILM COATED ORAL DAILY
Status: DISCONTINUED | OUTPATIENT
Start: 2025-05-29 | End: 2025-06-02 | Stop reason: HOSPADM

## 2025-05-29 RX ADMIN — ACETAMINOPHEN 650 MG: 325 TABLET ORAL at 17:51

## 2025-05-29 RX ADMIN — RIVAROXABAN 20 MG: 20 TABLET, FILM COATED ORAL at 08:20

## 2025-05-29 RX ADMIN — SENNOSIDES, DOCUSATE SODIUM 2 TABLET: 50; 8.6 TABLET, FILM COATED ORAL at 18:30

## 2025-05-29 RX ADMIN — FAMOTIDINE 20 MG: 20 TABLET, FILM COATED ORAL at 08:20

## 2025-05-29 RX ADMIN — MICONAZOLE NITRATE: 2 POWDER TOPICAL at 12:04

## 2025-05-29 RX ADMIN — CEFEPIME 2000 MG: 2 INJECTION, POWDER, FOR SOLUTION INTRAVENOUS at 06:13

## 2025-05-29 RX ADMIN — Medication: at 19:59

## 2025-05-29 RX ADMIN — CEFEPIME 2000 MG: 2 INJECTION, POWDER, FOR SOLUTION INTRAVENOUS at 17:48

## 2025-05-29 RX ADMIN — SODIUM CHLORIDE 500 ML: 0.9 INJECTION, SOLUTION INTRAVENOUS at 09:17

## 2025-05-29 RX ADMIN — POLYETHYLENE GLYCOL 3350 17 G: 17 POWDER, FOR SOLUTION ORAL at 18:29

## 2025-05-29 RX ADMIN — FAMOTIDINE 20 MG: 20 TABLET, FILM COATED ORAL at 19:56

## 2025-05-29 RX ADMIN — MICONAZOLE NITRATE: 2 CREAM TOPICAL at 08:19

## 2025-05-29 RX ADMIN — MICONAZOLE NITRATE: 2 POWDER TOPICAL at 19:59

## 2025-05-29 RX ADMIN — SODIUM CHLORIDE, PRESERVATIVE FREE 10 ML: 5 INJECTION INTRAVENOUS at 19:57

## 2025-05-29 RX ADMIN — AMITRIPTYLINE HYDROCHLORIDE 150 MG: 50 TABLET ORAL at 19:56

## 2025-05-29 RX ADMIN — FLUCONAZOLE 100 MG: 100 TABLET ORAL at 12:04

## 2025-05-29 RX ADMIN — ONDANSETRON 4 MG: 2 INJECTION, SOLUTION INTRAMUSCULAR; INTRAVENOUS at 19:56

## 2025-05-29 ASSESSMENT — PAIN SCALES - GENERAL
PAINLEVEL_OUTOF10: 0
PAINLEVEL_OUTOF10: 2
PAINLEVEL_OUTOF10: 0
PAINLEVEL_OUTOF10: 0
PAINLEVEL_OUTOF10: 2
PAINLEVEL_OUTOF10: 0
PAINLEVEL_OUTOF10: 5

## 2025-05-29 ASSESSMENT — PAIN DESCRIPTION - LOCATION: LOCATION: HEAD

## 2025-05-29 ASSESSMENT — PAIN DESCRIPTION - ORIENTATION: ORIENTATION: UPPER

## 2025-05-29 ASSESSMENT — PAIN - FUNCTIONAL ASSESSMENT: PAIN_FUNCTIONAL_ASSESSMENT: ACTIVITIES ARE NOT PREVENTED

## 2025-05-29 ASSESSMENT — PAIN SCALES - WONG BAKER
WONGBAKER_NUMERICALRESPONSE: NO HURT
WONGBAKER_NUMERICALRESPONSE: HURTS A LITTLE BIT
WONGBAKER_NUMERICALRESPONSE: NO HURT
WONGBAKER_NUMERICALRESPONSE: HURTS A LITTLE BIT
WONGBAKER_NUMERICALRESPONSE: HURTS A LITTLE BIT

## 2025-05-29 ASSESSMENT — PAIN DESCRIPTION - DESCRIPTORS: DESCRIPTORS: ACHING;DULL

## 2025-05-29 NOTE — ACP (ADVANCE CARE PLANNING)
Advance Care Planning   Healthcare Decision Maker:    Primary Decision Maker: Antoinette Suarez - Spouse - [phone]    Secondary Decision Maker: Vianey Lanier - Child - [phone]      Today we documented Decision Maker(s) consistent with Legal Next of Kin hierarchy.       FABIO Reynolds  [phone]  Electronically signed by FABIO Meier on 5/29/2025 at 12:00 PM

## 2025-05-29 NOTE — H&P
V2.0  History and Physical      Name:  Jarred Suarez /Age/Sex: 1935  (89 y.o. male)   MRN & CSN:  [id number] & [id number] Encounter Date/Time: 2025 9:54 PM EDT   Location:   PCP: Jack Shelton MD       Hospital Day: 1    Assessment and Plan:   Jarred Suarez is a 89 y.o. obese male smoker in remission with a pmh of CAD, chronic systolic and diastolic heart failure, hypertension, dyslipidemia, paroxysmal A-fib on Xarelto, history of DVT, nephrolithiasis, venous stasis, chronic ambulatory dysfunction due to foot drop, chronic back pain who presents with Severe sepsis with lactic acidosis (HCC).    Hospital Problems           Last Modified POA    * (Principal) Severe sepsis with lactic acidosis (HCC) 2025 Yes    Right lower extremity cellulitis without foot 2025 Yes    Prerenal azotemia 2025 Yes    Hypomagnesemia 2025 Yes    Generalized weakness 2025 Yes    Hyperglycemia 2025 Yes    Essential hypertension 2025 Yes       Plan:  Fluid resuscitation, empiric cefepime and Vanco, follow-up cultures, ID consult  Replace magnesium follow-up  Check TSH and glycosylated hemoglobin  Resume home regimen for chronic stable conditions     Disposition:   Current Living situation: Home  Expected Disposition: SNF  Estimated D/C: 3 days    Diet ADULT DIET; Regular; Low Fat/Low Chol/High Fiber/TONY   DVT Prophylaxis [] Lovenox, []  Heparin, [] SCDs, [] Ambulation,  [] Eliquis, [x] Xarelto, [] Coumadin   Code Status DNR CCA   Surrogate Decision Maker/ POA Wife     Personally reviewed Lab Studies and Imaging     Discussed management of the case with no one who recommended n/a.    EKG interpreted personally and results atrial fibrillation with ventricular rate of 107, QTc 419, right bundle branch block, nonspecific ST-T wave changes.    Imaging that was interpreted personally includes chest x-ray and results negative for any acute cardiopulmonary process.    Drugs that require

## 2025-05-29 NOTE — CONSULTS
Infectious Diseases Inpatient Consult Note      Reason for Consult:  Sepsis, fevers, WBC elevation and lactic acidosis    Requesting Physician:  Dr. Aguirre     Primary Care Physician:  Jack Shelton MD    History Obtained From:  [facility]    CHIEF COMPLAINT:     Chief Complaint   Patient presents with    Wound Infection     Pt sent by Dr. Shelton for concerns for an infection. Pt has had a fungal infection in the right side of groin. Was prescribed a nystatin powder and cream but neither helped. Pt's wife states that the redness is now on his leg.         HISTORY OF PRESENT ILLNESS:  89 y.o. male with a significant history for atrial fibrillation, coronary artery disease, congestive heart failure, history of skin cancer, hyperlipidemia, pretension kidney stones, venous thromboembolism in the past, morbid obesity BMI 35, left foot drop for mobility admitted to the hospital secondary to fever chills confusion right leg cellulitis extending up to the thigh does have chronic venous stasis edema and skin changes of the lower extremities.  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  Labs indicate creatinine 1.4 sodium 134 BNP elevated WBC 11.9 with a left shift blood culture now positive for Pseudomonas from admission        Past Medical History:    Past Medical History:   Diagnosis Date    Atrial fibrillation (HCC)     CAD (coronary artery disease)     Combined systolic and diastolic heart failure (HCC)     Diarrhea 04/12/2017    History of skin cancer     Hyperlipidemia     Hypertension     Insomnia 04/20/2020    Kidney stones 03/2017    Open wound of left hand, subsequent encounter 06/03/2022    VTE (venous thromboembolism)        Past Surgical History:    Past Surgical History:   Procedure Laterality Date    APPENDECTOMY      BACK SURGERY      x4    CORONARY ARTERY BYPASS GRAFT  1981    CORONARY ARTERY BYPASS GRAFT  2001    CYSTOSCOPY N/A 7/26/2024    CYSTOSCOPY WITH URETHRAL DILATION AND  BLADDER STONE REMOVAL

## 2025-05-29 NOTE — CONSULTS
Clinical Pharmacy Note  Vancomycin Consult    Jarred Suarez is a 89 y.o. male ordered vancomycin for sepsis of unknown origin; consult received from Dr. Oliva to manage therapy. Also receiving cefepime    Allergies:  Codeine and Flomax [tamsulosin hcl]     Temp max:  Temp (24hrs), Av.3 °F (37.4 °C), Min:97.7 °F (36.5 °C), Max:100.7 °F (38.2 °C)      Recent Labs     25  1806 25  0543   WBC 11.9* 10.1       Recent Labs     25  1800 25  0543   BUN 28* 21*   CREATININE 1.4* 1.2       No intake or output data in the 24 hours ending 25 0751    Culture Results:  pending    Ht Readings from Last 1 Encounters:   25 1.803 m (5' 11\")        Wt Readings from Last 1 Encounters:   25 114.3 kg (251 lb 15.8 oz)         Estimated Creatinine Clearance: 54 mL/min (based on SCr of 1.2 mg/dL).    Assessment:  Day # 1 of vancomycin.  Vancomycin 1500 mg IV x 1 dose given in ER    Goal -600  Vancomcyin level: 9.8 mg/L (~10 hours after dose)    Plan:   Start vancomycin 1500 mg IV Q24H  Predicted  (24-48H), 549 (steady state)  Will obtain level with AM labs on     Thank you for the consult.   Zahraa Peoples, JamarD, BCPS  2025 7:55 AM

## 2025-05-29 NOTE — ED NOTES
ED TO INPATIENT SBAR HANDOFF    Patient Name: Jarred Suarez   Preferred Name: Jarred  : 1935  89 y.o.   Family/Caregiver Present: no   Code Status Order: Full Code  PO Status: NPO:No  Telemetry Order: Yes  C-SSRS: Risk of Suicide: No Risk  Sitter no   Restraints:     Sepsis Risk Score Sepsis V2 Risk Score: 62.2    Situation  Chief Complaint   Patient presents with    Wound Infection     Pt sent by Dr. Shelton for concerns for an infection. Pt has had a fungal infection in the right side of groin. Was prescribed a nystatin powder and cream but neither helped. Pt's wife states that the redness is now on his leg.     Brief Description of Patient's Condition: Pt is able to walk with PT that comes to his condo, but his wife states she usually gets him around by a wheelchair and a lift. Pt is A&Ox4. STATES HE USES A CREAM ON HIS BUTT EVERY NIGHT AND NEEDS THAT ON UPSTAIRS       Mental Status: oriented, alert, coherent, logical, thought processes intact, and able to concentrate and follow conversation  Arrived from:Home  Imaging:   CT HEAD WO CONTRAST   Final Result   1. No acute intracranial abnormality. Brain parenchymal volume loss has   increased from prior examination.  Asymmetrical prominence of the extra-axial   CSF space on the left side with minimal midline shift towards the right side   is similar to prior examination.  MRI may be considered for further   evaluation.   2. No acute osseous abnormality of the cervical spine.   3. Multilevel degenerative disc disease and facet arthrosis.         CT CERVICAL SPINE WO CONTRAST   Final Result   1. No acute intracranial abnormality. Brain parenchymal volume loss has   increased from prior examination.  Asymmetrical prominence of the extra-axial   CSF space on the left side with minimal midline shift towards the right side   is similar to prior examination.  MRI may be considered for further   evaluation.   2. No acute osseous abnormality of the cervical spine.

## 2025-05-29 NOTE — CARE COORDINATION
Case Management Assessment  Initial Evaluation    Date/Time of Evaluation: 5/29/2025 11:53 AM  Assessment Completed by: FABIO Meier    If patient is discharged prior to next notation, then this note serves as note for discharge by case management.    Patient Name: Jarred Suarez                   YOB: 1935  Diagnosis: Severe sepsis with lactic acidosis (HCC) [A41.9, R65.20, E87.20]                   Date / Time: 5/28/2025  5:31 PM    Patient Admission Status: Inpatient   Readmission Risk (Low < 19, Mod (19-27), High > 27): Readmission Risk Score: 15.2    Current PCP: Jack Shelton MD  PCP verified by CM? (P) Yes    Chart Reviewed: Yes      History Provided by: (P) Patient  Patient Orientation: (P) Alert and Oriented, Person, Place, Situation, Self    Patient Cognition: (P) Alert    Hospitalization in the last 30 days (Readmission):  No    If yes, Readmission Assessment in  Navigator will be completed.    Advance Directives:      Code Status: DNR-CCA   Patient's Primary Decision Maker is: (P) Legal Next of Kin    Primary Decision Maker: Antoinette Suarez - Spouse - [phone]    Primary Decision Maker: [place] - Child - [phone]    Discharge Planning:    Patient lives with: (P) Spouse/Significant Other (Lives with spouse) Type of Home: (P) Other (Comment) (Condo with ramped entry.  Elevator in building)  Primary Care Giver: (P) Self  Patient Support Systems include: (P) Spouse/Significant Other, Children, Home Care Staff   Current Financial resources: (P) Medicare  Current community resources: (P) ECF/Home Care (Active with [facility] for PT/OT/RN)  Current services prior to admission: (P) Durable Medical Equipment, Home Care            Current DME: (P) Cane, Shower Chair, Walker, Wheelchair, Other (Comment) (Grab bars in shower and around toilet.  TTB)            Type of Home Care services:  (P) OT, PT, Nursing Services    ADLS  Prior functional level: (P) Assistance with the

## 2025-05-29 NOTE — CARE COORDINATION
Discharge Planning:  SW called the following facilities to follow up on the following referrals:  [facility] [place]  [facility]-Message left for Lizzie Zarco-Message left for [facility]-Spoke with Og Chaidez stated she is unable to use [facility].  Information given to Og Chaidez stated she will review this referral and will get back with this SW.  FABIO Reynolds [facility]  [phone]  Electronically signed by FABIO Meier on 5/29/2025 at 3:37 PM

## 2025-05-30 LAB
ANION GAP SERPL CALCULATED.3IONS-SCNC: 11 MMOL/L (ref 3–16)
BASOPHILS # BLD: 0 K/UL (ref 0–0.2)
BASOPHILS NFR BLD: 0.6 %
BUN SERPL-MCNC: 22 MG/DL (ref 7–20)
CALCIUM SERPL-MCNC: 9 MG/DL (ref 8.3–10.6)
CHLORIDE SERPL-SCNC: 104 MMOL/L (ref 99–110)
CO2 SERPL-SCNC: 22 MMOL/L (ref 21–32)
CREAT SERPL-MCNC: 1 MG/DL (ref 0.8–1.3)
DEPRECATED RDW RBC AUTO: 14.1 % (ref 12.4–15.4)
EOSINOPHIL # BLD: 0.1 K/UL (ref 0–0.6)
EOSINOPHIL NFR BLD: 0.7 %
GFR SERPLBLD CREATININE-BSD FMLA CKD-EPI: 72 ML/MIN/{1.73_M2}
GLUCOSE SERPL-MCNC: 97 MG/DL (ref 70–99)
HCT VFR BLD AUTO: 34.7 % (ref 40.5–52.5)
HGB BLD-MCNC: 11.8 G/DL (ref 13.5–17.5)
LYMPHOCYTES # BLD: 0.6 K/UL (ref 1–5.1)
LYMPHOCYTES NFR BLD: 7.5 %
MCH RBC QN AUTO: 32 PG (ref 26–34)
MCHC RBC AUTO-ENTMCNC: 34 G/DL (ref 31–36)
MCV RBC AUTO: 94.2 FL (ref 80–100)
MONOCYTES # BLD: 0.9 K/UL (ref 0–1.3)
MONOCYTES NFR BLD: 11.3 %
MRSA DNA SPEC QL NAA+PROBE: NORMAL
NEUTROPHILS # BLD: 6.1 K/UL (ref 1.7–7.7)
NEUTROPHILS NFR BLD: 79.9 %
PLATELET # BLD AUTO: 120 K/UL (ref 135–450)
PMV BLD AUTO: 8.6 FL (ref 5–10.5)
POTASSIUM SERPL-SCNC: 4.5 MMOL/L (ref 3.5–5.1)
RBC # BLD AUTO: 3.68 M/UL (ref 4.2–5.9)
SODIUM SERPL-SCNC: 137 MMOL/L (ref 136–145)
WBC # BLD AUTO: 7.6 K/UL (ref 4–11)

## 2025-05-30 PROCEDURE — 6370000000 HC RX 637 (ALT 250 FOR IP): Performed by: INTERNAL MEDICINE

## 2025-05-30 PROCEDURE — 2500000003 HC RX 250 WO HCPCS: Performed by: INTERNAL MEDICINE

## 2025-05-30 PROCEDURE — 1200000000 HC SEMI PRIVATE

## 2025-05-30 PROCEDURE — 85025 COMPLETE CBC W/AUTO DIFF WBC: CPT

## 2025-05-30 PROCEDURE — 2580000003 HC RX 258: Performed by: INTERNAL MEDICINE

## 2025-05-30 PROCEDURE — 6360000002 HC RX W HCPCS: Performed by: INTERNAL MEDICINE

## 2025-05-30 PROCEDURE — 97530 THERAPEUTIC ACTIVITIES: CPT

## 2025-05-30 PROCEDURE — 99233 SBSQ HOSP IP/OBS HIGH 50: CPT | Performed by: INTERNAL MEDICINE

## 2025-05-30 PROCEDURE — 94760 N-INVAS EAR/PLS OXIMETRY 1: CPT

## 2025-05-30 PROCEDURE — 87040 BLOOD CULTURE FOR BACTERIA: CPT

## 2025-05-30 PROCEDURE — 36415 COLL VENOUS BLD VENIPUNCTURE: CPT

## 2025-05-30 PROCEDURE — G0545 PR INHERENT VISIT TO INPT: HCPCS | Performed by: INTERNAL MEDICINE

## 2025-05-30 PROCEDURE — 6370000000 HC RX 637 (ALT 250 FOR IP): Performed by: STUDENT IN AN ORGANIZED HEALTH CARE EDUCATION/TRAINING PROGRAM

## 2025-05-30 PROCEDURE — 2500000003 HC RX 250 WO HCPCS: Performed by: STUDENT IN AN ORGANIZED HEALTH CARE EDUCATION/TRAINING PROGRAM

## 2025-05-30 PROCEDURE — 80048 BASIC METABOLIC PNL TOTAL CA: CPT

## 2025-05-30 RX ADMIN — CEFEPIME 2000 MG: 2 INJECTION, POWDER, FOR SOLUTION INTRAVENOUS at 05:34

## 2025-05-30 RX ADMIN — FAMOTIDINE 20 MG: 20 TABLET, FILM COATED ORAL at 09:05

## 2025-05-30 RX ADMIN — MICONAZOLE NITRATE: 2 POWDER TOPICAL at 20:23

## 2025-05-30 RX ADMIN — CEFEPIME 2000 MG: 2 INJECTION, POWDER, FOR SOLUTION INTRAVENOUS at 18:19

## 2025-05-30 RX ADMIN — LOSARTAN POTASSIUM 25 MG: 25 TABLET, FILM COATED ORAL at 09:05

## 2025-05-30 RX ADMIN — ACETAMINOPHEN 650 MG: 325 TABLET ORAL at 21:21

## 2025-05-30 RX ADMIN — AMITRIPTYLINE HYDROCHLORIDE 150 MG: 50 TABLET ORAL at 21:21

## 2025-05-30 RX ADMIN — Medication: at 20:23

## 2025-05-30 RX ADMIN — FLUCONAZOLE 100 MG: 100 TABLET ORAL at 09:05

## 2025-05-30 RX ADMIN — SENNOSIDES, DOCUSATE SODIUM 2 TABLET: 50; 8.6 TABLET, FILM COATED ORAL at 09:05

## 2025-05-30 RX ADMIN — MICONAZOLE NITRATE: 2 POWDER TOPICAL at 09:08

## 2025-05-30 RX ADMIN — SODIUM CHLORIDE, PRESERVATIVE FREE 10 ML: 5 INJECTION INTRAVENOUS at 09:06

## 2025-05-30 RX ADMIN — FAMOTIDINE 20 MG: 20 TABLET, FILM COATED ORAL at 20:22

## 2025-05-30 RX ADMIN — RIVAROXABAN 20 MG: 20 TABLET, FILM COATED ORAL at 09:05

## 2025-05-30 ASSESSMENT — PAIN SCALES - GENERAL
PAINLEVEL_OUTOF10: 0
PAINLEVEL_OUTOF10: 3

## 2025-05-30 ASSESSMENT — PAIN - FUNCTIONAL ASSESSMENT: PAIN_FUNCTIONAL_ASSESSMENT: ACTIVITIES ARE NOT PREVENTED

## 2025-05-30 ASSESSMENT — PAIN DESCRIPTION - ONSET: ONSET: ON-GOING

## 2025-05-30 ASSESSMENT — PAIN DESCRIPTION - LOCATION: LOCATION: BACK

## 2025-05-30 ASSESSMENT — PAIN DESCRIPTION - PAIN TYPE: TYPE: ACUTE PAIN

## 2025-05-30 ASSESSMENT — PAIN DESCRIPTION - FREQUENCY: FREQUENCY: INTERMITTENT

## 2025-05-30 ASSESSMENT — PAIN DESCRIPTION - DESCRIPTORS: DESCRIPTORS: ACHING;DULL;CRAMPING

## 2025-05-30 ASSESSMENT — PAIN DESCRIPTION - ORIENTATION: ORIENTATION: RIGHT;LEFT;LOWER

## 2025-05-30 NOTE — CARE COORDINATION
Discharge Planning:  SW called the following facilities to follow up on the following referrals:  [facility]-SW spoke with Ronni.  No beds available until next week.   \[facility]\""-Unable to accept   [facility]-Message left for Lizzie Zarco-Message left for [facility]-Message lft for FABIO Zuniga  [phone]  Electronically signed by FABIO Meier on 5/30/2025 at 9:06 AM

## 2025-05-30 NOTE — CONSULTS
Reason for Consult: renal mass, per radiology read appearance of RCC     History of Present Illness: Jarred Suarez is a 89 y.o. male w/ complex past cardiac/vascular history including atrial fibrillation, coronary artery disease, congestive heart failure, hyperlipidemia, venous thromboembolism, prior CABG x2 morbid obesity BMI 35.    He has been hospitalized since 5/28 for management of spsis secondary to RLE cellulitis. Urology has been consulted for incidental finding on CT imaging. CTAP revealed solid enhancing 4.1cm RUP renal mass c/w RCC, increased in size since from 3.1cm on imaging 7/2024. After further review I also noticed a fairly distended bladder, no hydronephrosis or obstructive uropathy. Bilateral hydrocele present.     UA is clear on admission, he denies FH  malignancy. Appears to have had UTI in 1/2025 (urine culture grew Enterococcus).  Denies frequent UTIs, urinary complaints, GH. He is a former smoker of ~30yr, quit 30 yr ago.     It appears he follows w/ Dr. Perry for history of bladder neck contracture and BPH, he has had several dilations in the past.    Past Medical History:   Past Medical History:   Diagnosis Date    Atrial fibrillation (HCC)     CAD (coronary artery disease)     Combined systolic and diastolic heart failure (HCC)     Diarrhea 04/12/2017    History of skin cancer     Hyperlipidemia     Hypertension     Insomnia 04/20/2020    Kidney stones 03/2017    Open wound of left hand, subsequent encounter 06/03/2022    VTE (venous thromboembolism)        Past Surgical History:  Past Surgical History:   Procedure Laterality Date    APPENDECTOMY      BACK SURGERY      x4    CORONARY ARTERY BYPASS GRAFT  1981    CORONARY ARTERY BYPASS GRAFT  2001    CYSTOSCOPY N/A 7/26/2024    CYSTOSCOPY WITH URETHRAL DILATION AND  BLADDER STONE REMOVAL WITH HOLMIUM LASER - FORTEC (CONF# [id number]) performed by Raza Perry MD at [facility] OR    JOINT REPLACEMENT Right     knee

## 2025-05-30 NOTE — CARE COORDINATION
[facility] [employer] PRE-CERT REQUEST SUBMITTED VIA [employer] PORTAL    REQUESTED FOR FACILITY:  [facility]    ANTICIPATED ADMIT DATE TO SNF:  06/02/2025      [facility] #:  [id number]     Electronically signed by Juany Dodd, Case Management Assistant Juany Dodd on 5/30/2025 at 3:59 PM

## 2025-05-30 NOTE — CARE COORDINATION
Discharge Planning:  ANKUSH received a call from Lizzie with [facility].  Lizzie stated this facility is able to accept this pt for skilled care pending pre cert.   ANKUSH met with pt who stated he is agreeable to going to [facility] SNF at d/c.   Pt will likely need IV abx.  Awaiting final recs from ID.  PLAN: [facility] SNF pending pre cert.  May need IV abx.  Will need HENS and BLS transport.   FABIO Reynolds [facility]  [phone]  Electronically signed by FABIO Meier on 5/30/2025 at 11:11 AM

## 2025-05-30 NOTE — DISCHARGE INSTR - COC
Continuity of Care Form    Patient Name: Jarred Suarez   :  1935  MRN:  [id number]    Admit date:  2025  Discharge date:      Code Status Order: DNR-CCA   Advance Directives:     Admitting Physician:  Yris Mcnair MD  PCP: Jack Shelton MD    Discharging Nurse:   Discharging Hospital Unit/Room#: Q5D-7708/4104-01  Discharging Unit Phone Number:     Emergency Contact:   Extended Emergency Contact Information  Primary Emergency Contact: Antoinette Suarez  Address: [address] [place] [address]            [address]  Home Phone: [phone]  Mobile Phone: [phone]  Relation: Spouse  Secondary Emergency Contact: Vianey Lanier   [facility]  Home Phone: [phone]  Work Phone: [phone]  Relation: Child    Past Surgical History:  Past Surgical History:   Procedure Laterality Date    APPENDECTOMY      BACK SURGERY      x4    CORONARY ARTERY BYPASS GRAFT  1981    CORONARY ARTERY BYPASS GRAFT      CYSTOSCOPY N/A 2024    CYSTOSCOPY WITH URETHRAL DILATION AND  BLADDER STONE REMOVAL WITH HOLMIUM LASER - FORTEC (CONF# [id number]) performed by Raza Perry MD at [facility] OR    JOINT REPLACEMENT Right     knee replacement    KNEE SURGERY  2005    right     PROSTATE SURGERY      SHOULDER SURGERY      both    SKIN CANCER EXCISION      TOTAL KNEE ARTHROPLASTY Right 2015    [facility]       Immunization History:   Immunization History   Administered Date(s) Administered    Pneumococcal, PCV-13, PREVNAR 13, (age 6w+), IM, 0.5mL 2016    Pneumococcal, PPSV23, PNEUMOVAX 23, (age 2y+), SC/IM, 0.5mL 2010    TDaP, ADACEL (age 10y-64y), BOOSTRIX (age 10y+), IM, 0.5mL 2022       Active Problems:  Patient Active Problem List   Diagnosis Code    Mixed hyperlipidemia E78.2    Essential hypertension I10    Coronary artery disease involving native coronary artery of native heart without angina pectoris I25.10    Bilateral carotid artery stenosis I65.23    Knee

## 2025-05-31 PROBLEM — B96.5 BACTEREMIA DUE TO PSEUDOMONAS: Status: ACTIVE | Noted: 2025-05-31

## 2025-05-31 PROBLEM — N17.9 AKI (ACUTE KIDNEY INJURY): Status: ACTIVE | Noted: 2025-05-31

## 2025-05-31 PROBLEM — R78.81 BACTEREMIA DUE TO PSEUDOMONAS: Status: ACTIVE | Noted: 2025-05-31

## 2025-05-31 PROBLEM — E66.01 MORBID OBESITY (HCC): Status: ACTIVE | Noted: 2025-05-31

## 2025-05-31 PROBLEM — R60.0 LEG EDEMA, RIGHT: Status: ACTIVE | Noted: 2025-05-31

## 2025-05-31 PROBLEM — R79.89 ELEVATED PROCALCITONIN: Status: ACTIVE | Noted: 2025-05-31

## 2025-05-31 LAB
ANION GAP SERPL CALCULATED.3IONS-SCNC: 8 MMOL/L (ref 3–16)
BACTERIA BLD CULT: ABNORMAL
BACTERIA BLD CULT: ABNORMAL
BASOPHILS # BLD: 0 K/UL (ref 0–0.2)
BASOPHILS NFR BLD: 0.5 %
BUN SERPL-MCNC: 20 MG/DL (ref 7–20)
CALCIUM SERPL-MCNC: 8.9 MG/DL (ref 8.3–10.6)
CHLORIDE SERPL-SCNC: 104 MMOL/L (ref 99–110)
CO2 SERPL-SCNC: 24 MMOL/L (ref 21–32)
CREAT SERPL-MCNC: 1 MG/DL (ref 0.8–1.3)
DEPRECATED RDW RBC AUTO: 13.6 % (ref 12.4–15.4)
EOSINOPHIL # BLD: 0.2 K/UL (ref 0–0.6)
EOSINOPHIL NFR BLD: 3.7 %
GFR SERPLBLD CREATININE-BSD FMLA CKD-EPI: 72 ML/MIN/{1.73_M2}
GLUCOSE SERPL-MCNC: 110 MG/DL (ref 70–99)
HCT VFR BLD AUTO: 36.8 % (ref 40.5–52.5)
HGB BLD-MCNC: 12.4 G/DL (ref 13.5–17.5)
LYMPHOCYTES # BLD: 0.4 K/UL (ref 1–5.1)
LYMPHOCYTES NFR BLD: 7.3 %
MCH RBC QN AUTO: 31.8 PG (ref 26–34)
MCHC RBC AUTO-ENTMCNC: 33.6 G/DL (ref 31–36)
MCV RBC AUTO: 94.4 FL (ref 80–100)
MONOCYTES # BLD: 0.6 K/UL (ref 0–1.3)
MONOCYTES NFR BLD: 9.5 %
NEUTROPHILS # BLD: 4.8 K/UL (ref 1.7–7.7)
NEUTROPHILS NFR BLD: 79 %
ORGANISM: ABNORMAL
ORGANISM: ABNORMAL
PLATELET # BLD AUTO: 135 K/UL (ref 135–450)
PMV BLD AUTO: 8.1 FL (ref 5–10.5)
POTASSIUM SERPL-SCNC: 4 MMOL/L (ref 3.5–5.1)
PROCALCITONIN SERPL IA-MCNC: 0.76 NG/ML (ref 0–0.15)
RBC # BLD AUTO: 3.9 M/UL (ref 4.2–5.9)
SODIUM SERPL-SCNC: 136 MMOL/L (ref 136–145)
WBC # BLD AUTO: 6 K/UL (ref 4–11)

## 2025-05-31 PROCEDURE — 2500000003 HC RX 250 WO HCPCS: Performed by: INTERNAL MEDICINE

## 2025-05-31 PROCEDURE — 80048 BASIC METABOLIC PNL TOTAL CA: CPT

## 2025-05-31 PROCEDURE — 6370000000 HC RX 637 (ALT 250 FOR IP): Performed by: STUDENT IN AN ORGANIZED HEALTH CARE EDUCATION/TRAINING PROGRAM

## 2025-05-31 PROCEDURE — 84145 PROCALCITONIN (PCT): CPT

## 2025-05-31 PROCEDURE — 2580000003 HC RX 258: Performed by: INTERNAL MEDICINE

## 2025-05-31 PROCEDURE — 1200000000 HC SEMI PRIVATE

## 2025-05-31 PROCEDURE — 6370000000 HC RX 637 (ALT 250 FOR IP): Performed by: INTERNAL MEDICINE

## 2025-05-31 PROCEDURE — 6360000002 HC RX W HCPCS: Performed by: INTERNAL MEDICINE

## 2025-05-31 PROCEDURE — 94760 N-INVAS EAR/PLS OXIMETRY 1: CPT

## 2025-05-31 PROCEDURE — 36415 COLL VENOUS BLD VENIPUNCTURE: CPT

## 2025-05-31 PROCEDURE — 85025 COMPLETE CBC W/AUTO DIFF WBC: CPT

## 2025-05-31 RX ADMIN — ACETAMINOPHEN 650 MG: 325 TABLET ORAL at 19:53

## 2025-05-31 RX ADMIN — SODIUM CHLORIDE, PRESERVATIVE FREE 10 ML: 5 INJECTION INTRAVENOUS at 09:19

## 2025-05-31 RX ADMIN — FAMOTIDINE 20 MG: 20 TABLET, FILM COATED ORAL at 09:19

## 2025-05-31 RX ADMIN — FLUCONAZOLE 100 MG: 100 TABLET ORAL at 09:19

## 2025-05-31 RX ADMIN — RIVAROXABAN 20 MG: 20 TABLET, FILM COATED ORAL at 09:19

## 2025-05-31 RX ADMIN — LOSARTAN POTASSIUM 25 MG: 25 TABLET, FILM COATED ORAL at 09:19

## 2025-05-31 RX ADMIN — AMITRIPTYLINE HYDROCHLORIDE 150 MG: 50 TABLET ORAL at 19:53

## 2025-05-31 RX ADMIN — MICONAZOLE NITRATE: 2 POWDER TOPICAL at 09:19

## 2025-05-31 RX ADMIN — CEFEPIME 2000 MG: 2 INJECTION, POWDER, FOR SOLUTION INTRAVENOUS at 05:15

## 2025-05-31 RX ADMIN — Medication: at 19:53

## 2025-05-31 RX ADMIN — CEFEPIME 2000 MG: 2 INJECTION, POWDER, FOR SOLUTION INTRAVENOUS at 17:41

## 2025-05-31 RX ADMIN — SENNOSIDES, DOCUSATE SODIUM 2 TABLET: 50; 8.6 TABLET, FILM COATED ORAL at 09:19

## 2025-05-31 RX ADMIN — FAMOTIDINE 20 MG: 20 TABLET, FILM COATED ORAL at 19:54

## 2025-05-31 RX ADMIN — SODIUM CHLORIDE, PRESERVATIVE FREE 10 ML: 5 INJECTION INTRAVENOUS at 19:54

## 2025-05-31 RX ADMIN — MICONAZOLE NITRATE: 2 POWDER TOPICAL at 19:53

## 2025-05-31 RX ADMIN — POLYETHYLENE GLYCOL 3350 17 G: 17 POWDER, FOR SOLUTION ORAL at 09:19

## 2025-05-31 ASSESSMENT — PAIN DESCRIPTION - DESCRIPTORS: DESCRIPTORS: ACHING

## 2025-05-31 ASSESSMENT — PAIN SCALES - GENERAL
PAINLEVEL_OUTOF10: 5
PAINLEVEL_OUTOF10: 7

## 2025-05-31 ASSESSMENT — PAIN DESCRIPTION - LOCATION: LOCATION: BACK

## 2025-05-31 NOTE — FLOWSHEET NOTE
Patient complained of back pain Requested Prn pain meds. PRN Tylenol administered per order with effects noted. Resting in bed watching T.V. with bed alarm on and call light in reach.

## 2025-06-01 LAB
ANION GAP SERPL CALCULATED.3IONS-SCNC: 8 MMOL/L (ref 3–16)
ANISOCYTOSIS BLD QL SMEAR: ABNORMAL
BASOPHILS # BLD: 0.1 K/UL (ref 0–0.2)
BASOPHILS NFR BLD: 2 %
BUN SERPL-MCNC: 18 MG/DL (ref 7–20)
CALCIUM SERPL-MCNC: 8.9 MG/DL (ref 8.3–10.6)
CHLORIDE SERPL-SCNC: 105 MMOL/L (ref 99–110)
CO2 SERPL-SCNC: 25 MMOL/L (ref 21–32)
CREAT SERPL-MCNC: 1 MG/DL (ref 0.8–1.3)
DEPRECATED RDW RBC AUTO: 13.5 % (ref 12.4–15.4)
EOSINOPHIL # BLD: 0.3 K/UL (ref 0–0.6)
EOSINOPHIL NFR BLD: 5 %
GFR SERPLBLD CREATININE-BSD FMLA CKD-EPI: 72 ML/MIN/{1.73_M2}
GLUCOSE SERPL-MCNC: 112 MG/DL (ref 70–99)
HCT VFR BLD AUTO: 35 % (ref 40.5–52.5)
HGB BLD-MCNC: 11.9 G/DL (ref 13.5–17.5)
LYMPHOCYTES # BLD: 0.8 K/UL (ref 1–5.1)
LYMPHOCYTES NFR BLD: 11 %
MCH RBC QN AUTO: 31.6 PG (ref 26–34)
MCHC RBC AUTO-ENTMCNC: 33.9 G/DL (ref 31–36)
MCV RBC AUTO: 93.1 FL (ref 80–100)
METAMYELOCYTES NFR BLD MANUAL: 1 %
MONOCYTES # BLD: 0.6 K/UL (ref 0–1.3)
MONOCYTES NFR BLD: 10 %
MYELOCYTES NFR BLD MANUAL: 1 %
NEUTROPHILS # BLD: 4.1 K/UL (ref 1.7–7.7)
NEUTROPHILS NFR BLD: 65 %
NEUTS BAND NFR BLD MANUAL: 3 % (ref 0–7)
PLATELET # BLD AUTO: 152 K/UL (ref 135–450)
PLATELET BLD QL SMEAR: ADEQUATE
PMV BLD AUTO: 7.9 FL (ref 5–10.5)
POTASSIUM SERPL-SCNC: 4 MMOL/L (ref 3.5–5.1)
RBC # BLD AUTO: 3.76 M/UL (ref 4.2–5.9)
SLIDE REVIEW: ABNORMAL
SODIUM SERPL-SCNC: 138 MMOL/L (ref 136–145)
VARIANT LYMPHS NFR BLD MANUAL: 2 % (ref 0–6)
WBC # BLD AUTO: 5.8 K/UL (ref 4–11)

## 2025-06-01 PROCEDURE — 6370000000 HC RX 637 (ALT 250 FOR IP): Performed by: INTERNAL MEDICINE

## 2025-06-01 PROCEDURE — 85025 COMPLETE CBC W/AUTO DIFF WBC: CPT

## 2025-06-01 PROCEDURE — 1200000000 HC SEMI PRIVATE

## 2025-06-01 PROCEDURE — 6360000002 HC RX W HCPCS: Performed by: INTERNAL MEDICINE

## 2025-06-01 PROCEDURE — 2580000003 HC RX 258: Performed by: INTERNAL MEDICINE

## 2025-06-01 PROCEDURE — 80048 BASIC METABOLIC PNL TOTAL CA: CPT

## 2025-06-01 PROCEDURE — 2500000003 HC RX 250 WO HCPCS: Performed by: STUDENT IN AN ORGANIZED HEALTH CARE EDUCATION/TRAINING PROGRAM

## 2025-06-01 PROCEDURE — 36415 COLL VENOUS BLD VENIPUNCTURE: CPT

## 2025-06-01 RX ORDER — POLYETHYLENE GLYCOL 3350 17 G/17G
17 POWDER, FOR SOLUTION ORAL DAILY PRN
Status: DISCONTINUED | OUTPATIENT
Start: 2025-06-01 | End: 2025-06-02 | Stop reason: HOSPADM

## 2025-06-01 RX ADMIN — CEFEPIME 2000 MG: 2 INJECTION, POWDER, FOR SOLUTION INTRAVENOUS at 17:51

## 2025-06-01 RX ADMIN — ACETAMINOPHEN 650 MG: 325 TABLET ORAL at 20:48

## 2025-06-01 RX ADMIN — FAMOTIDINE 20 MG: 20 TABLET, FILM COATED ORAL at 08:22

## 2025-06-01 RX ADMIN — RIVAROXABAN 20 MG: 20 TABLET, FILM COATED ORAL at 08:22

## 2025-06-01 RX ADMIN — MICONAZOLE NITRATE: 2 POWDER TOPICAL at 08:22

## 2025-06-01 RX ADMIN — Medication: at 20:47

## 2025-06-01 RX ADMIN — FAMOTIDINE 20 MG: 20 TABLET, FILM COATED ORAL at 20:48

## 2025-06-01 RX ADMIN — CEFEPIME 2000 MG: 2 INJECTION, POWDER, FOR SOLUTION INTRAVENOUS at 06:22

## 2025-06-01 RX ADMIN — AMITRIPTYLINE HYDROCHLORIDE 150 MG: 50 TABLET ORAL at 20:48

## 2025-06-01 RX ADMIN — MICONAZOLE NITRATE: 2 POWDER TOPICAL at 20:48

## 2025-06-01 RX ADMIN — FLUCONAZOLE 100 MG: 100 TABLET ORAL at 08:22

## 2025-06-01 RX ADMIN — ACETAMINOPHEN 650 MG: 325 TABLET ORAL at 10:02

## 2025-06-01 RX ADMIN — LOSARTAN POTASSIUM 25 MG: 25 TABLET, FILM COATED ORAL at 08:22

## 2025-06-01 ASSESSMENT — PAIN DESCRIPTION - ORIENTATION: ORIENTATION: MID;LOWER

## 2025-06-01 ASSESSMENT — PAIN DESCRIPTION - FREQUENCY: FREQUENCY: INTERMITTENT

## 2025-06-01 ASSESSMENT — PAIN DESCRIPTION - DESCRIPTORS
DESCRIPTORS: ACHING
DESCRIPTORS: ACHING

## 2025-06-01 ASSESSMENT — PAIN SCALES - GENERAL
PAINLEVEL_OUTOF10: 4
PAINLEVEL_OUTOF10: 2
PAINLEVEL_OUTOF10: 4

## 2025-06-01 ASSESSMENT — PAIN DESCRIPTION - LOCATION
LOCATION: BACK
LOCATION: BACK

## 2025-06-01 ASSESSMENT — PAIN DESCRIPTION - PAIN TYPE: TYPE: CHRONIC PAIN

## 2025-06-01 ASSESSMENT — PAIN DESCRIPTION - ONSET: ONSET: ON-GOING

## 2025-06-01 NOTE — FLOWSHEET NOTE
Tate hose and left foot boot removed.  Lotion applied to dry flaky skin to BLE including feet.  Feet elevated on pillows.  Tate hose washed, will replace in AM.  Instructed that hose should be removed nightly, lotion applied to dry skin and skin should be checked daily for open areas or increased redness.  Abd folds and groin cleansed with wipes, dried with pillow case and powder applied. Sameera care given and assisted to turn, zinc cream applied so sacrum/buttock.Tylenol given for back pain, no other needs voiced at this time.

## 2025-06-02 VITALS
OXYGEN SATURATION: 98 % | HEIGHT: 71 IN | DIASTOLIC BLOOD PRESSURE: 70 MMHG | RESPIRATION RATE: 18 BRPM | SYSTOLIC BLOOD PRESSURE: 155 MMHG | WEIGHT: 251.77 LBS | TEMPERATURE: 97.5 F | HEART RATE: 68 BPM | BODY MASS INDEX: 35.25 KG/M2

## 2025-06-02 LAB
ANION GAP SERPL CALCULATED.3IONS-SCNC: 8 MMOL/L (ref 3–16)
BASOPHILS # BLD: 0 K/UL (ref 0–0.2)
BASOPHILS NFR BLD: 0 %
BUN SERPL-MCNC: 13 MG/DL (ref 7–20)
CALCIUM SERPL-MCNC: 9.4 MG/DL (ref 8.3–10.6)
CHLORIDE SERPL-SCNC: 104 MMOL/L (ref 99–110)
CO2 SERPL-SCNC: 26 MMOL/L (ref 21–32)
CREAT SERPL-MCNC: 0.9 MG/DL (ref 0.8–1.3)
DEPRECATED RDW RBC AUTO: 13.6 % (ref 12.4–15.4)
EOSINOPHIL # BLD: 0.2 K/UL (ref 0–0.6)
EOSINOPHIL NFR BLD: 3 %
GFR SERPLBLD CREATININE-BSD FMLA CKD-EPI: 81 ML/MIN/{1.73_M2}
GLUCOSE SERPL-MCNC: 101 MG/DL (ref 70–99)
HCT VFR BLD AUTO: 37.6 % (ref 40.5–52.5)
HGB BLD-MCNC: 12.5 G/DL (ref 13.5–17.5)
LYMPHOCYTES # BLD: 0.6 K/UL (ref 1–5.1)
LYMPHOCYTES NFR BLD: 9 %
MCH RBC QN AUTO: 31.3 PG (ref 26–34)
MCHC RBC AUTO-ENTMCNC: 33.3 G/DL (ref 31–36)
MCV RBC AUTO: 94.2 FL (ref 80–100)
METAMYELOCYTES NFR BLD MANUAL: 3 %
MONOCYTES # BLD: 0.2 K/UL (ref 0–1.3)
MONOCYTES NFR BLD: 3 %
NEUTROPHILS # BLD: 6 K/UL (ref 1.7–7.7)
NEUTROPHILS NFR BLD: 82 %
PLATELET # BLD AUTO: 183 K/UL (ref 135–450)
PLATELET BLD QL SMEAR: ADEQUATE
PMV BLD AUTO: 8.1 FL (ref 5–10.5)
POTASSIUM SERPL-SCNC: 4.3 MMOL/L (ref 3.5–5.1)
PROCALCITONIN SERPL IA-MCNC: 0.3 NG/ML (ref 0–0.15)
RBC # BLD AUTO: 3.99 M/UL (ref 4.2–5.9)
RBC MORPH BLD: NORMAL
SLIDE REVIEW: ABNORMAL
SODIUM SERPL-SCNC: 138 MMOL/L (ref 136–145)
WBC # BLD AUTO: 7.1 K/UL (ref 4–11)

## 2025-06-02 PROCEDURE — 97535 SELF CARE MNGMENT TRAINING: CPT

## 2025-06-02 PROCEDURE — 99232 SBSQ HOSP IP/OBS MODERATE 35: CPT | Performed by: INTERNAL MEDICINE

## 2025-06-02 PROCEDURE — 85025 COMPLETE CBC W/AUTO DIFF WBC: CPT

## 2025-06-02 PROCEDURE — 97530 THERAPEUTIC ACTIVITIES: CPT

## 2025-06-02 PROCEDURE — 80048 BASIC METABOLIC PNL TOTAL CA: CPT

## 2025-06-02 PROCEDURE — 6370000000 HC RX 637 (ALT 250 FOR IP): Performed by: INTERNAL MEDICINE

## 2025-06-02 PROCEDURE — 36415 COLL VENOUS BLD VENIPUNCTURE: CPT

## 2025-06-02 PROCEDURE — 2580000003 HC RX 258: Performed by: INTERNAL MEDICINE

## 2025-06-02 PROCEDURE — 6360000002 HC RX W HCPCS: Performed by: INTERNAL MEDICINE

## 2025-06-02 PROCEDURE — 84145 PROCALCITONIN (PCT): CPT

## 2025-06-02 PROCEDURE — 94760 N-INVAS EAR/PLS OXIMETRY 1: CPT

## 2025-06-02 PROCEDURE — 6370000000 HC RX 637 (ALT 250 FOR IP): Performed by: STUDENT IN AN ORGANIZED HEALTH CARE EDUCATION/TRAINING PROGRAM

## 2025-06-02 RX ORDER — FLUCONAZOLE 100 MG/1
100 TABLET ORAL DAILY
Qty: 2 TABLET | Refills: 0 | DISCHARGE
Start: 2025-06-03 | End: 2025-06-05

## 2025-06-02 RX ORDER — LEVOFLOXACIN 500 MG/1
500 TABLET, FILM COATED ORAL DAILY
Qty: 10 TABLET | Refills: 0 | Status: SHIPPED | OUTPATIENT
Start: 2025-06-02 | End: 2025-06-12

## 2025-06-02 RX ORDER — LIDOCAINE 4 G/G
1 PATCH TOPICAL DAILY
Status: DISCONTINUED | OUTPATIENT
Start: 2025-06-02 | End: 2025-06-02 | Stop reason: HOSPADM

## 2025-06-02 RX ADMIN — LOSARTAN POTASSIUM 25 MG: 25 TABLET, FILM COATED ORAL at 08:25

## 2025-06-02 RX ADMIN — FLUCONAZOLE 100 MG: 100 TABLET ORAL at 08:25

## 2025-06-02 RX ADMIN — CEFEPIME 2000 MG: 2 INJECTION, POWDER, FOR SOLUTION INTRAVENOUS at 06:14

## 2025-06-02 RX ADMIN — SENNOSIDES, DOCUSATE SODIUM 2 TABLET: 50; 8.6 TABLET, FILM COATED ORAL at 08:25

## 2025-06-02 RX ADMIN — FAMOTIDINE 20 MG: 20 TABLET, FILM COATED ORAL at 08:25

## 2025-06-02 RX ADMIN — RIVAROXABAN 20 MG: 20 TABLET, FILM COATED ORAL at 08:25

## 2025-06-02 RX ADMIN — MICONAZOLE NITRATE: 2 POWDER TOPICAL at 08:25

## 2025-06-02 NOTE — PLAN OF CARE
Problem: Chronic Conditions and Co-morbidities  Goal: Patient's chronic conditions and co-morbidity symptoms are monitored and maintained or improved  5/29/2025 1238 by Sanna Becker RN  Outcome: Progressing  5/29/2025 0042 by Khatiwada, Swastika, RN  Outcome: Progressing     Problem: Discharge Planning  Goal: Discharge to home or other facility with appropriate resources  5/29/2025 1238 by Sanna Becker RN  Outcome: Progressing  5/29/2025 0042 by Khatiwada, Swastika, RN  Outcome: Progressing     Problem: Skin/Tissue Integrity  Goal: Skin integrity remains intact  Description: 1.  Monitor for areas of redness and/or skin breakdown2.  Assess vascular access sites hourly3.  Every 4-6 hours minimum:  Change oxygen saturation probe site4.  Every 4-6 hours:  If on nasal continuous positive airway pressure, respiratory therapy assess nares and determine need for appliance change or resting period  5/29/2025 1238 by Sanna Becker RN  Outcome: Progressing  5/29/2025 0042 by Khatiwada, Swastika, RN  Outcome: Progressing     Problem: Safety - Adult  Goal: Free from fall injury  5/29/2025 1238 by Sanna Becker RN  Outcome: Progressing  5/29/2025 0042 by Khatiwada, Swastika, RN  Outcome: Progressing     Problem: ABCDS Injury Assessment  Goal: Absence of physical injury  5/29/2025 1238 by Sanna Becker RN  Outcome: Progressing  5/29/2025 0042 by Khatiwada, Swastika, RN  Outcome: Progressing     
  Problem: Chronic Conditions and Co-morbidities  Goal: Patient's chronic conditions and co-morbidity symptoms are monitored and maintained or improved  5/29/2025 2219 by Khatiwada, Swastika, RN  Outcome: Progressing  5/29/2025 1238 by Sanna Becker RN  Outcome: Progressing     Problem: Discharge Planning  Goal: Discharge to home or other facility with appropriate resources  5/29/2025 2219 by Khatiwada, Swastika, RN  Outcome: Progressing  5/29/2025 1238 by Sanna Becker RN  Outcome: Progressing     Problem: Skin/Tissue Integrity  Goal: Skin integrity remains intact  Description: 1.  Monitor for areas of redness and/or skin breakdown2.  Assess vascular access sites hourly3.  Every 4-6 hours minimum:  Change oxygen saturation probe site4.  Every 4-6 hours:  If on nasal continuous positive airway pressure, respiratory therapy assess nares and determine need for appliance change or resting period  5/29/2025 2219 by Khatiwada, Swastika, RN  Outcome: Progressing  5/29/2025 1238 by Sanna Becker RN  Outcome: Progressing  Flowsheets (Taken 5/29/2025 0820)  Skin Integrity Remains Intact: Monitor for areas of redness and/or skin breakdown     Problem: Safety - Adult  Goal: Free from fall injury  5/29/2025 2219 by Khatiwada, Swastika, RN  Outcome: Progressing  5/29/2025 1238 by Sanna Becker RN  Outcome: Progressing     Problem: ABCDS Injury Assessment  Goal: Absence of physical injury  5/29/2025 2219 by Khatiwada, Swastika, RN  Outcome: Progressing  5/29/2025 1238 by Sanna Becker RN  Outcome: Progressing     
  Problem: Chronic Conditions and Co-morbidities  Goal: Patient's chronic conditions and co-morbidity symptoms are monitored and maintained or improved  5/30/2025 1045 by Sanna Jerez RN  Outcome: Progressing  Flowsheets (Taken 5/30/2025 0740)  Care Plan - Patient's Chronic Conditions and Co-Morbidity Symptoms are Monitored and Maintained or Improved: Monitor and assess patient's chronic conditions and comorbid symptoms for stability, deterioration, or improvement  5/29/2025 2219 by Khatiwada, Swastika, RN  Outcome: Progressing     Problem: Discharge Planning  Goal: Discharge to home or other facility with appropriate resources  5/30/2025 1045 by Sanna Jerez RN  Outcome: Progressing  Flowsheets (Taken 5/30/2025 0740)  Discharge to home or other facility with appropriate resources:   Identify barriers to discharge with patient and caregiver   Arrange for needed discharge resources and transportation as appropriate   Identify discharge learning needs (meds, wound care, etc)  5/29/2025 2219 by Khatiwada, Swastika, RN  Outcome: Progressing     Problem: Skin/Tissue Integrity  Goal: Skin integrity remains intact  Description: 1.  Monitor for areas of redness and/or skin breakdown2.  Assess vascular access sites hourly3.  Every 4-6 hours minimum:  Change oxygen saturation probe site4.  Every 4-6 hours:  If on nasal continuous positive airway pressure, respiratory therapy assess nares and determine need for appliance change or resting period  5/30/2025 1045 by Sanna Jerez RN  Outcome: Progressing  Flowsheets (Taken 5/30/2025 0740)  Skin Integrity Remains Intact:   Monitor for areas of redness and/or skin breakdown   Assess vascular access sites hourly   Turn and reposition as indicated  5/29/2025 2219 by Khatiwada, Swastika, RN  Outcome: Progressing     Problem: Safety - Adult  Goal: Free from fall injury  5/30/2025 1045 by Sanna Jerez RN  Outcome: Progressing  5/29/2025 2219 by Khatiwada, Swastika, 
  Problem: Chronic Conditions and Co-morbidities  Goal: Patient's chronic conditions and co-morbidity symptoms are monitored and maintained or improved  5/30/2025 2255 by Kalpana Law RN  Outcome: Progressing  5/30/2025 1045 by Sanna Jerez RN  Outcome: Progressing  Flowsheets (Taken 5/30/2025 0740)  Care Plan - Patient's Chronic Conditions and Co-Morbidity Symptoms are Monitored and Maintained or Improved: Monitor and assess patient's chronic conditions and comorbid symptoms for stability, deterioration, or improvement     Problem: Discharge Planning  Goal: Discharge to home or other facility with appropriate resources  5/30/2025 2255 by Kalpana Law RN  Outcome: Progressing  5/30/2025 1045 by Sanna Jerez RN  Outcome: Progressing  Flowsheets (Taken 5/30/2025 0740)  Discharge to home or other facility with appropriate resources:   Identify barriers to discharge with patient and caregiver   Arrange for needed discharge resources and transportation as appropriate   Identify discharge learning needs (meds, wound care, etc)     Problem: Skin/Tissue Integrity  Goal: Skin integrity remains intact  Description: 1.  Monitor for areas of redness and/or skin breakdown2.  Assess vascular access sites hourly3.  Every 4-6 hours minimum:  Change oxygen saturation probe site4.  Every 4-6 hours:  If on nasal continuous positive airway pressure, respiratory therapy assess nares and determine need for appliance change or resting period  5/30/2025 2255 by Kalpana Law RN  Outcome: Progressing  5/30/2025 1045 by Sanna Jerez RN  Outcome: Progressing  Flowsheets (Taken 5/30/2025 0740)  Skin Integrity Remains Intact:   Monitor for areas of redness and/or skin breakdown   Assess vascular access sites hourly   Turn and reposition as indicated     Problem: Safety - Adult  Goal: Free from fall injury  5/30/2025 2255 by Kalpana Law RN  Outcome: Progressing  5/30/2025 1045 by Sanna Jerez RN  Outcome: 
  Problem: Chronic Conditions and Co-morbidities  Goal: Patient's chronic conditions and co-morbidity symptoms are monitored and maintained or improved  5/31/2025 0813 by Sanna Jerez RN  Outcome: Progressing  Flowsheets (Taken 5/31/2025 0804)  Care Plan - Patient's Chronic Conditions and Co-Morbidity Symptoms are Monitored and Maintained or Improved:   Monitor and assess patient's chronic conditions and comorbid symptoms for stability, deterioration, or improvement   Collaborate with multidisciplinary team to address chronic and comorbid conditions and prevent exacerbation or deterioration  5/30/2025 2255 by Kalpana Law, RN  Outcome: Progressing     Problem: Discharge Planning  Goal: Discharge to home or other facility with appropriate resources  5/31/2025 0813 by Sanna Jerez RN  Outcome: Progressing  Flowsheets (Taken 5/31/2025 0804)  Discharge to home or other facility with appropriate resources:   Identify barriers to discharge with patient and caregiver   Arrange for needed discharge resources and transportation as appropriate   Identify discharge learning needs (meds, wound care, etc)  5/30/2025 2255 by Kalpana Law, RN  Outcome: Progressing     Problem: Skin/Tissue Integrity  Goal: Skin integrity remains intact  Description: 1.  Monitor for areas of redness and/or skin breakdown2.  Assess vascular access sites hourly3.  Every 4-6 hours minimum:  Change oxygen saturation probe site4.  Every 4-6 hours:  If on nasal continuous positive airway pressure, respiratory therapy assess nares and determine need for appliance change or resting period  5/31/2025 0813 by Sanna Jerez, RN  Outcome: Progressing  Flowsheets (Taken 5/31/2025 0804)  Skin Integrity Remains Intact:   Monitor for areas of redness and/or skin breakdown   Assess vascular access sites hourly  5/30/2025 2255 by Kalpana Law, RN  Outcome: Progressing     Problem: Safety - Adult  Goal: Free from fall injury  5/31/2025 0813 by Solitario 
  Problem: Chronic Conditions and Co-morbidities  Goal: Patient's chronic conditions and co-morbidity symptoms are monitored and maintained or improved  6/2/2025 1212 by Adry Mayers RN  Outcome: Progressing  6/1/2025 2254 by Viri Araya, RN  Outcome: Progressing  Flowsheets (Taken 6/1/2025 2048)  Care Plan - Patient's Chronic Conditions and Co-Morbidity Symptoms are Monitored and Maintained or Improved:   Monitor and assess patient's chronic conditions and comorbid symptoms for stability, deterioration, or improvement   Collaborate with multidisciplinary team to address chronic and comorbid conditions and prevent exacerbation or deterioration     Problem: Discharge Planning  Goal: Discharge to home or other facility with appropriate resources  6/2/2025 1212 by Adry Mayers RN  Outcome: Progressing  6/1/2025 2254 by Viri Araya RN  Outcome: Progressing  Flowsheets (Taken 6/1/2025 2048)  Discharge to home or other facility with appropriate resources:   Identify barriers to discharge with patient and caregiver   Arrange for needed discharge resources and transportation as appropriate     Problem: Skin/Tissue Integrity  Goal: Skin integrity remains intact  Description: 1.  Monitor for areas of redness and/or skin breakdown2.  Assess vascular access sites hourly3.  Every 4-6 hours minimum:  Change oxygen saturation probe site4.  Every 4-6 hours:  If on nasal continuous positive airway pressure, respiratory therapy assess nares and determine need for appliance change or resting period  6/2/2025 1212 by Adry Mayers, RN  Outcome: Progressing  6/1/2025 2254 by Viri Araya, RN  Outcome: Progressing  Flowsheets  Taken 6/1/2025 2229  Skin Integrity Remains Intact:   Monitor for areas of redness and/or skin breakdown   Assess vascular access sites hourly  Taken 6/1/2025 2048  Skin Integrity Remains Intact:   Monitor for areas of redness and/or skin breakdown   Assess vascular access sites hourly   
  Problem: Chronic Conditions and Co-morbidities  Goal: Patient's chronic conditions and co-morbidity symptoms are monitored and maintained or improved  6/2/2025 1644 by Adry Mayers RN  Outcome: Completed  6/2/2025 1212 by Adry Mayers RN  Outcome: Progressing     Problem: Discharge Planning  Goal: Discharge to home or other facility with appropriate resources  6/2/2025 1644 by Adry Mayers RN  Outcome: Completed  6/2/2025 1212 by Adry Mayers RN  Outcome: Progressing     Problem: Skin/Tissue Integrity  Goal: Skin integrity remains intact  Description: 1.  Monitor for areas of redness and/or skin breakdown2.  Assess vascular access sites hourly3.  Every 4-6 hours minimum:  Change oxygen saturation probe site4.  Every 4-6 hours:  If on nasal continuous positive airway pressure, respiratory therapy assess nares and determine need for appliance change or resting period  6/2/2025 1644 by Adry Mayers RN  Outcome: Completed  6/2/2025 1212 by Adry Mayers RN  Outcome: Progressing     Problem: Safety - Adult  Goal: Free from fall injury  6/2/2025 1644 by Adry Mayers RN  Outcome: Completed  6/2/2025 1212 by Adry Mayers RN  Outcome: Progressing     Problem: ABCDS Injury Assessment  Goal: Absence of physical injury  6/2/2025 1644 by Adry Mayers RN  Outcome: Completed  6/2/2025 1212 by Adry Mayers RN  Outcome: Progressing     Problem: Pain  Goal: Verbalizes/displays adequate comfort level or baseline comfort level  6/2/2025 1644 by Adry Mayers RN  Outcome: Completed  6/2/2025 1212 by Adry Mayers RN  Outcome: Progressing     Problem: Respiratory - Adult  Goal: Achieves optimal ventilation and oxygenation  6/2/2025 1644 by Adry Mayers RN  Outcome: Completed  6/2/2025 1212 by Adry Mayers RN  Outcome: Progressing     Problem: Cardiovascular - Adult  Goal: Maintains optimal cardiac output and hemodynamic stability  6/2/2025 1644 by Adry Mayers 
  Problem: Chronic Conditions and Co-morbidities  Goal: Patient's chronic conditions and co-morbidity symptoms are monitored and maintained or improved  Outcome: Progressing     Problem: Discharge Planning  Goal: Discharge to home or other facility with appropriate resources  Outcome: Progressing     Problem: Skin/Tissue Integrity  Goal: Skin integrity remains intact  Description: 1.  Monitor for areas of redness and/or skin breakdown2.  Assess vascular access sites hourly3.  Every 4-6 hours minimum:  Change oxygen saturation probe site4.  Every 4-6 hours:  If on nasal continuous positive airway pressure, respiratory therapy assess nares and determine need for appliance change or resting period  Outcome: Progressing     Problem: Safety - Adult  Goal: Free from fall injury  Outcome: Progressing     Problem: ABCDS Injury Assessment  Goal: Absence of physical injury  Outcome: Progressing     
  Problem: Chronic Conditions and Co-morbidities  Goal: Patient's chronic conditions and co-morbidity symptoms are monitored and maintained or improved  Outcome: Progressing  Flowsheets (Taken 5/31/2025 1953)  Care Plan - Patient's Chronic Conditions and Co-Morbidity Symptoms are Monitored and Maintained or Improved:   Monitor and assess patient's chronic conditions and comorbid symptoms for stability, deterioration, or improvement   Collaborate with multidisciplinary team to address chronic and comorbid conditions and prevent exacerbation or deterioration     Problem: Discharge Planning  Goal: Discharge to home or other facility with appropriate resources  Outcome: Progressing  Flowsheets (Taken 5/31/2025 1953)  Discharge to home or other facility with appropriate resources:   Identify barriers to discharge with patient and caregiver   Arrange for needed discharge resources and transportation as appropriate     Problem: Skin/Tissue Integrity  Goal: Skin integrity remains intact  Description: 1.  Monitor for areas of redness and/or skin breakdown2.  Assess vascular access sites hourly3.  Every 4-6 hours minimum:  Change oxygen saturation probe site4.  Every 4-6 hours:  If on nasal continuous positive airway pressure, respiratory therapy assess nares and determine need for appliance change or resting period  Outcome: Progressing  Flowsheets  Taken 5/31/2025 2258  Skin Integrity Remains Intact:   Monitor for areas of redness and/or skin breakdown   Assess vascular access sites hourly  Taken 5/31/2025 1953  Skin Integrity Remains Intact:   Monitor for areas of redness and/or skin breakdown   Assess vascular access sites hourly     Problem: Safety - Adult  Goal: Free from fall injury  Outcome: Progressing     Problem: ABCDS Injury Assessment  Goal: Absence of physical injury  Outcome: Progressing     Problem: Pain  Goal: Verbalizes/displays adequate comfort level or baseline comfort level  Outcome: Progressing   
1036 by Felipe, Simranjit, RN  Outcome: Progressing  5/31/2025 2334 by Viri Araya RN  Outcome: Progressing  Flowsheets (Taken 5/31/2025 1953)  Maintains optimal cardiac output and hemodynamic stability:   Monitor blood pressure and heart rate   Monitor urine output and notify Licensed Independent Practitioner for values outside of normal range   Assess for signs of decreased cardiac output   Administer vasoactive medications as ordered   Administer fluid and/or volume expanders as ordered  Goal: Absence of cardiac dysrhythmias or at baseline  6/1/2025 1036 by Jazzy Felipe RN  Outcome: Progressing  5/31/2025 2334 by Viri Araya RN  Outcome: Progressing  Flowsheets (Taken 5/31/2025 1953)  Absence of cardiac dysrhythmias or at baseline:   Monitor cardiac rate and rhythm   Assess for signs of decreased cardiac output   Administer antiarrhythmia medication and electrolyte replacement as ordered     Problem: Skin/Tissue Integrity - Adult  Goal: Skin integrity remains intact  Description: 1.  Monitor for areas of redness and/or skin breakdown2.  Assess vascular access sites hourly3.  Every 4-6 hours minimum:  Change oxygen saturation probe site4.  Every 4-6 hours:  If on nasal continuous positive airway pressure, respiratory therapy assess nares and determine need for appliance change or resting period  6/1/2025 1036 by Jazzy Felipe RN  Outcome: Progressing  5/31/2025 2334 by Viri Araya RN  Outcome: Progressing  Flowsheets  Taken 5/31/2025 2258  Skin Integrity Remains Intact:   Monitor for areas of redness and/or skin breakdown   Assess vascular access sites hourly  Taken 5/31/2025 1953  Skin Integrity Remains Intact:   Monitor for areas of redness and/or skin breakdown   Assess vascular access sites hourly  Goal: Incisions, wounds, or drain sites healing without S/S of infection  6/1/2025 1036 by Jazzy Felipe RN  Outcome: Progressing  5/31/2025 2334 by Viri Araya RN  Outcome: 
Progressing  Goal: Oral mucous membranes remain intact  6/1/2025 2254 by Viri Araya RN  Outcome: Progressing  6/1/2025 1036 by Jazzy Felipe RN  Outcome: Progressing     Problem: Musculoskeletal - Adult  Goal: Return mobility to safest level of function  6/1/2025 2254 by Viri Araya RN  Outcome: Progressing  Flowsheets (Taken 6/1/2025 2048)  Return Mobility to Safest Level of Function:   Assess patient stability and activity tolerance for standing, transferring and ambulating with or without assistive devices   Assist with transfers and ambulation using safe patient handling equipment as needed   Obtain physical therapy/occupational therapy consults as needed   Instruct patient/family in ordered activity level  6/1/2025 1036 by Jazzy Felipe RN  Outcome: Progressing  Goal: Maintain proper alignment of affected body part  6/1/2025 2254 by Viri Araya RN  Outcome: Progressing  Flowsheets (Taken 6/1/2025 2048)  Maintain proper alignment of affected body part: Support and protect limb and body alignment per provider's orders  6/1/2025 1036 by Jazzy Felipe RN  Outcome: Progressing  Goal: Return ADL status to a safe level of function  6/1/2025 2254 by Viri Araya RN  Outcome: Progressing  Flowsheets (Taken 6/1/2025 2048)  Return ADL Status to a Safe Level of Function:   Administer medication as ordered   Assess activities of daily living deficits and provide assistive devices as needed   Obtain physical therapy/occupational therapy consults as needed   Assist and instruct patient to increase activity and self care as tolerated  6/1/2025 1036 by Jazzy Felipe RN  Outcome: Progressing     Problem: Genitourinary - Adult  Goal: Absence of urinary retention  6/1/2025 2254 by Viri Araya RN  Outcome: Progressing  Flowsheets (Taken 6/1/2025 2048)  Absence of urinary retention:   Assess patient’s ability to void and empty bladder   Monitor intake/output and perform bladder scan as

## 2025-06-02 NOTE — CARE COORDINATION
Discharge Planning:     Met with the patient and spouse. Update provided regarding precert obtained to [facility].     Seen by PT/OT this morning. Continues to require SNF. Spouse in agreement.     Per ID, will discharge on oral antibiotics. Will arrange transportation for around 6pm.     Dexter WISDOM RN  Case Management  [phone]    Electronically signed by Dexter Martins RN on 6/2/2025 at 12:14 PM

## 2025-06-02 NOTE — CARE COORDINATION
Asked to see pt for possible irritant contact dermatitis related to urinary incontinence. Pt does have irritant contact dermatitis and has 2 open areas from moisture. Scant amt of bleeding. Pt reports itching along right buttock. Scrotum is red. Pt has fungal dermatitis in groin that according to notes has improved, but extends to buttocks. Using antifungal powder and is on Diflucan. PureWick is in place. Pt being discharged. On Isoflex gel with pump. Ernst Marcano, ZHAO, RN, CWOCN

## 2025-06-02 NOTE — CARE COORDINATION
[facility] AUTHORIZATION INFORMATION      LOCATION:  [facility]    EFFECTIVE DATE:  06/02 thru 06/04/2025    NEXT REVIEW DATE:  06/04/2025    AUTH#:  [id number]     Electronically signed by Juany Dodd, Case Management Assistant Juany Dodd on 6/2/2025 at 8:58 AM

## 2025-06-02 NOTE — CARE COORDINATION
Case Management Discharge Note          Date / Time of Note: 6/2/2025 12:22 PM                  Patient Name: Jarred Suarez   YOB: 1935  Diagnosis: Severe sepsis with lactic acidosis (HCC) [A41.9, R65.20, E87.20]   Date / Time: 5/28/2025  5:31 PM    Financial:  Payor: [employer] MEDICARE / Plan: [employer] GOLD PLUS HMO / Product Type: *No Product type* /      Pharmacy:    [id number] - [place], [place] - [address] - P [phone] - F [phone]  [address] [facility] [address]  Phone: [phone] Fax: [phone]      Assistance purchasing medications?: Potential Assistance Purchasing Medications: No  Assistance provided by Case Management: None at this time    DISCHARGE Disposition: Skilled Nursing Facility (SNF)    Nursing Facility:   Discharging to Facility/ Agency   Name: [facility]  Address:  [address] [place] [id number]   Phone:  [phone]  Fax:  [phone]      LOC at discharge: Skilled Nursing  CARLEEN Completed: Yes             NURSING REPORT NUMBER: [phone]               NURSING FAX NUMBER: [phone]    Notification completed in HENS/PAS?:  Yes : CM has completed HENS online through secure website for SNF admission at [facility].   Document ID #:      Referrals made at DISCHARGE for outpatient continued care:  Not Applicable    Transportation:  Transportation PLAN for discharge: EMS transportation   Mode of Transport: Ambulance stretcher - S  Reason for medical transport: Bed confined: Meets the following criteria 1) unable to get out of bed without assistance or ambulate, 2) unable to safely sit up in a wheelchair, 3) unable to maintain erect seating position in a chair for time needed for transport  Name of Transport Company: [employer]  Phone: [phone]  Time of Transport: 6:00 PM    Transport form completed: Yes    IMM Completed:   Yes, Case management has presented and reviewed IMM letter #2.       IMM Letter date

## 2025-06-02 NOTE — PROGRESS NOTES
V2.0  [facility] [place]      Name:  Jarred Suarez /Age/Sex: 1935  (89 y.o. male)   MRN & CSN:  [id number] & [id number] Encounter Date/Time: 2025 8:48 AM EDT    Location:  M4P-7733/4104-01 PCP: Jack Shelton MD       Hospital Day: 2    Assessment and Plan:   Jarred Suarez is a 89 y.o. male presenting with fevers and rash      Plan:  Severe sepsis  - Evidenced by fever, tachycardia with elevated initial lactate  - Empiric antibiotics with vancomycin and cefepime. Monitor toxicity of vancomycin with BMP for ODILIA  -ID consulted, appreciate input  Right lower extremity cellulitis  -Abx with vancomycin, cefepime.  Monitor toxicity of vancomycin with BMP for ODILIA  Right groin dermatitis  -appearance of fungal dermatitis  -micotin powder to keep area dry. Fluconazole per ID  Creatinine elevation - improved  -not quite to level of ODILIA  -likely pre-renal due to sepsis  Hypertension  - Hold home meds in the setting of hypotension  Paroxysmal A-fib  - AC with Xarelto  Constipation  -Bowel regimen    Ppx: Xarelto  Dispo: SNF    Subjective:     Chief Complaint: Wound Infection (Pt sent by Dr. Shelton for concerns for an infection. Pt has had a fungal infection in the right side of groin. Was prescribed a nystatin powder and cream but neither helped. Pt's wife states that the redness is now on his leg.)     Interval history  Patient denies fevers/chills. Some pain in the right leg. Denies shortness of breath.      Review of Systems:    Review of Systems    10 point ROS negative except as stated above in \"subjective\" section    Objective:   No intake or output data in the 24 hours ending 25 0848     Vitals:   Vitals:    25 0818   BP: (!) 98/51   Pulse:    Resp:    Temp:    SpO2:        Physical Exam:     General: NAD  Cardiovascular: Regular rate.  Respiratory: Clear to auscultation  Gastrointestinal: Soft, non tender  Genitourinary: Right groin beefy red rash. Moist, 
    V2.0  [facility] [place] Note      Name:  Jarred Suarez /Age/Sex: 1935  (89 y.o. male)   MRN & CSN:  [id number] & [id number] Encounter Date/Time: 2025 8:48 AM EDT    Location:  [id number]-9098/4104-01 PCP: Jack Shelton MD       Hospital Day: 4    Assessment and Plan:   Jarred Suarez is a 89 y.o. male presenting with fevers and rash      Plan:  Severe sepsis  Pseudomonas bacteremia  - Evidenced by fever, tachycardia with elevated initial lactate  - Blood culture positive for Pseudomonas  - ID consulted, note  reviewed: Pseudomonas isolated.  DC vancomycin.  Continue IV cefepime. Continue with wound care and compression. Trend procal and WBC  -Clearance cultures  NGTD  Right lower extremity cellulitis - improving  - Abx with cefepime  Right groin dermatitis - improving  - Appearance of fungal dermatitis; greatly improved today  - Micotin powder to keep area dry. Fluconazole per ID  Creatinine elevation - improved  - Not quite to level of ODILIA  - Likely pre-renal due to sepsis  Right Renal mass  - increased in size from 3.1cm 2024 to 4.1 cm  - per radiology read appearance of renal cell carcinoma. Urology consulted.  - Urology note reviewed : recommended OP follow-up to discuss treatment vs observation  Hypertension  - Resumed home losartan  Paroxysmal A-fib  - AC with Xarelto  Constipation  -Bowel regimen    Ppx: Xarelto  Dispo: SNF    Subjective:     Chief Complaint: Wound Infection (Pt sent by Dr. Shelton for concerns for an infection. Pt has had a fungal infection in the right side of groin. Was prescribed a nystatin powder and cream but neither helped. Pt's wife states that the redness is now on his leg.)     Interval history  Patient denies pain in right leg or groin today. Denies fevers/chills, N/V.     Review of Systems:    Review of Systems    10 point ROS negative except as stated above in \"subjective\" section    Objective:     Intake/Output Summary (Last 24 hours) at 
  [facility] - Occupational Therapy   Phone: [phone]    Occupational Therapy  Facility/Department:[address] MED SURG    [x] Initial Evaluation            [x] Daily Treatment Note         [] Discharge Summary      Patient: Jarred Suarez   : 1935   MRN: [id number]   Date of Service:  2025    Staff Mobility Recommendation: Maxi move    AM-PAC Score: 16/24  Jarred Suarez scored a 16/24 on the AM-PAC ADL Inpatient form. Current research shows that an AM-PAC score of 17 or less is typically not associated with a discharge to the patient's home setting. Based on the patient's AM-PAC score and their current ADL deficits, it is recommended that the patient have 3-5 sessions per week of Occupational Therapy at d/c to increase the patient's independence.  Please see assessment section for further patient specific details.    If patient discharges prior to next session this note will serve as a discharge summary.  Please see below for the latest assessment towards goals.     Discharge Recommendations: SNF    Admitting Diagnosis:  Severe sepsis with lactic acidosis (HCC)  Ordering Physician: Yris Mcnair MD   Current Admission Summary: per ED note, Jarred Suarez is a 89 y.o. male with past medical history significant for CAD, Afib on Xarelto who presents with weakness and fever  For the last 3 days patient with progressive weakness generalized, was seen in PCP office had a urinalysis, was followed up as a PCP office today difficulty even ambulating has had 4 falls in the last 3 days the numerous lift assist for the patient is did not want to come to the hospital each time  He otherwise denies any chest pain or abdominal pain this only is of infection in his groin is however the last 2 months with believe is due to yeast  He tells me that his falls are mostly due to him losing his balance, he does not sure if he hit his head or not  Otherwise tells me he had here yesterday Tmax 1-2.3 history 
  [facility] - Physical Therapy   Phone: [phone]    Physical Therapy  Facility/Department:[address] MED SURG  [x]co-treatment indicated; patient seen for co-treatment due to: patient safety, patient endurance, acute illness/injury, and staff recommendation.    PT addressing: [x] Sitting balance/LE WB, [x] Standing balance/LE WB, [x]Transfer training, [x] BLE strength/endurance with functional tasks,  [] Other:  OT addressing: []ADL's, [] Pericare,  []clothing management, [x] BU E strength/endurance with functional tasks,  [] UE WB [] Other:      [] Initial Evaluation            [x] Daily Treatment Note         [] Discharge Summary      Patient: Jarred Suarez   : 1935   MRN: [id number]   Date of Service:  2025  Staff Mobility Recommendation: Maxi Move    AM-PAC score:   Discharge Recommendations: SNF    Jarred Suarez scored a  on the AM-PAC short mobility form. Current research shows that an AM-PAC score of 17 or less is typically not associated with a discharge to the patient's home setting. Based on the patient's AM-PAC score and their current functional mobility deficits, it is recommended that the patient have 3-5 sessions per week of Physical Therapy at d/c to increase the patient's independence.  Please see assessment section for further patient specific details.    If patient discharges prior to next session this note will serve as a discharge summary.  Please see below for the latest assessment towards goals.      Admitting Diagnosis: Severe sepsis with lactic acidosis (HCC)  Ordering Physician: Dr. Mcnair  Current Admission Summary: Pt admitted with diagnosis of Sepsis, presents today with significant decreased strength and functional endurance.    Past Medical History:  has a past medical history of Atrial fibrillation (HCC), CAD (coronary artery disease), Combined systolic and diastolic heart failure (HCC), Diarrhea, History of skin cancer, Hyperlipidemia, 
  [facility] - Physical Therapy   Phone: [phone]    Physical Therapy  Facility/Department:[address] MED SURG        [] Initial Evaluation            [x] Daily Treatment Note         [] Discharge Summary      Patient: Jarred Suarez   : 1935   MRN: [id number]   Date of Service:  2025  Staff Mobility Recommendation: Maxi Move    AM-PAC score:   Discharge Recommendations: SNF    Jarred Suarez scored a  on the AM-PAC short mobility form. Current research shows that an AM-PAC score of 17 or less is typically not associated with a discharge to the patient's home setting. Based on the patient's AM-PAC score and their current functional mobility deficits, it is recommended that the patient have 3-5 sessions per week of Physical Therapy at d/c to increase the patient's independence.  Please see assessment section for further patient specific details.    If patient discharges prior to next session this note will serve as a discharge summary.  Please see below for the latest assessment towards goals.      Admitting Diagnosis: Severe sepsis with lactic acidosis (HCC)  Ordering Physician: Dr. Mcnair  Current Admission Summary: 88 y/o male admit 2025 with ODILIA, Bactremia due to Pseudomonas, Severe Sepsis.    Past Medical History:  has a past medical history of Atrial fibrillation (HCC), CAD (coronary artery disease), Combined systolic and diastolic heart failure (HCC), Diarrhea, History of skin cancer, Hyperlipidemia, Hypertension, Insomnia, Kidney stones, Open wound of left hand, subsequent encounter, and VTE (venous thromboembolism).  Past Surgical History:  has a past surgical history that includes back surgery; knee surgery (); shoulder surgery; Appendectomy; Coronary artery bypass graft (); Coronary artery bypass graft (); Prostate surgery; Skin cancer excision; joint replacement (Right); Total knee arthroplasty (Right, 2015); and Cystoscopy (N/A, 
  [facility] - Occupational Therapy   Phone: [phone]    Occupational Therapy  Facility/Department:[address] MED SURG    [] Initial Evaluation            [x] Daily Treatment Note         [] Discharge Summary      Patient: Jarred Suarez   : 1935   MRN: [id number]   Date of Service:  2025    Staff Mobility Recommendation: Stedy x2 vs Maxi move    AM-PAC Score: 16/24  Jarred Suarez scored a 16/24 on the AM-PAC ADL Inpatient form. Current research shows that an AM-PAC score of 17 or less is typically not associated with a discharge to the patient's home setting. Based on the patient's AM-PAC score and their current ADL deficits, it is recommended that the patient have 3-5 sessions per week of Occupational Therapy at d/c to increase the patient's independence.  Please see assessment section for further patient specific details.    If patient discharges prior to next session this note will serve as a discharge summary.  Please see below for the latest assessment towards goals.     Discharge Recommendations: SNF    Admitting Diagnosis:  Severe sepsis with lactic acidosis (HCC)  Ordering Physician: Yris Mcnair MD   Current Admission Summary: per ED note, Jarred Suarez is a 89 y.o. male with past medical history significant for CAD, Afib on Xarelto who presents with weakness and fever  For the last 3 days patient with progressive weakness generalized, was seen in PCP office had a urinalysis, was followed up as a PCP office today difficulty even ambulating has had 4 falls in the last 3 days the numerous lift assist for the patient is did not want to come to the hospital each time  He otherwise denies any chest pain or abdominal pain this only is of infection in his groin is however the last 2 months with believe is due to yeast  He tells me that his falls are mostly due to him losing his balance, he does not sure if he hit his head or not  Otherwise tells me he had here yesterday Tmax 
  [facility] - Occupational Therapy   Phone: [phone]    Occupational Therapy  Facility/Department:[address] MED SURG    [] Initial Evaluation            [x] Daily Treatment Note         [] Discharge Summary      Patient: Jarred Suarez   : 1935   MRN: [id number]   Date of Service:  2025    Staff Mobility Recommendation: Maxi move    AM-PAC Score: 14/24  Jarred Suarez scored a 14/24 on the AM-PAC ADL Inpatient form. Current research shows that an AM-PAC score of 17 or less is typically not associated with a discharge to the patient's home setting. Based on the patient's AM-PAC score and their current ADL deficits, it is recommended that the patient have 3-5 sessions per week of Occupational Therapy at d/c to increase the patient's independence.  Please see assessment section for further patient specific details.    If patient discharges prior to next session this note will serve as a discharge summary.  Please see below for the latest assessment towards goals.     Discharge Recommendations: SNF    Admitting Diagnosis:  Severe sepsis with lactic acidosis (HCC)  Ordering Physician: Yris Mcnair MD   Current Admission Summary: per ED note, Jarred Suarez is a 89 y.o. male with past medical history significant for CAD, Afib on Xarelto who presents with weakness and fever  For the last 3 days patient with progressive weakness generalized, was seen in PCP office had a urinalysis, was followed up as a PCP office today difficulty even ambulating has had 4 falls in the last 3 days the numerous lift assist for the patient is did not want to come to the hospital each time  He otherwise denies any chest pain or abdominal pain this only is of infection in his groin is however the last 2 months with believe is due to yeast  He tells me that his falls are mostly due to him losing his balance, he does not sure if he hit his head or not  Otherwise tells me he had here yesterday Tmax 1-2.3 history 
  [facility] - Physical Therapy   Phone: [phone]    Physical Therapy  Facility/Department:[address] MED SURG    [x] Initial Evaluation            [] Daily Treatment Note         [] Discharge Summary      Patient: Jarred Suarez   : 1935   MRN: [id number]   Date of Service:  2025  Staff Mobility Recommendation: Maxi Move    AM-PAC score: 824  Discharge Recommendations: SNF    Jarred Suarez scored a 8 on the AM-PAC short mobility form. Current research shows that an AM-PAC score of 17 or less is typically not associated with a discharge to the patient's home setting. Based on the patient's AM-PAC score and their current functional mobility deficits, it is recommended that the patient have 3-5 sessions per week of Physical Therapy at d/c to increase the patient's independence.  Please see assessment section for further patient specific details.    If patient discharges prior to next session this note will serve as a discharge summary.  Please see below for the latest assessment towards goals.      Admitting Diagnosis: Severe sepsis with lactic acidosis (HCC)  Ordering Physician: Dr. Mcnair  Current Admission Summary: Pt admitted with diagnosis of Sepsis, presents today with significant decreased strength and functional endurance.    Past Medical History:  has a past medical history of Atrial fibrillation (HCC), CAD (coronary artery disease), Combined systolic and diastolic heart failure (HCC), Diarrhea, History of skin cancer, Hyperlipidemia, Hypertension, Insomnia, Kidney stones, Open wound of left hand, subsequent encounter, and VTE (venous thromboembolism).  Past Surgical History:  has a past surgical history that includes back surgery; knee surgery (); shoulder surgery; Appendectomy; Coronary artery bypass graft (); Coronary artery bypass graft (); Prostate surgery; Skin cancer excision; joint replacement (Right); Total knee arthroplasty (Right, 2015); and Cystoscopy 
4 Eyes Skin Assessment     NAME:  Jarred Suarez  YOB: 1935  MEDICAL RECORD NUMBER:  [id number]    The patient is being assessed for  Admission    I agree that at least one RN has performed a thorough Head to Toe Skin Assessment on the patient. ALL assessment sites listed below have been assessed.      Areas assessed by both nurses:    Head, Face, Ears, Shoulders, Back, Chest, Arms, Elbows, Hands, Sacrum. Buttock, Coccyx, Ischium, Legs. Feet and Heels, and Under Medical Devices         Does the Patient have a Wound? No noted wound(s)       Marbin Prevention initiated by RN: Yes  Wound Care Orders initiated by RN: No    Pressure Injury (Stage 3,4, Unstageable, DTI, NWPT, and Complex wounds) if present, place Wound referral order by RN under : No    New Ostomies, if present place, Ostomy referral order under : No     Nurse 1 eSignature: Electronically signed by KHATIWADA,SWASTIKA, RN on 5/28/25 at 11:28 PM EDT    **SHARE this note so that the co-signing nurse can place an eSignature**    Nurse 2 eSignature: {Esignature:[id number]}    
Patient is admitted form Ed to bed 4104. Patients is orient x4. 4 eyes completed at bedside. Check and change done, patient have a bowel movement. Patient is educated about room and call light. Call light and bedside table are in reach. Assisted all patient need no any concern at this time.  
Pharmacy Medication Reconciliation Note     List of medications patient is currently taking is complete.    Source of information:   1. [facility]  2. EMR    Notes regarding home medications:   1. Patient reports taking all morning medication doses prior to arrival.      Elsy Velasquez, Pharmacy Intern  5/28/2025 9:50 PM    
Pt discharged to [facility]. Report called. Intact IV removed without any complications. Belongings sent w pt.  
Pt. A&O x4 in the bed. Pt. Tolerating oral intake of food and fluids; having taken AM medications whole with sips of water. No complaints at this time. Call light within reach, able to make needs known, fall precautions in place.    Electronically signed by Sanna Becker RN on 5/29/2025 at 12:40 PM      
RN called [facility], report given to Rama. All questions answered.  
contrast. Multiplanar reformatted images are provided for review. Automated exposure control, iterative reconstruction, and/or weight based adjustment of the mA/kV was utilized to reduce the radiation dose to as low as reasonably achievable. COMPARISON: July 10, 2024. HISTORY: ORDERING SYSTEM PROVIDED HISTORY: sepsis/fall TECHNOLOGIST PROVIDED HISTORY: Additional Contrast?->None Reason for exam:->sepsis/fall Decision Support Exception - unselect if not a suspected or confirmed emergency medical condition->Emergency Medical Condition (MA) FINDINGS: Lower Chest: No significant abnormalities. Organs: Liver: Liver is normal. No focal lesions are seen. Spleen: Normal. Pancreas: Normal Gallbladder and bile ducts: Gallbladder is distended.  Multiple calculi are seen in the gallbladder.  No surrounding fat stranding or wall thickening is seen.  CBD is nondilated... Adrenal glands: Normal. No focal lesions are seen. Kidneys and ureters: Renal cortical simple cysts are seen.  Solid enhancing mass in the upper pole of the right kidney has increased in size measuring 4.1 cm, previously 3.1 cm.  There is no hydronephrosis or calculi. Ureters are non-dilated. Abdominal aorta and branches: Normal in caliber. IVC and portal vein: Normal in caliber. GI/Bowel: Bowel loops are normal in wall-thickness and caliber. Scattered left-sided colonic diverticulosis is seen. No evidence of appendicitis. Peritoneum/Retroperitoneum: Normal PELVIS No acute abnormalities are seen. Bones/Soft Tissues: Degenerative changes are seen in the lumbar spine. No lytic or blastic lesions are seen. Bilateral hydrocele is seen.     1. No acute intra-abdominal or pelvic abnormality. 2. Solid enhancing mass in the upper pole of the right kidney has increased in size measuring 4.1 cm, previously 3.1 cm. This is concerning for renal cell carcinoma. 3. Cholelithiasis. 4. Scattered left-sided colonic diverticulosis. 5. Bilateral hydrocele.     CT HEAD WO 
Appearance: alert,in some acute distress, + pallor, no icterus morbid obesity chronically ill-appearing gentleman ongoing lower extremity edema  Skin: warm and dry, no rash or erythema  Head: normocephalic and atraumatic  Eyes: pupils equal, round, and reactive to light, conjunctivae normal  ENT: tympanic membrane, external ear and ear canal normal bilaterally, nose without deformity, nasal mucosa and turbinates normal without polyps  Neck: supple and non-tender without mass, no thyromegaly  no cervical lymphadenopathy  Pulmonary/Chest: clear to auscultation bilaterally- no wheezes, rales or rhonchi, normal air movement, no respiratory distress  Cardiovascular: normal rate, regular rhythm, normal S1 and S2, no murmurs, rubs, clicks, or gallops, no carotid bruits  Abdomen: soft, non-tender, non-distended, normal bowel sounds, no masses or organomegaly  Extremities: no cyanosis, clubbing or edema++  Musculoskeletal: normal range of motion, no joint swelling, deformity or tenderness  Integumentary: No rashes, no abnormal skin lesions, no petechiae  Neurologic: reflexes normal and symmetric, no cranial nerve deficit  Lines: IV  Rt TKA   Rt leg on going swelling and redness and cellulitis     DATA:    CBC:   Lab Results   Component Value Date    WBC 7.1 06/02/2025    HGB 12.5 (L) 06/02/2025    HCT 37.6 (L) 06/02/2025    MCV 94.2 06/02/2025     06/02/2025     RENAL:   Lab Results   Component Value Date    CREATININE 0.9 06/02/2025    BUN 13 06/02/2025     06/02/2025    K 4.3 06/02/2025     06/02/2025    CO2 26 06/02/2025     SED RATE: No results found for: \"SEDRATE\"  CK:   Lab Results   Component Value Date/Time    CKTOTAL 228 05/29/2025 05:43 AM     CRP:   Lab Results   Component Value Date/Time    CRP 81.3 04/16/2020 05:27 AM     Hepatic Function Panel:   Lab Results   Component Value Date/Time    ALKPHOS 72 05/29/2025 05:43 AM    ALT 13 05/29/2025 05:43 AM    AST 25 05/29/2025 05:43 AM    BILITOT 0.8 
5/28/2025  EXAMINATION: CT OF THE CERVICAL SPINE WITHOUT CONTRAST; CT OF THE HEAD WITHOUT CONTRAST 5/28/2025 6:22 pm TECHNIQUE: CT of the cervical spine was performed without the administration of intravenous contrast. Multiplanar reformatted images are provided for review. Automated exposure control, iterative reconstruction, and/or weight based adjustment of the mA/kV was utilized to reduce the radiation dose to as low as reasonably achievable.; CT of the head was performed without the administration of intravenous contrast. Automated exposure control, iterative reconstruction, and/or weight based adjustment of the mA/kV was utilized to reduce the radiation dose to as low as reasonably achievable. COMPARISON: 01/27/2025 HISTORY: ORDERING SYSTEM PROVIDED HISTORY: eval for injury TECHNOLOGIST PROVIDED HISTORY: Reason for exam:->eval for injury Decision Support Exception - unselect if not a suspected or confirmed emergency medical condition->Emergency Medical Condition (MA) FINDINGS: BRAIN/VENTRICLES: There is no acute intracranial hemorrhage.  Slight midline shift towards the right side by 5 mm is similar to prior examination.  CSF bases prominent in left cerebral convexity, probably representing chronic subdural effusion.  The gray-white differentiation is maintained without evidence of an acute infarct.  Brain parenchymal volume loss has increased from prior examination.  There is prominence of the ventricles and sulci due to global parenchymal volume loss. There are nonspecific areas of hypoattenuation within the periventricular and subcortical white matter, which likely represent chronic microvascular ischemic change. ORBITS: The visualized portion of the orbits demonstrate no acute abnormality. SINUSES: The visualized paranasal sinuses and mastoid air cells demonstrate no acute abnormality. SOFT TISSUES/SKULL: No acute abnormality of the visualized skull or soft tissues. BONES/ALIGNMENT: There is no acute 
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) L97.822    Bladder calculi N21.0    Generalized weakness R53.1    Severe sepsis with lactic acidosis (HCC) A41.9, R65.20, E87.20    Hyperglycemia R73.9    Pseudomonas infection A49.8      Sepsis  Lactic acidosis  Atrial fibrillation  tasneem  Congestive heart failure  Morbid obesity BMI 35  Groin fungal rash  Right leg severe cellulitis with lymphangitis  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    PLAN :  Pseudomonas on the blood cx  Reviewed blood culture in process  Pseudomonas sensitivities pending  Continue IV cefepime 2 g every 12 hrs  Watch creatinine  Trend procalcitonin  Blood culture isolated Pseudomonas  Trend fever  Trend WBC  Trend procalcitonin  Continue with Kerlix and Ace wrap  Not ready for discharge    Discussed with patient/Family and Nursing     Medical Decision Making:  The following items were considered in medical decision making:  Discussion of patient care with other providers  Reviewed clinical lab tests  Reviewed radiology tests  Reviewed other diagnostic tests/interventions  Independent review of radiologic images  Independent review of  Microbiology cultures and other micro tests reviewed       Risk of Complications/Morbidity: High      Illness(es)/ Infection present that pose threat to bodily

## 2025-06-03 DIAGNOSIS — I48.0 PAROXYSMAL ATRIAL FIBRILLATION (HCC): ICD-10-CM

## 2025-06-03 LAB
BACTERIA BLD CULT ORG #2: NORMAL
BACTERIA BLD CULT: NORMAL

## 2025-06-03 RX ORDER — RIVAROXABAN 20 MG/1
TABLET, FILM COATED ORAL
Qty: 90 TABLET | Refills: 1 | Status: SHIPPED | OUTPATIENT
Start: 2025-06-03

## 2025-06-03 NOTE — TELEPHONE ENCOUNTER
Received refill request for XARELTO 20 MG TABS tablet  from [facility] pharmacy.     Last OV: 11/12/24    Next OV: 12/02/25    Last Labs:     Last Filled: 5/27/25

## 2025-06-03 NOTE — DISCHARGE SUMMARY
V2.0  Discharge Summary    Name:  Jarred Suarez /Age/Sex: 1935 (89 y.o. male)   Admit Date: 2025  Discharge Date: 25    MRN & CSN:  [id number] & [id number] Encounter Date and Time 25 8:59 PM EDT    Attending:  No att. providers found Discharging Provider: Glen Aguirre MD       Hospital Course:     Brief HPI: Jarred Suarez is a 89 y.o. male who presented with fevers and rash diagnosed with severe sepsis    Brief Problem Based Course:   Severe sepsis due to Pseudomonas bacteremia and right lower extremity cellulitis: Patient presented to the hospital with fevers, tachycardia and elevated initial lactate.  On exam patient was noted to have right lower extremity induration, pain, warmth and erythema.  Patient was treated with empiric antibiotics.  Blood cultures returned positive for Pseudomonas and ID was consulted.  Repeat cultures demonstrated clearance of bacteremia.  Patient was cleared to complete antibiotic course with oral Levaquin for 10 days  Right groin fungal dermatitis: Present on admission.  Patient had been using topical treatments at home without much effect.  Oral fluconazole was added and patient had significant improvement and near resolution of the rash by time of discharge  Right renal mass: Incidentally noted on CT scan.  Previous CT 2024 was 3.1 cm and this admission measured at 4.1 cm with appearance of renal cell carcinoma.  Urology was consulted and recommended outpatient follow-up to discuss treatment versus observation  Creatinine elevation: Patient's initial creatinine was slightly elevated but not at the level of an ODILIA.  This resolved with IV fluids.  Was presumed prerenal in the setting of sepsis    The patient expressed appropriate understanding of, and agreement with the discharge recommendations, medications, and plan.     Consults this admission:  PHARMACY TO DOSE VANCOMYCIN  IP CONSULT TO HOSPITALIST  IP CONSULT TO INFECTIOUS DISEASES  IP CONSULT TO

## 2025-06-18 ENCOUNTER — OFFICE VISIT (OUTPATIENT)
Dept: FAMILY MEDICINE CLINIC | Age: 89
End: 2025-06-18

## 2025-06-18 VITALS
DIASTOLIC BLOOD PRESSURE: 60 MMHG | HEART RATE: 49 BPM | OXYGEN SATURATION: 96 % | RESPIRATION RATE: 18 BRPM | SYSTOLIC BLOOD PRESSURE: 128 MMHG

## 2025-06-18 DIAGNOSIS — R53.1 GENERALIZED WEAKNESS: ICD-10-CM

## 2025-06-18 DIAGNOSIS — R78.81 BACTEREMIA DUE TO PSEUDOMONAS: ICD-10-CM

## 2025-06-18 DIAGNOSIS — N17.9 AKI (ACUTE KIDNEY INJURY): ICD-10-CM

## 2025-06-18 DIAGNOSIS — B96.5 BACTEREMIA DUE TO PSEUDOMONAS: ICD-10-CM

## 2025-06-18 DIAGNOSIS — I10 ESSENTIAL HYPERTENSION: ICD-10-CM

## 2025-06-18 DIAGNOSIS — I87.332 IDIOPATHIC CHRONIC VENOUS HYPERTENSION OF LEFT LOWER EXTREMITY WITH ULCER AND INFLAMMATION (HCC): ICD-10-CM

## 2025-06-18 DIAGNOSIS — N28.89 RIGHT RENAL MASS: ICD-10-CM

## 2025-06-18 DIAGNOSIS — Z09 HOSPITAL DISCHARGE FOLLOW-UP: Primary | ICD-10-CM

## 2025-06-18 PROBLEM — R65.20 SEVERE SEPSIS WITH LACTIC ACIDOSIS (HCC): Status: RESOLVED | Noted: 2025-05-28 | Resolved: 2025-06-18

## 2025-06-18 PROBLEM — S61.402A OPEN WOUND OF LEFT HAND, INITIAL ENCOUNTER: Status: RESOLVED | Noted: 2022-05-03 | Resolved: 2025-06-18

## 2025-06-18 PROBLEM — E83.42 HYPOMAGNESEMIA: Status: RESOLVED | Noted: 2020-04-16 | Resolved: 2025-06-18

## 2025-06-18 PROBLEM — A49.8 PSEUDOMONAS INFECTION: Status: RESOLVED | Noted: 2025-05-29 | Resolved: 2025-06-18

## 2025-06-18 PROBLEM — S61.402D: Status: RESOLVED | Noted: 2022-06-03 | Resolved: 2025-06-18

## 2025-06-18 PROBLEM — R79.89 PRERENAL AZOTEMIA: Status: RESOLVED | Noted: 2020-04-16 | Resolved: 2025-06-18

## 2025-06-18 PROBLEM — A41.9 SEVERE SEPSIS WITH LACTIC ACIDOSIS (HCC): Status: RESOLVED | Noted: 2025-05-28 | Resolved: 2025-06-18

## 2025-06-18 PROBLEM — E87.20 SEVERE SEPSIS WITH LACTIC ACIDOSIS (HCC): Status: RESOLVED | Noted: 2025-05-28 | Resolved: 2025-06-18

## 2025-06-18 PROBLEM — R73.9 HYPERGLYCEMIA: Status: RESOLVED | Noted: 2025-05-28 | Resolved: 2025-06-18

## 2025-06-18 PROBLEM — R60.0 LEG EDEMA, RIGHT: Status: RESOLVED | Noted: 2025-05-31 | Resolved: 2025-06-18

## 2025-06-18 NOTE — PROGRESS NOTES
Post-Discharge Transitional Care  Follow Up      Jarred Suarez   YOB: 1935    Date of Office Visit:  6/18/2025  Date of Hospital Admission: 5/28/25  Date of Hospital Discharge: 6/2/25  Risk of hospital readmission (high >=14%. Medium >=10%) :Readmission Risk Score: 14.2      Care management risk score Rising risk (score 2-5) and Complex Care (Scores >=6): No Risk Score On File     Non face to face  following discharge, date last encounter closed (first attempt may have been earlier): *No documented post hospital discharge outreach found in the last 14 days    Call initiated 2 business days of discharge: *No response recorded in the last 14 days    ASSESSMENT/PLAN:   Hospital discharge follow-up  -     [facility] DISCHARGE MEDS RECONCILED W/ CURRENT OUTPATIENT MED LIST  Bacteremia due to Pseudomonas  ODILIA (acute kidney injury)  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 ID was consulted, completed IV and outpatient oral antibiotic therapy.    ODILIA resolved with last GFR of 81    Chronic venous HTN with LLE skin tear is stable without signs of active infection at this time. This is also being managed by home health with bandage changes as needed.    BP well controlled    Warrants home health for physical deconditioning, decreased mobility, and generalized weakness.    Discussed right renal mass, concern for carcinoma. Has f/u with Urology scheduled to discuss.    Medical Decision Making: moderate complexity  No follow-ups on file.           Subjective:   HPI:  Follow up of Hospital problems/diagnosis(es): see above    Inpatient course: Discharge summary reviewed- see chart.    Interval history/Current status:     Was at a rehab facility for 11 days. He did have some episodes of confusion there which have resolved.     Now has been home for

## 2025-06-24 ENCOUNTER — TELEPHONE (OUTPATIENT)
Dept: FAMILY MEDICINE CLINIC | Age: 89
End: 2025-06-24

## 2025-06-24 NOTE — TELEPHONE ENCOUNTER
Jackelin from [facility] calling about pt needing a verbal for nursing TT and OT     Jackelin stated pt was referred from the hospital and has been trying to get a verbal for over a week and if they do not get that verbal in the pt home health will be put on hold.    Please advise

## 2025-06-30 RX ORDER — FAMOTIDINE 20 MG/1
20 TABLET, FILM COATED ORAL 2 TIMES DAILY
Qty: 180 TABLET | Refills: 0 | Status: SHIPPED | OUTPATIENT
Start: 2025-06-30

## 2025-07-03 DIAGNOSIS — F51.01 PRIMARY INSOMNIA: ICD-10-CM

## 2025-07-03 RX ORDER — ALPRAZOLAM 2 MG/1
TABLET ORAL
Qty: 90 TABLET | Refills: 0 | Status: SHIPPED | OUTPATIENT
Start: 2025-07-03 | End: 2025-10-01

## 2025-07-14 ENCOUNTER — TELEPHONE (OUTPATIENT)
Dept: FAMILY MEDICINE CLINIC | Age: 89
End: 2025-07-14

## 2025-07-14 NOTE — TELEPHONE ENCOUNTER
Paulina from [facility] called state the pt has a skin tear back of right lower leg they wrapped it up       Ok to come 2 times a week until wounds clear up ?      Paulina [phone]

## 2025-07-25 RX ORDER — AMITRIPTYLINE HYDROCHLORIDE 150 MG/1
150 TABLET ORAL
Qty: 90 TABLET | Refills: 0 | Status: SHIPPED | OUTPATIENT
Start: 2025-07-25

## 2025-07-30 ENCOUNTER — APPOINTMENT (OUTPATIENT)
Dept: GENERAL RADIOLOGY | Age: 89
DRG: 947 | End: 2025-07-30
Payer: MEDICARE

## 2025-07-30 ENCOUNTER — APPOINTMENT (OUTPATIENT)
Dept: CT IMAGING | Age: 89
DRG: 947 | End: 2025-07-30
Payer: MEDICARE

## 2025-07-30 ENCOUNTER — HOSPITAL ENCOUNTER (INPATIENT)
Age: 89
LOS: 2 days | Discharge: NURSING, REHAB OR LONG-TERM CARE | DRG: 947 | End: 2025-08-01
Attending: EMERGENCY MEDICINE | Admitting: INTERNAL MEDICINE
Payer: MEDICARE

## 2025-07-30 DIAGNOSIS — R53.1 GENERALIZED WEAKNESS: ICD-10-CM

## 2025-07-30 DIAGNOSIS — G96.08 SUBDURAL HYGROMA: ICD-10-CM

## 2025-07-30 DIAGNOSIS — L03.116 CELLULITIS OF LEFT LOWER LEG: ICD-10-CM

## 2025-07-30 DIAGNOSIS — J18.9 PNEUMONIA DUE TO INFECTIOUS ORGANISM, UNSPECIFIED LATERALITY, UNSPECIFIED PART OF LUNG: Primary | ICD-10-CM

## 2025-07-30 DIAGNOSIS — R33.8 ACUTE URINARY RETENTION: ICD-10-CM

## 2025-07-30 PROBLEM — J15.9 BACTERIAL PNEUMONIA: Status: ACTIVE | Noted: 2025-07-30

## 2025-07-30 LAB
ALBUMIN SERPL-MCNC: 3.7 G/DL (ref 3.4–5)
ALBUMIN/GLOB SERPL: 1.4 {RATIO} (ref 1.1–2.2)
ALP SERPL-CCNC: 72 U/L (ref 40–129)
ALT SERPL-CCNC: 10 U/L (ref 10–40)
ANION GAP SERPL CALCULATED.3IONS-SCNC: 11 MMOL/L (ref 3–16)
AST SERPL-CCNC: 19 U/L (ref 15–37)
BASOPHILS # BLD: 0 K/UL (ref 0–0.2)
BASOPHILS NFR BLD: 0.4 %
BILIRUB SERPL-MCNC: 0.7 MG/DL (ref 0–1)
BILIRUB UR QL STRIP.AUTO: NEGATIVE
BUN SERPL-MCNC: 23 MG/DL (ref 7–20)
CALCIUM SERPL-MCNC: 9.1 MG/DL (ref 8.3–10.6)
CHLORIDE SERPL-SCNC: 102 MMOL/L (ref 99–110)
CLARITY UR: CLEAR
CO2 SERPL-SCNC: 22 MMOL/L (ref 21–32)
COLOR UR: YELLOW
CREAT SERPL-MCNC: 1.2 MG/DL (ref 0.8–1.3)
DEPRECATED RDW RBC AUTO: 14.1 % (ref 12.4–15.4)
EOSINOPHIL # BLD: 0.1 K/UL (ref 0–0.6)
EOSINOPHIL NFR BLD: 1 %
GFR SERPLBLD CREATININE-BSD FMLA CKD-EPI: 58 ML/MIN/{1.73_M2}
GLUCOSE SERPL-MCNC: 127 MG/DL (ref 70–99)
GLUCOSE UR STRIP.AUTO-MCNC: NEGATIVE MG/DL
HCT VFR BLD AUTO: 35.2 % (ref 40.5–52.5)
HGB BLD-MCNC: 12.1 G/DL (ref 13.5–17.5)
HGB UR QL STRIP.AUTO: NEGATIVE
KETONES UR STRIP.AUTO-MCNC: NEGATIVE MG/DL
LACTATE BLDV-SCNC: 1.2 MMOL/L (ref 0.4–1.9)
LACTATE BLDV-SCNC: 2.1 MMOL/L (ref 0.4–1.9)
LEUKOCYTE ESTERASE UR QL STRIP.AUTO: NEGATIVE
LYMPHOCYTES # BLD: 0.6 K/UL (ref 1–5.1)
LYMPHOCYTES NFR BLD: 9.7 %
MCH RBC QN AUTO: 32.6 PG (ref 26–34)
MCHC RBC AUTO-ENTMCNC: 34.4 G/DL (ref 31–36)
MCV RBC AUTO: 94.9 FL (ref 80–100)
MONOCYTES # BLD: 0.6 K/UL (ref 0–1.3)
MONOCYTES NFR BLD: 9.8 %
NEUTROPHILS # BLD: 4.8 K/UL (ref 1.7–7.7)
NEUTROPHILS NFR BLD: 79.1 %
NITRITE UR QL STRIP.AUTO: NEGATIVE
PH UR STRIP.AUTO: 5 [PH] (ref 5–8)
PLATELET # BLD AUTO: 139 K/UL (ref 135–450)
PMV BLD AUTO: 8.1 FL (ref 5–10.5)
POTASSIUM SERPL-SCNC: 4.2 MMOL/L (ref 3.5–5.1)
PROCALCITONIN SERPL IA-MCNC: 0.16 NG/ML (ref 0–0.15)
PROT SERPL-MCNC: 6.4 G/DL (ref 6.4–8.2)
PROT UR STRIP.AUTO-MCNC: NEGATIVE MG/DL
RBC # BLD AUTO: 3.71 M/UL (ref 4.2–5.9)
SODIUM SERPL-SCNC: 135 MMOL/L (ref 136–145)
SP GR UR STRIP.AUTO: 1.02 (ref 1–1.03)
UA COMPLETE W REFLEX CULTURE PNL UR: NORMAL
UA DIPSTICK W REFLEX MICRO PNL UR: NORMAL
URN SPEC COLLECT METH UR: NORMAL
UROBILINOGEN UR STRIP-ACNC: 0.2 E.U./DL
WBC # BLD AUTO: 6.1 K/UL (ref 4–11)

## 2025-07-30 PROCEDURE — 93005 ELECTROCARDIOGRAM TRACING: CPT | Performed by: PHYSICIAN ASSISTANT

## 2025-07-30 PROCEDURE — 2500000003 HC RX 250 WO HCPCS: Performed by: INTERNAL MEDICINE

## 2025-07-30 PROCEDURE — 84145 PROCALCITONIN (PCT): CPT

## 2025-07-30 PROCEDURE — 85025 COMPLETE CBC W/AUTO DIFF WBC: CPT

## 2025-07-30 PROCEDURE — 73590 X-RAY EXAM OF LOWER LEG: CPT

## 2025-07-30 PROCEDURE — 51798 US URINE CAPACITY MEASURE: CPT

## 2025-07-30 PROCEDURE — 6360000002 HC RX W HCPCS: Performed by: PHYSICIAN ASSISTANT

## 2025-07-30 PROCEDURE — 6370000000 HC RX 637 (ALT 250 FOR IP): Performed by: INTERNAL MEDICINE

## 2025-07-30 PROCEDURE — 71250 CT THORAX DX C-: CPT

## 2025-07-30 PROCEDURE — 2580000003 HC RX 258: Performed by: PHYSICIAN ASSISTANT

## 2025-07-30 PROCEDURE — 36415 COLL VENOUS BLD VENIPUNCTURE: CPT

## 2025-07-30 PROCEDURE — 1200000000 HC SEMI PRIVATE

## 2025-07-30 PROCEDURE — 80053 COMPREHEN METABOLIC PANEL: CPT

## 2025-07-30 PROCEDURE — 99285 EMERGENCY DEPT VISIT HI MDM: CPT

## 2025-07-30 PROCEDURE — 96365 THER/PROPH/DIAG IV INF INIT: CPT

## 2025-07-30 PROCEDURE — 71046 X-RAY EXAM CHEST 2 VIEWS: CPT

## 2025-07-30 PROCEDURE — 6370000000 HC RX 637 (ALT 250 FOR IP): Performed by: NURSE PRACTITIONER

## 2025-07-30 PROCEDURE — 87040 BLOOD CULTURE FOR BACTERIA: CPT

## 2025-07-30 PROCEDURE — 83605 ASSAY OF LACTIC ACID: CPT

## 2025-07-30 PROCEDURE — 6370000000 HC RX 637 (ALT 250 FOR IP): Performed by: PHYSICIAN ASSISTANT

## 2025-07-30 PROCEDURE — 81003 URINALYSIS AUTO W/O SCOPE: CPT

## 2025-07-30 PROCEDURE — 70450 CT HEAD/BRAIN W/O DYE: CPT

## 2025-07-30 PROCEDURE — 73630 X-RAY EXAM OF FOOT: CPT

## 2025-07-30 PROCEDURE — 87449 NOS EACH ORGANISM AG IA: CPT

## 2025-07-30 RX ORDER — FAMOTIDINE 20 MG/1
20 TABLET, FILM COATED ORAL 2 TIMES DAILY
Status: DISCONTINUED | OUTPATIENT
Start: 2025-07-30 | End: 2025-08-01 | Stop reason: HOSPADM

## 2025-07-30 RX ORDER — VITAMIN B COMPLEX
1000 TABLET ORAL 2 TIMES DAILY
Status: DISCONTINUED | OUTPATIENT
Start: 2025-07-30 | End: 2025-08-01 | Stop reason: HOSPADM

## 2025-07-30 RX ORDER — LIDOCAINE HYDROCHLORIDE 20 MG/ML
JELLY TOPICAL
Status: COMPLETED | OUTPATIENT
Start: 2025-07-30 | End: 2025-07-30

## 2025-07-30 RX ORDER — DOXYCYCLINE HYCLATE 100 MG
100 TABLET ORAL EVERY 12 HOURS SCHEDULED
Status: DISCONTINUED | OUTPATIENT
Start: 2025-07-30 | End: 2025-08-01 | Stop reason: HOSPADM

## 2025-07-30 RX ORDER — POTASSIUM CHLORIDE 1500 MG/1
10 TABLET, EXTENDED RELEASE ORAL DAILY
Status: DISCONTINUED | OUTPATIENT
Start: 2025-07-31 | End: 2025-08-01 | Stop reason: HOSPADM

## 2025-07-30 RX ORDER — POLYETHYLENE GLYCOL 3350 17 G/17G
17 POWDER, FOR SOLUTION ORAL DAILY PRN
Status: DISCONTINUED | OUTPATIENT
Start: 2025-07-30 | End: 2025-08-01 | Stop reason: HOSPADM

## 2025-07-30 RX ORDER — ALBUTEROL SULFATE 90 UG/1
2 INHALANT RESPIRATORY (INHALATION) EVERY 4 HOURS PRN
Status: DISCONTINUED | OUTPATIENT
Start: 2025-07-30 | End: 2025-08-01 | Stop reason: HOSPADM

## 2025-07-30 RX ORDER — LOSARTAN POTASSIUM 25 MG/1
25 TABLET ORAL DAILY
Status: DISCONTINUED | OUTPATIENT
Start: 2025-07-31 | End: 2025-08-01 | Stop reason: HOSPADM

## 2025-07-30 RX ORDER — ALPRAZOLAM 0.5 MG
2 TABLET ORAL NIGHTLY PRN
Status: DISCONTINUED | OUTPATIENT
Start: 2025-07-30 | End: 2025-08-01 | Stop reason: HOSPADM

## 2025-07-30 RX ORDER — ACETAMINOPHEN 650 MG/1
650 SUPPOSITORY RECTAL EVERY 6 HOURS PRN
Status: DISCONTINUED | OUTPATIENT
Start: 2025-07-30 | End: 2025-08-01 | Stop reason: HOSPADM

## 2025-07-30 RX ORDER — ONDANSETRON 4 MG/1
4 TABLET, ORALLY DISINTEGRATING ORAL EVERY 8 HOURS PRN
Status: DISCONTINUED | OUTPATIENT
Start: 2025-07-30 | End: 2025-08-01 | Stop reason: HOSPADM

## 2025-07-30 RX ORDER — ACETAMINOPHEN 325 MG/1
650 TABLET ORAL EVERY 6 HOURS PRN
Status: DISCONTINUED | OUTPATIENT
Start: 2025-07-30 | End: 2025-08-01 | Stop reason: HOSPADM

## 2025-07-30 RX ORDER — AMITRIPTYLINE HYDROCHLORIDE 50 MG/1
150 TABLET ORAL NIGHTLY
Status: DISCONTINUED | OUTPATIENT
Start: 2025-07-30 | End: 2025-08-01 | Stop reason: HOSPADM

## 2025-07-30 RX ORDER — SODIUM CHLORIDE 0.9 % (FLUSH) 0.9 %
5-40 SYRINGE (ML) INJECTION PRN
Status: DISCONTINUED | OUTPATIENT
Start: 2025-07-30 | End: 2025-08-01 | Stop reason: HOSPADM

## 2025-07-30 RX ORDER — ONDANSETRON 2 MG/ML
4 INJECTION INTRAMUSCULAR; INTRAVENOUS EVERY 6 HOURS PRN
Status: DISCONTINUED | OUTPATIENT
Start: 2025-07-30 | End: 2025-08-01 | Stop reason: HOSPADM

## 2025-07-30 RX ORDER — SODIUM CHLORIDE 9 MG/ML
INJECTION, SOLUTION INTRAVENOUS PRN
Status: DISCONTINUED | OUTPATIENT
Start: 2025-07-30 | End: 2025-08-01 | Stop reason: HOSPADM

## 2025-07-30 RX ORDER — NITROGLYCERIN 0.4 MG/1
0.4 TABLET SUBLINGUAL EVERY 5 MIN PRN
Status: DISCONTINUED | OUTPATIENT
Start: 2025-07-30 | End: 2025-08-01 | Stop reason: HOSPADM

## 2025-07-30 RX ORDER — VANCOMYCIN 2 G/400ML
2000 INJECTION, SOLUTION INTRAVENOUS ONCE
Status: COMPLETED | OUTPATIENT
Start: 2025-07-30 | End: 2025-07-30

## 2025-07-30 RX ORDER — VITAMIN E (DL,TOCOPHERYL ACET) 90 MG
800 CAPSULE ORAL DAILY
Status: DISCONTINUED | OUTPATIENT
Start: 2025-07-31 | End: 2025-08-01 | Stop reason: HOSPADM

## 2025-07-30 RX ORDER — SODIUM CHLORIDE 0.9 % (FLUSH) 0.9 %
5-40 SYRINGE (ML) INJECTION EVERY 12 HOURS SCHEDULED
Status: DISCONTINUED | OUTPATIENT
Start: 2025-07-30 | End: 2025-08-01 | Stop reason: HOSPADM

## 2025-07-30 RX ADMIN — CEFEPIME 2000 MG: 2 INJECTION, POWDER, FOR SOLUTION INTRAVENOUS at 15:16

## 2025-07-30 RX ADMIN — RIVAROXABAN 20 MG: 20 TABLET, FILM COATED ORAL at 22:01

## 2025-07-30 RX ADMIN — DOXYCYCLINE HYCLATE 100 MG: 100 TABLET, COATED ORAL at 21:40

## 2025-07-30 RX ADMIN — AMITRIPTYLINE HYDROCHLORIDE 150 MG: 50 TABLET ORAL at 21:40

## 2025-07-30 RX ADMIN — LIDOCAINE HYDROCHLORIDE: 20 JELLY TOPICAL at 15:17

## 2025-07-30 RX ADMIN — Medication 1000 UNITS: at 21:40

## 2025-07-30 RX ADMIN — ALPRAZOLAM 2 MG: 0.5 TABLET ORAL at 21:40

## 2025-07-30 RX ADMIN — VANCOMYCIN 2000 MG: 2 INJECTION, SOLUTION INTRAVENOUS at 18:19

## 2025-07-30 RX ADMIN — SODIUM CHLORIDE, PRESERVATIVE FREE 10 ML: 5 INJECTION INTRAVENOUS at 21:41

## 2025-07-30 RX ADMIN — FAMOTIDINE 20 MG: 20 TABLET, FILM COATED ORAL at 21:40

## 2025-07-30 ASSESSMENT — LIFESTYLE VARIABLES
HOW OFTEN DO YOU HAVE A DRINK CONTAINING ALCOHOL: MONTHLY OR LESS
HOW MANY STANDARD DRINKS CONTAINING ALCOHOL DO YOU HAVE ON A TYPICAL DAY: 1 OR 2

## 2025-07-30 NOTE — ED PROVIDER NOTES
mg Oral Given 7/30/25 2140)   losartan (COZAAR) tablet 25 mg (has no administration in time range)   nitroGLYCERIN (NITROSTAT) SL tablet 0.4 mg (has no administration in time range)   potassium chloride (KLOR-CON M) extended release tablet 10 mEq (has no administration in time range)   rivaroxaban (XARELTO) tablet 20 mg (20 mg Oral Given 7/30/25 2201)   vitamin E capsule 800 Units (has no administration in time range)   Vitamin D (CHOLECALCIFEROL) tablet 1,000 Units (1,000 Units Oral Given 7/30/25 2140)   sodium chloride flush 0.9 % injection 5-40 mL (10 mLs IntraVENous Given 7/30/25 2141)   sodium chloride flush 0.9 % injection 5-40 mL (has no administration in time range)   0.9 % sodium chloride infusion (has no administration in time range)   ondansetron (ZOFRAN-ODT) disintegrating tablet 4 mg (has no administration in time range)     Or   ondansetron (ZOFRAN) injection 4 mg (has no administration in time range)   polyethylene glycol (GLYCOLAX) packet 17 g (has no administration in time range)   acetaminophen (TYLENOL) tablet 650 mg (has no administration in time range)     Or   acetaminophen (TYLENOL) suppository 650 mg (has no administration in time range)   cefTRIAXone (ROCEPHIN) 2,000 mg in sodium chloride 0.9 % 50 mL IVPB (addEASE) (has no administration in time range)   doxycycline hyclate (VIBRA-TABS) tablet 100 mg (100 mg Oral Given 7/30/25 2140)   ALPRAZolam (XANAX) tablet 2 mg (2 mg Oral Given 7/30/25 2140)   lidocaine (XYLOCAINE) 2 % uro-jet ( Topical Given 7/30/25 1517)   ceFEPIme (MAXIPIME) 2,000 mg in sodium chloride 0.9 % 100 mL IVPB (addEASE) (0 mg IntraVENous Stopped 7/30/25 1546)   vancomycin (VANCOCIN) 2000 mg in 400 mL IVPB (0 mg IntraVENous Stopped 7/30/25 2031)           Patient with hx of renal cancer, bladder stones, HTN, CAD, HLD, PAF, venous stasis dermatitis, CHF, presents for urinary retention, generalized weakness and mild confusion.  Family concerned for UTI. Patient was nontoxic,  criteria - the patient is NOT to be included for SEP-1 Core Measure due to:  2+ SIRS criteria are not met    PROCEDURES   Unless otherwise noted below, none     Procedures    FINAL IMPRESSION      1. Pneumonia due to infectious organism, unspecified laterality, unspecified part of lung    2. Generalized weakness    3. Subdural hygroma    4. Cellulitis of left lower leg    5. Acute urinary retention          DISPOSITION/PLAN       DISPOSITION Admitted 07/30/2025 06:18:12 PM               PATIENT REFERRED TO:  No follow-up provider specified.    DISCHARGE MEDICATIONS:  Current Discharge Medication List          DISCONTINUED MEDICATIONS:  Current Discharge Medication List        STOP taking these medications       Misc. Devices MISC Comments:   Reason for Stopping:         ammonium lactate (LAC-HYDRIN TWELVE) 12 % lotion Comments:   Reason for Stopping:         Compression Stockings MISC Comments:   Reason for Stopping:                      (Please note that portions of this note were completed with a voice recognition program.  Efforts were made to edit the dictations but occasionally words are mis-transcribed.)    Beth Castro PA-C (electronically signed)            Beth Castro PA-C  07/30/25 [phone]

## 2025-07-30 NOTE — ED NOTES
Hcl] Other (See Comments)     dizziness     History:   Past Medical History:   Diagnosis Date    Atrial fibrillation (HCC)     CAD (coronary artery disease)     Combined systolic and diastolic heart failure (HCC)     Diarrhea 04/12/2017    History of skin cancer     Hyperlipidemia     Hypertension     Insomnia 04/20/2020    Kidney stones 03/2017    Open wound of left hand, subsequent encounter 06/03/2022    VTE (venous thromboembolism)        Assessment  Vitals:        Vitals:    07/30/25 1600 07/30/25 1730 07/30/25 1800 07/30/25 1815   BP: (!) 145/69 (!) 127/92 135/62 133/76   Pulse: 62 72 69 72   Resp: 16 19 17 22   Temp:       TempSrc:       SpO2: 97% 96% 98% 96%   Weight:         Deterioration Index (DI): Deterioration Index: 27.37  Deterioration Index (DI) Interventions Performed:    O2 Flow Rate:    O2 Device:    Cardiac Rhythm:    Critical Lab Results: [unfilled]  Cultures: Cultures:Blood  NIH Score: NIH NIH Stroke Scale  Interval: Baseline  Level of Consciousness (1a): Alert  LOC Questions (1b): Answers both correctly  LOC Commands (1c): Performs both tasks correctly  Best Gaze (2): Normal  Visual (3): No visual loss  Facial Palsy (4): Normal symmetrical movement  Motor Arm, Left (5a): No drift  Motor Arm, Right (5b): No drift  Motor Leg, Left (6a): No drift  Motor Leg, Right (6b): No drift  Limb Ataxia (7): Absent (unable to test in lower extremities due to his generalized weakness)  Sensory (8): Normal  Best Language (9): No aphasia  Dysarthria (10): Normal  Extinction and Inattention (11): No abnormality  Total: 0   Active LDA's:   Peripheral IV 07/30/25 Right;Anterior Forearm (Active)     Active Central Lines:                          Active Wounds:    Active Herbert's:    Active Feeding Tubes:      Administered Medications:   Medications   vancomycin (VANCOCIN) 2000 mg in 400 mL IVPB (2,000 mg IntraVENous New Bag 7/30/25 1819)   lidocaine (XYLOCAINE) 2 % uro-jet ( Topical Given 7/30/25 1517)   ceFEPIme  (MAXIPIME) 2,000 mg in sodium chloride 0.9 % 100 mL IVPB (addEASE) (0 mg IntraVENous Stopped 7/30/25 1546)     Last documented pain medication administration:   Pertinent or High Risk Medications/Drips: no   If Yes, please provide details:   Blood Product Administration: no  If Yes, please provide details:   Process Protocols/Bundles:     Recommendation  Incomplete STAT orders:   Overdue Medications:   Patient Belongings:    Additional Comments:   If any further questions, please call Sending RN at [phone]      Admitting Unit Notification  Name of person notified and time:       Electronically signed by: Electronically signed by Nick Farris RN on 7/30/2025 at 7:18 PM

## 2025-07-30 NOTE — CONSULTS
Clinical Pharmacy Note  Vancomycin Consult    Pharmacy consult received for one-time dose of vancomycin in the Emergency Department per Beth Castro PA-C    Ht Readings from Last 1 Encounters:   05/28/25 1.803 m (5' 11\")        Wt Readings from Last 1 Encounters:   07/30/25 127.9 kg (281 lb 15.5 oz)         Assessment/Plan:  Vancomycin 2000 mg x 1 in ED.  If vancomycin is to continue on admission and pharmacy is to manage dosing, please re-consult with admission orders.        Pradeep Diehl [facility], 7/30/2025 5:49 PM

## 2025-07-30 NOTE — ACP (ADVANCE CARE PLANNING)
ADVANCED CARE PLANNING    Name:Jarred Suarez       :  1935              MRN:  [id number]      Purpose of Encounter: Advanced care planning in light of generalized weakness pneumonia urinary retention.  Parties in attendance: :Jarred BYRNES MacyColby MD, Family members: Wife.  Decisional Capacity: Yes    Diagnosis: Principal Problem:    Bacterial pneumonia  Resolved Problems:    * No resolved hospital problems. *    Patients Medical Story: Generalized weakness worsening over the last few days pneumonia urinary retention and possible cellulitis patient sleeps in the recliner discussed CODE STATUS continue to be a request full code, discussed POA patient stated in case he cannot take medical decision for himself his wife to do so  Goals of Care Determinations: Patient wishes to focus on aggressive measures for now  Plan: Will notify Jack Shelton MD of change in care plan. Will look at further interventions as needed.   Code Status: At this time patient wishes to be Prior  Time Spent with Patient: 17 minutes      Electronically signed by Colby Jaffe MD on 2025 at 6:29 PM  Thank you Jack Shelton MD for the opportunity to be involved in this patient's care.

## 2025-07-30 NOTE — CONSULTS
Neurosurgery Consult    89 y.o man with asymptomatic bilateral hygromas, stable from previous scans. No intervention or follow up necessary.    Luis Carlos Tabares MD  Neurosurgery  [facility]  [phone]

## 2025-07-30 NOTE — H&P
Hospital Medicine History & Physical      PCP: Jack Shelton MD    Date of Admission: 7/30/2025    Date of Service: Pt seen/examined on 07/30/2025 and Admitted to Inpatient with expected LOS greater than two midnights due to medical therapy.     Chief Complaint: Weakness      History Of Present Illness:      89 y.o. male who presented to [facility] with generalized weakness for the last 3 to 4 days worsening, also he was not able to urinate since yesterday no fever chills cough or shortness of breath reported at home, no nausea vomiting or abdominal pain in ED found to have possible infiltrate, found to have urinary retention Herbert placed being admitted for further management and treatment.  Discussed with ED provider agrees with plan to admit    Past Medical History:          Diagnosis Date    Atrial fibrillation (HCC)     CAD (coronary artery disease)     Combined systolic and diastolic heart failure (HCC)     Diarrhea 04/12/2017    History of skin cancer     Hyperlipidemia     Hypertension     Insomnia 04/20/2020    Kidney stones 03/2017    Open wound of left hand, subsequent encounter 06/03/2022    VTE (venous thromboembolism)        Past Surgical History:          Procedure Laterality Date    APPENDECTOMY      BACK SURGERY      x4    CORONARY ARTERY BYPASS GRAFT  1981    CORONARY ARTERY BYPASS GRAFT  2001    CYSTOSCOPY N/A 7/26/2024    CYSTOSCOPY WITH URETHRAL DILATION AND  BLADDER STONE REMOVAL WITH HOLMIUM LASER - FORTEC (CONF# [id number]) performed by Raza Perry MD at [facility] OR    JOINT REPLACEMENT Right     knee replacement    KNEE SURGERY  2005    right     PROSTATE SURGERY      SHOULDER SURGERY      both    SKIN CANCER EXCISION      TOTAL KNEE ARTHROPLASTY Right 2015    [place]       Medications Prior to Admission:      Prior to Admission medications    Medication Sig Start Date End Date Taking? Authorizing Provider   amitriptyline (ELAVIL) 150 MG tablet TAKE 1 TABLET BY MOUTH AT

## 2025-07-30 NOTE — ED PROVIDER NOTES
Attending Note:    The patient was seen and examined by the mid-level provider.  I also completed a face-to-face examination.      HPI: Jarred Suarez is a 89 y.o. male who presents to the emergency department with a complaint of general weakness, and inability to hear made since last night.  He has not urinated at all today.  His wife reports that this is never happened.  He has never had a Herbert catheter.  He does have a history of bladder stones and enlarged prostate in the past and has required cystoscopy for removal of bladder stones.  His wife suspects that he may have a urinary tract infection because he has had similar symptoms in the past.    He denies any documented fever, chills, nausea vomiting diarrhea.  He denies any abdominal pain back pain or flank pain.  He denies any chest pain heaviness pressure tightness.  He denies any shortness of breath diaphoresis or dyspnea on exertion.    He lives in a condo with his wife.  He is able to ambulate short distances with a walker but requires assistance with transfers.  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    His wife has noticed that both of his legs have been weaker over the last 5 days, more on the left.  He does have chronic venous stasis of both lower extremities but left leg has been more warm and red on the pretibial surface.  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    His wife reports that he did have a fall on Saturday, 4 days ago in which she was sitting on the chair and slowly slid onto a carpeted surface onto his buttocks.  He did not hit his head.  There was no loss of consciousness.  He denies any neck or back pain.  No headache.  He denies any injury.    He denies any melena or hematochezia.    Medical history significant for bladder stones removed 1 year ago.  Also significant for hyperlipidemia, hypertension, DVT, atrial

## 2025-07-30 NOTE — PROGRESS NOTES
Medication Reconciliation    List of medications patient is currently taking is complete.     Source of information: 1. Conversation with patient and wife at bedside                                      2. EPIC records      Notes regarding home medications:   1. Patient received his morning medications prior to arrival to the ER today.  2. Patient takes Xarelto in the evening - he has not taken this yet today.    Wilber Valladares RPH, PharmD, BCPS  7/30/2025 6:10 PM

## 2025-07-31 LAB
ALBUMIN SERPL-MCNC: 3.5 G/DL (ref 3.4–5)
ALBUMIN/GLOB SERPL: 1.3 {RATIO} (ref 1.1–2.2)
ALP SERPL-CCNC: 69 U/L (ref 40–129)
ALT SERPL-CCNC: 9 U/L (ref 10–40)
ANION GAP SERPL CALCULATED.3IONS-SCNC: 12 MMOL/L (ref 3–16)
AST SERPL-CCNC: 17 U/L (ref 15–37)
BASOPHILS # BLD: 0 K/UL (ref 0–0.2)
BASOPHILS NFR BLD: 0.5 %
BILIRUB SERPL-MCNC: 0.9 MG/DL (ref 0–1)
BUN SERPL-MCNC: 19 MG/DL (ref 7–20)
CALCIUM SERPL-MCNC: 8.9 MG/DL (ref 8.3–10.6)
CHLORIDE SERPL-SCNC: 104 MMOL/L (ref 99–110)
CO2 SERPL-SCNC: 23 MMOL/L (ref 21–32)
CREAT SERPL-MCNC: 1.1 MG/DL (ref 0.8–1.3)
DEPRECATED RDW RBC AUTO: 13.8 % (ref 12.4–15.4)
EKG ATRIAL RATE: 72 BPM
EKG DIAGNOSIS: NORMAL
EKG P AXIS: 62 DEGREES
EKG P-R INTERVAL: 272 MS
EKG Q-T INTERVAL: 440 MS
EKG QRS DURATION: 152 MS
EKG QTC CALCULATION (BAZETT): 481 MS
EKG R AXIS: -67 DEGREES
EKG T AXIS: 50 DEGREES
EKG VENTRICULAR RATE: 72 BPM
EOSINOPHIL # BLD: 0.2 K/UL (ref 0–0.6)
EOSINOPHIL NFR BLD: 2.5 %
GFR SERPLBLD CREATININE-BSD FMLA CKD-EPI: 64 ML/MIN/{1.73_M2}
GLUCOSE BLD-MCNC: 137 MG/DL (ref 70–99)
GLUCOSE SERPL-MCNC: 101 MG/DL (ref 70–99)
HCT VFR BLD AUTO: 34.2 % (ref 40.5–52.5)
HGB BLD-MCNC: 11.8 G/DL (ref 13.5–17.5)
LEGIONELLA AG UR QL: NORMAL
LYMPHOCYTES # BLD: 0.8 K/UL (ref 1–5.1)
LYMPHOCYTES NFR BLD: 11.4 %
MCH RBC QN AUTO: 32.3 PG (ref 26–34)
MCHC RBC AUTO-ENTMCNC: 34.4 G/DL (ref 31–36)
MCV RBC AUTO: 94.1 FL (ref 80–100)
MONOCYTES # BLD: 0.7 K/UL (ref 0–1.3)
MONOCYTES NFR BLD: 9.9 %
NEUTROPHILS # BLD: 5.2 K/UL (ref 1.7–7.7)
NEUTROPHILS NFR BLD: 75.7 %
PERFORMED ON: ABNORMAL
PLATELET # BLD AUTO: 152 K/UL (ref 135–450)
PMV BLD AUTO: 8.1 FL (ref 5–10.5)
POTASSIUM SERPL-SCNC: 4.1 MMOL/L (ref 3.5–5.1)
PROT SERPL-MCNC: 6.2 G/DL (ref 6.4–8.2)
RBC # BLD AUTO: 3.64 M/UL (ref 4.2–5.9)
REPORT: NORMAL
RESP PATH DNA+RNA PNL L RESP NAA+NON-PRB: NORMAL
S PNEUM AG UR QL: NORMAL
SODIUM SERPL-SCNC: 139 MMOL/L (ref 136–145)
WBC # BLD AUTO: 6.9 K/UL (ref 4–11)

## 2025-07-31 PROCEDURE — 2580000003 HC RX 258: Performed by: INTERNAL MEDICINE

## 2025-07-31 PROCEDURE — 94760 N-INVAS EAR/PLS OXIMETRY 1: CPT

## 2025-07-31 PROCEDURE — 6370000000 HC RX 637 (ALT 250 FOR IP): Performed by: INTERNAL MEDICINE

## 2025-07-31 PROCEDURE — 2500000003 HC RX 250 WO HCPCS: Performed by: INTERNAL MEDICINE

## 2025-07-31 PROCEDURE — 6370000000 HC RX 637 (ALT 250 FOR IP)

## 2025-07-31 PROCEDURE — 97162 PT EVAL MOD COMPLEX 30 MIN: CPT | Performed by: PHYSICAL THERAPIST

## 2025-07-31 PROCEDURE — 97530 THERAPEUTIC ACTIVITIES: CPT

## 2025-07-31 PROCEDURE — 36415 COLL VENOUS BLD VENIPUNCTURE: CPT

## 2025-07-31 PROCEDURE — 6370000000 HC RX 637 (ALT 250 FOR IP): Performed by: NURSE PRACTITIONER

## 2025-07-31 PROCEDURE — 97166 OT EVAL MOD COMPLEX 45 MIN: CPT

## 2025-07-31 PROCEDURE — 87633 RESP VIRUS 12-25 TARGETS: CPT

## 2025-07-31 PROCEDURE — 6360000002 HC RX W HCPCS: Performed by: INTERNAL MEDICINE

## 2025-07-31 PROCEDURE — 1200000000 HC SEMI PRIVATE

## 2025-07-31 PROCEDURE — 93010 ELECTROCARDIOGRAM REPORT: CPT | Performed by: INTERNAL MEDICINE

## 2025-07-31 PROCEDURE — 80053 COMPREHEN METABOLIC PANEL: CPT

## 2025-07-31 PROCEDURE — 97530 THERAPEUTIC ACTIVITIES: CPT | Performed by: PHYSICAL THERAPIST

## 2025-07-31 PROCEDURE — 85025 COMPLETE CBC W/AUTO DIFF WBC: CPT

## 2025-07-31 RX ORDER — AMMONIUM LACTATE 12 G/100G
LOTION TOPICAL DAILY
Status: DISCONTINUED | OUTPATIENT
Start: 2025-07-31 | End: 2025-08-01 | Stop reason: HOSPADM

## 2025-07-31 RX ADMIN — Medication 1000 UNITS: at 21:12

## 2025-07-31 RX ADMIN — Medication 1000 UNITS: at 08:30

## 2025-07-31 RX ADMIN — SODIUM CHLORIDE, PRESERVATIVE FREE 10 ML: 5 INJECTION INTRAVENOUS at 21:13

## 2025-07-31 RX ADMIN — LOSARTAN POTASSIUM 25 MG: 25 TABLET, FILM COATED ORAL at 08:30

## 2025-07-31 RX ADMIN — ALPRAZOLAM 2 MG: 0.5 TABLET ORAL at 21:12

## 2025-07-31 RX ADMIN — DOXYCYCLINE HYCLATE 100 MG: 100 TABLET, COATED ORAL at 08:29

## 2025-07-31 RX ADMIN — FAMOTIDINE 20 MG: 20 TABLET, FILM COATED ORAL at 08:29

## 2025-07-31 RX ADMIN — Medication 800 UNITS: at 08:30

## 2025-07-31 RX ADMIN — AMITRIPTYLINE HYDROCHLORIDE 150 MG: 50 TABLET ORAL at 21:12

## 2025-07-31 RX ADMIN — RIVAROXABAN 20 MG: 20 TABLET, FILM COATED ORAL at 18:01

## 2025-07-31 RX ADMIN — POTASSIUM CHLORIDE 10 MEQ: 1500 TABLET, EXTENDED RELEASE ORAL at 08:30

## 2025-07-31 RX ADMIN — SODIUM CHLORIDE, PRESERVATIVE FREE 10 ML: 5 INJECTION INTRAVENOUS at 08:30

## 2025-07-31 RX ADMIN — FAMOTIDINE 20 MG: 20 TABLET, FILM COATED ORAL at 21:12

## 2025-07-31 RX ADMIN — CEFTRIAXONE SODIUM 2000 MG: 2 INJECTION, POWDER, FOR SOLUTION INTRAMUSCULAR; INTRAVENOUS at 14:34

## 2025-07-31 RX ADMIN — DOXYCYCLINE HYCLATE 100 MG: 100 TABLET, COATED ORAL at 21:12

## 2025-07-31 RX ADMIN — Medication: at 14:34

## 2025-07-31 NOTE — CONSULTS
Reason for Consult: urinary retention    History of Present Illness: Jarred Suarez is a 89 y.o. with PMH significant for complex past cardiac/vascular history including atrial fibrillation, coronary artery disease, congestive heart failure, hyperlipidemia, venous thromboembolism, prior CABG x2 morbid obesity BMI 35.     Patient presented yesterday with generalized weakness, confusion, and inability to urinate.  He was found to have pulmonary infiltrates concerning for bacterial pneumonia.  Also found to be in urinary retention, placed alvarez for bladder scan of 731mL.  UA unremarkable.      He explains he has no history of urinary retention.  He has not needed a catheter in the past.  His retention likely multifactorial, which we discussed.  Combination of acute illness, generalized weakness, and BPH.  Up to the acute retention yesterday, he did not noticed any issues with hesitancy or weaker stream than usual.  No issues similar to those with BNC.      Patient has followed with [facility] in the past, most recently saw Dr. Zuleta in office on 7/1/25 for evaluation of renal mass. He has followed with their office for history of bladder stones, BPH, BNC, renal mass.      Past Medical History:   Past Medical History:   Diagnosis Date    Atrial fibrillation (HCC)     CAD (coronary artery disease)     Combined systolic and diastolic heart failure (HCC)     Diarrhea 04/12/2017    History of skin cancer     Hyperlipidemia     Hypertension     Insomnia 04/20/2020    Kidney stones 03/2017    Open wound of left hand, subsequent encounter 06/03/2022    VTE (venous thromboembolism)        Past Surgical History:  Past Surgical History:   Procedure Laterality Date    APPENDECTOMY      BACK SURGERY      x4    CORONARY ARTERY BYPASS GRAFT  1981    CORONARY ARTERY BYPASS GRAFT  2001    CYSTOSCOPY N/A 7/26/2024    CYSTOSCOPY WITH URETHRAL DILATION AND  BLADDER STONE REMOVAL WITH HOLMIUM LASER - [facility] (CONF# [id number])     NITRU Negative 07/30/2025 02:25 PM    LEUKOCYTESUR Negative 07/30/2025 02:25 PM       Imaging:  Pertinent images and radiologist's report were reviewed independently  CT A/P 7/30/25  IMPRESSION:  1. No acute cardiopulmonary process.  2. Linear atelectasis/scarring in the lower lungs. Bronchiectatic changes in  the medial lung bases, greater on the right.  No evidence of pneumonia.  3. Cholelithiasis.  4. 1.1 cm hypodense nodule in the left adrenal gland, probably an adenoma.  5. Ectatic ascending aorta measuring 3.8 cm in AP dimension.    Impression/Plan:   - 89 y/o male admitted for generalized weakness, possible bacterial pneumonia, lower extremity cellulitis, and urinary retention  - Urinary retention multifactorial in the setting of acute illness, generalized weakness, and BPH  - unable to take flomax due to dizziness  - Significant urologic history including bladder stones, known renal mass, BNC, BPH.  Follows with the [facility] office  - Continue alvarez.  Recommend voiding trial in 3 days if remains inpatient.  If discharged prior to this time he can follow up with her primary urologist office in [place] for outpatient voiding trial.      IAN Wright 7/31/20258:38 AM

## 2025-07-31 NOTE — CARE COORDINATION
Case Management Assessment  Initial Evaluation    Date/Time of Evaluation: 7/31/2025 3:45 PM  Assessment Completed by: Vero Boland RN    If patient is discharged prior to next notation, then this note serves as note for discharge by case management.    Patient Name: Jarred Suarez                   YOB: 1935  Diagnosis: Bacterial pneumonia [J15.9]  Generalized weakness [R53.1]  Subdural hygroma [G96.08]  Acute urinary retention [R33.8]  Cellulitis of left lower leg [L03.116]  Pneumonia due to infectious organism, unspecified laterality, unspecified part of lung [J18.9]                     Date / Time: 7/30/2025  1:55 PM    Patient Admission Status: Inpatient   Readmission Risk (Low < 19, Mod (19-27), High > 27): Readmission Risk Score: 17.9    Current PCP: Jack Shelton MD  PCP verified by CM? Yes    Chart Reviewed: Yes      History Provided by: Patient, Significant Other, Medical Record  Patient Orientation: Alert and Oriented    Patient Cognition: Alert    Hospitalization in the last 30 days (Readmission):  No    If yes, Readmission Assessment in  Navigator will be completed.    Advance Directives:      Code Status: Full Code   Patient's Primary Decision Maker is: Legal Next of Kin    Primary Decision Maker: Antoinette Suarez - Spouse - [phone]    Secondary Decision Maker: Vianey Lanier - Child - [phone]    Discharge Planning:    Patient lives with: Spouse/Significant Other Type of Home: Other (Comment) (condo)  Primary Care Giver: Spouse  Patient Support Systems include: Spouse/Significant Other, Children   Current Financial resources: Medicare  Current community resources: ECF/Home Care  Current services prior to admission: Home Care, Durable Medical Equipment            Current DME: Wheelchair, Walker, Shower Chair            Type of Home Care services:  OT, PT, Nursing Services, Aide Services    ADLS  Prior functional level: Assistance with the following:, Bathing, Dressing,  OT, HHA          The Plan for Transition of Care is related to the following treatment goals of Bacterial pneumonia [J15.9]  Generalized weakness [R53.1]  Subdural hygroma [G96.08]  Acute urinary retention [R33.8]  Cellulitis of left lower leg [L03.116]  Pneumonia due to infectious organism, unspecified laterality, unspecified part of lung [J18.9]    IF APPLICABLE: The Patient and/or patient representative Jarred and his family were provided with a choice of provider and agrees with the discharge plan. Freedom of choice list with basic dialogue that supports the patient's individualized plan of care/goals and shares the quality data associated with the providers was provided to: Patient, Patient Representative   Patient Representative Name: (P) Tomasa Suarez     The Patient and/or Patient Representative Agree with the Discharge Plan? (P) Yes    Vero Boland RN  Case Management Department  Ph: [phone]      07/31/25 1536   Service Assessment   Patient Orientation Alert and Oriented   Cognition Alert   History Provided By Patient;Significant Other;Medical Record   Primary Caregiver Spouse   Accompanied By/Relationship wife   Support Systems Spouse/Significant Other;Children   Patient's Healthcare Decision Maker is: Legal Next of Kin   PCP Verified by CM Yes   Last Visit to PCP Within last 3 months   Prior Functional Level Assistance with the following:;Bathing;Dressing;Toileting;Cooking;Housework;Shopping;Mobility   Current Functional Level Assistance with the following:;Bathing;Dressing;Toileting;Cooking;Housework;Mobility   Can patient return to prior living arrangement Unknown at present   Ability to make needs known: Fair   Family able to assist with home care needs: Yes   Would you like for me to discuss the discharge plan with any other family members/significant others, and if so, who? Yes  (wife)   Financial Resources Medicare   Community Resources ECF/Home Care   CM/SW Referral Other (see

## 2025-07-31 NOTE — FLOWSHEET NOTE
Admitted to room 4129 via stretcher from ED. Pleasant and cooperative. AA)x4. Oriented to room and call system. Orders released. Denies pain or discomfort.

## 2025-07-31 NOTE — CARE COORDINATION
Advance Care Planning   Healthcare Decision Maker:    Primary Decision Maker: Antoinette Suarez - Spouse - [phone]    Secondary Decision Maker: YannickVianey - Child - [phone]  Electronically signed by Vero Boland RN on 7/31/2025 at 3:57 PM

## 2025-07-31 NOTE — PROGRESS NOTES
Occupational Therapy      [facility] - Occupational Therapy   Phone: [phone]    Occupational Therapy  Facility/Department:[address] MED SURG    [x] Initial Evaluation            [x] Daily Treatment Note         [] Discharge Summary      Patient: Jarred Suarez   : 1935   MRN: [id number]   Date of Service:  2025    Staff Mobility Recommendation: maxi move    AM-PAC Score:   Discharge Recommendations: 3-5  Jarred Suarez scored a  on the AM-PAC ADL Inpatient form. Current research shows that an AM-PAC score of 17 or less is typically not associated with a discharge to the patient's home setting. Based on the patient's AM-PAC score and their current ADL deficits, it is recommended that the patient have 3-5 sessions per week of Occupational Therapy at d/c to increase the patient's independence.  Please see assessment section for further patient specific details.    If patient discharges prior to next session this note will serve as a discharge summary.  Please see below for the latest assessment towards goals.      Admitting Diagnosis:  Bacterial pneumonia  Ordering Physician: Dr. Jaffe  Current Admission Summary: 88 y/o male admitted 2025 with generalized weakness, confusion, and inability to urinate. Pt was found to have pulmonary infiltrates concerning for bacterial pneumonia and had urinary retention- alvarez placed. CT head negative for acute findings; showed stable bilateral subdural hygromas with 4 mm rightward midline shift     Past Medical History:  has a past medical history of Atrial fibrillation (HCC), CAD (coronary artery disease), Combined systolic and diastolic heart failure (HCC), Diarrhea, History of skin cancer, Hyperlipidemia, Hypertension, Insomnia, Kidney stones, Open wound of left hand, subsequent encounter, and VTE (venous thromboembolism).  Past Surgical History:  has a past surgical history that includes back surgery; knee surgery (); shoulder

## 2025-07-31 NOTE — CARE COORDINATION
Wound Referral Progress Note       NAME:  Jarred Suarez  MEDICAL RECORD NUMBER:  [id number]  AGE: 89 y.o.   GENDER: male  : 1935  TODAY'S DATE:  2025    Subjective   Reason for WOCN Evaluation and Assessment: ? Cellulitis to bLE      Jarred Suarez is a 89 y.o. male referred by:   Provider    Wound Identification:  Wound Type: pt has venous stasis dermatitis   Contributing Factors: venous stasis    Wound History: pt has chronic venous stasis, wears compressions stockings at home and uses amlactin  Current Wound Care Treatment:  BO    Patient Care Goal:  Wound Healing        PAST MEDICAL HISTORY        Diagnosis Date    Atrial fibrillation (HCC)     CAD (coronary artery disease)     Combined systolic and diastolic heart failure (HCC)     Diarrhea 2017    History of skin cancer     Hyperlipidemia     Hypertension     Insomnia 2020    Kidney stones 2017    Open wound of left hand, subsequent encounter 2022    VTE (venous thromboembolism)        PAST SURGICAL HISTORY    Past Surgical History:   Procedure Laterality Date    APPENDECTOMY      BACK SURGERY      x4    CORONARY ARTERY BYPASS GRAFT  1981    CORONARY ARTERY BYPASS GRAFT      CYSTOSCOPY N/A 2024    CYSTOSCOPY WITH URETHRAL DILATION AND  BLADDER STONE REMOVAL WITH HOLMIUM LASER - FORTEC (CONF# [id number]) performed by Raza Perry MD at [facility] OR    JOINT REPLACEMENT Right     knee replacement    KNEE SURGERY      right     PROSTATE SURGERY      SHOULDER SURGERY      both    SKIN CANCER EXCISION      TOTAL KNEE ARTHROPLASTY Right     [facility]       FAMILY HISTORY    Family History   Problem Relation Age of Onset    Heart Attack Mother     Cancer Mother     Diabetes Mother     Heart Disease Father     Heart Attack Father     High Blood Pressure Father     High Cholesterol Father     Cancer Sister         brain, lung, spine       SOCIAL HISTORY    Social History     Tobacco Use    Smoking status:

## 2025-07-31 NOTE — PROGRESS NOTES
Hospitalist Progress Note      PCP: Jack Shelton MD    Date of Admission: 7/30/2025    Chief Complaint: Urinary retention, weakness    Hospital Course: 89 year old male with a past medical history of chronic combined CHF, paroxysmal atrial fibrillation, hypertension, and renal cell carcinoma who presented to the ED with concerns of generalized weakness and inability to urinate. Patient reported inability to void for nearly a day. No associated urinary symptoms, fevers or chills. Family felt patient was more weak than normal, and somewhat confused. Patient was hemodynamically stable on arrival. Basic labs including CBC and BMP essentially unremarkable. Intitial lactic acid elevated. Bladder scan notable for >700 ccs urine. Alvarez catheter placed. Urinalysis unremarkable. Chest x-ray with possible pneumonia versus atelectasis. CT head showed stable bilateral subdural hygromas, felt to be unrelated to current presentation and per neurosurgery, not needing any intervention or follow up. There were clinical concerns for lower extremity cellulitis. Patient was started on empiric antibiotics for possible pneumonia and cellulitis and admitted for further evaluation and management. Urology consulted regarding urinary retention. PT/OT consulted for evaluation in setting of increased weakness.    Subjective: Patient seen resting in bed. Overall reports still feeling weak. Reports his main issue was inability to urinate. He has never had that issue before. He is tolerating the alvarez. Discussed plan to continue antibiotics for treatment of potential infections and monitor.     Assessment/Plan:    Generalized weakness   -PT/OT consulted, recommend SNF  -CM for DC planning    Acute urinary retention  BPH  -Alvarez placed in ED  -UA unremarkable  -Urology consulted, suspect multifactorial in setting of weakness, acute ilness, BPH  -Recommend VT 3 day if inpatient otherwise outpatient follow up with primary urologist for VT in

## 2025-07-31 NOTE — FLOWSHEET NOTE
4 Eyes Skin Assessment     NAME:  Jarred Suarez  YOB: 1935  MEDICAL RECORD NUMBER:  [id number]    The patient is being assessed for  Admission    I agree that at least one RN has performed a thorough Head to Toe Skin Assessment on the patient. ALL assessment sites listed below have been assessed.      Areas assessed by both nurses:    Head, Face, Ears, Shoulders, Back, Chest, Arms, Elbows, Hands, Sacrum. Buttock, Coccyx, Ischium, Legs. Feet and Heels, and Under Medical Devices         Does the Patient have a Wound? Yes wound(s) were present on assessment. LDA wound assessment was Initiated and completed by RN       Marbin Prevention initiated by RN: Yes  Wound Care Orders initiated by RN: Yes    For hospital-acquired stage 1 & 2 and ALL Stage 3,4, Unstageable, DTI, NWPT, and Complex wounds: place order “IP Wound Care/Ostomy Nurse Eval and Treat” by RN under : No    New Ostomies, if present place, Ostomy referral order under : No     Nurse 1 eSignature: Electronically signed by DELPHINE ROBERTSON RN on 7/30/25 at 10:32 PM EDT    **SHARE this note so that the co-signing nurse can place an eSignature**    Nurse 2 eSignature: Electronically signed by Massiel Bautista RN on 7/31/25 at 4:37 AM EDT

## 2025-07-31 NOTE — PROGRESS NOTES
Physical Therapy      [facility] - Physical Therapy   Phone: [phone]    Physical Therapy  Facility/Department:[address] MED SURG    [x] Initial Evaluation            [x] Daily Treatment Note         [] Discharge Summary      Patient: Jarred Suarez   : 1935   MRN: [id number]   Date of Service:  2025  Staff Mobility Recommendation: maxi move    AM-PAC score:   Discharge Recommendations: pt will benefit from continued skilled therapy at discharge  Jarred Suarez scored a  on the AM-PAC short mobility form. Current research shows that an AM-PAC score of 17 or less is typically not associated with a discharge to the patient's home setting. Based on the patient's AM-PAC score and their current functional mobility deficits, it is recommended that the patient have 5 sessions per week of Physical Therapy at d/c to increase the patient's independence.  Please see assessment section for further patient specific details.    If patient discharges prior to next session this note will serve as a discharge summary.  Please see below for the latest assessment towards goals.      Admitting Diagnosis: Bacterial pneumonia  Ordering Physician: Dr Jaffe  Current Admission Summary: Jarred Suarez is a 89 y.o. male who presents for inability to urinate, generalized weakness and confusion.  Patient has been unable to urinate since 9 pm last night.      Past Medical History:  has a past medical history of Atrial fibrillation (HCC), CAD (coronary artery disease), Combined systolic and diastolic heart failure (HCC), Diarrhea, History of skin cancer, Hyperlipidemia, Hypertension, Insomnia, Kidney stones, Open wound of left hand, subsequent encounter, and VTE (venous thromboembolism).  Past Surgical History:  has a past surgical history that includes back surgery; knee surgery (); shoulder surgery; Appendectomy; Coronary artery bypass graft (); Coronary artery bypass graft (); Prostate

## 2025-08-01 VITALS
TEMPERATURE: 97.3 F | BODY MASS INDEX: 34.63 KG/M2 | OXYGEN SATURATION: 100 % | HEART RATE: 64 BPM | SYSTOLIC BLOOD PRESSURE: 113 MMHG | RESPIRATION RATE: 15 BRPM | DIASTOLIC BLOOD PRESSURE: 48 MMHG | WEIGHT: 247.36 LBS | HEIGHT: 71 IN

## 2025-08-01 PROCEDURE — 2500000003 HC RX 250 WO HCPCS: Performed by: INTERNAL MEDICINE

## 2025-08-01 PROCEDURE — 6360000002 HC RX W HCPCS: Performed by: INTERNAL MEDICINE

## 2025-08-01 PROCEDURE — 97530 THERAPEUTIC ACTIVITIES: CPT

## 2025-08-01 PROCEDURE — 2580000003 HC RX 258: Performed by: INTERNAL MEDICINE

## 2025-08-01 PROCEDURE — 94760 N-INVAS EAR/PLS OXIMETRY 1: CPT

## 2025-08-01 PROCEDURE — 97535 SELF CARE MNGMENT TRAINING: CPT

## 2025-08-01 PROCEDURE — 97530 THERAPEUTIC ACTIVITIES: CPT | Performed by: PHYSICAL THERAPIST

## 2025-08-01 PROCEDURE — 6370000000 HC RX 637 (ALT 250 FOR IP): Performed by: INTERNAL MEDICINE

## 2025-08-01 PROCEDURE — 6370000000 HC RX 637 (ALT 250 FOR IP)

## 2025-08-01 RX ORDER — DOXYCYCLINE HYCLATE 100 MG
100 TABLET ORAL EVERY 12 HOURS SCHEDULED
Qty: 6 TABLET | Refills: 0
Start: 2025-08-01 | End: 2025-08-04

## 2025-08-01 RX ORDER — FUROSEMIDE 20 MG/1
20 TABLET ORAL DAILY
Status: DISCONTINUED | OUTPATIENT
Start: 2025-08-01 | End: 2025-08-01 | Stop reason: HOSPADM

## 2025-08-01 RX ORDER — AMMONIUM LACTATE 12 G/100G
LOTION TOPICAL
Qty: 222 ML | Refills: 5
Start: 2025-08-01

## 2025-08-01 RX ADMIN — RIVAROXABAN 20 MG: 20 TABLET, FILM COATED ORAL at 17:42

## 2025-08-01 RX ADMIN — SODIUM CHLORIDE, PRESERVATIVE FREE 10 ML: 5 INJECTION INTRAVENOUS at 08:25

## 2025-08-01 RX ADMIN — FAMOTIDINE 20 MG: 20 TABLET, FILM COATED ORAL at 08:23

## 2025-08-01 RX ADMIN — Medication: at 08:24

## 2025-08-01 RX ADMIN — Medication 800 UNITS: at 08:24

## 2025-08-01 RX ADMIN — POTASSIUM CHLORIDE 10 MEQ: 1500 TABLET, EXTENDED RELEASE ORAL at 08:24

## 2025-08-01 RX ADMIN — CEFTRIAXONE SODIUM 2000 MG: 2 INJECTION, POWDER, FOR SOLUTION INTRAMUSCULAR; INTRAVENOUS at 14:55

## 2025-08-01 RX ADMIN — DOXYCYCLINE HYCLATE 100 MG: 100 TABLET, COATED ORAL at 08:23

## 2025-08-01 RX ADMIN — LOSARTAN POTASSIUM 25 MG: 25 TABLET, FILM COATED ORAL at 08:24

## 2025-08-01 RX ADMIN — FUROSEMIDE 20 MG: 20 TABLET ORAL at 10:58

## 2025-08-01 RX ADMIN — Medication 1000 UNITS: at 08:24

## 2025-08-01 NOTE — CARE COORDINATION
[facility] [employer] PRE-CERT REQUEST SUBMITTED VIA [place] PORTAL    [facility]ED FOR FACILITY:  [facility]    ANTICIPATED ADMIT DATE TO SNF:  08/01/2025      [facility] #:  [id number]     Electronically signed by Juany Dodd, Case Management Assistant Juany Dodd on 8/1/2025 at 11:17 AM

## 2025-08-01 NOTE — DISCHARGE SUMMARY
Hospital Medicine Discharge Summary    Patient ID: Jarred Suarez      Patient's PCP: Jack Shelton MD    Admit Date: 7/30/2025     Discharge Date:   08/01/25    Admitting Physician: Colby Jaffe MD     Discharging Provider: Rubia Johnston PA-C       Hospital Course: 89 year old male with a past medical history of chronic combined CHF, paroxysmal atrial fibrillation, hypertension, and renal cell carcinoma who presented to the ED with concerns of generalized weakness and inability to urinate. Patient reported inability to void for nearly a day. No associated urinary symptoms, fevers or chills. Family felt patient was more weak than normal, and somewhat confused. Patient was hemodynamically stable on arrival. Basic labs including CBC and BMP essentially unremarkable. Intitial lactic acid elevated. Bladder scan notable for >700 ccs urine. Herbert catheter placed. Urinalysis unremarkable. Chest x-ray with possible pneumonia versus atelectasis. CT head showed stable bilateral subdural hygromas, felt to be unrelated to current presentation and per neurosurgery, not needing any intervention or follow up. There were clinical concerns for lower extremity cellulitis. Patient was started on empiric antibiotics for possible pneumonia and cellulitis and admitted for further evaluation and management. Wound care evaluated patient and recommended amlactin cream and compression stockings to the bilateral lower extremities. CT chest ultimately ruled out pneumonia. Urology was consulted regarding urinary retention and felt multifactorial in setting of weakness, acute illness and BPH. Recommended voiding as outpatient and follow up with primary urologist. PT/OT consulted for evaluation in setting of increased weakness and recommended SNF at discharge. Patient will complete course of oral doxycycline at discharge.       Generalized weakness   -PT/OT consulted, recommend SNF  -CM consulted for DC planning     Acute urinary

## 2025-08-01 NOTE — DISCHARGE INSTR - COC
Continuity of Care Form    Patient Name: Jarred Suarez   :  1935  MRN:  [id number]    Admit date:  2025  Discharge date:  ***    Code Status Order: Full Code   Advance Directives:     Admitting Physician:  Colby Jaffe MD  PCP: Jack Shelton MD    Discharging Nurse: ***  Discharging Hospital Unit/Room#: M9T-5092/4129-01  Discharging Unit Phone Number: ***    Emergency Contact:   Extended Emergency Contact Information  Primary Emergency Contact: Antoinette Suarez  Address: [address] [place] [address]            [place], [place] [address] [place] [address]  Home Phone: [phone]  Mobile Phone: [phone]  Relation: Spouse  Secondary Emergency Contact: Vianey Lanier   [facility]  Home Phone: [phone]  Work Phone: [phone]  Relation: Child    Past Surgical History:  Past Surgical History:   Procedure Laterality Date    APPENDECTOMY      BACK SURGERY      x4    CORONARY ARTERY BYPASS GRAFT  1981    CORONARY ARTERY BYPASS GRAFT      CYSTOSCOPY N/A 2024    CYSTOSCOPY WITH URETHRAL DILATION AND  BLADDER STONE REMOVAL WITH HOLMIUM LASER - FORTEC (CONF# [id number]) performed by Raza Perry MD at [facility] OR    JOINT REPLACEMENT Right     knee replacement    KNEE SURGERY      right     PROSTATE SURGERY      SHOULDER SURGERY      both    SKIN CANCER EXCISION      TOTAL KNEE ARTHROPLASTY Right 2015    [place]       Immunization History:   Immunization History   Administered Date(s) Administered    Pneumococcal, PCV-13, PREVNAR 13, (age 6w+), IM, 0.5mL 2016    Pneumococcal, PPSV23, PNEUMOVAX 23, (age 2y+), SC/IM, 0.5mL 2010    TDaP, ADACEL (age 10y-64y), BOOSTRIX (age 10y+), IM, 0.5mL 2022       Active Problems:  Patient Active Problem List   Diagnosis Code    Mixed hyperlipidemia E78.2    Essential hypertension I10    Coronary artery disease involving native coronary artery of native heart without angina pectoris I25.10    Bilateral carotid artery stenosis  I65.23    Knee pain, bilateral M25.561, M25.562    Right knee DJD M17.11    Left knee DJD M17.12    Paroxysmal atrial fibrillation ([facility]) I48.0    History of DVT (deep vein thrombosis) Z86.718    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 I87.323    Venous stasis dermatitis of both lower extremities I87.2    Idiopathic chronic venous 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 ([facility]) I87.332    Morbid obesity due to excess calories ([facility]) E66.01    Insomnia G47.00    Chronic combined systolic and diastolic congestive heart failure ([facility]) I50.42    Open wound of right lower leg S81.801A    History of coronary artery bypass graft x 2 Z95.1    Non-prs chronic ulcer oth prt l low leg w fat layer exposed ([facility]) L97.822    Bladder calculi N21.0    Generalized weakness R53.1    Morbid obesity ([facility]) E66.01    Elevated procalcitonin R79.89    Bacterial pneumonia J15.9       Isolation/Infection:   Isolation            No Isolation          Patient Infection Status    None to display              Nurse Assessment:  Last Vital Signs: BP (!) 138/104   Pulse 70   Temp 97.5 °F (36.4 °C) (Axillary)   Resp 14   Ht 1.803 m (5' 10.98\")   Wt 112.2 kg (247 lb 5.7 oz)   SpO2 99%   BMI 34.51 kg/m²     Last documented pain score (0-10 scale):    Last Weight:   Wt Readings from Last 1 Encounters:   08/01/25 112.2 kg (247 lb 5.7 oz)     Mental Status:  {IP PT MENTAL STATUS:19274}    IV Access:  { CARLEEN IV ACCESS:886857336}    Nursing Mobility/ADLs:  Walking   {CHP DME ADLs:101279618}  Transfer  {CHP DME ADLs:021316245}  Bathing  {CHP DME ADLs:565241961}  Dressing  {CHP DME ADLs:223854430}  Toileting  {CHP DME ADLs:003330980}  Feeding  {CHP DME ADLs:731387178}  Med Admin  {P DME ADLs:298089201}  Med Delivery   { CARLEEN MED Delivery:921391893}    Wound Care Documentation and Therapy:  Wound 07/30/25 Perineum open areas left and right buttocks next to rectum. pink wound bed aattached edges. scant amt seous

## 2025-08-01 NOTE — CARE COORDINATION
8/1 PLAN: Precert requested for [facility]. Active with [facility] SN, PT. Wife is primary c/g. Will need HENs and BLS transport. Electronically signed by Vero Boland RN on 8/1/2025 at 9:44 AM

## 2025-08-01 NOTE — PROGRESS NOTES
Physical Therapy      [facility] - Physical Therapy   Phone: [phone]    Physical Therapy  Facility/Department:[address] MED SURG    [] Initial Evaluation            [x] Daily Treatment Note         [] Discharge Summary      Patient: Jarred Suarez   : 1935   MRN: [id number]   Date of Service:  2025  Staff Mobility Recommendation: maxi move    AM-PAC score: 10/24  Discharge Recommendations: pt will benefit from continued skilled therapy at discharge  Jarred Suarez scored a 10 on the AM-PAC short mobility form. Current research shows that an AM-PAC score of 17 or less is typically not associated with a discharge to the patient's home setting. Based on the patient's AM-PAC score and their current functional mobility deficits, it is recommended that the patient have 5 sessions per week of Physical Therapy at d/c to increase the patient's independence.  Please see assessment section for further patient specific details.    If patient discharges prior to next session this note will serve as a discharge summary.  Please see below for the latest assessment towards goals.      Admitting Diagnosis: Bacterial pneumonia  Ordering Physician: Dr Jaffe  Current Admission Summary: Jarred Suarez is a 89 y.o. male who presents for inability to urinate, generalized weakness and confusion.  Patient has been unable to urinate since 9 pm last night.      Past Medical History:  has a past medical history of Atrial fibrillation (HCC), CAD (coronary artery disease), Combined systolic and diastolic heart failure (HCC), Diarrhea, History of skin cancer, Hyperlipidemia, Hypertension, Insomnia, Kidney stones, Open wound of left hand, subsequent encounter, and VTE (venous thromboembolism).  Past Surgical History:  has a past surgical history that includes back surgery; knee surgery (); shoulder surgery; Appendectomy; Coronary artery bypass graft (); Coronary artery bypass graft (); Prostate

## 2025-08-01 NOTE — CARE COORDINATION
8/1 [facility] can accept. Precert requested.Electronically signed by Vero Boland RN on 8/1/2025 at 9:39 AM

## 2025-08-01 NOTE — PROGRESS NOTES
Occupational Therapy      [facility] - Occupational Therapy   Phone: [phone]    Occupational Therapy  Facility/Department:[address] MED SURG    [] Initial Evaluation            [x] Daily Treatment Note         [] Discharge Summary      Patient: Jarred Suarez   : 1935   MRN: [id number]   Date of Service:  2025    Staff Mobility Recommendation: stedy x 2 or maxi move    AM-PAC Score:   Discharge Recommendations: 3-5  Jarred Suarez scored a  on the AM-PAC ADL Inpatient form. Current research shows that an AM-PAC score of 17 or less is typically not associated with a discharge to the patient's home setting. Based on the patient's AM-PAC score and their current ADL deficits, it is recommended that the patient have 3-5 sessions per week of Occupational Therapy at d/c to increase the patient's independence.  Please see assessment section for further patient specific details.    If patient discharges prior to next session this note will serve as a discharge summary.  Please see below for the latest assessment towards goals.      Admitting Diagnosis:  Bacterial pneumonia  Ordering Physician: Dr. Jaffe  Current Admission Summary: 88 y/o male admitted 2025 with generalized weakness, confusion, and inability to urinate. Pt was found to have pulmonary infiltrates concerning for bacterial pneumonia and had urinary retention- alvarez placed. CT head negative for acute findings; showed stable bilateral subdural hygromas with 4 mm rightward midline shift     Past Medical History:  has a past medical history of Atrial fibrillation (HCC), CAD (coronary artery disease), Combined systolic and diastolic heart failure (HCC), Diarrhea, History of skin cancer, Hyperlipidemia, Hypertension, Insomnia, Kidney stones, Open wound of left hand, subsequent encounter, and VTE (venous thromboembolism).  Past Surgical History:  has a past surgical history that includes back surgery; knee surgery ();

## 2025-08-01 NOTE — CARE COORDINATION
DISCHARGE SUMMARY     DATE OF DISCHARGE: 8/1/25    DISCHARGE DESTINATION: SNF    HOME CARE: No    FACILITY  Discharging to Facility/ Agency   Name: [facility]  Address:  [address], [place] [address]   Phone:  [phone]  Fax:  [phone]     Level of Care: Skilled    Report Number: [id number]    Fax Number:  [id number]    Precert Obtained: yes    Hens Completed: {YES/NO/NA:19705}    PASARR: N/A    Notified: RN, Family, and Facility/Agency    Prescriptions Faxed:no  HEMODIALYSIS: No    TRANSPORTATION: [employer]    Company Name:      Time: 4:30 PM    Phone Number:     NEW DME ORDERED: no    COMMENTS:

## 2025-08-01 NOTE — PLAN OF CARE
Problem: Chronic Conditions and Co-morbidities  Goal: Patient's chronic conditions and co-morbidity symptoms are monitored and maintained or improved  7/31/2025 1034 by Mildred Macias, RN  Outcome: Progressing  Flowsheets (Taken 7/31/2025 0829)  Care Plan - Patient's Chronic Conditions and Co-Morbidity Symptoms are Monitored and Maintained or Improved:   Monitor and assess patient's chronic conditions and comorbid symptoms for stability, deterioration, or improvement   Collaborate with multidisciplinary team to address chronic and comorbid conditions and prevent exacerbation or deterioration   Update acute care plan with appropriate goals if chronic or comorbid symptoms are exacerbated and prevent overall improvement and discharge  7/30/2025 2228 by Adi Sargent, RN  Outcome: Progressing  Flowsheets (Taken 7/30/2025 2030)  Care Plan - Patient's Chronic Conditions and Co-Morbidity Symptoms are Monitored and Maintained or Improved:   Monitor and assess patient's chronic conditions and comorbid symptoms for stability, deterioration, or improvement   Collaborate with multidisciplinary team to address chronic and comorbid conditions and prevent exacerbation or deterioration     Problem: Discharge Planning  Goal: Discharge to home or other facility with appropriate resources  7/31/2025 1034 by Mildred Macias, RN  Outcome: Progressing  Flowsheets (Taken 7/31/2025 0829)  Discharge to home or other facility with appropriate resources:   Identify barriers to discharge with patient and caregiver   Arrange for needed discharge resources and transportation as appropriate   Identify discharge learning needs (meds, wound care, etc)  7/30/2025 2228 by Adi Sargent, RN  Outcome: Progressing  Flowsheets  Taken 7/30/2025 2221  Discharge to home or other facility with appropriate resources:   Identify barriers to discharge with patient and caregiver   Arrange for needed discharge resources and transportation as 
  Problem: Chronic Conditions and Co-morbidities  Goal: Patient's chronic conditions and co-morbidity symptoms are monitored and maintained or improved  8/1/2025 1011 by Mildred Macias, RN  Outcome: Progressing  Flowsheets (Taken 8/1/2025 0825)  Care Plan - Patient's Chronic Conditions and Co-Morbidity Symptoms are Monitored and Maintained or Improved:   Monitor and assess patient's chronic conditions and comorbid symptoms for stability, deterioration, or improvement   Collaborate with multidisciplinary team to address chronic and comorbid conditions and prevent exacerbation or deterioration   Update acute care plan with appropriate goals if chronic or comorbid symptoms are exacerbated and prevent overall improvement and discharge  7/31/2025 2142 by Adi Sargent, RN  Outcome: Progressing  Flowsheets (Taken 7/31/2025 2137)  Care Plan - Patient's Chronic Conditions and Co-Morbidity Symptoms are Monitored and Maintained or Improved:   Monitor and assess patient's chronic conditions and comorbid symptoms for stability, deterioration, or improvement   Collaborate with multidisciplinary team to address chronic and comorbid conditions and prevent exacerbation or deterioration     Problem: Discharge Planning  Goal: Discharge to home or other facility with appropriate resources  8/1/2025 1011 by Mildred Macias, RN  Outcome: Progressing  Flowsheets (Taken 8/1/2025 0825)  Discharge to home or other facility with appropriate resources:   Identify barriers to discharge with patient and caregiver   Arrange for needed discharge resources and transportation as appropriate   Identify discharge learning needs (meds, wound care, etc)  7/31/2025 2142 by Adi Sargent, RN  Outcome: Progressing  Flowsheets (Taken 7/31/2025 2137)  Discharge to home or other facility with appropriate resources:   Identify barriers to discharge with patient and caregiver   Arrange for needed discharge resources and transportation as 
  Problem: Chronic Conditions and Co-morbidities  Goal: Patient's chronic conditions and co-morbidity symptoms are monitored and maintained or improved  Outcome: Progressing  Flowsheets (Taken 7/30/2025 2030)  Care Plan - Patient's Chronic Conditions and Co-Morbidity Symptoms are Monitored and Maintained or Improved:   Monitor and assess patient's chronic conditions and comorbid symptoms for stability, deterioration, or improvement   Collaborate with multidisciplinary team to address chronic and comorbid conditions and prevent exacerbation or deterioration     Problem: Discharge Planning  Goal: Discharge to home or other facility with appropriate resources  Outcome: Progressing  Flowsheets  Taken 7/30/2025 2221  Discharge to home or other facility with appropriate resources:   Identify barriers to discharge with patient and caregiver   Arrange for needed discharge resources and transportation as appropriate   Identify discharge learning needs (meds, wound care, etc)  Taken 7/30/2025 2030  Discharge to home or other facility with appropriate resources:   Identify barriers to discharge with patient and caregiver   Arrange for needed discharge resources and transportation as appropriate   Identify discharge learning needs (meds, wound care, etc)     Problem: Safety - Adult  Goal: Free from fall injury  Outcome: Progressing     Problem: ABCDS Injury Assessment  Goal: Absence of physical injury  Outcome: Progressing     Problem: Skin/Tissue Integrity  Goal: Skin integrity remains intact  Description: 1.  Monitor for areas of redness and/or skin breakdown  2.  Assess vascular access sites hourly  3.  Every 4-6 hours minimum:  Change oxygen saturation probe site  4.  Every 4-6 hours:  If on nasal continuous positive airway pressure, respiratory therapy assess nares and determine need for appliance change or resting period  Outcome: Progressing  Flowsheets (Taken 7/30/2025 2030)  Skin Integrity Remains Intact: Monitor for 
appropriate   Identify discharge learning needs (meds, wound care, etc)

## 2025-08-01 NOTE — CARE COORDINATION
Pre-cert obtained for [facility]  Auth# [id number]  Valid 8/1/25 - 8/4/25    CM following notified

## 2025-08-01 NOTE — CARE COORDINATION
8/1 Discharge order is noted. Precert pending for [facility]. Electronically signed by Vero Boland RN on 8/1/2025 at 11:52 AM

## 2025-08-03 LAB
BACTERIA BLD CULT ORG #2: NORMAL
BACTERIA BLD CULT: NORMAL

## 2025-08-11 ENCOUNTER — HOSPITAL ENCOUNTER (EMERGENCY)
Age: 89
Discharge: HOME | End: 2025-08-11
Payer: MEDICARE

## 2025-08-11 VITALS
SYSTOLIC BLOOD PRESSURE: 144 MMHG | RESPIRATION RATE: 18 BRPM | TEMPERATURE: 97.7 F | HEIGHT: 70 IN | HEART RATE: 79 BPM | DIASTOLIC BLOOD PRESSURE: 81 MMHG | BODY MASS INDEX: 34.75 KG/M2 | OXYGEN SATURATION: 99 % | WEIGHT: 242.73 LBS

## 2025-08-11 DIAGNOSIS — S81.812A LACERATION OF LEFT LOWER EXTREMITY, INITIAL ENCOUNTER: Primary | ICD-10-CM

## 2025-08-11 PROCEDURE — 6360000002 HC RX W HCPCS: Performed by: PHYSICIAN ASSISTANT

## 2025-08-11 PROCEDURE — 99283 EMERGENCY DEPT VISIT LOW MDM: CPT

## 2025-08-11 PROCEDURE — 6370000000 HC RX 637 (ALT 250 FOR IP): Performed by: PHYSICIAN ASSISTANT

## 2025-08-11 PROCEDURE — 12002 RPR S/N/AX/GEN/TRNK2.6-7.5CM: CPT

## 2025-08-11 RX ORDER — LIDOCAINE HYDROCHLORIDE 20 MG/ML
5 INJECTION, SOLUTION INFILTRATION; PERINEURAL ONCE
Status: COMPLETED | OUTPATIENT
Start: 2025-08-11 | End: 2025-08-11

## 2025-08-11 RX ORDER — CEPHALEXIN 500 MG/1
500 CAPSULE ORAL ONCE
Status: COMPLETED | OUTPATIENT
Start: 2025-08-11 | End: 2025-08-11

## 2025-08-11 RX ORDER — CEPHALEXIN 500 MG/1
500 CAPSULE ORAL 2 TIMES DAILY
Qty: 14 CAPSULE | Refills: 0 | Status: SHIPPED | OUTPATIENT
Start: 2025-08-11 | End: 2025-08-18

## 2025-08-11 RX ADMIN — CEPHALEXIN 500 MG: 500 CAPSULE ORAL at 10:25

## 2025-08-11 RX ADMIN — LIDOCAINE HYDROCHLORIDE 5 ML: 20 INJECTION, SOLUTION INFILTRATION; PERINEURAL at 10:22

## 2025-08-11 ASSESSMENT — PAIN SCALES - GENERAL: PAINLEVEL_OUTOF10: 4

## 2025-08-11 ASSESSMENT — PAIN DESCRIPTION - DESCRIPTORS: DESCRIPTORS: THROBBING

## 2025-08-11 ASSESSMENT — LIFESTYLE VARIABLES
HOW MANY STANDARD DRINKS CONTAINING ALCOHOL DO YOU HAVE ON A TYPICAL DAY: PATIENT DOES NOT DRINK
HOW OFTEN DO YOU HAVE A DRINK CONTAINING ALCOHOL: NEVER

## 2025-08-11 ASSESSMENT — PAIN - FUNCTIONAL ASSESSMENT: PAIN_FUNCTIONAL_ASSESSMENT: 0-10

## 2025-08-11 ASSESSMENT — PAIN DESCRIPTION - LOCATION: LOCATION: LEG

## 2025-08-27 ENCOUNTER — OFFICE VISIT (OUTPATIENT)
Dept: FAMILY MEDICINE CLINIC | Age: 89
End: 2025-08-27
Payer: MEDICARE

## 2025-08-27 VITALS
RESPIRATION RATE: 18 BRPM | OXYGEN SATURATION: 97 % | DIASTOLIC BLOOD PRESSURE: 62 MMHG | SYSTOLIC BLOOD PRESSURE: 124 MMHG | HEART RATE: 55 BPM

## 2025-08-27 DIAGNOSIS — S81.802A LEG WOUND, LEFT, INITIAL ENCOUNTER: Primary | ICD-10-CM

## 2025-08-27 PROCEDURE — G8417 CALC BMI ABV UP PARAM F/U: HCPCS | Performed by: FAMILY MEDICINE

## 2025-08-27 PROCEDURE — 1111F DSCHRG MED/CURRENT MED MERGE: CPT | Performed by: FAMILY MEDICINE

## 2025-08-27 PROCEDURE — G8427 DOCREV CUR MEDS BY ELIG CLIN: HCPCS | Performed by: FAMILY MEDICINE

## 2025-08-27 PROCEDURE — 1123F ACP DISCUSS/DSCN MKR DOCD: CPT | Performed by: FAMILY MEDICINE

## 2025-08-27 PROCEDURE — 1036F TOBACCO NON-USER: CPT | Performed by: FAMILY MEDICINE

## 2025-08-27 PROCEDURE — 1159F MED LIST DOCD IN RCRD: CPT | Performed by: FAMILY MEDICINE

## 2025-08-27 PROCEDURE — 99213 OFFICE O/P EST LOW 20 MIN: CPT | Performed by: FAMILY MEDICINE

## 2025-08-28 ENCOUNTER — TELEPHONE (OUTPATIENT)
Dept: FAMILY MEDICINE CLINIC | Age: 89
End: 2025-08-28

## 2025-08-28 RX ORDER — CEPHALEXIN 500 MG/1
500 CAPSULE ORAL 3 TIMES DAILY
Qty: 15 CAPSULE | Refills: 0 | Status: SHIPPED | OUTPATIENT
Start: 2025-08-28 | End: 2025-09-02

## (undated) DEVICE — SYRINGE, LUER LOCK, 30ML: Brand: MEDLINE

## (undated) DEVICE — S-CURVE URETHRAL DILATOR SET WITH AQ, HYDROPHILIC COATING: Brand: S~CURVE

## (undated) DEVICE — SOLUTION IRRIGATION STRL H2O 1000 ML UROMATIC CONTAINER

## (undated) DEVICE — CYSTO/BLADDER IRRIGATION SET, REGULATING CLAMP

## (undated) DEVICE — URETERAL DILATOR

## (undated) DEVICE — SOLUTION IRRIG 1000ML STRL H2O USP PLAS POUR BTL

## (undated) DEVICE — WET SKIN PREP TRAY: Brand: MEDLINE INDUSTRIES, INC.

## (undated) DEVICE — FIBER LSR HOLM 1000 DISP

## (undated) DEVICE — CYSTO: Brand: MEDLINE INDUSTRIES, INC.

## (undated) DEVICE — GUIDEWIRE ENDOSCP L150CM DIA0.035IN TIP 3CM PTFE NIT

## (undated) DEVICE — GLOVE ORANGE PI 7   MSG9070

## (undated) DEVICE — BAG DRAINAGE NS

## (undated) DEVICE — Device